# Patient Record
Sex: MALE | Race: WHITE | Employment: OTHER | URBAN - METROPOLITAN AREA
[De-identification: names, ages, dates, MRNs, and addresses within clinical notes are randomized per-mention and may not be internally consistent; named-entity substitution may affect disease eponyms.]

---

## 2017-04-03 DIAGNOSIS — E11.9 TYPE 2 DIABETES MELLITUS WITHOUT COMPLICATION, WITHOUT LONG-TERM CURRENT USE OF INSULIN (HCC): Chronic | ICD-10-CM

## 2017-04-03 DIAGNOSIS — I10 ESSENTIAL HYPERTENSION, BENIGN: Chronic | ICD-10-CM

## 2017-04-03 RX ORDER — LISINOPRIL 2.5 MG/1
TABLET ORAL
Qty: 90 TABLET | Refills: 0 | Status: SHIPPED | OUTPATIENT
Start: 2017-04-03 | End: 2017-07-05 | Stop reason: SDUPTHER

## 2017-04-03 RX ORDER — BLOOD-GLUCOSE METER
KIT MISCELLANEOUS
Qty: 300 STRIP | Refills: 0 | Status: SHIPPED | OUTPATIENT
Start: 2017-04-03 | End: 2017-07-05 | Stop reason: SDUPTHER

## 2017-04-06 ENCOUNTER — OFFICE VISIT (OUTPATIENT)
Dept: FAMILY MEDICINE CLINIC | Age: 77
End: 2017-04-06

## 2017-04-06 VITALS
DIASTOLIC BLOOD PRESSURE: 80 MMHG | RESPIRATION RATE: 16 BRPM | SYSTOLIC BLOOD PRESSURE: 124 MMHG | BODY MASS INDEX: 36.47 KG/M2 | WEIGHT: 232.4 LBS | HEIGHT: 67 IN

## 2017-04-06 DIAGNOSIS — E11.8 TYPE 2 DIABETES MELLITUS WITH COMPLICATION, WITHOUT LONG-TERM CURRENT USE OF INSULIN (HCC): Primary | Chronic | ICD-10-CM

## 2017-04-06 DIAGNOSIS — I25.10 CORONARY ARTERY DISEASE DUE TO LIPID RICH PLAQUE: Chronic | ICD-10-CM

## 2017-04-06 DIAGNOSIS — I10 ESSENTIAL HYPERTENSION, BENIGN: Chronic | ICD-10-CM

## 2017-04-06 DIAGNOSIS — I25.83 CORONARY ARTERY DISEASE DUE TO LIPID RICH PLAQUE: Chronic | ICD-10-CM

## 2017-04-06 DIAGNOSIS — E78.00 PURE HYPERCHOLESTEROLEMIA: Chronic | ICD-10-CM

## 2017-04-06 LAB — HBA1C MFR BLD: 7.6 %

## 2017-04-06 PROCEDURE — 3017F COLORECTAL CA SCREEN DOC REV: CPT | Performed by: FAMILY MEDICINE

## 2017-04-06 PROCEDURE — 83036 HEMOGLOBIN GLYCOSYLATED A1C: CPT | Performed by: FAMILY MEDICINE

## 2017-04-06 PROCEDURE — 4040F PNEUMOC VAC/ADMIN/RCVD: CPT | Performed by: FAMILY MEDICINE

## 2017-04-06 PROCEDURE — 3045F PR MOST RECENT HEMOGLOBIN A1C LEVEL 7.0-9.0%: CPT | Performed by: FAMILY MEDICINE

## 2017-04-06 PROCEDURE — G8598 ASA/ANTIPLAT THER USED: HCPCS | Performed by: FAMILY MEDICINE

## 2017-04-06 PROCEDURE — G8417 CALC BMI ABV UP PARAM F/U: HCPCS | Performed by: FAMILY MEDICINE

## 2017-04-06 PROCEDURE — 1123F ACP DISCUSS/DSCN MKR DOCD: CPT | Performed by: FAMILY MEDICINE

## 2017-04-06 PROCEDURE — 4004F PT TOBACCO SCREEN RCVD TLK: CPT | Performed by: FAMILY MEDICINE

## 2017-04-06 PROCEDURE — G8427 DOCREV CUR MEDS BY ELIG CLIN: HCPCS | Performed by: FAMILY MEDICINE

## 2017-04-06 PROCEDURE — 99214 OFFICE O/P EST MOD 30 MIN: CPT | Performed by: FAMILY MEDICINE

## 2017-04-06 RX ORDER — ATORVASTATIN CALCIUM 40 MG/1
TABLET, FILM COATED ORAL
Qty: 90 TABLET | Refills: 0 | Status: SHIPPED | OUTPATIENT
Start: 2017-04-06 | End: 2017-08-02 | Stop reason: SDUPTHER

## 2017-04-06 ASSESSMENT — ENCOUNTER SYMPTOMS
WHEEZING: 0
SHORTNESS OF BREATH: 1
COUGH: 1
CHOKING: 0
CHEST TIGHTNESS: 0

## 2017-04-06 ASSESSMENT — PATIENT HEALTH QUESTIONNAIRE - PHQ9
1. LITTLE INTEREST OR PLEASURE IN DOING THINGS: 0
2. FEELING DOWN, DEPRESSED OR HOPELESS: 0
SUM OF ALL RESPONSES TO PHQ QUESTIONS 1-9: 0
SUM OF ALL RESPONSES TO PHQ9 QUESTIONS 1 & 2: 0

## 2017-04-07 LAB
CREATININE URINE: 106.8 MG/DL (ref 39–259)
MICROALBUMIN UR-MCNC: 8 MG/DL
MICROALBUMIN/CREAT UR-RTO: 74.9 MG/G (ref 0–30)

## 2017-05-02 RX ORDER — GLIPIZIDE 5 MG/1
TABLET ORAL
Qty: 270 TABLET | Refills: 0 | Status: SHIPPED | OUTPATIENT
Start: 2017-05-02 | End: 2017-08-08 | Stop reason: SDUPTHER

## 2017-06-05 ENCOUNTER — OFFICE VISIT (OUTPATIENT)
Dept: FAMILY MEDICINE CLINIC | Age: 77
End: 2017-06-05

## 2017-06-05 VITALS
HEIGHT: 67 IN | OXYGEN SATURATION: 93 % | HEART RATE: 72 BPM | WEIGHT: 236 LBS | SYSTOLIC BLOOD PRESSURE: 136 MMHG | TEMPERATURE: 97.6 F | RESPIRATION RATE: 18 BRPM | DIASTOLIC BLOOD PRESSURE: 70 MMHG | BODY MASS INDEX: 37.04 KG/M2

## 2017-06-05 DIAGNOSIS — J44.1 COPD EXACERBATION (HCC): Primary | ICD-10-CM

## 2017-06-05 PROCEDURE — 94640 AIRWAY INHALATION TREATMENT: CPT | Performed by: NURSE PRACTITIONER

## 2017-06-05 PROCEDURE — G8427 DOCREV CUR MEDS BY ELIG CLIN: HCPCS | Performed by: NURSE PRACTITIONER

## 2017-06-05 PROCEDURE — 3023F SPIROM DOC REV: CPT | Performed by: NURSE PRACTITIONER

## 2017-06-05 PROCEDURE — 4040F PNEUMOC VAC/ADMIN/RCVD: CPT | Performed by: NURSE PRACTITIONER

## 2017-06-05 PROCEDURE — 3017F COLORECTAL CA SCREEN DOC REV: CPT | Performed by: NURSE PRACTITIONER

## 2017-06-05 PROCEDURE — G8417 CALC BMI ABV UP PARAM F/U: HCPCS | Performed by: NURSE PRACTITIONER

## 2017-06-05 PROCEDURE — 99214 OFFICE O/P EST MOD 30 MIN: CPT | Performed by: NURSE PRACTITIONER

## 2017-06-05 PROCEDURE — G8598 ASA/ANTIPLAT THER USED: HCPCS | Performed by: NURSE PRACTITIONER

## 2017-06-05 PROCEDURE — 1123F ACP DISCUSS/DSCN MKR DOCD: CPT | Performed by: NURSE PRACTITIONER

## 2017-06-05 PROCEDURE — 4004F PT TOBACCO SCREEN RCVD TLK: CPT | Performed by: NURSE PRACTITIONER

## 2017-06-05 PROCEDURE — G8926 SPIRO NO PERF OR DOC: HCPCS | Performed by: NURSE PRACTITIONER

## 2017-06-05 RX ORDER — ALBUTEROL SULFATE 2.5 MG/3ML
2.5 SOLUTION RESPIRATORY (INHALATION) ONCE
Status: COMPLETED | OUTPATIENT
Start: 2017-06-05 | End: 2017-06-05

## 2017-06-05 RX ORDER — ALBUTEROL SULFATE 90 UG/1
2 AEROSOL, METERED RESPIRATORY (INHALATION) EVERY 4 HOURS PRN
Qty: 1 INHALER | Refills: 3 | Status: SHIPPED | OUTPATIENT
Start: 2017-06-05 | End: 2018-05-15 | Stop reason: ALTCHOICE

## 2017-06-05 RX ORDER — SULFAMETHOXAZOLE AND TRIMETHOPRIM 800; 160 MG/1; MG/1
1 TABLET ORAL 2 TIMES DAILY
Qty: 20 TABLET | Refills: 0 | Status: SHIPPED | OUTPATIENT
Start: 2017-06-05 | End: 2017-06-15

## 2017-06-05 RX ORDER — PREDNISONE 10 MG/1
TABLET ORAL
Qty: 10 TABLET | Refills: 0 | Status: SHIPPED | OUTPATIENT
Start: 2017-06-05 | End: 2017-06-19 | Stop reason: ALTCHOICE

## 2017-06-05 RX ADMIN — ALBUTEROL SULFATE 2.5 MG: 2.5 SOLUTION RESPIRATORY (INHALATION) at 08:51

## 2017-06-19 ENCOUNTER — OFFICE VISIT (OUTPATIENT)
Dept: FAMILY MEDICINE CLINIC | Age: 77
End: 2017-06-19

## 2017-06-19 VITALS
BODY MASS INDEX: 36.76 KG/M2 | OXYGEN SATURATION: 96 % | WEIGHT: 234.2 LBS | SYSTOLIC BLOOD PRESSURE: 124 MMHG | DIASTOLIC BLOOD PRESSURE: 72 MMHG | HEIGHT: 67 IN | RESPIRATION RATE: 16 BRPM | HEART RATE: 79 BPM

## 2017-06-19 DIAGNOSIS — E11.59 TYPE 2 DIABETES MELLITUS WITH OTHER CIRCULATORY COMPLICATION, WITHOUT LONG-TERM CURRENT USE OF INSULIN (HCC): Chronic | ICD-10-CM

## 2017-06-19 DIAGNOSIS — J44.1 COPD WITH EXACERBATION (HCC): Primary | ICD-10-CM

## 2017-06-19 PROCEDURE — G8926 SPIRO NO PERF OR DOC: HCPCS | Performed by: FAMILY MEDICINE

## 2017-06-19 PROCEDURE — G8598 ASA/ANTIPLAT THER USED: HCPCS | Performed by: FAMILY MEDICINE

## 2017-06-19 PROCEDURE — 1123F ACP DISCUSS/DSCN MKR DOCD: CPT | Performed by: FAMILY MEDICINE

## 2017-06-19 PROCEDURE — 99214 OFFICE O/P EST MOD 30 MIN: CPT | Performed by: FAMILY MEDICINE

## 2017-06-19 PROCEDURE — 3017F COLORECTAL CA SCREEN DOC REV: CPT | Performed by: FAMILY MEDICINE

## 2017-06-19 PROCEDURE — 3046F HEMOGLOBIN A1C LEVEL >9.0%: CPT | Performed by: FAMILY MEDICINE

## 2017-06-19 PROCEDURE — G8427 DOCREV CUR MEDS BY ELIG CLIN: HCPCS | Performed by: FAMILY MEDICINE

## 2017-06-19 PROCEDURE — 3023F SPIROM DOC REV: CPT | Performed by: FAMILY MEDICINE

## 2017-06-19 PROCEDURE — 4004F PT TOBACCO SCREEN RCVD TLK: CPT | Performed by: FAMILY MEDICINE

## 2017-06-19 PROCEDURE — 4040F PNEUMOC VAC/ADMIN/RCVD: CPT | Performed by: FAMILY MEDICINE

## 2017-06-19 PROCEDURE — G8417 CALC BMI ABV UP PARAM F/U: HCPCS | Performed by: FAMILY MEDICINE

## 2017-06-19 RX ORDER — BUDESONIDE AND FORMOTEROL FUMARATE DIHYDRATE 160; 4.5 UG/1; UG/1
2 AEROSOL RESPIRATORY (INHALATION) 2 TIMES DAILY
Qty: 1 INHALER | Refills: 5 | Status: SHIPPED | OUTPATIENT
Start: 2017-06-19 | End: 2018-04-16

## 2017-07-05 ENCOUNTER — TELEPHONE (OUTPATIENT)
Dept: FAMILY MEDICINE CLINIC | Age: 77
End: 2017-07-05

## 2017-07-05 DIAGNOSIS — D50.8 ANEMIA, IRON DEFICIENCY, INADEQUATE DIETARY INTAKE: ICD-10-CM

## 2017-07-05 DIAGNOSIS — E78.00 PURE HYPERCHOLESTEROLEMIA: Primary | Chronic | ICD-10-CM

## 2017-07-05 DIAGNOSIS — I10 ESSENTIAL HYPERTENSION, BENIGN: Chronic | ICD-10-CM

## 2017-07-05 DIAGNOSIS — E55.9 VITAMIN D DEFICIENCY: ICD-10-CM

## 2017-07-05 DIAGNOSIS — I25.10 CORONARY ARTERY DISEASE DUE TO LIPID RICH PLAQUE: Chronic | ICD-10-CM

## 2017-07-05 DIAGNOSIS — E11.9 TYPE 2 DIABETES MELLITUS WITHOUT COMPLICATION, WITHOUT LONG-TERM CURRENT USE OF INSULIN (HCC): Chronic | ICD-10-CM

## 2017-07-05 DIAGNOSIS — Z51.81 MEDICATION MONITORING ENCOUNTER: ICD-10-CM

## 2017-07-05 DIAGNOSIS — I25.83 CORONARY ARTERY DISEASE DUE TO LIPID RICH PLAQUE: Chronic | ICD-10-CM

## 2017-07-05 DIAGNOSIS — K59.03 DRUG-INDUCED CONSTIPATION: Chronic | ICD-10-CM

## 2017-07-05 RX ORDER — BLOOD-GLUCOSE METER
KIT MISCELLANEOUS
Qty: 300 STRIP | Refills: 0 | Status: SHIPPED | OUTPATIENT
Start: 2017-07-05 | End: 2017-10-01 | Stop reason: SDUPTHER

## 2017-07-05 RX ORDER — LISINOPRIL 5 MG/1
5 TABLET ORAL DAILY
Qty: 90 TABLET | Refills: 0 | Status: SHIPPED | OUTPATIENT
Start: 2017-07-05 | End: 2017-10-16 | Stop reason: SDUPTHER

## 2017-08-02 DIAGNOSIS — I25.10 CORONARY ARTERY DISEASE DUE TO LIPID RICH PLAQUE: Chronic | ICD-10-CM

## 2017-08-02 DIAGNOSIS — I25.83 CORONARY ARTERY DISEASE DUE TO LIPID RICH PLAQUE: Chronic | ICD-10-CM

## 2017-08-02 DIAGNOSIS — E78.00 PURE HYPERCHOLESTEROLEMIA: Chronic | ICD-10-CM

## 2017-08-02 RX ORDER — ATORVASTATIN CALCIUM 40 MG/1
TABLET, FILM COATED ORAL
Qty: 90 TABLET | Refills: 0 | Status: SHIPPED | OUTPATIENT
Start: 2017-08-02 | End: 2017-08-08 | Stop reason: SDUPTHER

## 2017-08-08 DIAGNOSIS — I25.10 CORONARY ARTERY DISEASE DUE TO LIPID RICH PLAQUE: Chronic | ICD-10-CM

## 2017-08-08 DIAGNOSIS — E78.00 PURE HYPERCHOLESTEROLEMIA: Chronic | ICD-10-CM

## 2017-08-08 DIAGNOSIS — I25.83 CORONARY ARTERY DISEASE DUE TO LIPID RICH PLAQUE: Chronic | ICD-10-CM

## 2017-08-08 DIAGNOSIS — E11.8 TYPE 2 DIABETES MELLITUS WITH COMPLICATION, WITHOUT LONG-TERM CURRENT USE OF INSULIN (HCC): Chronic | ICD-10-CM

## 2017-08-08 DIAGNOSIS — I10 ESSENTIAL HYPERTENSION, BENIGN: Chronic | ICD-10-CM

## 2017-08-08 RX ORDER — GLIPIZIDE 5 MG/1
TABLET ORAL
Qty: 270 TABLET | Refills: 0 | Status: SHIPPED | OUTPATIENT
Start: 2017-08-08 | End: 2017-10-31 | Stop reason: SDUPTHER

## 2017-08-08 RX ORDER — ATORVASTATIN CALCIUM 40 MG/1
TABLET, FILM COATED ORAL
Qty: 90 TABLET | Refills: 0 | Status: SHIPPED | OUTPATIENT
Start: 2017-08-08 | End: 2017-12-29 | Stop reason: SDUPTHER

## 2017-08-14 ENCOUNTER — OFFICE VISIT (OUTPATIENT)
Dept: FAMILY MEDICINE CLINIC | Age: 77
End: 2017-08-14

## 2017-08-14 VITALS
RESPIRATION RATE: 16 BRPM | WEIGHT: 235.8 LBS | HEIGHT: 67 IN | OXYGEN SATURATION: 95 % | SYSTOLIC BLOOD PRESSURE: 136 MMHG | BODY MASS INDEX: 37.01 KG/M2 | HEART RATE: 68 BPM | DIASTOLIC BLOOD PRESSURE: 68 MMHG

## 2017-08-14 DIAGNOSIS — M79.89 LEG SWELLING: ICD-10-CM

## 2017-08-14 DIAGNOSIS — E55.9 VITAMIN D DEFICIENCY: ICD-10-CM

## 2017-08-14 DIAGNOSIS — G47.33 OBSTRUCTIVE SLEEP APNEA: Chronic | ICD-10-CM

## 2017-08-14 DIAGNOSIS — I25.10 CORONARY ARTERY DISEASE DUE TO LIPID RICH PLAQUE: Chronic | ICD-10-CM

## 2017-08-14 DIAGNOSIS — E78.00 PURE HYPERCHOLESTEROLEMIA: Chronic | ICD-10-CM

## 2017-08-14 DIAGNOSIS — Z23 NEED FOR PROPHYLACTIC VACCINATION AGAINST STREPTOCOCCUS PNEUMONIAE (PNEUMOCOCCUS): ICD-10-CM

## 2017-08-14 DIAGNOSIS — I25.83 CORONARY ARTERY DISEASE DUE TO LIPID RICH PLAQUE: Chronic | ICD-10-CM

## 2017-08-14 DIAGNOSIS — C44.91 RECURRENT BCC (BASAL CELL CARCINOMA): Chronic | ICD-10-CM

## 2017-08-14 DIAGNOSIS — K59.03 DRUG-INDUCED CONSTIPATION: Chronic | ICD-10-CM

## 2017-08-14 DIAGNOSIS — F17.200 TOBACCO USE DISORDER: Chronic | ICD-10-CM

## 2017-08-14 DIAGNOSIS — E11.8 TYPE 2 DIABETES MELLITUS WITH COMPLICATION, WITHOUT LONG-TERM CURRENT USE OF INSULIN (HCC): Primary | Chronic | ICD-10-CM

## 2017-08-14 DIAGNOSIS — Z51.81 MEDICATION MONITORING ENCOUNTER: ICD-10-CM

## 2017-08-14 DIAGNOSIS — I10 ESSENTIAL HYPERTENSION, BENIGN: Chronic | ICD-10-CM

## 2017-08-14 LAB
A/G RATIO: 2 (ref 1.1–2.2)
ALBUMIN SERPL-MCNC: 4.7 G/DL (ref 3.4–5)
ALP BLD-CCNC: 55 U/L (ref 40–129)
ALT SERPL-CCNC: 18 U/L (ref 10–40)
ANION GAP SERPL CALCULATED.3IONS-SCNC: 16 MMOL/L (ref 3–16)
AST SERPL-CCNC: 13 U/L (ref 15–37)
BASOPHILS ABSOLUTE: 0.1 K/UL (ref 0–0.2)
BASOPHILS RELATIVE PERCENT: 0.8 %
BILIRUB SERPL-MCNC: 0.4 MG/DL (ref 0–1)
BUN BLDV-MCNC: 16 MG/DL (ref 7–20)
CALCIUM SERPL-MCNC: 9.5 MG/DL (ref 8.3–10.6)
CHLORIDE BLD-SCNC: 101 MMOL/L (ref 99–110)
CHOLESTEROL, TOTAL: 139 MG/DL (ref 0–199)
CO2: 26 MMOL/L (ref 21–32)
CREAT SERPL-MCNC: 0.8 MG/DL (ref 0.8–1.3)
EOSINOPHILS ABSOLUTE: 0.1 K/UL (ref 0–0.6)
EOSINOPHILS RELATIVE PERCENT: 1.2 %
GFR AFRICAN AMERICAN: >60
GFR NON-AFRICAN AMERICAN: >60
GLOBULIN: 2.4 G/DL
GLUCOSE BLD-MCNC: 122 MG/DL (ref 70–99)
HBA1C MFR BLD: 8.2 %
HCT VFR BLD CALC: 47.8 % (ref 40.5–52.5)
HDLC SERPL-MCNC: 37 MG/DL (ref 40–60)
HEMOGLOBIN: 15.8 G/DL (ref 13.5–17.5)
HOMOCYSTEINE: 12 UMOL/L (ref 0–10)
LDL CHOLESTEROL CALCULATED: 74 MG/DL
LYMPHOCYTES ABSOLUTE: 2.7 K/UL (ref 1–5.1)
LYMPHOCYTES RELATIVE PERCENT: 30.3 %
MAGNESIUM: 2.1 MG/DL (ref 1.8–2.4)
MCH RBC QN AUTO: 32.3 PG (ref 26–34)
MCHC RBC AUTO-ENTMCNC: 33.1 G/DL (ref 31–36)
MCV RBC AUTO: 97.6 FL (ref 80–100)
MONOCYTES ABSOLUTE: 0.5 K/UL (ref 0–1.3)
MONOCYTES RELATIVE PERCENT: 5.9 %
NEUTROPHILS ABSOLUTE: 5.5 K/UL (ref 1.7–7.7)
NEUTROPHILS RELATIVE PERCENT: 61.8 %
PDW BLD-RTO: 14.8 % (ref 12.4–15.4)
PLATELET # BLD: 273 K/UL (ref 135–450)
PMV BLD AUTO: 7.9 FL (ref 5–10.5)
POTASSIUM SERPL-SCNC: 5.4 MMOL/L (ref 3.5–5.1)
RBC # BLD: 4.9 M/UL (ref 4.2–5.9)
SODIUM BLD-SCNC: 143 MMOL/L (ref 136–145)
TOTAL PROTEIN: 7.1 G/DL (ref 6.4–8.2)
TRIGL SERPL-MCNC: 138 MG/DL (ref 0–150)
VITAMIN B-12: 314 PG/ML (ref 211–911)
VITAMIN D 25-HYDROXY: 31.5 NG/ML
VLDLC SERPL CALC-MCNC: 28 MG/DL
WBC # BLD: 9 K/UL (ref 4–11)

## 2017-08-14 PROCEDURE — 4040F PNEUMOC VAC/ADMIN/RCVD: CPT | Performed by: FAMILY MEDICINE

## 2017-08-14 PROCEDURE — 3017F COLORECTAL CA SCREEN DOC REV: CPT | Performed by: FAMILY MEDICINE

## 2017-08-14 PROCEDURE — 90670 PCV13 VACCINE IM: CPT | Performed by: FAMILY MEDICINE

## 2017-08-14 PROCEDURE — G0009 ADMIN PNEUMOCOCCAL VACCINE: HCPCS | Performed by: FAMILY MEDICINE

## 2017-08-14 PROCEDURE — 1123F ACP DISCUSS/DSCN MKR DOCD: CPT | Performed by: FAMILY MEDICINE

## 2017-08-14 PROCEDURE — G8427 DOCREV CUR MEDS BY ELIG CLIN: HCPCS | Performed by: FAMILY MEDICINE

## 2017-08-14 PROCEDURE — 4004F PT TOBACCO SCREEN RCVD TLK: CPT | Performed by: FAMILY MEDICINE

## 2017-08-14 PROCEDURE — 99215 OFFICE O/P EST HI 40 MIN: CPT | Performed by: FAMILY MEDICINE

## 2017-08-14 PROCEDURE — G8598 ASA/ANTIPLAT THER USED: HCPCS | Performed by: FAMILY MEDICINE

## 2017-08-14 PROCEDURE — 83036 HEMOGLOBIN GLYCOSYLATED A1C: CPT | Performed by: FAMILY MEDICINE

## 2017-08-14 PROCEDURE — 3046F HEMOGLOBIN A1C LEVEL >9.0%: CPT | Performed by: FAMILY MEDICINE

## 2017-08-14 PROCEDURE — 36415 COLL VENOUS BLD VENIPUNCTURE: CPT | Performed by: FAMILY MEDICINE

## 2017-08-14 PROCEDURE — G8417 CALC BMI ABV UP PARAM F/U: HCPCS | Performed by: FAMILY MEDICINE

## 2017-08-14 RX ORDER — CEPHALEXIN 500 MG/1
CAPSULE ORAL
Refills: 0 | COMMUNITY
Start: 2017-08-10 | End: 2017-09-27 | Stop reason: ALTCHOICE

## 2017-08-14 ASSESSMENT — ENCOUNTER SYMPTOMS
COUGH: 0
CHEST TIGHTNESS: 0
CHOKING: 0
WHEEZING: 1
SHORTNESS OF BREATH: 1
APNEA: 1

## 2017-09-12 ENCOUNTER — CARE COORDINATION (OUTPATIENT)
Dept: CARE COORDINATION | Age: 77
End: 2017-09-12

## 2017-09-22 ENCOUNTER — OFFICE VISIT (OUTPATIENT)
Dept: FAMILY MEDICINE CLINIC | Age: 77
End: 2017-09-22

## 2017-09-22 VITALS
HEART RATE: 62 BPM | RESPIRATION RATE: 24 BRPM | OXYGEN SATURATION: 95 % | HEIGHT: 67 IN | WEIGHT: 233 LBS | BODY MASS INDEX: 36.57 KG/M2 | DIASTOLIC BLOOD PRESSURE: 60 MMHG | SYSTOLIC BLOOD PRESSURE: 120 MMHG

## 2017-09-22 DIAGNOSIS — R59.9 LYMPH NODE ENLARGEMENT: Primary | ICD-10-CM

## 2017-09-22 PROCEDURE — G8427 DOCREV CUR MEDS BY ELIG CLIN: HCPCS | Performed by: NURSE PRACTITIONER

## 2017-09-22 PROCEDURE — G8598 ASA/ANTIPLAT THER USED: HCPCS | Performed by: NURSE PRACTITIONER

## 2017-09-22 PROCEDURE — 4040F PNEUMOC VAC/ADMIN/RCVD: CPT | Performed by: NURSE PRACTITIONER

## 2017-09-22 PROCEDURE — 3017F COLORECTAL CA SCREEN DOC REV: CPT | Performed by: NURSE PRACTITIONER

## 2017-09-22 PROCEDURE — G8417 CALC BMI ABV UP PARAM F/U: HCPCS | Performed by: NURSE PRACTITIONER

## 2017-09-22 PROCEDURE — 99212 OFFICE O/P EST SF 10 MIN: CPT | Performed by: NURSE PRACTITIONER

## 2017-09-22 PROCEDURE — 1123F ACP DISCUSS/DSCN MKR DOCD: CPT | Performed by: NURSE PRACTITIONER

## 2017-09-22 PROCEDURE — 4004F PT TOBACCO SCREEN RCVD TLK: CPT | Performed by: NURSE PRACTITIONER

## 2017-09-22 RX ORDER — SULFAMETHOXAZOLE AND TRIMETHOPRIM 800; 160 MG/1; MG/1
1 TABLET ORAL 2 TIMES DAILY
Qty: 20 TABLET | Refills: 0 | Status: SHIPPED | OUTPATIENT
Start: 2017-09-22 | End: 2017-10-02

## 2017-09-24 ASSESSMENT — ENCOUNTER SYMPTOMS: FACIAL SWELLING: 1

## 2017-09-26 ENCOUNTER — NURSE ONLY (OUTPATIENT)
Dept: FAMILY MEDICINE CLINIC | Age: 77
End: 2017-09-26

## 2017-09-26 DIAGNOSIS — Z23 NEED FOR INFLUENZA VACCINATION: Primary | ICD-10-CM

## 2017-09-26 PROCEDURE — G0008 ADMIN INFLUENZA VIRUS VAC: HCPCS | Performed by: FAMILY MEDICINE

## 2017-09-26 PROCEDURE — 90662 IIV NO PRSV INCREASED AG IM: CPT | Performed by: FAMILY MEDICINE

## 2017-09-27 ENCOUNTER — OFFICE VISIT (OUTPATIENT)
Dept: FAMILY MEDICINE CLINIC | Age: 77
End: 2017-09-27

## 2017-09-27 VITALS
DIASTOLIC BLOOD PRESSURE: 82 MMHG | RESPIRATION RATE: 18 BRPM | HEART RATE: 75 BPM | SYSTOLIC BLOOD PRESSURE: 126 MMHG | HEIGHT: 67 IN | OXYGEN SATURATION: 93 % | BODY MASS INDEX: 36.32 KG/M2 | WEIGHT: 231.4 LBS

## 2017-09-27 DIAGNOSIS — K11.5 SIALOLITHIASIS OF SUBMANDIBULAR GLAND: Primary | ICD-10-CM

## 2017-09-27 DIAGNOSIS — N64.4 BREAST PAIN, RIGHT: ICD-10-CM

## 2017-09-27 PROCEDURE — 4040F PNEUMOC VAC/ADMIN/RCVD: CPT | Performed by: FAMILY MEDICINE

## 2017-09-27 PROCEDURE — 4004F PT TOBACCO SCREEN RCVD TLK: CPT | Performed by: FAMILY MEDICINE

## 2017-09-27 PROCEDURE — G8427 DOCREV CUR MEDS BY ELIG CLIN: HCPCS | Performed by: FAMILY MEDICINE

## 2017-09-27 PROCEDURE — G8417 CALC BMI ABV UP PARAM F/U: HCPCS | Performed by: FAMILY MEDICINE

## 2017-09-27 PROCEDURE — 3017F COLORECTAL CA SCREEN DOC REV: CPT | Performed by: FAMILY MEDICINE

## 2017-09-27 PROCEDURE — 99213 OFFICE O/P EST LOW 20 MIN: CPT | Performed by: FAMILY MEDICINE

## 2017-09-27 PROCEDURE — G8598 ASA/ANTIPLAT THER USED: HCPCS | Performed by: FAMILY MEDICINE

## 2017-09-27 PROCEDURE — 1123F ACP DISCUSS/DSCN MKR DOCD: CPT | Performed by: FAMILY MEDICINE

## 2017-09-27 NOTE — MR AVS SNAPSHOT
metFORMIN (GLUCOPHAGE) 1000 MG tablet Indications: Type 2 Diabetes TAKE 1 TABLET BY MOUTH TWICE DAILY WITH A MEAL    metoprolol tartrate (LOPRESSOR) 25 MG tablet TAKE 1/2 TABLET BY MOUTH TWICE DAILY    lisinopril (PRINIVIL;ZESTRIL) 5 MG tablet Take 1 tablet by mouth daily    FREESTYLE LITE strip TEST THREE TIMES DAILY    budesonide-formoterol (SYMBICORT) 160-4.5 MCG/ACT AERO Inhale 2 puffs into the lungs 2 times daily    Spacer/Aero-Holding Chambers (E-Z SPACER) JANET 1 Device by Does not apply route daily as needed (12)    albuterol sulfate HFA (VENTOLIN HFA) 108 (90 BASE) MCG/ACT inhaler Inhale 2 puffs into the lungs every 4 hours as needed for Wheezing or Shortness of Breath    FREESTYLE LANCETS MISC 1 each by In Vitro route 3 times daily    JANUVIA 100 MG tablet TAKE 1 TABLET BY MOUTH DAILY    docusate sodium (COLACE) 100 MG capsule Take 200 mg by mouth daily Indications: Constipation    aspirin 81 MG tablet Take 81 mg by mouth daily.       Allergies           No Known Allergies         Additional Information        Basic Information     Date Of Birth Sex Race Ethnicity Preferred Language    1940 Male White Non-/Non  English      Problem List as of 9/27/2017  Date Reviewed: 9/27/2017                Osteoarthritis of cervical spine with myelopathy    Cervical spondylosis without myelopathy    Narcotic dependency, continuous (HCC)    Cervical radiculitis    Lumbar stenosis    Drug-induced constipation (Chronic)    Disc displacement, lumbar (Chronic)    Facet arthropathy, cervical (HCC)    Cervical spine degeneration (Chronic)    Cervical stenosis of spinal canal    Spinal stenosis, lumbar region, without neurogenic claudication (Chronic)    Cervical radiculopathy, chronic (Chronic)    Low back pain (Chronic)    COPD (chronic obstructive pulmonary disease) (HCC) (Chronic)    Obesity (Chronic)    Degenerative disc disease, cervical    Postoperative anemia    CAD (coronary artery disease) (Chronic)

## 2017-09-27 NOTE — PROGRESS NOTES
(SYMBICORT) 160-4.5 MCG/ACT AERO Inhale 2 puffs into the lungs 2 times daily 1 Inhaler 5    Spacer/Aero-Holding Chambers (E-Z SPACER) JANET 1 Device by Does not apply route daily as needed (12) 1 Device 0    albuterol sulfate HFA (VENTOLIN HFA) 108 (90 BASE) MCG/ACT inhaler Inhale 2 puffs into the lungs every 4 hours as needed for Wheezing or Shortness of Breath 1 Inhaler 3    FREESTYLE LANCETS MISC 1 each by In Vitro route 3 times daily 300 each 1    JANUVIA 100 MG tablet TAKE 1 TABLET BY MOUTH DAILY 90 tablet 0    docusate sodium (COLACE) 100 MG capsule Take 200 mg by mouth daily Indications: Constipation      aspirin 81 MG tablet Take 81 mg by mouth daily. No current facility-administered medications for this visit. Review of Systems   Constitutional: Negative for activity change, chills, diaphoresis, fatigue, fever and unexpected weight change. Objective:   Physical Exam   Constitutional: He appears well-developed. No distress. Neck: Trachea normal and phonation normal. Neck supple. No thyroid mass and no thyromegaly present. Pulmonary/Chest:       Lymphadenopathy:        Head (right side): No submental, no tonsillar, no preauricular and no occipital adenopathy present. Head (left side): No submental, no tonsillar, no preauricular, no posterior auricular and no occipital adenopathy present. He has no cervical adenopathy. Right cervical: No superficial cervical, no deep cervical and no posterior cervical adenopathy present. Left cervical: No superficial cervical, no deep cervical and no posterior cervical adenopathy present. He has no axillary adenopathy. Right axillary: No pectoral and no lateral adenopathy present. Skin: He is not diaphoretic. Nursing note and vitals reviewed.     Vitals:    09/27/17 1638   BP: 126/82   Site: Left Arm   Position: Sitting   Cuff Size: Medium Adult   Pulse: 75   Resp: 18   SpO2: 93%   Weight: 231 lb 6.4 oz (105 kg) Height: 5' 7\" (1.702 m)     BP Readings from Last 3 Encounters:   09/27/17 126/82   09/22/17 120/60   08/14/17 136/68     Pulse Readings from Last 3 Encounters:   09/27/17 75   09/22/17 62   08/14/17 68     Wt Readings from Last 3 Encounters:   09/27/17 231 lb 6.4 oz (105 kg)   09/22/17 233 lb (105.7 kg)   08/14/17 235 lb 12.8 oz (107 kg)     Body mass index is 36.24 kg/(m^2). Assessment:      1. Sialolithiasis of submandibular gland - right    2. Breast pain, right          Plan:      Aries Arroyo was seen today for other and other. Diagnoses and all orders for this visit:    Sialolithiasis of submandibular gland - right  Suck on lemon drops to help constantly. Zinc Lozenges (Basic or Cold Artem brands are common.)   Keep blood sugars down ot avoid an infection. Call for ENT referral if not getting better. Breast pain, right  -     diclofenac sodium (VOLTAREN) 1 % GEL; Apply 4 g topically 4 times daily To joint - can use this any where. Try heat to area once cream above dry or 4 hours after over the counter sports cream put on.   If not getting better may need a mammogram.      Salivary Gland Stone: Care Instructions  Your Care Instructions

## 2017-10-02 RX ORDER — BLOOD-GLUCOSE METER
KIT MISCELLANEOUS
Qty: 300 STRIP | Refills: 0 | Status: SHIPPED | OUTPATIENT
Start: 2017-10-02 | End: 2017-12-30 | Stop reason: SDUPTHER

## 2017-10-02 RX ORDER — LANCETS 28 GAUGE
EACH MISCELLANEOUS
Qty: 300 EACH | Refills: 0 | Status: SHIPPED | OUTPATIENT
Start: 2017-10-02 | End: 2017-12-30 | Stop reason: SDUPTHER

## 2017-10-14 DIAGNOSIS — I10 ESSENTIAL HYPERTENSION, BENIGN: Chronic | ICD-10-CM

## 2017-10-14 DIAGNOSIS — E11.9 TYPE 2 DIABETES MELLITUS WITHOUT COMPLICATION, WITHOUT LONG-TERM CURRENT USE OF INSULIN (HCC): Chronic | ICD-10-CM

## 2017-10-16 ENCOUNTER — TELEPHONE (OUTPATIENT)
Dept: FAMILY MEDICINE CLINIC | Age: 77
End: 2017-10-16

## 2017-10-16 DIAGNOSIS — I10 ESSENTIAL HYPERTENSION, BENIGN: Chronic | ICD-10-CM

## 2017-10-16 DIAGNOSIS — I10 ESSENTIAL HYPERTENSION, BENIGN: Primary | Chronic | ICD-10-CM

## 2017-10-16 DIAGNOSIS — E11.9 TYPE 2 DIABETES MELLITUS WITHOUT COMPLICATION, WITHOUT LONG-TERM CURRENT USE OF INSULIN (HCC): Chronic | ICD-10-CM

## 2017-10-16 RX ORDER — LISINOPRIL 5 MG/1
5 TABLET ORAL DAILY
Qty: 30 TABLET | Refills: 0 | Status: SHIPPED | OUTPATIENT
Start: 2017-10-16 | End: 2017-10-16 | Stop reason: SDUPTHER

## 2017-10-16 RX ORDER — LISINOPRIL 5 MG/1
5 TABLET ORAL DAILY
Qty: 90 TABLET | Refills: 1 | OUTPATIENT
Start: 2017-10-16

## 2017-10-17 RX ORDER — LISINOPRIL 5 MG/1
5 TABLET ORAL DAILY
Qty: 90 TABLET | Refills: 0 | Status: SHIPPED | OUTPATIENT
Start: 2017-10-17 | End: 2018-01-12 | Stop reason: SDUPTHER

## 2017-10-31 DIAGNOSIS — I25.83 CORONARY ARTERY DISEASE DUE TO LIPID RICH PLAQUE: Chronic | ICD-10-CM

## 2017-10-31 DIAGNOSIS — E11.8 TYPE 2 DIABETES MELLITUS WITH COMPLICATION, WITHOUT LONG-TERM CURRENT USE OF INSULIN (HCC): Chronic | ICD-10-CM

## 2017-10-31 DIAGNOSIS — I10 ESSENTIAL HYPERTENSION, BENIGN: Chronic | ICD-10-CM

## 2017-10-31 DIAGNOSIS — I25.10 CORONARY ARTERY DISEASE DUE TO LIPID RICH PLAQUE: Chronic | ICD-10-CM

## 2017-10-31 RX ORDER — GLIPIZIDE 5 MG/1
TABLET ORAL
Qty: 270 TABLET | Refills: 0 | Status: SHIPPED | OUTPATIENT
Start: 2017-10-31 | End: 2017-11-09 | Stop reason: SDUPTHER

## 2017-11-09 ENCOUNTER — TELEPHONE (OUTPATIENT)
Dept: FAMILY MEDICINE CLINIC | Age: 77
End: 2017-11-09

## 2017-11-09 DIAGNOSIS — I25.83 CORONARY ARTERY DISEASE DUE TO LIPID RICH PLAQUE: Chronic | ICD-10-CM

## 2017-11-09 DIAGNOSIS — E11.8 TYPE 2 DIABETES MELLITUS WITH COMPLICATION, WITHOUT LONG-TERM CURRENT USE OF INSULIN (HCC): Chronic | ICD-10-CM

## 2017-11-09 DIAGNOSIS — I25.10 CORONARY ARTERY DISEASE DUE TO LIPID RICH PLAQUE: Chronic | ICD-10-CM

## 2017-11-09 DIAGNOSIS — I10 ESSENTIAL HYPERTENSION, BENIGN: Chronic | ICD-10-CM

## 2017-11-09 RX ORDER — GLIPIZIDE 5 MG/1
TABLET ORAL
Qty: 270 TABLET | Refills: 0 | Status: SHIPPED | OUTPATIENT
Start: 2017-11-09 | End: 2018-04-16 | Stop reason: SDUPTHER

## 2017-12-04 ENCOUNTER — OFFICE VISIT (OUTPATIENT)
Dept: FAMILY MEDICINE CLINIC | Age: 77
End: 2017-12-04

## 2017-12-04 VITALS
DIASTOLIC BLOOD PRESSURE: 68 MMHG | RESPIRATION RATE: 20 BRPM | SYSTOLIC BLOOD PRESSURE: 118 MMHG | WEIGHT: 234.4 LBS | HEIGHT: 67 IN | OXYGEN SATURATION: 94 % | BODY MASS INDEX: 36.79 KG/M2 | HEART RATE: 70 BPM

## 2017-12-04 DIAGNOSIS — F11.20 OPIOID TYPE DEPENDENCE, CONTINUOUS (HCC): ICD-10-CM

## 2017-12-04 DIAGNOSIS — J42 BRONCHITIS, CHRONIC WITH ACUTE EXACERBATION (HCC): ICD-10-CM

## 2017-12-04 DIAGNOSIS — J20.9 BRONCHITIS, CHRONIC WITH ACUTE EXACERBATION (HCC): ICD-10-CM

## 2017-12-04 DIAGNOSIS — M47.818 SI JOINT ARTHRITIS: ICD-10-CM

## 2017-12-04 DIAGNOSIS — E11.8 TYPE 2 DIABETES MELLITUS WITH COMPLICATION, WITHOUT LONG-TERM CURRENT USE OF INSULIN (HCC): Primary | Chronic | ICD-10-CM

## 2017-12-04 LAB — HBA1C MFR BLD: 7.6 %

## 2017-12-04 PROCEDURE — 3023F SPIROM DOC REV: CPT | Performed by: FAMILY MEDICINE

## 2017-12-04 PROCEDURE — G8926 SPIRO NO PERF OR DOC: HCPCS | Performed by: FAMILY MEDICINE

## 2017-12-04 PROCEDURE — 1123F ACP DISCUSS/DSCN MKR DOCD: CPT | Performed by: FAMILY MEDICINE

## 2017-12-04 PROCEDURE — 4004F PT TOBACCO SCREEN RCVD TLK: CPT | Performed by: FAMILY MEDICINE

## 2017-12-04 PROCEDURE — G8417 CALC BMI ABV UP PARAM F/U: HCPCS | Performed by: FAMILY MEDICINE

## 2017-12-04 PROCEDURE — G8598 ASA/ANTIPLAT THER USED: HCPCS | Performed by: FAMILY MEDICINE

## 2017-12-04 PROCEDURE — 99214 OFFICE O/P EST MOD 30 MIN: CPT | Performed by: FAMILY MEDICINE

## 2017-12-04 PROCEDURE — 83036 HEMOGLOBIN GLYCOSYLATED A1C: CPT | Performed by: FAMILY MEDICINE

## 2017-12-04 PROCEDURE — 3017F COLORECTAL CA SCREEN DOC REV: CPT | Performed by: FAMILY MEDICINE

## 2017-12-04 PROCEDURE — 3045F PR MOST RECENT HEMOGLOBIN A1C LEVEL 7.0-9.0%: CPT | Performed by: FAMILY MEDICINE

## 2017-12-04 PROCEDURE — G8427 DOCREV CUR MEDS BY ELIG CLIN: HCPCS | Performed by: FAMILY MEDICINE

## 2017-12-04 PROCEDURE — G8484 FLU IMMUNIZE NO ADMIN: HCPCS | Performed by: FAMILY MEDICINE

## 2017-12-04 PROCEDURE — 4040F PNEUMOC VAC/ADMIN/RCVD: CPT | Performed by: FAMILY MEDICINE

## 2017-12-04 RX ORDER — AMOXICILLIN AND CLAVULANATE POTASSIUM 875; 125 MG/1; MG/1
1 TABLET, FILM COATED ORAL 2 TIMES DAILY
Qty: 28 TABLET | Refills: 0 | Status: SHIPPED | OUTPATIENT
Start: 2017-12-04 | End: 2017-12-18

## 2017-12-04 ASSESSMENT — ENCOUNTER SYMPTOMS
SINUS PAIN: 0
SORE THROAT: 0
VOICE CHANGE: 0
TROUBLE SWALLOWING: 0
SINUS PRESSURE: 0
RHINORRHEA: 0
FACIAL SWELLING: 0

## 2017-12-04 NOTE — PATIENT INSTRUCTIONS
to loosen chest or head congestion) and Dextromethorphan ( to decrease excess cough). Robitussin D.M. Syrup every 4-6 hrs or Mucinex D. M. pills twice a day. Use the pediatric formulations for children over 6 months making sure they are alcohol & sugar free for children, pregnant women, and diabetics. 5. Pain And Fevers: Take Acetaminophen ( Tylenol) for fevers, aches, and headaches. 2-500 mg every 8 hours for adults. Appropriate doses at bedtime for children may help them sleep better. If pregnant take 1 -500 mg (Tylenol) every 8 hours as needed. Ibuprofen may be used if not pregnant, but should be given with food to avoid nausea. Avoid Ibuprofen if you have high blood pressure, CHF, or kidney problems. 6.Gargle: (DAY ONE OF SYMPTOMS) Gargle in the back of the throat with the head tilted back and to the sides with a strong mouthwash  ( Listerine or Scope) after meals and at bedtime at least 4 -5 times a day. This helps kill bacteria and viruses in the back of the throat and will shorten the duration and decrease the severity of your symptoms: sore throat, cough, ear popping,/ear pain, and possibly dizziness. 7. Smoking: Avoid smoking or exposure to second hand smoke. 8. Zinc: (DAY ONE OF SYMPTOMS)  Zinc lozenges such as Cold Artem (available most stores), or Basic ([employer] brand) will help shorten the duration and lessen symptoms such as sore throat, cough, nasal congestion, runny nose, and post nasal drip. Use 1 lozenge every 2-4 hours ( after meals if stomach is sensitive). Children can use 10-15 mg or less 3-4 times a day or Zinc lollypops. In pregnancy limit to 50-60 mg a day for 7 days as prenatals have Zinc also. With diarrhea use zinc pills 50 mg 1/2 to 1 pill 2x/day. 9. Vitamins: Vitamin C 500 mg with breakfast and dinner. Children and pregnant women should drink citrus juices. This speeds healing and strengthens immune system. 10. Chest Symptoms: Vicks Vapor rub to the chest at bedtime.   11.

## 2017-12-04 NOTE — PROGRESS NOTES
Subjective:      Patient ID: Virgen Walker is a 78 y.o. male. Chief Complaint   Patient presents with    COPD     EDUCATION    Cough     WANTS TO KNOW WHAT CAN BE DONE    Diabetes     3 MONTH ROUTINE FOLLOW UP     HPI    Type 2 diabetes mellitus with complication, without long-term current use of insulin ([facility]  -     POCT glycosylated hemoglobin (Hb A1C)  Is 140-160 in am on average. PM glucose . Unable to average PM glucose without a calculator due to the high degree a variability. Opioid type dependence, continuous (HCC)  Seeing pain Dr. Autsin Saunders joint arthritis ([facility] [address]  Listed [facility] diagnosis. Is being followed by pain management. Head congestion  Been there for 50 years. No fevers. Has no humidifier. Washes out CPAP weekly. He is using old equipment as they did not send new. Has not contacted them. He is taking a Nasal spray that says not to use more than 3 days (Afrin?).      Current Outpatient Prescriptions   Medication Sig Dispense Refill    metFORMIN (GLUCOPHAGE) 1000 MG tablet TAKE 1 TABLET BY MOUTH TWICE DAILY WITH A MEAL 180 tablet 0    metoprolol tartrate (LOPRESSOR) 25 MG tablet TAKE 1/2 TABLET BY MOUTH TWICE DAILY 90 tablet 0    glipiZIDE (GLUCOTROL) 5 MG tablet TAKE 1/2 TO 1 TABLET BY MOUTH THREE TIMES DAILY BEFORE MEALS 270 tablet 0    HYDROcodone-acetaminophen (NORCO)  MG per tablet Take 1 tablet by mouth 4 times daily  ICD M48.02. Earliest Fill Date: 11/30/17 120 tablet 0    lisinopril (PRINIVIL;ZESTRIL) 5 MG tablet TAKE 1 TABLET BY MOUTH DAILY 90 tablet 0    FREESTYLE LITE strip TEST THREE TIMES DAILY 300 strip 0    FREESTYLE LANCETS MISC TEST BLOOD SUGAR THREE TIMES DAILY 300 each 0    atorvastatin (LIPITOR) 40 MG tablet TAKE 1 TABLET BY MOUTH EVERY DAY 90 tablet 0    Spacer/Aero-Holding Chambers (E-Z SPACER) JANET 1 Device by Does not apply route daily as needed (12) 1 Device 0    JANUVIA 100 MG tablet TAKE 1 TABLET BY MOUTH DAILY 90 tablet 0    docusate sodium 16   GFR Non-African American >60   Magnesium 2.10   Glucose 122 (H)   Calcium 9.5   Total Protein 7.1   Cholesterol, Total 139   HDL Cholesterol 37 (L)   LDL Calculated 74   Triglycerides 138   VLDL CHOLESTEROL CALCULATED 28   Homocysteine 12 (H)   Albumin 4.7   Globulin 2.4   Albumin/Globulin Ratio 2.0   Alk Phos 55   ALT 18   AST 13 (L)   Bilirubin 0.4   Vit D, 25-Hydroxy 31.5   WBC 9.0   RBC 4.90   Hemoglobin Quant 15.8   Hematocrit 47.8   MCV 97.6   MCH 32.3   MCHC 33.1   MPV 7.9   RDW 14.8   Platelet Count 636   Neutrophils % 61.8   Lymphocyte % 30.3   Monocytes % 5.9   Eosinophils % 1.2   Basophils % 0.8   Neutrophils # 5.5   Lymphocytes # 2.7   Monocytes # 0.5   Eosinophils # 0.1   Basophils # 0.1   Vitamin B-12 314     Results for POC orders placed in visit on 12/04/17   POCT glycosylated hemoglobin (Hb A1C)   Result Value Ref Range    Hemoglobin A1C 7.6 %     Assessment:      1. Type 2 diabetes mellitus with complication, without long-term current use of insulin (HCC)    2. Opioid type dependence, continuous (HCC)    3. SI joint arthritis ([address]    4. Bronchitis, chronic with acute exacerbation ([address]          Plan:      - Counseling More Than 50% of the 25 min Appointment Time     Jose Cannon was seen today for copd, cough and diabetes. Diagnoses and all orders for this visit:    Type 2 diabetes mellitus with complication, without long-term current use of insulin (HCC)  -     POCT glycosylated hemoglobin (Hb A1C)  -     Moo Hernandez MD  Poor control for  4 years. Explained to the patient that poor diabetes control will result in increased risk of heart attack infections and amputations. Since I have not been able to gain control over a long period of time we'll have him see the specialist who can concentrate on this problem. Opioid type dependence, continuous ([address]  Per pain Doctor. SI joint arthritis ([address]  Per pain Doctor.     Bronchitis, chronic with acute exacerbation ([address] / Sinusitis  -     amoxicillin-clavulanate (AUGMENTIN) 875-125 MG per tablet; Take 1 tablet by mouth 2 times daily for 14 days  Discussed the risk of aspiration from reflux causing bronchitis and pneumonias and also is poorly treated COPD. Consistent use of his inhalers could help treat the cough. He need to send him to a pulmonologist to treat his COPD. Instructions for Respiratory Infections (SAVE THIS SHEET)  For the first 7-14 days of symptoms follow instructions below, even before being seen in the office or even during treatment with antibiotics, until symptom free. 1. Water: Drink 1 ounce of water for every 2 pounds of body weight for adults, 100 Ounces of water per day. This will loosen mucus in the head and chest & improve the weak feeling of dehydration, allow the body to get germ fighting resources to the infection. Half can be juice or sugar free Crystal Light. Don't count drinks with caffeine or carbonation. Infants can have Pedialyte liquid or freezer pops. Avoid salt if you have high Blood Pressure, swelling in the feet or ankles or have heart problems. 2. Humidity: Humidify the air to 35-50% ( or until the windows fog over slightly). Can use a humidifier, vaporizer, boil water on the stove or put a coffee can full of water on the heater vents. This will loosen mucus from infections and allergies. 3. Sleep: Get 8-10 hours a night and rest during the evening after work or school. If you have trouble sleeping, adults can take Melatonin 5mg up to 2 tabs at bedtime ( not for children or pregnant women). If Mono is suspected then sleep during 9PM to 9AM time span (if possible.)  4. Cough: Take cough medicines with Guaifenesin ( to loosen chest or head congestion) and Dextromethorphan ( to decrease excess cough). Robitussin D.M. Syrup every 4-6 hrs or Mucinex D. M. pills twice a day.  Use the pediatric formulations for children over 6 months making sure they are alcohol & sugar free for children, pregnant women, and diabetics. 5. Pain And Fevers: Take Acetaminophen ( Tylenol) for fevers, aches, and headaches. 2-500 mg every 8 hours for adults. Appropriate doses at bedtime for children may help them sleep better. If pregnant take 1 -500 mg (Tylenol) every 8 hours as needed. Ibuprofen may be used if not pregnant, but should be given with food to avoid nausea. Avoid Ibuprofen if you have high blood pressure, CHF, or kidney problems. 6.Gargle: (DAY ONE OF SYMPTOMS) Gargle in the back of the throat with the head tilted back and to the sides with a strong mouthwash  ( Listerine or Scope) after meals and at bedtime at least 4 -5 times a day. This helps kill bacteria and viruses in the back of the throat and will shorten the duration and decrease the severity of your symptoms: sore throat, cough, ear popping,/ear pain, and possibly dizziness. 7. Smoking: Avoid smoking or exposure to second hand smoke. 8. Zinc: (DAY ONE OF SYMPTOMS)  Zinc lozenges such as Cold Artem (available most stores), or Basic ([employer] brand) will help shorten the duration and lessen symptoms such as sore throat, cough, nasal congestion, runny nose, and post nasal drip. Use 1 lozenge every 2-4 hours ( after meals if stomach is sensitive). Children can use 10-15 mg or less 3-4 times a day or Zinc lollypops. In pregnancy limit to 50-60 mg a day for 7 days as prenatals have Zinc also. With diarrhea use zinc pills 50 mg 1/2 to 1 pill 2x/day. 9. Vitamins: Vitamin C 500 mg with breakfast and dinner. Children and pregnant women should drink citrus juices. This speeds healing and strengthens immune system. 10. Chest Symptoms: Vicks Vapor rub to the chest at bedtime. 11. Decongestants: Avoid all decongestants and antihistamine cold preparations in children. Try all of the above starting with day 1 of symptoms.  If Strep throat symptoms appear call to be seen in the office as soon as possible and don't gargle on

## 2017-12-29 DIAGNOSIS — I25.10 CORONARY ARTERY DISEASE DUE TO LIPID RICH PLAQUE: Chronic | ICD-10-CM

## 2017-12-29 DIAGNOSIS — E78.00 PURE HYPERCHOLESTEROLEMIA: Chronic | ICD-10-CM

## 2017-12-29 DIAGNOSIS — I25.83 CORONARY ARTERY DISEASE DUE TO LIPID RICH PLAQUE: Chronic | ICD-10-CM

## 2017-12-29 RX ORDER — ATORVASTATIN CALCIUM 40 MG/1
TABLET, FILM COATED ORAL
Qty: 90 TABLET | Refills: 0 | Status: SHIPPED | OUTPATIENT
Start: 2017-12-29 | End: 2018-03-29 | Stop reason: SDUPTHER

## 2018-01-02 RX ORDER — BLOOD-GLUCOSE METER
KIT MISCELLANEOUS
Qty: 300 STRIP | Refills: 0 | Status: SHIPPED | OUTPATIENT
Start: 2018-01-02 | End: 2018-07-26 | Stop reason: SDUPTHER

## 2018-01-02 RX ORDER — LANCETS 28 GAUGE
EACH MISCELLANEOUS
Qty: 300 EACH | Refills: 0 | Status: SHIPPED | OUTPATIENT
Start: 2018-01-02 | End: 2018-12-27 | Stop reason: SDUPTHER

## 2018-01-12 DIAGNOSIS — I10 ESSENTIAL HYPERTENSION, BENIGN: Chronic | ICD-10-CM

## 2018-01-12 DIAGNOSIS — E11.9 TYPE 2 DIABETES MELLITUS WITHOUT COMPLICATION, WITHOUT LONG-TERM CURRENT USE OF INSULIN (HCC): Chronic | ICD-10-CM

## 2018-01-12 RX ORDER — LISINOPRIL 5 MG/1
5 TABLET ORAL DAILY
Qty: 90 TABLET | Refills: 0 | Status: SHIPPED | OUTPATIENT
Start: 2018-01-12 | End: 2018-01-30 | Stop reason: SDUPTHER

## 2018-01-29 ENCOUNTER — TELEPHONE (OUTPATIENT)
Dept: FAMILY MEDICINE CLINIC | Age: 78
End: 2018-01-29

## 2018-01-29 DIAGNOSIS — J32.9 RECURRENT SINUSITIS: Primary | ICD-10-CM

## 2018-01-30 DIAGNOSIS — E11.8 TYPE 2 DIABETES MELLITUS WITH COMPLICATION, WITHOUT LONG-TERM CURRENT USE OF INSULIN (HCC): Chronic | ICD-10-CM

## 2018-01-30 DIAGNOSIS — I25.10 CORONARY ARTERY DISEASE DUE TO LIPID RICH PLAQUE: Chronic | ICD-10-CM

## 2018-01-30 DIAGNOSIS — E11.9 TYPE 2 DIABETES MELLITUS WITHOUT COMPLICATION, WITHOUT LONG-TERM CURRENT USE OF INSULIN (HCC): Chronic | ICD-10-CM

## 2018-01-30 DIAGNOSIS — I25.83 CORONARY ARTERY DISEASE DUE TO LIPID RICH PLAQUE: Chronic | ICD-10-CM

## 2018-01-30 DIAGNOSIS — I10 ESSENTIAL HYPERTENSION, BENIGN: Chronic | ICD-10-CM

## 2018-01-30 RX ORDER — LISINOPRIL 5 MG/1
5 TABLET ORAL DAILY
Qty: 90 TABLET | Refills: 0 | Status: SHIPPED | OUTPATIENT
Start: 2018-01-30 | End: 2018-04-16 | Stop reason: SDUPTHER

## 2018-01-30 NOTE — TELEPHONE ENCOUNTER
NOTE TO :  Please use the referral form. There is no ENT listed in the chart until care everywhere is searched. NOTE TO MA:  FAX REFERRAL.

## 2018-03-28 ENCOUNTER — TELEPHONE (OUTPATIENT)
Dept: FAMILY MEDICINE CLINIC | Age: 78
End: 2018-03-28

## 2018-03-28 NOTE — TELEPHONE ENCOUNTER
This is the 4th day pt has had a nose bleed. It gushes out , not just a little. It fills a hand towel with blood. Pt just takes a baby aspirin, he is on blood pressure medication lisinopril--pt did take it today. His BP has been normal.    What can they do? Go to The ER?   Please call

## 2018-03-29 DIAGNOSIS — I25.10 CORONARY ARTERY DISEASE DUE TO LIPID RICH PLAQUE: Chronic | ICD-10-CM

## 2018-03-29 DIAGNOSIS — I25.83 CORONARY ARTERY DISEASE DUE TO LIPID RICH PLAQUE: Chronic | ICD-10-CM

## 2018-03-29 DIAGNOSIS — E78.00 PURE HYPERCHOLESTEROLEMIA: Chronic | ICD-10-CM

## 2018-03-29 RX ORDER — ATORVASTATIN CALCIUM 40 MG/1
TABLET, FILM COATED ORAL
Qty: 30 TABLET | Refills: 0 | Status: SHIPPED | OUTPATIENT
Start: 2018-03-29 | End: 2018-04-09 | Stop reason: SDUPTHER

## 2018-04-09 ENCOUNTER — TELEPHONE (OUTPATIENT)
Dept: FAMILY MEDICINE CLINIC | Age: 78
End: 2018-04-09

## 2018-04-09 DIAGNOSIS — I25.10 CORONARY ARTERY DISEASE DUE TO LIPID RICH PLAQUE: Chronic | ICD-10-CM

## 2018-04-09 DIAGNOSIS — E78.00 PURE HYPERCHOLESTEROLEMIA: Chronic | ICD-10-CM

## 2018-04-09 DIAGNOSIS — I25.83 CORONARY ARTERY DISEASE DUE TO LIPID RICH PLAQUE: Chronic | ICD-10-CM

## 2018-04-11 RX ORDER — ATORVASTATIN CALCIUM 40 MG/1
TABLET, FILM COATED ORAL
Qty: 90 TABLET | Refills: 0 | Status: SHIPPED | OUTPATIENT
Start: 2018-04-11 | End: 2018-07-24 | Stop reason: SDUPTHER

## 2018-04-16 ENCOUNTER — TELEPHONE (OUTPATIENT)
Dept: FAMILY MEDICINE CLINIC | Age: 78
End: 2018-04-16

## 2018-04-16 ENCOUNTER — OFFICE VISIT (OUTPATIENT)
Dept: FAMILY MEDICINE CLINIC | Age: 78
End: 2018-04-16

## 2018-04-16 VITALS
BODY MASS INDEX: 36.41 KG/M2 | RESPIRATION RATE: 24 BRPM | WEIGHT: 232 LBS | HEIGHT: 67 IN | OXYGEN SATURATION: 98 % | HEART RATE: 64 BPM | SYSTOLIC BLOOD PRESSURE: 136 MMHG | DIASTOLIC BLOOD PRESSURE: 70 MMHG

## 2018-04-16 DIAGNOSIS — I10 ESSENTIAL HYPERTENSION, BENIGN: Chronic | ICD-10-CM

## 2018-04-16 DIAGNOSIS — E11.8 TYPE 2 DIABETES MELLITUS WITH COMPLICATION, WITHOUT LONG-TERM CURRENT USE OF INSULIN (HCC): Primary | Chronic | ICD-10-CM

## 2018-04-16 DIAGNOSIS — I25.83 CORONARY ARTERY DISEASE DUE TO LIPID RICH PLAQUE: Chronic | ICD-10-CM

## 2018-04-16 DIAGNOSIS — I25.10 CORONARY ARTERY DISEASE DUE TO LIPID RICH PLAQUE: Chronic | ICD-10-CM

## 2018-04-16 DIAGNOSIS — E78.00 PURE HYPERCHOLESTEROLEMIA: Chronic | ICD-10-CM

## 2018-04-16 DIAGNOSIS — R04.0 BLEEDING FROM THE NOSE: ICD-10-CM

## 2018-04-16 LAB — HBA1C MFR BLD: 7.7 %

## 2018-04-16 PROCEDURE — G8417 CALC BMI ABV UP PARAM F/U: HCPCS | Performed by: FAMILY MEDICINE

## 2018-04-16 PROCEDURE — G8598 ASA/ANTIPLAT THER USED: HCPCS | Performed by: FAMILY MEDICINE

## 2018-04-16 PROCEDURE — 2022F DILAT RTA XM EVC RTNOPTHY: CPT | Performed by: FAMILY MEDICINE

## 2018-04-16 PROCEDURE — 99214 OFFICE O/P EST MOD 30 MIN: CPT | Performed by: FAMILY MEDICINE

## 2018-04-16 PROCEDURE — 3045F PR MOST RECENT HEMOGLOBIN A1C LEVEL 7.0-9.0%: CPT | Performed by: FAMILY MEDICINE

## 2018-04-16 PROCEDURE — 3017F COLORECTAL CA SCREEN DOC REV: CPT | Performed by: FAMILY MEDICINE

## 2018-04-16 PROCEDURE — G8427 DOCREV CUR MEDS BY ELIG CLIN: HCPCS | Performed by: FAMILY MEDICINE

## 2018-04-16 PROCEDURE — 83036 HEMOGLOBIN GLYCOSYLATED A1C: CPT | Performed by: FAMILY MEDICINE

## 2018-04-16 PROCEDURE — 4004F PT TOBACCO SCREEN RCVD TLK: CPT | Performed by: FAMILY MEDICINE

## 2018-04-16 PROCEDURE — 1123F ACP DISCUSS/DSCN MKR DOCD: CPT | Performed by: FAMILY MEDICINE

## 2018-04-16 PROCEDURE — 4040F PNEUMOC VAC/ADMIN/RCVD: CPT | Performed by: FAMILY MEDICINE

## 2018-04-16 RX ORDER — LISINOPRIL 5 MG/1
5 TABLET ORAL DAILY
Qty: 90 TABLET | Refills: 0 | Status: SHIPPED | OUTPATIENT
Start: 2018-04-16 | End: 2018-10-08 | Stop reason: SDUPTHER

## 2018-04-16 RX ORDER — GLIPIZIDE 5 MG/1
TABLET ORAL
Qty: 270 TABLET | Refills: 0 | Status: SHIPPED | OUTPATIENT
Start: 2018-04-16 | End: 2018-12-03 | Stop reason: SDUPTHER

## 2018-04-16 ASSESSMENT — PATIENT HEALTH QUESTIONNAIRE - PHQ9
SUM OF ALL RESPONSES TO PHQ9 QUESTIONS 1 & 2: 0
1. LITTLE INTEREST OR PLEASURE IN DOING THINGS: 0
2. FEELING DOWN, DEPRESSED OR HOPELESS: 0
SUM OF ALL RESPONSES TO PHQ QUESTIONS 1-9: 0

## 2018-04-16 ASSESSMENT — ENCOUNTER SYMPTOMS
CHEST TIGHTNESS: 0
CHOKING: 0
APNEA: 1
SHORTNESS OF BREATH: 1
COUGH: 1
WHEEZING: 0

## 2018-04-29 ENCOUNTER — TELEPHONE (OUTPATIENT)
Dept: FAMILY MEDICINE CLINIC | Age: 78
End: 2018-04-29

## 2018-05-15 ENCOUNTER — OFFICE VISIT (OUTPATIENT)
Dept: FAMILY MEDICINE CLINIC | Age: 78
End: 2018-05-15

## 2018-05-15 VITALS
HEIGHT: 67 IN | RESPIRATION RATE: 16 BRPM | HEART RATE: 75 BPM | TEMPERATURE: 98.3 F | SYSTOLIC BLOOD PRESSURE: 130 MMHG | DIASTOLIC BLOOD PRESSURE: 80 MMHG | WEIGHT: 225 LBS | OXYGEN SATURATION: 93 % | BODY MASS INDEX: 35.31 KG/M2

## 2018-05-15 DIAGNOSIS — J43.1 PANLOBULAR EMPHYSEMA (HCC): ICD-10-CM

## 2018-05-15 DIAGNOSIS — J44.1 CHRONIC OBSTRUCTIVE PULMONARY DISEASE WITH ACUTE EXACERBATION (HCC): ICD-10-CM

## 2018-05-15 DIAGNOSIS — F11.20 NARCOTIC DEPENDENCY, CONTINUOUS (HCC): ICD-10-CM

## 2018-05-15 DIAGNOSIS — R07.89 CHEST TIGHTNESS: Primary | ICD-10-CM

## 2018-05-15 PROCEDURE — G8926 SPIRO NO PERF OR DOC: HCPCS | Performed by: REGISTERED NURSE

## 2018-05-15 PROCEDURE — 93000 ELECTROCARDIOGRAM COMPLETE: CPT | Performed by: REGISTERED NURSE

## 2018-05-15 PROCEDURE — 1123F ACP DISCUSS/DSCN MKR DOCD: CPT | Performed by: REGISTERED NURSE

## 2018-05-15 PROCEDURE — 99214 OFFICE O/P EST MOD 30 MIN: CPT | Performed by: REGISTERED NURSE

## 2018-05-15 PROCEDURE — 4040F PNEUMOC VAC/ADMIN/RCVD: CPT | Performed by: REGISTERED NURSE

## 2018-05-15 PROCEDURE — G8417 CALC BMI ABV UP PARAM F/U: HCPCS | Performed by: REGISTERED NURSE

## 2018-05-15 PROCEDURE — G8598 ASA/ANTIPLAT THER USED: HCPCS | Performed by: REGISTERED NURSE

## 2018-05-15 PROCEDURE — G8427 DOCREV CUR MEDS BY ELIG CLIN: HCPCS | Performed by: REGISTERED NURSE

## 2018-05-15 PROCEDURE — 4004F PT TOBACCO SCREEN RCVD TLK: CPT | Performed by: REGISTERED NURSE

## 2018-05-15 PROCEDURE — 3023F SPIROM DOC REV: CPT | Performed by: REGISTERED NURSE

## 2018-05-15 RX ORDER — ALBUTEROL SULFATE 90 UG/1
2 AEROSOL, METERED RESPIRATORY (INHALATION) EVERY 4 HOURS PRN
Qty: 1 INHALER | Refills: 3 | Status: SHIPPED | OUTPATIENT
Start: 2018-05-15 | End: 2019-01-17

## 2018-05-15 RX ORDER — METHYLPREDNISOLONE 4 MG/1
4 TABLET ORAL SEE ADMIN INSTRUCTIONS
Qty: 1 KIT | Refills: 0 | Status: SHIPPED | OUTPATIENT
Start: 2018-05-15 | End: 2018-05-21

## 2018-05-15 RX ORDER — AZITHROMYCIN 250 MG/1
TABLET, FILM COATED ORAL
Qty: 6 TABLET | Refills: 0 | Status: SHIPPED | OUTPATIENT
Start: 2018-05-15 | End: 2019-05-10 | Stop reason: SDUPTHER

## 2018-05-15 ASSESSMENT — ENCOUNTER SYMPTOMS
RHINORRHEA: 1
COUGH: 0
SORE THROAT: 0
SINUS PAIN: 0
SHORTNESS OF BREATH: 1
WHEEZING: 0
SINUS PRESSURE: 0
TROUBLE SWALLOWING: 0
CHEST TIGHTNESS: 1

## 2018-06-29 ENCOUNTER — OFFICE VISIT (OUTPATIENT)
Dept: FAMILY MEDICINE CLINIC | Age: 78
End: 2018-06-29

## 2018-06-29 VITALS
BODY MASS INDEX: 36.03 KG/M2 | HEIGHT: 67 IN | RESPIRATION RATE: 18 BRPM | OXYGEN SATURATION: 93 % | HEART RATE: 70 BPM | WEIGHT: 229.6 LBS | SYSTOLIC BLOOD PRESSURE: 132 MMHG | TEMPERATURE: 98.4 F | DIASTOLIC BLOOD PRESSURE: 82 MMHG

## 2018-06-29 DIAGNOSIS — B96.89 ACUTE BACTERIAL SINUSITIS: ICD-10-CM

## 2018-06-29 DIAGNOSIS — H65.22 LEFT CHRONIC SEROUS OTITIS MEDIA: Primary | ICD-10-CM

## 2018-06-29 DIAGNOSIS — J01.90 ACUTE BACTERIAL SINUSITIS: ICD-10-CM

## 2018-06-29 PROCEDURE — 4004F PT TOBACCO SCREEN RCVD TLK: CPT | Performed by: NURSE PRACTITIONER

## 2018-06-29 PROCEDURE — 99213 OFFICE O/P EST LOW 20 MIN: CPT | Performed by: NURSE PRACTITIONER

## 2018-06-29 PROCEDURE — G8417 CALC BMI ABV UP PARAM F/U: HCPCS | Performed by: NURSE PRACTITIONER

## 2018-06-29 PROCEDURE — 1123F ACP DISCUSS/DSCN MKR DOCD: CPT | Performed by: NURSE PRACTITIONER

## 2018-06-29 PROCEDURE — G8598 ASA/ANTIPLAT THER USED: HCPCS | Performed by: NURSE PRACTITIONER

## 2018-06-29 PROCEDURE — G8427 DOCREV CUR MEDS BY ELIG CLIN: HCPCS | Performed by: NURSE PRACTITIONER

## 2018-06-29 PROCEDURE — 4040F PNEUMOC VAC/ADMIN/RCVD: CPT | Performed by: NURSE PRACTITIONER

## 2018-06-29 RX ORDER — PREDNISONE 10 MG/1
TABLET ORAL
Qty: 36 TABLET | Refills: 0 | Status: SHIPPED | OUTPATIENT
Start: 2018-06-29 | End: 2018-08-27 | Stop reason: ALTCHOICE

## 2018-06-29 RX ORDER — FLUTICASONE PROPIONATE 50 MCG
1 SPRAY, SUSPENSION (ML) NASAL DAILY
Qty: 1 BOTTLE | Refills: 3 | Status: SHIPPED | OUTPATIENT
Start: 2018-06-29 | End: 2020-02-18

## 2018-06-29 RX ORDER — AMOXICILLIN AND CLAVULANATE POTASSIUM 875; 125 MG/1; MG/1
1 TABLET, FILM COATED ORAL 2 TIMES DAILY
Qty: 20 TABLET | Refills: 0 | Status: SHIPPED | OUTPATIENT
Start: 2018-06-29 | End: 2018-10-12 | Stop reason: SDUPTHER

## 2018-06-29 NOTE — PROGRESS NOTES
if the fluid does not clear up after 3 months. · If your doctor prescribed antibiotics, take them as directed. Do not stop taking them just because you feel better. You need to take the full course of antibiotics. When should you call for help? Call your doctor now or seek immediate medical care if:    · You have symptoms of infection, such as:  ¨ Increased pain, swelling, warmth, or redness. ¨ Pus draining from the area. ¨ A fever.    Watch closely for changes in your health, and be sure to contact your doctor if:    · You notice changes in hearing.     · You do not get better as expected. Where can you learn more? Go to [email or web address].[email or web address] [email or web address] and sign in to your [facility] account. Enter M871 in the [employer] box to learn more about \"Middle Ear Fluid: Care Instructions. \"     If you do not have an account, please click on the \"Sign Up Now\" link. Current as of: May 12, 2017  Content Version: 11.6  © 0344-0335 [employer]. Care instructions adapted under license by [facility] ([facility]). If you have questions about a medical condition or this instruction, always ask your healthcare professional. [place] [address] any warranty or liability for your use of this information.

## 2018-07-24 DIAGNOSIS — I25.83 CORONARY ARTERY DISEASE DUE TO LIPID RICH PLAQUE: Chronic | ICD-10-CM

## 2018-07-24 DIAGNOSIS — E78.00 PURE HYPERCHOLESTEROLEMIA: Chronic | ICD-10-CM

## 2018-07-24 DIAGNOSIS — I25.10 CORONARY ARTERY DISEASE DUE TO LIPID RICH PLAQUE: Chronic | ICD-10-CM

## 2018-07-25 DIAGNOSIS — I10 ESSENTIAL HYPERTENSION, BENIGN: Chronic | ICD-10-CM

## 2018-07-25 DIAGNOSIS — E11.8 TYPE 2 DIABETES MELLITUS WITH COMPLICATION, WITHOUT LONG-TERM CURRENT USE OF INSULIN (HCC): Chronic | ICD-10-CM

## 2018-07-25 DIAGNOSIS — I25.10 CORONARY ARTERY DISEASE DUE TO LIPID RICH PLAQUE: Chronic | ICD-10-CM

## 2018-07-25 DIAGNOSIS — I25.83 CORONARY ARTERY DISEASE DUE TO LIPID RICH PLAQUE: Chronic | ICD-10-CM

## 2018-07-25 RX ORDER — ATORVASTATIN CALCIUM 40 MG/1
TABLET, FILM COATED ORAL
Qty: 90 TABLET | Refills: 0 | Status: SHIPPED | OUTPATIENT
Start: 2018-07-25 | End: 2018-10-22 | Stop reason: SDUPTHER

## 2018-07-26 RX ORDER — BLOOD-GLUCOSE METER
KIT MISCELLANEOUS
Qty: 300 STRIP | Refills: 0 | Status: SHIPPED | OUTPATIENT
Start: 2018-07-26 | End: 2019-02-05 | Stop reason: SDUPTHER

## 2018-08-27 ENCOUNTER — OFFICE VISIT (OUTPATIENT)
Dept: FAMILY MEDICINE CLINIC | Age: 78
End: 2018-08-27

## 2018-08-27 VITALS
RESPIRATION RATE: 17 BRPM | HEART RATE: 70 BPM | HEIGHT: 67 IN | BODY MASS INDEX: 36.41 KG/M2 | DIASTOLIC BLOOD PRESSURE: 70 MMHG | WEIGHT: 232 LBS | SYSTOLIC BLOOD PRESSURE: 124 MMHG | OXYGEN SATURATION: 95 %

## 2018-08-27 DIAGNOSIS — R35.1 NOCTURIA: ICD-10-CM

## 2018-08-27 DIAGNOSIS — E11.8 TYPE 2 DIABETES MELLITUS WITH COMPLICATION, WITHOUT LONG-TERM CURRENT USE OF INSULIN (HCC): Primary | Chronic | ICD-10-CM

## 2018-08-27 DIAGNOSIS — I10 ESSENTIAL HYPERTENSION, BENIGN: Chronic | ICD-10-CM

## 2018-08-27 DIAGNOSIS — E66.01 CLASS 2 SEVERE OBESITY DUE TO EXCESS CALORIES WITH SERIOUS COMORBIDITY AND BODY MASS INDEX (BMI) OF 36.0 TO 36.9 IN ADULT (HCC): Chronic | ICD-10-CM

## 2018-08-27 DIAGNOSIS — J43.1 PANLOBULAR EMPHYSEMA (HCC): Chronic | ICD-10-CM

## 2018-08-27 DIAGNOSIS — M79.89 LEG SWELLING: ICD-10-CM

## 2018-08-27 LAB
BILIRUBIN, POC: NORMAL
BLOOD URINE, POC: NORMAL
CLARITY, POC: NORMAL
COLOR, POC: NORMAL
CREATININE URINE: 87.6 MG/DL (ref 39–259)
GLUCOSE URINE, POC: NORMAL
HBA1C MFR BLD: 7.4 %
KETONES, POC: NORMAL
LEUKOCYTE EST, POC: NORMAL
MICROALBUMIN UR-MCNC: 4.7 MG/DL
MICROALBUMIN/CREAT UR-RTO: 53.7 MG/G (ref 0–30)
NITRITE, POC: NORMAL
PH, POC: 5.5
PROTEIN, POC: NORMAL
SPECIFIC GRAVITY, POC: 1.02
UROBILINOGEN, POC: 0.2

## 2018-08-27 PROCEDURE — G8598 ASA/ANTIPLAT THER USED: HCPCS | Performed by: FAMILY MEDICINE

## 2018-08-27 PROCEDURE — G8417 CALC BMI ABV UP PARAM F/U: HCPCS | Performed by: FAMILY MEDICINE

## 2018-08-27 PROCEDURE — 3023F SPIROM DOC REV: CPT | Performed by: FAMILY MEDICINE

## 2018-08-27 PROCEDURE — 4004F PT TOBACCO SCREEN RCVD TLK: CPT | Performed by: FAMILY MEDICINE

## 2018-08-27 PROCEDURE — 4040F PNEUMOC VAC/ADMIN/RCVD: CPT | Performed by: FAMILY MEDICINE

## 2018-08-27 PROCEDURE — 83036 HEMOGLOBIN GLYCOSYLATED A1C: CPT | Performed by: FAMILY MEDICINE

## 2018-08-27 PROCEDURE — G8427 DOCREV CUR MEDS BY ELIG CLIN: HCPCS | Performed by: FAMILY MEDICINE

## 2018-08-27 PROCEDURE — 81002 URINALYSIS NONAUTO W/O SCOPE: CPT | Performed by: FAMILY MEDICINE

## 2018-08-27 PROCEDURE — 1101F PT FALLS ASSESS-DOCD LE1/YR: CPT | Performed by: FAMILY MEDICINE

## 2018-08-27 PROCEDURE — 99215 OFFICE O/P EST HI 40 MIN: CPT | Performed by: FAMILY MEDICINE

## 2018-08-27 PROCEDURE — G8926 SPIRO NO PERF OR DOC: HCPCS | Performed by: FAMILY MEDICINE

## 2018-08-27 PROCEDURE — 1123F ACP DISCUSS/DSCN MKR DOCD: CPT | Performed by: FAMILY MEDICINE

## 2018-08-27 ASSESSMENT — ENCOUNTER SYMPTOMS
CHEST TIGHTNESS: 0
WHEEZING: 1
CHOKING: 0
SHORTNESS OF BREATH: 0
COUGH: 1

## 2018-08-27 NOTE — PROGRESS NOTES
Subjective:      Patient ID: Susana Lehman is a 70 y.o. male. Chief Complaint   Patient presents with    Diabetes     4 MONTH ROUTINE FOLLOW UP     HPI    Type 2 diabetes mellitus with complication, without long-term current use of insulin ([facility] [address] [address]  Other orders  -     POCT glycosylated hemoglobin (Hb A1C) - 7.4  -     MICROALBUMIN / CREATININE URINE RATIO; Future  He is checking his BS but forgot to bring. Highest 200's. In am 150 and pm is . He eats 2x/day. He eats sweets once in awhile. No exercise due to back pain. Essential hypertension, benign  -     MICROALBUMIN / CREATININE URINE RATIO; Future  Checks BP and is 140/80 or less. 150/80 high occasionally. Is not watching salt. Panlobular emphysema (HCC)  No cold nor COPD problems since seen. Is not using MDI any more. Can't tell it did any good. Leg swelling  Not on water pills. No sores on feet. Class 2 severe obesity due to excess calories with serious comorbidity and body mass index (BMI) of 36.0 to 36.9 in [facility])  He was sick and not eating and lost to 217 at home but now came back to 230 on its own per pt. He doesn't want to eat when he gets back pain. Nocturia  -     POCT Urinalysis no Micro  Every 1-2 hours. Getting up 4-5 x. No prior BPH dx.     Current Outpatient Prescriptions   Medication Sig Dispense Refill    FREESTYLE LITE strip TEST THREE TIMES DAILY 300 strip 0    atorvastatin (LIPITOR) 40 MG tablet TAKE 1 TABLET DAILY (NEED  APPOINTMENT FOR FURTHER    REFILLS) 90 tablet 0    metFORMIN (GLUCOPHAGE) 1000 MG tablet TAKE 1 TABLET BY MOUTH TWICE DAILY WITH A MEAL 180 tablet 0    metoprolol tartrate (LOPRESSOR) 25 MG tablet TAKE 1/2 TABLET BY MOUTH TWICE DAILY 90 tablet 0    fluticasone (FLONASE) 50 MCG/ACT nasal spray 1 spray by Nasal route daily 1 Bottle 3    albuterol sulfate HFA (VENTOLIN HFA) 108 (90 Base) MCG/ACT inhaler Inhale 2 puffs into the lungs every 4 hours as needed for Wheezing 1 Inhaler 3   

## 2018-10-08 DIAGNOSIS — E11.8 TYPE 2 DIABETES MELLITUS WITH COMPLICATION, WITHOUT LONG-TERM CURRENT USE OF INSULIN (HCC): Chronic | ICD-10-CM

## 2018-10-08 DIAGNOSIS — I10 ESSENTIAL HYPERTENSION, BENIGN: Chronic | ICD-10-CM

## 2018-10-09 RX ORDER — LISINOPRIL 5 MG/1
5 TABLET ORAL DAILY
Qty: 90 TABLET | Refills: 0 | Status: SHIPPED | OUTPATIENT
Start: 2018-10-09 | End: 2018-10-15 | Stop reason: SDUPTHER

## 2018-10-12 ENCOUNTER — OFFICE VISIT (OUTPATIENT)
Dept: FAMILY MEDICINE CLINIC | Age: 78
End: 2018-10-12
Payer: MEDICARE

## 2018-10-12 VITALS
SYSTOLIC BLOOD PRESSURE: 138 MMHG | RESPIRATION RATE: 20 BRPM | WEIGHT: 234 LBS | HEART RATE: 72 BPM | DIASTOLIC BLOOD PRESSURE: 80 MMHG | TEMPERATURE: 98.3 F | BODY MASS INDEX: 36.73 KG/M2 | HEIGHT: 67 IN

## 2018-10-12 DIAGNOSIS — H61.21 IMPACTED CERUMEN OF RIGHT EAR: ICD-10-CM

## 2018-10-12 DIAGNOSIS — I27.20 PULMONARY HYPERTENSION (HCC): ICD-10-CM

## 2018-10-12 DIAGNOSIS — H65.22 LEFT CHRONIC SEROUS OTITIS MEDIA: ICD-10-CM

## 2018-10-12 DIAGNOSIS — Z23 NEED FOR SHINGLES VACCINE: ICD-10-CM

## 2018-10-12 DIAGNOSIS — J01.90 ACUTE BACTERIAL SINUSITIS: Primary | ICD-10-CM

## 2018-10-12 DIAGNOSIS — B96.89 ACUTE BACTERIAL SINUSITIS: Primary | ICD-10-CM

## 2018-10-12 PROCEDURE — G8484 FLU IMMUNIZE NO ADMIN: HCPCS | Performed by: REGISTERED NURSE

## 2018-10-12 PROCEDURE — G8417 CALC BMI ABV UP PARAM F/U: HCPCS | Performed by: REGISTERED NURSE

## 2018-10-12 PROCEDURE — 69209 REMOVE IMPACTED EAR WAX UNI: CPT | Performed by: REGISTERED NURSE

## 2018-10-12 PROCEDURE — 99213 OFFICE O/P EST LOW 20 MIN: CPT | Performed by: REGISTERED NURSE

## 2018-10-12 PROCEDURE — 1101F PT FALLS ASSESS-DOCD LE1/YR: CPT | Performed by: REGISTERED NURSE

## 2018-10-12 PROCEDURE — 4004F PT TOBACCO SCREEN RCVD TLK: CPT | Performed by: REGISTERED NURSE

## 2018-10-12 PROCEDURE — G8427 DOCREV CUR MEDS BY ELIG CLIN: HCPCS | Performed by: REGISTERED NURSE

## 2018-10-12 PROCEDURE — 4040F PNEUMOC VAC/ADMIN/RCVD: CPT | Performed by: REGISTERED NURSE

## 2018-10-12 PROCEDURE — G8598 ASA/ANTIPLAT THER USED: HCPCS | Performed by: REGISTERED NURSE

## 2018-10-12 PROCEDURE — 1123F ACP DISCUSS/DSCN MKR DOCD: CPT | Performed by: REGISTERED NURSE

## 2018-10-12 RX ORDER — AMOXICILLIN AND CLAVULANATE POTASSIUM 875; 125 MG/1; MG/1
1 TABLET, FILM COATED ORAL 2 TIMES DAILY
Qty: 20 TABLET | Refills: 0 | Status: SHIPPED | OUTPATIENT
Start: 2018-10-12 | End: 2018-10-22 | Stop reason: ALTCHOICE

## 2018-10-12 ASSESSMENT — ENCOUNTER SYMPTOMS
SINUS PRESSURE: 1
SORE THROAT: 1
SHORTNESS OF BREATH: 1
RHINORRHEA: 1
CHEST TIGHTNESS: 0
TROUBLE SWALLOWING: 0
SINUS PAIN: 1
COUGH: 1
WHEEZING: 0

## 2018-10-12 ASSESSMENT — PATIENT HEALTH QUESTIONNAIRE - PHQ9
1. LITTLE INTEREST OR PLEASURE IN DOING THINGS: 0
2. FEELING DOWN, DEPRESSED OR HOPELESS: 0
SUM OF ALL RESPONSES TO PHQ9 QUESTIONS 1 & 2: 0
SUM OF ALL RESPONSES TO PHQ QUESTIONS 1-9: 0
SUM OF ALL RESPONSES TO PHQ QUESTIONS 1-9: 0

## 2018-10-12 NOTE — PROGRESS NOTES
REPLACEMENT  11/12/2013    BLEPHAROPLASTY  07/2007    UPPER LID    CARDIAC CATHETERIZATION  11/8/2013    dx    CARDIAC SURGERY  11/12/2013    reincised for bleeding    CERVICAL DISC SURGERY Right 2015    epidural steroids.  CORONARY ARTERY BYPASS GRAFT  11/12/2013    4 V    FINGER TRIGGER RELEASE Right [id number]    Release of trigger thumb (stenosing tenosynovitis)    HEMORRHOID SURGERY  [id number]    LAPAROSCOPIC APPENDECTOMY  11/14/2014    LUMBAR LAMINECTOMY  1998 & 2003    LUMBAR LAMINECTOMY  05/07/2014    L2-L3 & Repair dural tear x2.  LUMBAR SPINE SURGERY  8/4/2009    SPINaL CORD STIMULATOR FOR PAIN    LUMBAR SPINE SURGERY  08/2014    epidural steroids. 3/2017 NEAL    NERVE SURGERY  ~12/2012    nerve block     ROTATOR CUFF REPAIR  2001    SKIN CANCER EXCISION Right 07/2017    forehead THEN TOP OF THE HEAD.     SOFT TISSUE BIOPSY      needle bx right neck mass -benign    TEAR DUCT SURGERY Bilateral 01/01/2015    for dry    TESTICLE REMOVAL  1947    R    TONSILLECTOMY      CHILD    UMBILICAL HERNIA REPAIR  11/14/2014    Laparoscopic       Office Visit on 08/27/2018   Component Date Value Ref Range Status    Hemoglobin A1C 08/27/2018 7.4  % Final    Glucose, UA POC 08/27/2018 NEG   Final    Bilirubin, UA 08/27/2018 NEG   Final    Ketones, UA 08/27/2018 NEG   Final    Spec Grav, UA 08/27/2018 1.020   Final    Blood, UA POC 08/27/2018 NEG   Final    pH, UA 08/27/2018 5.5   Final    Protein, UA POC 08/27/2018 NEG   Final    Urobilinogen, UA 08/27/2018 0.2   Final    Leukocytes, UA 08/27/2018 NEG   Final    Nitrite, UA 08/27/2018 NEG   Final    Microalbumin, Random Urine 08/27/2018 4.70* <2.0 mg/dL Final    Creatinine, Ur 08/27/2018 87.6  39.0 - 259.0 mg/dL Final    Microalbumin Creatinine Ratio 08/27/2018 53.7* 0.0 - 30.0 mg/g Final       Family History   Problem Relation Age of Onset    Other Mother         CABG    Heart Surgery Mother 66        CABG    Sudden Death Father    Phoebe Ashley drip, rhinorrhea, sinus pain, sinus pressure and sore throat. Negative for ear discharge, sneezing and trouble swallowing. Respiratory: Positive for cough and shortness of breath. Negative for chest tightness and wheezing. Cardiovascular: Negative for chest pain and palpitations. Neurological: Negative for dizziness, weakness, light-headedness, numbness and headaches. All other systems reviewed and are negative. Vitals:  /80 (Site: Right Upper Arm, Position: Sitting, Cuff Size: Large Adult)   Pulse 72   Temp 98.3 °F (36.8 °C) (Oral)   Resp 20   Ht 5' 7\" (1.702 m)   Wt 234 lb (106.1 kg)   BMI 36.65 kg/m²     Physical Exam   Constitutional: He is oriented to person, place, and time. He appears well-developed and well-nourished. HENT:   Head: Normocephalic and atraumatic. Right Ear: External ear and ear canal normal.   Left Ear: External ear and ear canal normal. A middle ear effusion is present. Nose: Rhinorrhea present. Right sinus exhibits maxillary sinus tenderness and frontal sinus tenderness. Left sinus exhibits maxillary sinus tenderness and frontal sinus tenderness. Mouth/Throat: Uvula is midline, oropharynx is clear and moist and mucous membranes are normal. No oropharyngeal exudate, posterior oropharyngeal edema, posterior oropharyngeal erythema or tonsillar abscesses. No tonsillar exudate. Unable to visualize right TM due to cerumen impaction   Eyes: Pupils are equal, round, and reactive to light. Conjunctivae and EOM are normal.   Neck: Normal range of motion. Neck supple. No thyromegaly present. Cardiovascular: Normal rate, regular rhythm and intact distal pulses. Murmur heard. Pulmonary/Chest: Effort normal and breath sounds normal. No respiratory distress. He has no wheezes. He has no rales. He exhibits no tenderness. Lymphadenopathy:     He has cervical adenopathy. Neurological: He is alert and oriented to person, place, and time.    Skin: Skin is warm and

## 2018-10-12 NOTE — PATIENT INSTRUCTIONS
fluid clears up within a few months without treatment. You may need more tests if the fluid does not clear up after 3 months. · If your doctor prescribed antibiotics, take them as directed. Do not stop taking them just because you feel better. You need to take the full course of antibiotics. When should you call for help? Call your doctor now or seek immediate medical care if:    · You have symptoms of infection, such as:  ¨ Increased pain, swelling, warmth, or redness. ¨ Pus draining from the area. ¨ A fever.    Watch closely for changes in your health, and be sure to contact your doctor if:    · You notice changes in hearing.     · You do not get better as expected. Where can you learn more? Go to [email or web address] and sign in to your [employer] account. Enter F706 in the TaKaDu box to learn more about \"Middle Ear Fluid: Care Instructions. \"     If you do not have an account, please click on the \"Sign Up Now\" link. Current as of: May 12, 2017  Content Version: 11.7  © 3453-2556 [email or web address]. Care instructions adapted under license by [facility] ([facility]). If you have questions about a medical condition or this instruction, always ask your healthcare professional. [place] [address] any warranty or liability for your use of this information. Patient Education        Earwax Blockage: Care Instructions  Your Care Instructions    Earwax is a natural substance that protects the ear canal. Normally, earwax drains from the ears and does not cause problems. Sometimes earwax builds up and hardens. Earwax blockage (also called cerumen impaction) can cause some loss of hearing and pain. When wax is tightly packed, you will need to have your doctor remove it. Follow-up care is a key part of your treatment and safety. Be sure to make and go to all appointments, and call your doctor if you are having problems.  It's also a good idea to know your test [employer]. Care instructions adapted under license by [facility] ([facility]). If you have questions about a medical condition or this instruction, always ask your healthcare professional. [address] any warranty or liability for your use of this information.

## 2018-10-15 ENCOUNTER — TELEPHONE (OUTPATIENT)
Dept: FAMILY MEDICINE CLINIC | Age: 78
End: 2018-10-15

## 2018-10-15 DIAGNOSIS — I10 ESSENTIAL HYPERTENSION, BENIGN: Chronic | ICD-10-CM

## 2018-10-15 DIAGNOSIS — E11.8 TYPE 2 DIABETES MELLITUS WITH COMPLICATION, WITHOUT LONG-TERM CURRENT USE OF INSULIN (HCC): Chronic | ICD-10-CM

## 2018-10-15 RX ORDER — LISINOPRIL 5 MG/1
5 TABLET ORAL DAILY
Qty: 90 TABLET | Refills: 0 | Status: SHIPPED | OUTPATIENT
Start: 2018-10-15 | End: 2019-03-05 | Stop reason: SDUPTHER

## 2018-10-22 ENCOUNTER — OFFICE VISIT (OUTPATIENT)
Dept: FAMILY MEDICINE CLINIC | Age: 78
End: 2018-10-22
Payer: MEDICARE

## 2018-10-22 VITALS
HEIGHT: 67 IN | SYSTOLIC BLOOD PRESSURE: 130 MMHG | HEART RATE: 98 BPM | RESPIRATION RATE: 17 BRPM | DIASTOLIC BLOOD PRESSURE: 76 MMHG | OXYGEN SATURATION: 96 % | BODY MASS INDEX: 36.44 KG/M2 | WEIGHT: 232.2 LBS

## 2018-10-22 DIAGNOSIS — I25.83 CORONARY ARTERY DISEASE DUE TO LIPID RICH PLAQUE: Chronic | ICD-10-CM

## 2018-10-22 DIAGNOSIS — J43.1 PANLOBULAR EMPHYSEMA (HCC): Chronic | ICD-10-CM

## 2018-10-22 DIAGNOSIS — E78.00 PURE HYPERCHOLESTEROLEMIA: Chronic | ICD-10-CM

## 2018-10-22 DIAGNOSIS — E55.9 VITAMIN D DEFICIENCY: Chronic | ICD-10-CM

## 2018-10-22 DIAGNOSIS — Z23 NEEDS FLU SHOT: ICD-10-CM

## 2018-10-22 DIAGNOSIS — J32.9 RECURRENT SINUSITIS: Primary | ICD-10-CM

## 2018-10-22 DIAGNOSIS — I25.10 CORONARY ARTERY DISEASE DUE TO LIPID RICH PLAQUE: Chronic | ICD-10-CM

## 2018-10-22 PROCEDURE — G8926 SPIRO NO PERF OR DOC: HCPCS | Performed by: FAMILY MEDICINE

## 2018-10-22 PROCEDURE — 1123F ACP DISCUSS/DSCN MKR DOCD: CPT | Performed by: FAMILY MEDICINE

## 2018-10-22 PROCEDURE — 4004F PT TOBACCO SCREEN RCVD TLK: CPT | Performed by: FAMILY MEDICINE

## 2018-10-22 PROCEDURE — G8427 DOCREV CUR MEDS BY ELIG CLIN: HCPCS | Performed by: FAMILY MEDICINE

## 2018-10-22 PROCEDURE — 90662 IIV NO PRSV INCREASED AG IM: CPT | Performed by: FAMILY MEDICINE

## 2018-10-22 PROCEDURE — G8598 ASA/ANTIPLAT THER USED: HCPCS | Performed by: FAMILY MEDICINE

## 2018-10-22 PROCEDURE — 1101F PT FALLS ASSESS-DOCD LE1/YR: CPT | Performed by: FAMILY MEDICINE

## 2018-10-22 PROCEDURE — 4040F PNEUMOC VAC/ADMIN/RCVD: CPT | Performed by: FAMILY MEDICINE

## 2018-10-22 PROCEDURE — G8417 CALC BMI ABV UP PARAM F/U: HCPCS | Performed by: FAMILY MEDICINE

## 2018-10-22 PROCEDURE — G0008 ADMIN INFLUENZA VIRUS VAC: HCPCS | Performed by: FAMILY MEDICINE

## 2018-10-22 PROCEDURE — G8482 FLU IMMUNIZE ORDER/ADMIN: HCPCS | Performed by: FAMILY MEDICINE

## 2018-10-22 PROCEDURE — 99214 OFFICE O/P EST MOD 30 MIN: CPT | Performed by: FAMILY MEDICINE

## 2018-10-22 PROCEDURE — 3023F SPIROM DOC REV: CPT | Performed by: FAMILY MEDICINE

## 2018-10-22 RX ORDER — ATORVASTATIN CALCIUM 40 MG/1
TABLET, FILM COATED ORAL
Qty: 90 TABLET | Refills: 0 | Status: SHIPPED | OUTPATIENT
Start: 2018-10-22 | End: 2019-02-28 | Stop reason: SDUPTHER

## 2018-10-22 ASSESSMENT — ENCOUNTER SYMPTOMS
SORE THROAT: 0
TROUBLE SWALLOWING: 0
SINUS PAIN: 0
SINUS PRESSURE: 0
SHORTNESS OF BREATH: 1
WHEEZING: 0
VOICE CHANGE: 0
RHINORRHEA: 1

## 2018-10-22 NOTE — PROGRESS NOTES
Subjective:      Patient ID: Aym Li is a 70 y.o. male. Chief Complaint   Patient presents with    Head Congestion     LONGER THAN A MONTH, HE STATES \"YEARS\"    Shortness of Breath     LONG TIME/ REFERRAL TO ENT? HPI  SOB  He is having more sx in his chest. Cough is severe when gets up. CPAP keeps him opened up. Has CPAP is 15-20 years. Has a humidifier on CPAP. Uses distilled water. Cleans weekly. Cleans mask. Nasal congestion  Using nasal saline occasionally. No sinus netti pot. Just finished Augmentin x 10 days. Still smoking 1 PPD. Has not been using Flonase. Current Outpatient Prescriptions   Medication Sig Dispense Refill    lisinopril (PRINIVIL;ZESTRIL) 5 MG tablet Take 1 tablet by mouth daily 90 tablet 0    FREESTYLE LITE strip TEST THREE TIMES DAILY 300 strip 0    atorvastatin (LIPITOR) 40 MG tablet TAKE 1 TABLET DAILY (NEED  APPOINTMENT FOR FURTHER    REFILLS) 90 tablet 0    metFORMIN (GLUCOPHAGE) 1000 MG tablet TAKE 1 TABLET BY MOUTH TWICE DAILY WITH A MEAL 180 tablet 0    metoprolol tartrate (LOPRESSOR) 25 MG tablet TAKE 1/2 TABLET BY MOUTH TWICE DAILY 90 tablet 0    glipiZIDE (GLUCOTROL) 5 MG tablet TAKE 1/2 TO 1 TABLET BY MOUTH THREE TIMES DAILY BEFORE MEALS 270 tablet 0    FREESTYLE LANCETS MISC TEST BLOOD SUGAR THREE TIMES DAILY 300 each 0    HYDROcodone-acetaminophen (NORCO)  MG per tablet Take 1 tablet by mouth 4 times daily for 5 days  ICD M51.26. 20 tablet 0    docusate sodium (COLACE) 100 MG capsule Take 200 mg by mouth daily Indications: Constipation      aspirin 81 MG tablet Take 81 mg by mouth daily.       fluticasone (FLONASE) 50 MCG/ACT nasal spray 1 spray by Nasal route daily 1 Bottle 3    albuterol sulfate HFA (VENTOLIN HFA) 108 (90 Base) MCG/ACT inhaler Inhale 2 puffs into the lungs every 4 hours as needed for Wheezing 1 Inhaler 3    Spacer/Aero-Holding Chambers (E-Z SPACER) JANET 1 Device by Does not apply route daily as needed (12) 1 Device 0 No current facility-administered medications for this visit. Review of Systems   Constitutional: Positive for fatigue (chronic). Negative for chills, diaphoresis and fever. HENT: Positive for congestion, postnasal drip and rhinorrhea. Negative for ear discharge, ear pain, nosebleeds, sinus pain, sinus pressure, sneezing, sore throat, tinnitus, trouble swallowing and voice change. Respiratory: Positive for shortness of breath. Negative for wheezing. Objective:   Physical Exam   Constitutional: He appears well-developed. No distress. HENT:   Right Ear: Tympanic membrane, external ear and ear canal normal. Tympanic membrane is not injected, not erythematous, not retracted and not bulging. No middle ear effusion. Left Ear: External ear and ear canal normal. Tympanic membrane is not injected, not erythematous, not retracted and not bulging. No middle ear effusion. Nose: Mucosal edema present. No rhinorrhea. Right sinus exhibits no maxillary sinus tenderness and no frontal sinus tenderness. Left sinus exhibits no maxillary sinus tenderness and no frontal sinus tenderness. Mouth/Throat: Uvula is midline, oropharynx is clear and moist and mucous membranes are normal. Mucous membranes are not pale, not dry and not cyanotic. No uvula swelling. No oropharyngeal exudate, posterior oropharyngeal edema, posterior oropharyngeal erythema or tonsillar abscesses. Eyes: Conjunctivae are normal. Right eye exhibits no discharge and no exudate. Left eye exhibits no discharge and no exudate. Right conjunctiva is not injected. Left conjunctiva is not injected. No scleral icterus. Neck: Neck supple. No thyroid mass and no thyromegaly present. Cardiovascular: Normal rate, regular rhythm and normal heart sounds. Exam reveals no gallop and no friction rub. No murmur heard. Pulmonary/Chest: Effort normal. No respiratory distress.  He has decreased breath sounds in the right lower field and the left lower evaluation of COPD and risk of lung cancer with a low-dose CT scan. Because of his back problem he doesn't move enough to become short of breath due to his lungs. He still is smoking and declines help with cessation. Vitamin D deficiency  Vitamin D 5000 IU daily. Needs flu shot  Influenza high-dose 65 years plus.       Dora Mcrae,

## 2018-10-22 NOTE — PATIENT INSTRUCTIONS
Kenny Byrne was seen today for head congestion and shortness of breath. Diagnoses and all orders for this visit:    Recurrent sinusitis  Restart Flonase nightly. Claritin 10 mg once nightly. Needs sinus endoscopy (NP scope)  Vitamin C 500 mg 2x/day. Panlobular emphysema ([address]  Not the cause of head symptoms. Vitamin D deficiency  Vitamin D 5000 IU daily.

## 2018-10-25 DIAGNOSIS — E11.8 TYPE 2 DIABETES MELLITUS WITH COMPLICATION, WITHOUT LONG-TERM CURRENT USE OF INSULIN (HCC): Chronic | ICD-10-CM

## 2018-10-26 DIAGNOSIS — I10 ESSENTIAL HYPERTENSION, BENIGN: Chronic | ICD-10-CM

## 2018-10-26 DIAGNOSIS — I25.10 CORONARY ARTERY DISEASE DUE TO LIPID RICH PLAQUE: Chronic | ICD-10-CM

## 2018-10-26 DIAGNOSIS — I25.83 CORONARY ARTERY DISEASE DUE TO LIPID RICH PLAQUE: Chronic | ICD-10-CM

## 2018-10-29 ENCOUNTER — NURSE ONLY (OUTPATIENT)
Dept: FAMILY MEDICINE CLINIC | Age: 78
End: 2018-10-29
Payer: MEDICARE

## 2018-10-29 DIAGNOSIS — I27.20 PULMONARY HYPERTENSION (HCC): ICD-10-CM

## 2018-10-29 DIAGNOSIS — I10 ESSENTIAL HYPERTENSION, BENIGN: Chronic | ICD-10-CM

## 2018-10-29 DIAGNOSIS — E78.00 PURE HYPERCHOLESTEROLEMIA: Chronic | ICD-10-CM

## 2018-10-29 DIAGNOSIS — E11.9 TYPE 2 DIABETES MELLITUS WITHOUT COMPLICATION, WITHOUT LONG-TERM CURRENT USE OF INSULIN (HCC): Chronic | ICD-10-CM

## 2018-10-29 LAB
A/G RATIO: 2.1 (ref 1.1–2.2)
ALBUMIN SERPL-MCNC: 4.7 G/DL (ref 3.4–5)
ALP BLD-CCNC: 57 U/L (ref 40–129)
ALT SERPL-CCNC: 19 U/L (ref 10–40)
ANION GAP SERPL CALCULATED.3IONS-SCNC: 17 MMOL/L (ref 3–16)
AST SERPL-CCNC: 15 U/L (ref 15–37)
BASOPHILS ABSOLUTE: 0.1 K/UL (ref 0–0.2)
BASOPHILS RELATIVE PERCENT: 0.6 %
BILIRUB SERPL-MCNC: 0.4 MG/DL (ref 0–1)
BUN BLDV-MCNC: 11 MG/DL (ref 7–20)
CALCIUM SERPL-MCNC: 9.7 MG/DL (ref 8.3–10.6)
CHLORIDE BLD-SCNC: 106 MMOL/L (ref 99–110)
CHOLESTEROL, TOTAL: 116 MG/DL (ref 0–199)
CO2: 26 MMOL/L (ref 21–32)
CREAT SERPL-MCNC: 0.8 MG/DL (ref 0.8–1.3)
EOSINOPHILS ABSOLUTE: 0.1 K/UL (ref 0–0.6)
EOSINOPHILS RELATIVE PERCENT: 1.3 %
GFR AFRICAN AMERICAN: >60
GFR NON-AFRICAN AMERICAN: >60
GLOBULIN: 2.2 G/DL
GLUCOSE BLD-MCNC: 115 MG/DL (ref 70–99)
HCT VFR BLD CALC: 48.6 % (ref 40.5–52.5)
HDLC SERPL-MCNC: 34 MG/DL (ref 40–60)
HEMOGLOBIN: 15.6 G/DL (ref 13.5–17.5)
LDL CHOLESTEROL CALCULATED: 65 MG/DL
LYMPHOCYTES ABSOLUTE: 2.4 K/UL (ref 1–5.1)
LYMPHOCYTES RELATIVE PERCENT: 28.9 %
MCH RBC QN AUTO: 31.8 PG (ref 26–34)
MCHC RBC AUTO-ENTMCNC: 32.1 G/DL (ref 31–36)
MCV RBC AUTO: 98.9 FL (ref 80–100)
MONOCYTES ABSOLUTE: 0.5 K/UL (ref 0–1.3)
MONOCYTES RELATIVE PERCENT: 5.7 %
NEUTROPHILS ABSOLUTE: 5.4 K/UL (ref 1.7–7.7)
NEUTROPHILS RELATIVE PERCENT: 63.5 %
PDW BLD-RTO: 15.1 % (ref 12.4–15.4)
PLATELET # BLD: 254 K/UL (ref 135–450)
PMV BLD AUTO: 8.4 FL (ref 5–10.5)
POTASSIUM SERPL-SCNC: 5.8 MMOL/L (ref 3.5–5.1)
RBC # BLD: 4.92 M/UL (ref 4.2–5.9)
SODIUM BLD-SCNC: 149 MMOL/L (ref 136–145)
TOTAL PROTEIN: 6.9 G/DL (ref 6.4–8.2)
TRIGL SERPL-MCNC: 86 MG/DL (ref 0–150)
VLDLC SERPL CALC-MCNC: 17 MG/DL
WBC # BLD: 8.5 K/UL (ref 4–11)

## 2018-10-29 PROCEDURE — 36415 COLL VENOUS BLD VENIPUNCTURE: CPT | Performed by: FAMILY MEDICINE

## 2018-11-02 ENCOUNTER — OFFICE VISIT (OUTPATIENT)
Dept: ENT CLINIC | Age: 78
End: 2018-11-02
Payer: MEDICARE

## 2018-11-02 VITALS — OXYGEN SATURATION: 95 % | SYSTOLIC BLOOD PRESSURE: 152 MMHG | DIASTOLIC BLOOD PRESSURE: 74 MMHG | HEART RATE: 95 BPM

## 2018-11-02 DIAGNOSIS — J32.9 RECURRENT SINUSITIS: Primary | ICD-10-CM

## 2018-11-02 DIAGNOSIS — J30.9 ALLERGIC SINUSITIS: ICD-10-CM

## 2018-11-02 PROCEDURE — 4040F PNEUMOC VAC/ADMIN/RCVD: CPT | Performed by: OTOLARYNGOLOGY

## 2018-11-02 PROCEDURE — 4004F PT TOBACCO SCREEN RCVD TLK: CPT | Performed by: OTOLARYNGOLOGY

## 2018-11-02 PROCEDURE — G8417 CALC BMI ABV UP PARAM F/U: HCPCS | Performed by: OTOLARYNGOLOGY

## 2018-11-02 PROCEDURE — G8482 FLU IMMUNIZE ORDER/ADMIN: HCPCS | Performed by: OTOLARYNGOLOGY

## 2018-11-02 PROCEDURE — 1123F ACP DISCUSS/DSCN MKR DOCD: CPT | Performed by: OTOLARYNGOLOGY

## 2018-11-02 PROCEDURE — G8598 ASA/ANTIPLAT THER USED: HCPCS | Performed by: OTOLARYNGOLOGY

## 2018-11-02 PROCEDURE — 99203 OFFICE O/P NEW LOW 30 MIN: CPT | Performed by: OTOLARYNGOLOGY

## 2018-11-02 PROCEDURE — 1101F PT FALLS ASSESS-DOCD LE1/YR: CPT | Performed by: OTOLARYNGOLOGY

## 2018-11-02 PROCEDURE — G8427 DOCREV CUR MEDS BY ELIG CLIN: HCPCS | Performed by: OTOLARYNGOLOGY

## 2018-11-02 RX ORDER — DOXYCYCLINE 100 MG/1
100 CAPSULE ORAL 2 TIMES DAILY
Qty: 20 CAPSULE | Refills: 0 | Status: SHIPPED | OUTPATIENT
Start: 2018-11-02 | End: 2019-01-17

## 2018-11-02 RX ORDER — MONTELUKAST SODIUM 10 MG/1
10 TABLET ORAL NIGHTLY
Qty: 30 TABLET | Refills: 1 | Status: SHIPPED | OUTPATIENT
Start: 2018-11-02 | End: 2019-01-07 | Stop reason: SDUPTHER

## 2018-11-02 ASSESSMENT — ENCOUNTER SYMPTOMS
RESPIRATORY NEGATIVE: 1
SINUS PAIN: 1
VOICE CHANGE: 0
FACIAL SWELLING: 0
RHINORRHEA: 1
EYES NEGATIVE: 1
TROUBLE SWALLOWING: 0
ALLERGIC/IMMUNOLOGIC NEGATIVE: 1
SINUS PRESSURE: 1
SORE THROAT: 0

## 2018-11-02 NOTE — PROGRESS NOTES
not polyps present. .There were not other masses present. The turbinates were not enlarged beyond normal.     Eyes: Pupils are equal, round, and reactive to light. Conjunctivae are normal.   Neck: Normal range of motion. Neck supple. No tracheal deviation present. No thyromegaly present. Cardiovascular: Normal rate and regular rhythm. Pulmonary/Chest: Effort normal.   Lymphadenopathy:     He has no cervical adenopathy. Neurological: He is alert and oriented to person, place, and time. Skin: Skin is warm and dry. Psychiatric: He has a normal mood and affect. His behavior is normal. Judgment and thought content normal.       Assessment:      Findings are suggestive of chronic sinus infection associated with some allergy. Plan:      Sinus CT scan is ordered to evaluate the more chronic problem. At present we'll start him on doxycycline monohydrate as well as Singulair. He is instructed to continue use of his Flonase nasal spray on a daily basis.         Janice Watts MD

## 2018-11-07 ENCOUNTER — HOSPITAL ENCOUNTER (OUTPATIENT)
Dept: CT IMAGING | Age: 78
Discharge: HOME | End: 2018-11-07
Payer: MEDICARE

## 2018-11-07 DIAGNOSIS — J32.9 RECURRENT SINUSITIS: ICD-10-CM

## 2018-11-07 PROCEDURE — 70486 CT MAXILLOFACIAL W/O DYE: CPT

## 2018-11-21 DIAGNOSIS — E11.8 TYPE 2 DIABETES MELLITUS WITH COMPLICATION, WITHOUT LONG-TERM CURRENT USE OF INSULIN (HCC): Chronic | ICD-10-CM

## 2018-11-23 DIAGNOSIS — E11.8 TYPE 2 DIABETES MELLITUS WITH COMPLICATION, WITHOUT LONG-TERM CURRENT USE OF INSULIN (HCC): Chronic | ICD-10-CM

## 2018-12-03 ENCOUNTER — OFFICE VISIT (OUTPATIENT)
Dept: FAMILY MEDICINE CLINIC | Age: 78
End: 2018-12-03
Payer: MEDICARE

## 2018-12-03 VITALS
OXYGEN SATURATION: 96 % | RESPIRATION RATE: 22 BRPM | DIASTOLIC BLOOD PRESSURE: 64 MMHG | HEART RATE: 68 BPM | SYSTOLIC BLOOD PRESSURE: 122 MMHG | BODY MASS INDEX: 36.44 KG/M2 | WEIGHT: 232.2 LBS | HEIGHT: 67 IN

## 2018-12-03 DIAGNOSIS — F17.200 TOBACCO USE DISORDER: Chronic | ICD-10-CM

## 2018-12-03 DIAGNOSIS — I10 ESSENTIAL HYPERTENSION, BENIGN: Chronic | ICD-10-CM

## 2018-12-03 DIAGNOSIS — J43.1 PANLOBULAR EMPHYSEMA (HCC): Chronic | ICD-10-CM

## 2018-12-03 DIAGNOSIS — G47.33 OBSTRUCTIVE SLEEP APNEA: Chronic | ICD-10-CM

## 2018-12-03 DIAGNOSIS — E11.8 TYPE 2 DIABETES MELLITUS WITH COMPLICATION, WITHOUT LONG-TERM CURRENT USE OF INSULIN (HCC): Primary | Chronic | ICD-10-CM

## 2018-12-03 DIAGNOSIS — E87.5 HYPERKALEMIA: Chronic | ICD-10-CM

## 2018-12-03 LAB
ANION GAP SERPL CALCULATED.3IONS-SCNC: 14 MMOL/L (ref 3–16)
BUN BLDV-MCNC: 19 MG/DL (ref 7–20)
CALCIUM SERPL-MCNC: 9.5 MG/DL (ref 8.3–10.6)
CHLORIDE BLD-SCNC: 101 MMOL/L (ref 99–110)
CO2: 26 MMOL/L (ref 21–32)
CREAT SERPL-MCNC: 0.9 MG/DL (ref 0.8–1.3)
GFR AFRICAN AMERICAN: >60
GFR NON-AFRICAN AMERICAN: >60
GLUCOSE BLD-MCNC: 128 MG/DL (ref 70–99)
HBA1C MFR BLD: 7.5 %
POTASSIUM SERPL-SCNC: 5.5 MMOL/L (ref 3.5–5.1)
SODIUM BLD-SCNC: 141 MMOL/L (ref 136–145)

## 2018-12-03 PROCEDURE — G8926 SPIRO NO PERF OR DOC: HCPCS | Performed by: FAMILY MEDICINE

## 2018-12-03 PROCEDURE — G8482 FLU IMMUNIZE ORDER/ADMIN: HCPCS | Performed by: FAMILY MEDICINE

## 2018-12-03 PROCEDURE — 1123F ACP DISCUSS/DSCN MKR DOCD: CPT | Performed by: FAMILY MEDICINE

## 2018-12-03 PROCEDURE — G8427 DOCREV CUR MEDS BY ELIG CLIN: HCPCS | Performed by: FAMILY MEDICINE

## 2018-12-03 PROCEDURE — 4040F PNEUMOC VAC/ADMIN/RCVD: CPT | Performed by: FAMILY MEDICINE

## 2018-12-03 PROCEDURE — 36415 COLL VENOUS BLD VENIPUNCTURE: CPT | Performed by: FAMILY MEDICINE

## 2018-12-03 PROCEDURE — 83036 HEMOGLOBIN GLYCOSYLATED A1C: CPT | Performed by: FAMILY MEDICINE

## 2018-12-03 PROCEDURE — G8598 ASA/ANTIPLAT THER USED: HCPCS | Performed by: FAMILY MEDICINE

## 2018-12-03 PROCEDURE — 1101F PT FALLS ASSESS-DOCD LE1/YR: CPT | Performed by: FAMILY MEDICINE

## 2018-12-03 PROCEDURE — 3023F SPIROM DOC REV: CPT | Performed by: FAMILY MEDICINE

## 2018-12-03 PROCEDURE — 99214 OFFICE O/P EST MOD 30 MIN: CPT | Performed by: FAMILY MEDICINE

## 2018-12-03 PROCEDURE — 4004F PT TOBACCO SCREEN RCVD TLK: CPT | Performed by: FAMILY MEDICINE

## 2018-12-03 PROCEDURE — G8417 CALC BMI ABV UP PARAM F/U: HCPCS | Performed by: FAMILY MEDICINE

## 2018-12-03 RX ORDER — GLIPIZIDE 5 MG/1
TABLET ORAL
Qty: 180 TABLET | Refills: 0 | Status: SHIPPED | OUTPATIENT
Start: 2018-12-03 | End: 2019-02-20 | Stop reason: SDUPTHER

## 2018-12-03 RX ORDER — HYDROCODONE BITARTRATE AND ACETAMINOPHEN 10; 325 MG/1; MG/1
TABLET ORAL
Refills: 0 | COMMUNITY
Start: 2018-11-26

## 2018-12-03 ASSESSMENT — PATIENT HEALTH QUESTIONNAIRE - PHQ9
SUM OF ALL RESPONSES TO PHQ QUESTIONS 1-9: 0
1. LITTLE INTEREST OR PLEASURE IN DOING THINGS: 0
2. FEELING DOWN, DEPRESSED OR HOPELESS: 0
SUM OF ALL RESPONSES TO PHQ9 QUESTIONS 1 & 2: 0
SUM OF ALL RESPONSES TO PHQ QUESTIONS 1-9: 0

## 2018-12-03 ASSESSMENT — ENCOUNTER SYMPTOMS
CHEST TIGHTNESS: 0
SHORTNESS OF BREATH: 1
COUGH: 1
WHEEZING: 1
CHOKING: 0

## 2018-12-03 NOTE — PROGRESS NOTES
Subjective:      Patient ID: Aurea Grover is a 70 y.o. male. Chief Complaint   Patient presents with    Diabetes     3 MONTH ROUTINE FOLLOW UP   1  HPI    Type 2 diabetes mellitus with complication, without long-term current use of insulin ([facility])  -     POCT glycosylated hemoglobin (Hb A1C) 7.5  He is often only taking the Glipizide once daily. No SE's with med. BMs regular. No diarrhea. Am 130-150 on average. Range 104-181 Pm 110-120's on average. Range . Essential hypertension, benign  -     BASIC METABOLIC PANEL; Future  BP ~ 140/75-80. High at the specialist. Is not watching salt. Obstructive sleep apnea  Is using CPAP every night. Needs to be checked. Seen by [address] in the past.  Not sure if they are still in business. Panlobular emphysema ([address]  Not using MDI. Allergies better. No colds since last visit. Hyperkalemia  Eats 1-2 bananas /day at times. Water - not a lot. No salt substitute. Tobacco use disorder  1 PPD. Allergic sinusitis   He is now on the Singulair 10 mg nightly. Still on flonase daily. He feels better. Cataracts  Will have surgery in 1/2019. Current Outpatient Prescriptions   Medication Sig Dispense Refill    HYDROcodone-acetaminophen (NORCO)  MG per tablet TK 1 T PO  QID PRN P  0    metFORMIN (GLUCOPHAGE) 1000 MG tablet TAKE 1 TABLET BY MOUTH TWICE DAILY WITH A MEAL 180 tablet 0    doxycycline monohydrate (MONODOX) 100 MG capsule Take 1 capsule by mouth 2 times daily 20 capsule 0    montelukast (SINGULAIR) 10 MG tablet Take 1 tablet by mouth nightly 30 tablet 1    metoprolol tartrate (LOPRESSOR) 25 MG tablet TAKE 1/2 TABLET BY MOUTH TWICE DAILY 90 tablet 0    atorvastatin (LIPITOR) 40 MG tablet TAKE 1 TABLET DAILY.   MAKE AN APPOINTMENT FOR FURTHER REFILLS 90 tablet 0    lisinopril (PRINIVIL;ZESTRIL) 5 MG tablet Take 1 tablet by mouth daily 90 tablet 0    FREESTYLE LITE strip TEST THREE TIMES DAILY 300 strip 0    fluticasone 2.1   Alk Phos 57   ALT 19   AST 15   Bilirubin 0.4   WBC 8.5   RBC 4.92   Hemoglobin Quant 15.6   Hematocrit 48.6   MCV 98.9   MCH 31.8   MCHC 32.1   MPV 8.4   RDW 15.1   Platelet Count 928   Neutrophils % 63.5   Lymphocyte % 28.9   Monocytes % 5.7   Eosinophils % 1.3   Basophils % 0.6   Neutrophils # 5.4   Lymphocytes # 2.4   Monocytes # 0.5   Eosinophils # 0.1   Basophils # 0.1     Results for POC orders placed in visit on 12/03/18   POCT glycosylated hemoglobin (Hb A1C)   Result Value Ref Range    Hemoglobin A1C 7.5 %     CT OF THE SINUS WITHOUT CONTRAST  11/7/2018 5:08 pm  COMPARISON:  None  HISTORY:  ORDERING SYSTEM PROVIDED HISTORY: Recurrent sinusitis  TECHNOLOGIST PROVIDED HISTORY:  Ordering Physician Provided Reason for Exam: recurrent sinusitis  Acuity: Unknown  Type of Exam: Unknown  FINDINGS:  SINUSES/MASTOIDS:  The paranasal sinuses are well aerated.  There is mild to moderate right maxillary sinus mucosal thickening. There are no air-fluid levels.  The ostiomeatal units are patent bilaterally.  There is fluid in the inferior portion of both mastoid air cells. SOFT TISSUES:  Visualized soft tissues demonstrate no acute abnormality.  The visualized portion of the intracranial contents demonstrate no gross acute  abnormality. Impression   Moderate right maxillary sinus mucosal thickening  Bilateral mastoid effusions     Assessment:      1. Type 2 diabetes mellitus with complication, without long-term current use of insulin ([address]    2. Essential hypertension, benign    3. Obstructive sleep apnea    4. Panlobular emphysema ([address]    5. Hyperkalemia    6. Tobacco use disorder          Plan:      Ethan Warren was seen today for diabetes.     Diagnoses and all orders for this visit:    Type 2 diabetes mellitus with complication, without long-term current use of insulin ([facility])  -     Cancel: POCT microalbumin  -     POCT glycosylated hemoglobin (Hb A1C)  -     glipiZIDE (GLUCOTROL) 5 MG tablet; TAKE 1/2 TO 1 TABLET BY MOUTH TWICE DAILY BEFORE MEALS  Poor control still. The Hemoglobin A1c goal for good control to avoid diabetic complications like stroke, heart attacks, amputations, kidney dialysis is an A1c of 6.4 or less. Up to 6.9 is considered fair control. Essential hypertension, benign  -     BASIC METABOLIC PANEL; Future  -     Good control here. -     Continue meds and lifestyle control. Obstructive sleep apnea  Call if you need a referral.    Panlobular emphysema ([address]  Stable. Hyperkalemia  -     BASIC METABOLIC PANEL; Future  Your potassium is high. It may be due to taking potassium supplements. Another cause is using too much potassium containing salt substitute like \"Chanel's Lite\", sea salt, or \"No Salt\"  (check the labels if you are using one). It will not be caused by substitutes like \"Mrs Dash\" which are salt free and low potassium spices. Drinking a lot of fruit/vegetable juice or eating a lot of fruit can also cause a slightly high potassium. Drinking plenty of water can help if you do not have other medical reasons to restrict your water. Tobacco use disorder  Cut back prior to cataract surgery. Plain that smoking decreases the size of blood vessels and therefore causes poor healing.       Catarino Cruz, DO

## 2018-12-03 NOTE — PATIENT INSTRUCTIONS
Jackson Del Real was seen today for diabetes. Diagnoses and all orders for this visit:    Type 2 diabetes mellitus with complication, without long-term current use of insulin (Former[facility])  -     Cancel: POCT microalbumin  -     POCT glycosylated hemoglobin (Hb A1C)  -     glipiZIDE (GLUCOTROL) 5 MG tablet; TAKE 1/2 TO 1 TABLET BY MOUTH TWICE DAILY BEFORE MEALS  Poor control still. The Hemoglobin A1c goal for good control to avoid diabetic complications like stroke, heart attacks, amputations, kidney dialysis is an A1c of 6.4 or less. Up to 6.9 is considered fair control. Essential hypertension, benign  -     BASIC METABOLIC PANEL; Future  -     Good control here. -     Continue meds and lifestyle control. Obstructive sleep apnea  Call if you need a referral.    Panlobular emphysema ([address]  Stable. Hyperkalemia  -     BASIC METABOLIC PANEL; Future  Your potassium is high. It may be due to taking potassium supplements. Another cause is using too much potassium containing salt substitute like \"Chanel's Lite\", sea salt, or \"No Salt\"  (check the labels if you are using one). It will not be caused by substitutes like \"Mrs Dash\" which are salt free and low potassium spices. Drinking a lot of fruit/vegetable juice or eating a lot of fruit can also cause a slightly high potassium. Drinking plenty of water can help if you do not have other medical reasons to restrict your water. Tobacco use disorder  Cut back prior to cataracts.

## 2018-12-27 DIAGNOSIS — E11.8 TYPE 2 DIABETES MELLITUS WITH COMPLICATION, WITHOUT LONG-TERM CURRENT USE OF INSULIN (HCC): Primary | Chronic | ICD-10-CM

## 2018-12-27 RX ORDER — LANCETS 28 GAUGE
EACH MISCELLANEOUS
Qty: 200 EACH | Refills: 1 | Status: SHIPPED | OUTPATIENT
Start: 2018-12-27 | End: 2019-08-08 | Stop reason: SDUPTHER

## 2019-01-07 DIAGNOSIS — J30.9 ALLERGIC SINUSITIS: ICD-10-CM

## 2019-01-07 RX ORDER — MONTELUKAST SODIUM 10 MG/1
TABLET ORAL
Qty: 30 TABLET | Refills: 0 | Status: SHIPPED | OUTPATIENT
Start: 2019-01-07 | End: 2019-02-15 | Stop reason: SDUPTHER

## 2019-01-17 ENCOUNTER — OFFICE VISIT (OUTPATIENT)
Dept: FAMILY MEDICINE CLINIC | Age: 79
End: 2019-01-17
Payer: MEDICARE

## 2019-01-17 VITALS
WEIGHT: 232 LBS | RESPIRATION RATE: 26 BRPM | BODY MASS INDEX: 36.41 KG/M2 | OXYGEN SATURATION: 95 % | TEMPERATURE: 97.8 F | HEIGHT: 67 IN | DIASTOLIC BLOOD PRESSURE: 78 MMHG | SYSTOLIC BLOOD PRESSURE: 134 MMHG | HEART RATE: 64 BPM

## 2019-01-17 DIAGNOSIS — Z01.818 PRE-OP EVALUATION: Primary | ICD-10-CM

## 2019-01-17 DIAGNOSIS — H26.9 CATARACT OF BOTH EYES, UNSPECIFIED CATARACT TYPE: ICD-10-CM

## 2019-01-17 PROCEDURE — G8427 DOCREV CUR MEDS BY ELIG CLIN: HCPCS | Performed by: REGISTERED NURSE

## 2019-01-17 PROCEDURE — 99214 OFFICE O/P EST MOD 30 MIN: CPT | Performed by: REGISTERED NURSE

## 2019-01-17 PROCEDURE — G8482 FLU IMMUNIZE ORDER/ADMIN: HCPCS | Performed by: REGISTERED NURSE

## 2019-01-17 PROCEDURE — 1101F PT FALLS ASSESS-DOCD LE1/YR: CPT | Performed by: REGISTERED NURSE

## 2019-01-17 PROCEDURE — G8417 CALC BMI ABV UP PARAM F/U: HCPCS | Performed by: REGISTERED NURSE

## 2019-01-17 RX ORDER — CEPHALEXIN 500 MG/1
CAPSULE ORAL
Refills: 0 | COMMUNITY
Start: 2018-12-27 | End: 2019-04-24 | Stop reason: ALTCHOICE

## 2019-01-17 RX ORDER — BROMFENAC SODIUM 0.7 MG/ML
SOLUTION/ DROPS OPHTHALMIC
Refills: 1 | COMMUNITY
Start: 2019-01-14 | End: 2019-07-30 | Stop reason: ALTCHOICE

## 2019-01-17 ASSESSMENT — ENCOUNTER SYMPTOMS
NAUSEA: 0
PHOTOPHOBIA: 0
VOMITING: 0
EYE REDNESS: 0
EYE DISCHARGE: 0
DIARRHEA: 0
EYE ITCHING: 0
CONSTIPATION: 0
EYE PAIN: 0
WHEEZING: 0
SHORTNESS OF BREATH: 0
CHEST TIGHTNESS: 0
COUGH: 0
ABDOMINAL PAIN: 0

## 2019-01-23 ENCOUNTER — CARE COORDINATION (OUTPATIENT)
Dept: CARE COORDINATION | Age: 79
End: 2019-01-23

## 2019-02-05 RX ORDER — BLOOD-GLUCOSE METER
KIT MISCELLANEOUS
Qty: 300 STRIP | Refills: 0 | Status: SHIPPED | OUTPATIENT
Start: 2019-02-05 | End: 2019-11-14 | Stop reason: SDUPTHER

## 2019-02-11 ENCOUNTER — CARE COORDINATION (OUTPATIENT)
Dept: CARE COORDINATION | Age: 79
End: 2019-02-11

## 2019-02-15 DIAGNOSIS — J30.9 ALLERGIC SINUSITIS: ICD-10-CM

## 2019-02-15 RX ORDER — MONTELUKAST SODIUM 10 MG/1
TABLET ORAL
Qty: 30 TABLET | Refills: 0 | Status: SHIPPED | OUTPATIENT
Start: 2019-02-15 | End: 2019-04-24 | Stop reason: SDUPTHER

## 2019-02-20 DIAGNOSIS — E11.8 TYPE 2 DIABETES MELLITUS WITH COMPLICATION, WITHOUT LONG-TERM CURRENT USE OF INSULIN (HCC): Chronic | ICD-10-CM

## 2019-02-20 RX ORDER — GLIPIZIDE 5 MG/1
TABLET ORAL
Qty: 180 TABLET | Refills: 0 | Status: SHIPPED | OUTPATIENT
Start: 2019-02-20 | End: 2019-04-24 | Stop reason: SDUPTHER

## 2019-02-26 ENCOUNTER — TELEPHONE (OUTPATIENT)
Dept: INTERNAL MEDICINE CLINIC | Age: 79
End: 2019-02-26

## 2019-02-27 ENCOUNTER — OFFICE VISIT (OUTPATIENT)
Dept: ENT CLINIC | Age: 79
End: 2019-02-27
Payer: MEDICARE

## 2019-02-27 VITALS — OXYGEN SATURATION: 93 % | SYSTOLIC BLOOD PRESSURE: 130 MMHG | HEART RATE: 85 BPM | DIASTOLIC BLOOD PRESSURE: 68 MMHG

## 2019-02-27 DIAGNOSIS — J32.9 RECURRENT SINUSITIS: ICD-10-CM

## 2019-02-27 DIAGNOSIS — J30.9 ALLERGIC SINUSITIS: Primary | ICD-10-CM

## 2019-02-27 PROCEDURE — 96372 THER/PROPH/DIAG INJ SC/IM: CPT | Performed by: OTOLARYNGOLOGY

## 2019-02-27 PROCEDURE — 1123F ACP DISCUSS/DSCN MKR DOCD: CPT | Performed by: OTOLARYNGOLOGY

## 2019-02-27 PROCEDURE — G8427 DOCREV CUR MEDS BY ELIG CLIN: HCPCS | Performed by: OTOLARYNGOLOGY

## 2019-02-27 PROCEDURE — 1101F PT FALLS ASSESS-DOCD LE1/YR: CPT | Performed by: OTOLARYNGOLOGY

## 2019-02-27 PROCEDURE — G8482 FLU IMMUNIZE ORDER/ADMIN: HCPCS | Performed by: OTOLARYNGOLOGY

## 2019-02-27 PROCEDURE — 99213 OFFICE O/P EST LOW 20 MIN: CPT | Performed by: OTOLARYNGOLOGY

## 2019-02-27 PROCEDURE — G8417 CALC BMI ABV UP PARAM F/U: HCPCS | Performed by: OTOLARYNGOLOGY

## 2019-02-27 PROCEDURE — G8598 ASA/ANTIPLAT THER USED: HCPCS | Performed by: OTOLARYNGOLOGY

## 2019-02-27 PROCEDURE — 4040F PNEUMOC VAC/ADMIN/RCVD: CPT | Performed by: OTOLARYNGOLOGY

## 2019-02-27 PROCEDURE — 4004F PT TOBACCO SCREEN RCVD TLK: CPT | Performed by: OTOLARYNGOLOGY

## 2019-02-27 RX ORDER — METHYLPREDNISOLONE ACETATE 40 MG/ML
40 INJECTION, SUSPENSION INTRA-ARTICULAR; INTRALESIONAL; INTRAMUSCULAR; SOFT TISSUE ONCE
Status: COMPLETED | OUTPATIENT
Start: 2019-02-27 | End: 2019-02-27

## 2019-02-27 RX ORDER — CEFDINIR 300 MG/1
300 CAPSULE ORAL 2 TIMES DAILY
Qty: 20 CAPSULE | Refills: 0 | Status: SHIPPED | OUTPATIENT
Start: 2019-02-27 | End: 2019-04-24 | Stop reason: ALTCHOICE

## 2019-02-27 RX ORDER — AZELASTINE 1 MG/ML
1 SPRAY, METERED NASAL 2 TIMES DAILY
Qty: 1 BOTTLE | Refills: 1 | Status: SHIPPED | OUTPATIENT
Start: 2019-02-27 | End: 2020-02-18

## 2019-02-27 RX ADMIN — METHYLPREDNISOLONE ACETATE 40 MG: 40 INJECTION, SUSPENSION INTRA-ARTICULAR; INTRALESIONAL; INTRAMUSCULAR; SOFT TISSUE at 15:10

## 2019-02-28 DIAGNOSIS — I25.10 CORONARY ARTERY DISEASE DUE TO LIPID RICH PLAQUE: Chronic | ICD-10-CM

## 2019-02-28 DIAGNOSIS — E78.00 PURE HYPERCHOLESTEROLEMIA: Chronic | ICD-10-CM

## 2019-02-28 DIAGNOSIS — I25.83 CORONARY ARTERY DISEASE DUE TO LIPID RICH PLAQUE: Chronic | ICD-10-CM

## 2019-02-28 RX ORDER — ATORVASTATIN CALCIUM 40 MG/1
TABLET, FILM COATED ORAL
Qty: 90 TABLET | Refills: 0 | Status: SHIPPED | OUTPATIENT
Start: 2019-02-28 | End: 2019-06-05 | Stop reason: SDUPTHER

## 2019-03-05 ENCOUNTER — CARE COORDINATION (OUTPATIENT)
Dept: CARE COORDINATION | Age: 79
End: 2019-03-05

## 2019-03-05 DIAGNOSIS — E11.8 TYPE 2 DIABETES MELLITUS WITH COMPLICATION, WITHOUT LONG-TERM CURRENT USE OF INSULIN (HCC): Chronic | ICD-10-CM

## 2019-03-05 DIAGNOSIS — I10 ESSENTIAL HYPERTENSION, BENIGN: Chronic | ICD-10-CM

## 2019-03-06 RX ORDER — LISINOPRIL 5 MG/1
TABLET ORAL
Qty: 90 TABLET | Refills: 0 | Status: SHIPPED | OUTPATIENT
Start: 2019-03-06 | End: 2019-05-21 | Stop reason: SDUPTHER

## 2019-03-25 ENCOUNTER — CARE COORDINATION (OUTPATIENT)
Dept: CARE COORDINATION | Age: 79
End: 2019-03-25

## 2019-04-02 ENCOUNTER — CARE COORDINATION (OUTPATIENT)
Dept: CARE COORDINATION | Age: 79
End: 2019-04-02

## 2019-04-02 NOTE — CARE COORDINATION
Ambulatory Care Coordination Note  4/2/2019  CM Risk Score: 7  Lily Mortality Risk Score: 15.18    ACC: Rose Gregory, RN    Summary Note: RNCC called and spoke with patient today. Pt states that he is still battling with cough and feeling \"stuffed up\" every morning. Pt states that he was seen by the ENT and put on antibiotics. Pt states that he has a follow up with the ENT on 4/8/19 as does not feel better. Pt states he is going to have is defib. Replaced on the 18th and has a follow up schedule with cardiology on the 24th. Pt also requested to schedule an appointment with Dr. Arn Boas on the same day which writer was able to schedule. Pt denies any progress with stopping smoking. Pt states that he did have his eyes worked on also this last month. Writer reviewed his blood sugar status which pt states is fine, he was able to fill the test strips. Writer and patient discussed hypo and hyperglycemia and how to correct if it is too low. Pt verbalized understanding. Writer will meet with patient for more education when he comes in to see Dr. Arn Boas. Care Coordination Interventions    Program Enrollment:  Complex Care  Referral from Primary Care Provider:  No  Suggested Interventions and Community Resources  Diabetes Education:  Not Started  Fall Risk Prevention: In Process  Disease Association:  Completed (Comment: ENT; Cardiology)  Medication Assistance Program:  Not Started  Medi Set or Pill Pack:  Completed  Pharmacist:   (Comment: Faxed medical necessity form for test strips and lancets to [facility] )  Registered Dietician:  Not Started  Smoking Cessation:  Not Started  Transportation Support:  Not Started  Zone Management Tools:  Not Started  Other Services or Interventions:  Able to get test strips now.  Will bring readings to appointment         Goals Addressed                 This Visit's Progress       Patient Stated     Patient Stated (pt-stated)   On track     Will attempt to decrease number of cigarettes per day. Barriers: lack of motivation  Plan for overcoming my barriers: I will work on limiting the number of cigarettes per day. Confidence: 5/10  Anticipated Goal Completion Date: 6/1/19         Other     Conditions and Symptoms        I will schedule office visits, as directed by my provider. I will notify my provider of any barriers to my plan of care. I will follow my Zone Management tool to seek urgent or emergent care. I will notify my provider of any symptoms that indicate a worsening of my condition. Barriers: overwhelmed by complexity of regimen  Plan for overcoming my barriers: I will follow up with my CC or health provider on symptoms or conditions that change with my health with COPD and DM  Confidence: 6/10  Anticipated Goal Completion Date: 6/1/19                Prior to Admission medications    Medication Sig Start Date End Date Taking? Authorizing Provider   lisinopril (PRINIVIL;ZESTRIL) 5 MG tablet TAKE 1 TABLET DAILY 3/6/19   Vernestine Slohéctor, DO   atorvastatin (LIPITOR) 40 MG tablet TAKE 1 TABLET DAILY.   MAKE AN APPOINTMENT FOR FURTHER REFILLS 2/28/19   Verfelicitastine Colin, DO   cefdinir (OMNICEF) 300 MG capsule Take 1 capsule by mouth 2 times daily 2/27/19   Karina Lyle MD   azelastine (ASTELIN) 0.1 % nasal spray 1 spray by Nasal route 2 times daily 1 Spray in each nostril 2/27/19   Karina Lyle MD   glipiZIDE (GLUCOTROL) 5 MG tablet TAKE 1/2 TO 1 TABLET TWICE DAILY BEFORE MEALS 2/20/19   MERCED Francis - CNP   montelukast (SINGULAIR) 10 MG tablet TAKE 1 TABLET BY MOUTH EVERY NIGHT 2/15/19   MD SAM Lopez LITE strip TEST THREE TIMES DAILY 2/5/19   Verkenneth Shaw, DO   cephALEXin (KEFLEX) 500 MG capsule TK 2 CS PO 1 HOUR PRIOR TO SURGERY AND 1 C PO 6 H AFTER PROCEDURE 12/27/18   Historical Provider, MD   PROLENSA 0.07 % SOLN USE 1 DROP IN RIGHT EYE D AFTER SURGERY 1/14/19   Historical Provider, MD   FREESTYLE LANCETS MISC Use to test blood two

## 2019-04-08 ENCOUNTER — OFFICE VISIT (OUTPATIENT)
Dept: ENT CLINIC | Age: 79
End: 2019-04-08
Payer: MEDICARE

## 2019-04-08 VITALS — DIASTOLIC BLOOD PRESSURE: 68 MMHG | SYSTOLIC BLOOD PRESSURE: 134 MMHG

## 2019-04-08 DIAGNOSIS — J30.9 ALLERGIC SINUSITIS: Primary | ICD-10-CM

## 2019-04-08 DIAGNOSIS — J30.9 ALLERGIC SINUSITIS: ICD-10-CM

## 2019-04-08 DIAGNOSIS — J32.9 RECURRENT SINUSITIS: ICD-10-CM

## 2019-04-08 DIAGNOSIS — I10 ESSENTIAL HYPERTENSION, BENIGN: Chronic | ICD-10-CM

## 2019-04-08 DIAGNOSIS — I25.10 CORONARY ARTERY DISEASE DUE TO LIPID RICH PLAQUE: Chronic | ICD-10-CM

## 2019-04-08 DIAGNOSIS — I25.83 CORONARY ARTERY DISEASE DUE TO LIPID RICH PLAQUE: Chronic | ICD-10-CM

## 2019-04-08 PROCEDURE — 4040F PNEUMOC VAC/ADMIN/RCVD: CPT | Performed by: OTOLARYNGOLOGY

## 2019-04-08 PROCEDURE — 99213 OFFICE O/P EST LOW 20 MIN: CPT | Performed by: OTOLARYNGOLOGY

## 2019-04-08 PROCEDURE — G8417 CALC BMI ABV UP PARAM F/U: HCPCS | Performed by: OTOLARYNGOLOGY

## 2019-04-08 PROCEDURE — 1123F ACP DISCUSS/DSCN MKR DOCD: CPT | Performed by: OTOLARYNGOLOGY

## 2019-04-08 PROCEDURE — G8598 ASA/ANTIPLAT THER USED: HCPCS | Performed by: OTOLARYNGOLOGY

## 2019-04-08 PROCEDURE — 4004F PT TOBACCO SCREEN RCVD TLK: CPT | Performed by: OTOLARYNGOLOGY

## 2019-04-08 PROCEDURE — G8427 DOCREV CUR MEDS BY ELIG CLIN: HCPCS | Performed by: OTOLARYNGOLOGY

## 2019-04-08 RX ORDER — MONTELUKAST SODIUM 10 MG/1
TABLET ORAL
Qty: 90 TABLET | Refills: 1 | Status: SHIPPED | OUTPATIENT
Start: 2019-04-08 | End: 2019-08-29

## 2019-04-08 RX ORDER — MONTELUKAST SODIUM 10 MG/1
10 TABLET ORAL NIGHTLY
Qty: 30 TABLET | Refills: 1 | Status: SHIPPED | OUTPATIENT
Start: 2019-04-08 | End: 2019-04-08 | Stop reason: SDUPTHER

## 2019-04-08 RX ORDER — DOXYCYCLINE 100 MG/1
100 TABLET ORAL 2 TIMES DAILY
Qty: 28 TABLET | Refills: 0 | Status: SHIPPED | OUTPATIENT
Start: 2019-04-08 | End: 2019-04-22

## 2019-04-08 NOTE — TELEPHONE ENCOUNTER
LAST VISIT 01/17/2019 EDDIE, NEXT VISIT 04/24/2019. LAST BMP 12/03/2018. [address]     Component Value Ref Range & Units Status Collected Lab   Sodium 141  136 - 145 mmol/L Final 12/03/2018 10:27 AM [facility]   Potassium 5.5High   3.5 - 5.1 mmol/L Final 12/03/2018 10:27 AM [facility]   Chloride 101  99 - 110 mmol/L Final 12/03/2018 10:27 AM [facility]   CO2 26  21 - 32 mmol/L Final 12/03/2018 10:27 AM [facility]   Anion Gap 14  3 - 16 Final 12/03/2018 10:27 AM [facility]   Glucose 128High   70 - 99 mg/dL Final 12/03/2018 10:27 AM [facility]   BUN 19  7 - 20 mg/dL Final 12/03/2018 10:27 AM [facility]   CREATININE 0.9  0.8 - 1.3 mg/dL Final 12/03/2018 10:27 AM [facility]   GFR Non-African American >60  >60 Final 12/03/2018 10:27 AM [facility]   >60 mL/min/1.73m2 EGFR, calc. for ages 25 and older using the   MDRD formula (not corrected for weight), is valid for stable   renal function. GFR  >60  >60 Final 12/03/2018 10:27 AM [facility]   Chronic Kidney Disease: less than 60 ml/min/1.73 sq. m.         Kidney Failure: less than 15 ml/min/1.73 sq.m. Results valid for patients 18 years and older.     Calcium 9.5  8.3 - 10.6 mg/dL Final 12/03/2018 10:27 AM [facility] [employer]

## 2019-04-08 NOTE — PROGRESS NOTES
Patient has not noted any particular change with his nasal congestion and drainage. He is diabetic on medication under fairly good control. No no temperature elevations been noted. He does not smoke. Currently, he appears in no obvious acute distress. Ear examination reveals normal-appearing tympanic membranes and ear canals after removal of some cerumen with a curet from his right ear canal.  Oral examination is unremarkable. The neck is free of adenopathy, mass, thyroid enlargement. Nasal mucosa treated with cotton pledgets  impregnated with Afrin and lidocaine placed in middle meatuses against turbinates. Pledgets left in place for five minutes and removed enabling enhanced visualization. The exam revealed positive edema of mucosa. it was positive for erythema indicative of infection. There was not evidence of deviation of the septum which was not significant. There were not polyps present. .There were not other masses present. The turbinates were not enlarged beyond normal.  Finding remain those of sinus infection with associated allergy. Unfortunately, it is difficult to treat his allergies due to his diabetes. We will try a course of doxycycline monohydrate for 2 weeks along with Singulair. He will continue the use of his nasal spray. It may well be necessary that he undergo allergy desensitization. Sinus CT scan previously done did not reveal any serious pathology that would require any surgical intervention.

## 2019-04-15 ENCOUNTER — TELEPHONE (OUTPATIENT)
Dept: FAMILY MEDICINE CLINIC | Age: 79
End: 2019-04-15

## 2019-04-15 NOTE — TELEPHONE ENCOUNTER
[facility] -- they need him to have labs done and he wants to come here    They need  A Pt Inr and CBC -  Please place orders.       He is having surgery the 18th --  Battery replaced in a stimulator in his back    Narciso Glass - will fax this over to you

## 2019-04-16 ENCOUNTER — TELEPHONE (OUTPATIENT)
Dept: FAMILY MEDICINE CLINIC | Age: 79
End: 2019-04-16

## 2019-04-16 ENCOUNTER — NURSE ONLY (OUTPATIENT)
Dept: FAMILY MEDICINE CLINIC | Age: 79
End: 2019-04-16
Payer: MEDICARE

## 2019-04-16 DIAGNOSIS — M96.1 POSTLAMINECTOMY SYNDROME, CERVICAL REGION: Primary | ICD-10-CM

## 2019-04-16 LAB
BASOPHILS ABSOLUTE: 0.1 K/UL (ref 0–0.2)
BASOPHILS RELATIVE PERCENT: 0.5 %
EOSINOPHILS ABSOLUTE: 0.1 K/UL (ref 0–0.6)
EOSINOPHILS RELATIVE PERCENT: 0.9 %
HCT VFR BLD CALC: 47.7 % (ref 40.5–52.5)
HEMOGLOBIN: 15.8 G/DL (ref 13.5–17.5)
INR BLD: 0.99 (ref 0.86–1.14)
LYMPHOCYTES ABSOLUTE: 3.3 K/UL (ref 1–5.1)
LYMPHOCYTES RELATIVE PERCENT: 25.4 %
MCH RBC QN AUTO: 32.4 PG (ref 26–34)
MCHC RBC AUTO-ENTMCNC: 33.2 G/DL (ref 31–36)
MCV RBC AUTO: 97.4 FL (ref 80–100)
MONOCYTES ABSOLUTE: 1 K/UL (ref 0–1.3)
MONOCYTES RELATIVE PERCENT: 7.9 %
NEUTROPHILS ABSOLUTE: 8.4 K/UL (ref 1.7–7.7)
NEUTROPHILS RELATIVE PERCENT: 65.3 %
PDW BLD-RTO: 14.7 % (ref 12.4–15.4)
PLATELET # BLD: 273 K/UL (ref 135–450)
PMV BLD AUTO: 8 FL (ref 5–10.5)
PROTHROMBIN TIME: 11.3 SEC (ref 9.8–13)
RBC # BLD: 4.9 M/UL (ref 4.2–5.9)
WBC # BLD: 12.9 K/UL (ref 4–11)

## 2019-04-16 PROCEDURE — 36415 COLL VENOUS BLD VENIPUNCTURE: CPT | Performed by: FAMILY MEDICINE

## 2019-04-16 NOTE — TELEPHONE ENCOUNTER
We can't do labs when the only diagnosis is preop otherwise it will not be covered by medicare and he will have to pay for it. Labs costs are part of the cost of surgery if there are no diagnosis so the labs must be done at the facility where the surgery will be done. Inform patient.

## 2019-04-16 NOTE — TELEPHONE ENCOUNTER
RECEIVED FAX FROM SURGEON OFFICE. PATIENT SCHEDULED FOR LABS TODAY AT 1:30 PM PLEASE PUT ORDERS IN FOR PATIENT.  SC

## 2019-04-16 NOTE — TELEPHONE ENCOUNTER
Mr Wyatt Lees has still not received his metoprolol . Was ordered on 4/8 but normally they would have contacted him by now.  Can we please look into this for him

## 2019-04-17 ENCOUNTER — TELEPHONE (OUTPATIENT)
Dept: FAMILY MEDICINE CLINIC | Age: 79
End: 2019-04-17

## 2019-04-17 DIAGNOSIS — E11.8 TYPE 2 DIABETES MELLITUS WITH COMPLICATION, WITHOUT LONG-TERM CURRENT USE OF INSULIN (HCC): Chronic | ICD-10-CM

## 2019-04-17 NOTE — TELEPHONE ENCOUNTER
LAST VISIT 01/17/2019 WITH EDDIE FOR A PRE-OP. LAST OV 12/03/2018 WITH LUCY. NEXT VISIT 04/24/2019 WITH LUCY. LAST A1C 12/03/2018.  HS     12/3/2018 10:57 AM - Vitaly Beltran MA     Component Value Ref Range & Units Status Collected Lab   Hemoglobin A1C 7.5  % Final 12/03/2018 10:57 AM Unknown

## 2019-04-17 NOTE — TELEPHONE ENCOUNTER
WBC 4.0 - 11.0 K/uL 12.9High     RBC 4.20 - 5.90 M/uL 4.90    Hemoglobin 13.5 - 17.5 g/dL 15.8    Hematocrit 40.5 - 52.5 % 47.7    MCV 80.0 - 100.0 fL 97.4    MCH 26.0 - 34.0 pg 32.4    MCHC 31.0 - 36.0 g/dL 33.2    RDW 12.4 - 15.4 % 14.7    Platelets 545 - 386 K/uL 273    MPV 5.0 - 10.5 fL 8.0    Neutrophils % % 65.3    Lymphocytes % % 25.4    Monocytes % % 7.9    Eosinophils % % 0.9    Basophils % % 0.5    Neutrophils # 1.7 - 7.7 K/uL 8. 4High     Lymphocytes # 1.0 - 5.1 K/uL 3.3    Monocytes # 0.0 - 1.3 K/uL 1.0    Eosinophils # 0.0 - 0.6 K/uL 0.1    Basophils # 0.0 - 0.2 K/uL 0.1       Narrative     Performed at:  [facility]  [address] [place] [address]   Phone [phone]            Protime-INR   Order: [id number]   Collected:  4/16/2019 13:55   View Full Report   Ref Range & Units 1d ago   Protime 9.8 - 13.0 sec 11.3    Comment: Effective 5-31-18 09:00am EST   Please note reference ranges have   changed for PT and INR Testing. INR 0.86 - 1.14 0.99    Comment: Effective 05/31/18 at 09:00am EST     Normal: 0.86 - 1.14   Therapeutic: 2.0 - 3.0   Pros.  Valve: 2.5 - 3.5   AMI: 2.0 - 3.0       Narrative     Performed at:  [facility]  [address] [place], [place] [address]   Phone [phone]

## 2019-04-17 NOTE — TELEPHONE ENCOUNTER
CALLED [facility] DELISA AND SPOKE TO BRANDIE SHE VERIFIED THAT THEY SHIPPED THE MEDICATION TO THE PATIENT ON Monday AND THAT PATIENT SHOULD EXPECT THIS BY END OF THE WEEK OR EARLY NEXT WEEK. CALLED PATIENT TO LET HIM KNOW THIS AND THAT HE IS TO CALL ME IF HE DOES NOT RECEIVE HIS MEDICATION.  SC

## 2019-04-24 ENCOUNTER — OFFICE VISIT (OUTPATIENT)
Dept: FAMILY MEDICINE CLINIC | Age: 79
End: 2019-04-24
Payer: MEDICARE

## 2019-04-24 VITALS
OXYGEN SATURATION: 95 % | RESPIRATION RATE: 18 BRPM | HEIGHT: 67 IN | WEIGHT: 233.2 LBS | SYSTOLIC BLOOD PRESSURE: 124 MMHG | BODY MASS INDEX: 36.6 KG/M2 | HEART RATE: 56 BPM | DIASTOLIC BLOOD PRESSURE: 82 MMHG

## 2019-04-24 DIAGNOSIS — E66.01 CLASS 2 SEVERE OBESITY DUE TO EXCESS CALORIES WITH SERIOUS COMORBIDITY AND BODY MASS INDEX (BMI) OF 36.0 TO 36.9 IN ADULT (HCC): ICD-10-CM

## 2019-04-24 DIAGNOSIS — H92.01 RIGHT EAR PAIN: ICD-10-CM

## 2019-04-24 DIAGNOSIS — J43.1 PANLOBULAR EMPHYSEMA (HCC): ICD-10-CM

## 2019-04-24 DIAGNOSIS — F17.200 TOBACCO USE DISORDER: ICD-10-CM

## 2019-04-24 DIAGNOSIS — M51.37 DEGENERATION OF LUMBAR OR LUMBOSACRAL INTERVERTEBRAL DISC: ICD-10-CM

## 2019-04-24 DIAGNOSIS — E11.8 TYPE 2 DIABETES MELLITUS WITH COMPLICATION, WITHOUT LONG-TERM CURRENT USE OF INSULIN (HCC): Primary | Chronic | ICD-10-CM

## 2019-04-24 LAB — HBA1C MFR BLD: 8.4 %

## 2019-04-24 PROCEDURE — 1123F ACP DISCUSS/DSCN MKR DOCD: CPT | Performed by: FAMILY MEDICINE

## 2019-04-24 PROCEDURE — 3023F SPIROM DOC REV: CPT | Performed by: FAMILY MEDICINE

## 2019-04-24 PROCEDURE — G8598 ASA/ANTIPLAT THER USED: HCPCS | Performed by: FAMILY MEDICINE

## 2019-04-24 PROCEDURE — 83036 HEMOGLOBIN GLYCOSYLATED A1C: CPT | Performed by: FAMILY MEDICINE

## 2019-04-24 PROCEDURE — G8417 CALC BMI ABV UP PARAM F/U: HCPCS | Performed by: FAMILY MEDICINE

## 2019-04-24 PROCEDURE — 4004F PT TOBACCO SCREEN RCVD TLK: CPT | Performed by: FAMILY MEDICINE

## 2019-04-24 PROCEDURE — 99215 OFFICE O/P EST HI 40 MIN: CPT | Performed by: FAMILY MEDICINE

## 2019-04-24 PROCEDURE — 4040F PNEUMOC VAC/ADMIN/RCVD: CPT | Performed by: FAMILY MEDICINE

## 2019-04-24 PROCEDURE — G8926 SPIRO NO PERF OR DOC: HCPCS | Performed by: FAMILY MEDICINE

## 2019-04-24 PROCEDURE — G8427 DOCREV CUR MEDS BY ELIG CLIN: HCPCS | Performed by: FAMILY MEDICINE

## 2019-04-24 RX ORDER — GLIPIZIDE 5 MG/1
TABLET ORAL
Qty: 225 TABLET | Refills: 0 | Status: SHIPPED | OUTPATIENT
Start: 2019-04-24 | End: 2019-05-21 | Stop reason: SDUPTHER

## 2019-04-24 SDOH — ECONOMIC STABILITY: TRANSPORTATION INSECURITY
IN THE PAST 12 MONTHS, HAS THE LACK OF TRANSPORTATION KEPT YOU FROM MEDICAL APPOINTMENTS OR FROM GETTING MEDICATIONS?: NO

## 2019-04-24 SDOH — ECONOMIC STABILITY: TRANSPORTATION INSECURITY
IN THE PAST 12 MONTHS, HAS LACK OF TRANSPORTATION KEPT YOU FROM MEETINGS, WORK, OR FROM GETTING THINGS NEEDED FOR DAILY LIVING?: NO

## 2019-04-24 ASSESSMENT — PATIENT HEALTH QUESTIONNAIRE - PHQ9
2. FEELING DOWN, DEPRESSED OR HOPELESS: 0
SUM OF ALL RESPONSES TO PHQ9 QUESTIONS 1 & 2: 0
SUM OF ALL RESPONSES TO PHQ QUESTIONS 1-9: 0
1. LITTLE INTEREST OR PLEASURE IN DOING THINGS: 0
SUM OF ALL RESPONSES TO PHQ QUESTIONS 1-9: 0

## 2019-04-24 ASSESSMENT — ENCOUNTER SYMPTOMS
WHEEZING: 0
COUGH: 1
SHORTNESS OF BREATH: 1
CHOKING: 0
CHEST TIGHTNESS: 0

## 2019-04-24 NOTE — PATIENT INSTRUCTIONS
Ct Miller was seen today for diabetes. Diagnoses and all orders for this visit:    Type 2 diabetes mellitus with complication, without long-term current use of insulin ([facility])  -     POCT glycosylated hemoglobin (Hb A1C) 8.4  -     glipiZIDE (GLUCOTROL) 5 MG tablet; Take 1 tab at breakfast and 1.5 tabs at dinner by mouth  Poor control. Increase medication as above. The Hemoglobin A1c goal for good control to avoid diabetic complications like stroke, heart attacks, amputations, kidney dialysis is an A1c of 6.4 or less. Up to 6.9 is considered fair control. Panlobular emphysema ([facility] [address]  Still have wheezing. Class 2 severe obesity due to excess calories with serious comorbidity and body mass index (BMI) of 36.0 to 36.9 in adult ([facility])  Weight loss gives. Right ear pain  Can soak with 1/2 vinegar 1/2 warm water for min nightly till resolved.

## 2019-05-08 ENCOUNTER — CARE COORDINATION (OUTPATIENT)
Dept: CARE COORDINATION | Age: 79
End: 2019-05-08

## 2019-05-10 ENCOUNTER — OFFICE VISIT (OUTPATIENT)
Dept: FAMILY MEDICINE CLINIC | Age: 79
End: 2019-05-10
Payer: MEDICARE

## 2019-05-10 VITALS
SYSTOLIC BLOOD PRESSURE: 130 MMHG | DIASTOLIC BLOOD PRESSURE: 82 MMHG | TEMPERATURE: 97.6 F | HEIGHT: 67 IN | HEART RATE: 64 BPM | OXYGEN SATURATION: 95 % | BODY MASS INDEX: 36.73 KG/M2 | WEIGHT: 234 LBS | RESPIRATION RATE: 26 BRPM

## 2019-05-10 DIAGNOSIS — Z87.891 PERSONAL HISTORY OF NICOTINE DEPENDENCE: ICD-10-CM

## 2019-05-10 DIAGNOSIS — J44.1 CHRONIC OBSTRUCTIVE PULMONARY DISEASE WITH ACUTE EXACERBATION (HCC): Primary | ICD-10-CM

## 2019-05-10 PROCEDURE — 99213 OFFICE O/P EST LOW 20 MIN: CPT | Performed by: REGISTERED NURSE

## 2019-05-10 PROCEDURE — G8598 ASA/ANTIPLAT THER USED: HCPCS | Performed by: REGISTERED NURSE

## 2019-05-10 PROCEDURE — 4004F PT TOBACCO SCREEN RCVD TLK: CPT | Performed by: REGISTERED NURSE

## 2019-05-10 PROCEDURE — 4040F PNEUMOC VAC/ADMIN/RCVD: CPT | Performed by: REGISTERED NURSE

## 2019-05-10 PROCEDURE — G8417 CALC BMI ABV UP PARAM F/U: HCPCS | Performed by: REGISTERED NURSE

## 2019-05-10 PROCEDURE — G8926 SPIRO NO PERF OR DOC: HCPCS | Performed by: REGISTERED NURSE

## 2019-05-10 PROCEDURE — 1123F ACP DISCUSS/DSCN MKR DOCD: CPT | Performed by: REGISTERED NURSE

## 2019-05-10 PROCEDURE — 3023F SPIROM DOC REV: CPT | Performed by: REGISTERED NURSE

## 2019-05-10 PROCEDURE — G8427 DOCREV CUR MEDS BY ELIG CLIN: HCPCS | Performed by: REGISTERED NURSE

## 2019-05-10 PROCEDURE — 99406 BEHAV CHNG SMOKING 3-10 MIN: CPT | Performed by: REGISTERED NURSE

## 2019-05-10 RX ORDER — PREDNISONE 1 MG/1
5 TABLET ORAL DAILY
Qty: 21 TABLET | Refills: 0 | Status: SHIPPED | OUTPATIENT
Start: 2019-05-10 | End: 2019-05-16

## 2019-05-10 RX ORDER — AZITHROMYCIN 250 MG/1
TABLET, FILM COATED ORAL
Qty: 6 TABLET | Refills: 0 | Status: SHIPPED | OUTPATIENT
Start: 2019-05-10 | End: 2019-05-20

## 2019-05-10 ASSESSMENT — ENCOUNTER SYMPTOMS
TROUBLE SWALLOWING: 0
SORE THROAT: 0
RHINORRHEA: 1
SHORTNESS OF BREATH: 1
CHEST TIGHTNESS: 0
SINUS PAIN: 0
WHEEZING: 1
FACIAL SWELLING: 0
COUGH: 1
SINUS PRESSURE: 0

## 2019-05-10 NOTE — PROGRESS NOTES
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    Obstructive sleep apnea 05/09/2011    Tobacco use disorder 05/09/2011    Essential hypertension, benign 05/09/2011    Type 2 diabetes mellitus with complication ([address] 74/35/1403    Degeneration of lumbar or lumbosacral intervertebral disc 05/09/2011       Past Medical History:   Diagnosis Date    Alcohol use     excess in past prior to CABG    Benign essential hypertension     Mod concentric LVH    Blind right eye 01/01/2015    ~ date    CAD (coronary artery disease) 11/8/2013    s/p 4V CABG 11/12/13    Colon polyps 10/30/2012    COPD (chronic obstructive pulmonary disease) ([address]     DDD (degenerative disc disease), lumbar 1962    Degenerative disc disease, cervical 2002    fell w/ radicular sx into his R thumb. Ruptured disc    Diverticulosis of colon 10/30/2012    Hip problem 1956    NUMBNESS W/ TRAUMA    Hypercholesterolemia     Impotence     Metabolic syndrome     Mild diastolic dysfunction 20/61/0773    LVEF 55-60%    Obstructive sleep apnea     Obstructive sleep apnea     C-PAP    Osteoarthritis of shoulder     left  @ ac jt/impingement.     Pulmonary hypertension ([address] 10/21/2013    37    Recurrent BCC (basal cell carcinoma)     S/P aortic valve replacement 11/12/2013    Tobacco use disorder 1957    Type II or unspecified type diabetes mellitus without mention of complication, not stated as uncontrolled 2003    Vitamin D deficiency        Past Surgical History: Current Outpatient Medications   Medication Sig Dispense Refill    glipiZIDE (GLUCOTROL) 5 MG tablet Take 1 tab at breakfast and 1.5 tabs at dinner by mouth 225 tablet 0    metFORMIN (GLUCOPHAGE) 1000 MG tablet TAKE 1 TABLET TWICE DAILY  WITH MEALS 180 tablet 0    metoprolol tartrate (LOPRESSOR) 25 MG tablet TAKE 1/2 TABLET BY MOUTH TWICE DAILY 90 tablet 0    montelukast (SINGULAIR) 10 MG tablet TAKE 1 TABLET BY MOUTH EVERY NIGHT 90 tablet 1    lisinopril (PRINIVIL;ZESTRIL) 5 MG tablet TAKE 1 TABLET DAILY 90 tablet 0    atorvastatin (LIPITOR) 40 MG tablet TAKE 1 TABLET DAILY. MAKE AN APPOINTMENT FOR FURTHER REFILLS 90 tablet 0    azelastine (ASTELIN) 0.1 % nasal spray 1 spray by Nasal route 2 times daily 1 Spray in each nostril 1 Bottle 1    FREESTYLE LITE strip TEST THREE TIMES DAILY 300 strip 0    PROLENSA 0.07 % SOLN USE 1 DROP IN RIGHT EYE D AFTER SURGERY  1    FREESTYLE LANCETS MISC Use to test blood two times per day 200 each 1    HYDROcodone-acetaminophen (NORCO)  MG per tablet TK 1 T PO  QID PRN P  0    fluticasone (FLONASE) 50 MCG/ACT nasal spray 1 spray by Nasal route daily 1 Bottle 3    docusate sodium (COLACE) 100 MG capsule Take 200 mg by mouth daily Indications: Constipation      aspirin 81 MG tablet Take 81 mg by mouth daily. No current facility-administered medications for this visit. No Known Allergies    Review of Systems   Constitutional: Negative for chills, diaphoresis, fatigue and fever. HENT: Positive for congestion, ear pain, postnasal drip and rhinorrhea. Negative for ear discharge, facial swelling, hearing loss, sinus pressure, sinus pain, sneezing, sore throat and trouble swallowing. Respiratory: Positive for cough, shortness of breath and wheezing. Negative for chest tightness. Cardiovascular: Negative for chest pain and palpitations. Neurological: Negative for dizziness, weakness, light-headedness, numbness and headaches.    All other systems reviewed and are negative. Vitals:  /82 (Site: Right Upper Arm, Position: Sitting, Cuff Size: Medium Adult)   Pulse 64   Temp 97.6 °F (36.4 °C) (Oral)   Resp 26   Ht 5' 7\" (1.702 m)   Wt 234 lb (106.1 kg)   SpO2 95%   BMI 36.65 kg/m²     Physical Exam   Constitutional: He is oriented to person, place, and time. He appears well-developed and well-nourished. HENT:   Head: Normocephalic and atraumatic. Right Ear: Tympanic membrane, external ear and ear canal normal.   Left Ear: Tympanic membrane, external ear and ear canal normal.   Nose: Rhinorrhea present. Right sinus exhibits no maxillary sinus tenderness and no frontal sinus tenderness. Left sinus exhibits no maxillary sinus tenderness and no frontal sinus tenderness. Mouth/Throat: Uvula is midline, oropharynx is clear and moist and mucous membranes are normal. No tonsillar exudate. Eyes: Pupils are equal, round, and reactive to light. Conjunctivae and EOM are normal.   Neck: Normal range of motion. Neck supple. No thyromegaly present. Cardiovascular: Normal rate, regular rhythm, normal heart sounds and intact distal pulses. Pulmonary/Chest: Effort normal. No stridor. No respiratory distress. He has wheezes. He has no rales. He exhibits no tenderness. Lymphadenopathy:     He has no cervical adenopathy. Neurological: He is alert and oriented to person, place, and time. Skin: Skin is warm and dry. Capillary refill takes less than 2 seconds. Psychiatric: He has a normal mood and affect. His behavior is normal. Judgment and thought content normal.   Nursing note and vitals reviewed. Assessment/Plan     1. Chronic obstructive pulmonary disease with acute exacerbation (HCC)  Treat with Zpack and steroids. Given OTC management education- Mucinex, Flonase, Delsym, push fluids, throat lozenges, and Zyrtec.  - azithromycin (ZITHROMAX Z-NALLELY) 250 MG tablet; 2 tabs day 1. 1 tab day 2-5. Dispense: 6 tablet;  Refill: 0  - predniSONE (DELTASONE) 5 MG tablet; Take 1 tablet by mouth daily for 6 days Take 6 tablets by mouth on day 1, 5 on day 2, 4 on day 3, 3 on day 4, 2 on day 5, 1 on day 6. Dispense: 21 tablet; Refill: 0    2. Personal history of nicotine dependence   Ready to quit: No  Counseling given: Yes  Comment: Started 17 y/o. Quit 11/8/13. He smoked this year but not regular 6/23/15. Is smoking < 1/2 ppd 12/28/16. 1 ppd 3/28/16 & 6/28 & 10/5/16, 12/21/16, 4/6/17, 6/19/17, 8/14/17. 9/27/17. 12/4/17. 1.5 PPD. 1 PPD. 8/27/18.10/22/18. 12/3/18. 4/24/19  - [place] TOBACCO USE CESSATION INTERMEDIATE 3-10 MINUTES      Orders Placed This Encounter   Procedures    [place] TOBACCO USE CESSATION INTERMEDIATE 3-10 MINUTES       Return if symptoms worsen or fail to improve.     June Guerrero NP    5/10/2019  8:23 AM

## 2019-05-10 NOTE — PATIENT INSTRUCTIONS
Mucinex, Flonase, Delsym and Zyrtec  For the first 7-14 days of symptoms follow instructions below, even before being seen in the office or even during treatment with antibiotics, until symptom free. 1. Water: Drink 1 ounce of water for every 2 pounds of body weight for adults, 90 Ounces of water per day. This will loosen mucus in the head and chest & improve the weak feeling of dehydration, allow the body to get germ fighting resources to the infection. Half can be juice or sugar free Crystal Light. Don't count drinks with caffeine or carbonation. Infants can have Pedialyte liquid or freezer pops. Avoid salt if you have high Blood Pressure, swelling in the feet or ankles or have heart problems. 2. Humidity: Humidify the air to 35-50% ( or until the windows fog over slightly).  Summer use of an air conditioner turned down too far and can result in dry air. Can use a humidifier, vaporizer, boil water on the stove or put a coffee can full of water on the heater vents. This will loosen mucus from infections and allergies. 3. Sleep: Get 8-10 hours a night and rest during the evening after work or school. If you have trouble sleeping, adults can take Melatonin 5mg up to 2 tabs at bedtime ( not for children or pregnant women). If Mono is suspected then sleep during 9PM to 9AM time span (if possible.)   4. Cough: Take cough medicines with Guaifenesin ( to loosen chest or head congestion) and Dextromethorphan ( to decrease excess cough). Robitussin D.M. Syrup every 4-6 hrs or Mucinex D. M. pills twice a day. Use the pediatric formulations for children over 6 months making sure they are alcohol & sugar free for children, pregnant women, and diabetics. 5. Pain And Fevers: Take Acetaminophen ( Tylenol) for fevers, aches, and headaches. 2-500 mg every 8 hours for adults. Appropriate doses at bedtime for children may help them sleep better. If pregnant take 1 -500 mg (Tylenol) every 8 hours as needed.  Ibuprofen may be Instructions    If you have chronic obstructive pulmonary disease (COPD), your usual shortness of breath could suddenly get worse. You may start coughing more and have more mucus. This flare-up is called a COPD exacerbation (say \"rw-WEQ-yo-BAY-shun\"). A lung infection or air pollution could set off an exacerbation. Sometimes it can happen after a quick change in temperature or being around chemicals. Work with your doctor to make a plan for dealing with an exacerbation. You can better manage it if you plan ahead. Follow-up care is a key part of your treatment and safety. Be sure to make and go to all appointments, and call your doctor if you are having problems. It's also a good idea to know your test results and keep a list of the medicines you take. How can you care for yourself at home? During an exacerbation  · Do not panic if you start to have one. Quick treatment at home may help you prevent serious breathing problems. If you have a COPD exacerbation plan that you developed with your doctor, follow it. · Take your medicines exactly as your doctor tells you.  ? Use your inhaler as directed by your doctor. If your symptoms do not get better after you use your medicine, have someone take you to the emergency room. Call an ambulance if necessary. ? With inhaled medicines, a spacer or a nebulizer may help you get more medicine to your lungs. Ask your doctor or pharmacist how to use them properly. Practice using the spacer in front of a mirror before you have an exacerbation. This may help you get the medicine into your lungs quickly. ? If your doctor has given you steroid pills, take them as directed. ? Your doctor may have given you a prescription for antibiotics, which you can fill if you need it. ? Talk to your doctor if you have any problems with your medicine. And call your doctor if you have to use your antibiotic or steroid pills. Preventing an exacerbation  · Do not smoke.  This is the most important step you can take to prevent more damage to your lungs and prevent problems. If you already smoke, it is never too late to stop. If you need help quitting, talk to your doctor about stop-smoking programs and medicines. These can increase your chances of quitting for good. · Take your daily medicines as prescribed. · Avoid colds and flu. ? Get a pneumococcal vaccine. ? Get a flu vaccine each year, as soon as it is available. Ask those you live or work with to do the same, so they will not get the flu and infect you. ? Try to stay away from people with colds or the flu. ? Wash your hands often. · Avoid secondhand smoke; air pollution; cold, dry air; hot, humid air; and high altitudes. Stay at home with your windows closed when air pollution is bad. · Learn breathing techniques for COPD, such as breathing through pursed lips. These techniques can help you breathe easier during an exacerbation. When should you call for help? Call 911 anytime you think you may need emergency care. For example, call if:    · You have severe trouble breathing.     · You have severe chest pain.    Call your doctor now or seek immediate medical care if:    · You have new or worse shortness of breath.     · You develop new chest pain.     · You are coughing more deeply or more often, especially if you notice more mucus or a change in the color of your mucus.     · You cough up blood.     · You have new or increased swelling in your legs or belly.     · You have a fever.    Watch closely for changes in your health, and be sure to contact your doctor if:    · You need to use your antibiotic or steroid pills.     · Your symptoms are getting worse. Where can you learn more? Go to https://Minterawilliam.Avolent. org and sign in to your CITTIO account. Enter B099 in the Stirplate.io box to learn more about \"COPD Exacerbation Plan: Care Instructions. \"     If you do not have an account, please click on the \"Sign Up Now\" link. Current as of: September 5, 2018  Content Version: 12.0  © 3748-9528 [employer]. Care instructions adapted under license by [facility] ([facility]). If you have questions about a medical condition or this instruction, always ask your healthcare professional. [address] any warranty or liability for your use of this information. Patient Education        Learning About Chronic Bronchitis  What is chronic bronchitis? Chronic bronchitis is long-term swelling and the buildup of mucus in the airways of your lungs. The airways (bronchial tubes) get inflamed and make a lot of mucus. This can narrow or block the airways, making it hard for you to breathe. It is a form of COPD (chronic obstructive pulmonary disease). Chronic bronchitis is usually caused by smoking. But chemical fumes, dust, or air pollution also can cause it over time. What can you expect when you have chronic bronchitis? Chronic bronchitis gets worse over time. You cannot undo the damage to your lungs. Over time, you may find that:  · You get short of breath even when you do simple things like get dressed or fix a meal.  · It is hard to eat or exercise. · You lose weight and feel weaker. Over many years, the swelling and mucus from chronic bronchitis make it more likely that you will get lung infections. But there are things you can do to prevent more damage and feel better. What are the symptoms? The main symptoms of chronic bronchitis are:  · A cough that will not go away. · Mucus that comes up when you cough. · Shortness of breath that gets worse when you exercise. At times, your symptoms may suddenly flare up and get much worse. This is a called an exacerbation (say \"egg-ZASS-er-BAY-shun\"). When this happens, your usual symptoms quickly get worse and stay bad. This can be dangerous. You may have to go to the hospital.  How can you keep chronic bronchitis from getting worse?   Don't smoke. That is the best way to keep chronic bronchitis from getting worse. If you already smoke, it is never too late to stop. If you need help quitting, talk to your doctor about stop-smoking programs and medicines. These can increase your chances of quitting for good. You can do other things to keep chronic bronchitis from getting worse:  · Avoid bad air. Air pollution, chemical fumes, and dust also can make chronic bronchitis worse. · Get a flu shot every year. A shot may keep the flu from turning into something more serious, like pneumonia. A flu shot also may lower your chances of having a flare-up. · Get a pneumococcal shot. A shot can prevent some of the serious complications of pneumonia. Ask your doctor how often you should get this shot. How is chronic bronchitis treated? Chronic bronchitis is treated with medicines and oxygen. You also can take steps at home to stay healthy and keep your condition from getting worse. Medicines and oxygen therapy  · You may be taking medicines such as:  ? Bronchodilators. These help open your airways and make breathing easier. Bronchodilators are either short-acting (work for 6 to 9 hours) or long-acting (work for 24 hours). You inhale most bronchodilators, so they start to act quickly. Always carry your quick-relief inhaler with you in case you need it while you are away from home. ? Corticosteroids. These reduce airway inflammation. They come in pill or inhaled form. You must take these medicines every day for them to work well. ? Antibiotics. These medicines are used when you have a bacterial lung infection. · Take your medicines exactly as prescribed. Call your doctor if you think you are having a problem with your medicine. · Oxygen therapy boosts the amount of oxygen in your blood and helps you breathe easier.  Use the flow rate your doctor has recommended, and do not change it without talking to your doctor first.  Other care at home  · If your doctor recommends it, get more exercise. Walking is a good choice. Bit by bit, increase the amount you walk every day. Try for at least 30 minutes on most days of the week. · Learn breathing methods--such as breathing through pursed lips--to help you become less short of breath. · If your doctor has not set you up with a pulmonary rehabilitation program, talk to him or her about whether rehab is right for you. Rehab includes exercise programs, education about your disease and how to manage it, help with diet and other changes, and emotional support. · Eat regular, healthy meals. Use bronchodilators about 1 hour before you eat to make it easier to eat. Eat several small meals instead of three large ones. Drink beverages at the end of the meal. Avoid foods that are hard to chew. Follow-up care is a key part of your treatment and safety. Be sure to make and go to all appointments, and call your doctor if you are having problems. It's also a good idea to know your test results and keep a list of the medicines you take. Where can you learn more? Go to https://Healthonomy.Card Capture Services. org and sign in to your Bill Me Later account. Enter B918 in the [facility] box to learn more about \"Learning About Chronic Bronchitis. \"     If you do not have an account, please click on the \"Sign Up Now\" link. Current as of: September 5, 2018  Content Version: 12.0  © 9202-8587 [employer]. Care instructions adapted under license by [facility] ([facility]). If you have questions about a medical condition or this instruction, always ask your healthcare professional. [address] any warranty or liability for your use of this information.

## 2019-05-21 DIAGNOSIS — E11.8 TYPE 2 DIABETES MELLITUS WITH COMPLICATION, WITHOUT LONG-TERM CURRENT USE OF INSULIN (HCC): Chronic | ICD-10-CM

## 2019-05-21 DIAGNOSIS — I10 ESSENTIAL HYPERTENSION, BENIGN: Chronic | ICD-10-CM

## 2019-05-21 RX ORDER — GLIPIZIDE 5 MG/1
TABLET ORAL
Qty: 180 TABLET | Refills: 0 | Status: SHIPPED | OUTPATIENT
Start: 2019-05-21 | End: 2019-09-18 | Stop reason: SDUPTHER

## 2019-05-21 RX ORDER — LISINOPRIL 5 MG/1
TABLET ORAL
Qty: 90 TABLET | Refills: 0 | Status: SHIPPED | OUTPATIENT
Start: 2019-05-21 | End: 2019-09-01 | Stop reason: SDUPTHER

## 2019-05-29 PROBLEM — K11.8 SUBMANDIBULAR DUCT OBSTRUCTION: Chronic | Status: ACTIVE | Noted: 2017-10-02

## 2019-06-05 DIAGNOSIS — E78.00 PURE HYPERCHOLESTEROLEMIA: Chronic | ICD-10-CM

## 2019-06-05 DIAGNOSIS — I25.83 CORONARY ARTERY DISEASE DUE TO LIPID RICH PLAQUE: Chronic | ICD-10-CM

## 2019-06-05 DIAGNOSIS — I25.10 CORONARY ARTERY DISEASE DUE TO LIPID RICH PLAQUE: Chronic | ICD-10-CM

## 2019-06-05 RX ORDER — ATORVASTATIN CALCIUM 40 MG/1
TABLET, FILM COATED ORAL
Qty: 90 TABLET | Refills: 0 | Status: SHIPPED | OUTPATIENT
Start: 2019-06-05 | End: 2019-09-10 | Stop reason: SDUPTHER

## 2019-06-10 ENCOUNTER — CARE COORDINATION (OUTPATIENT)
Dept: INTERNAL MEDICINE CLINIC | Age: 79
End: 2019-06-10

## 2019-06-10 NOTE — CARE COORDINATION
cessation resources)  Transportation Support:  Not Started  Zone Management Tools: In Process (Comment: DM and COPD)  Other Services or Interventions:  Able to get test strips now. Will bring readings to appointment         Goals Addressed                 This Visit's Progress     Conditions and Symptoms   Improving     I will schedule office visits, as directed by my provider. I will notify my provider of any barriers to my plan of care. I will follow my Zone Management tool to seek urgent or emergent care. I will notify my provider of any symptoms that indicate a worsening of my condition. Barriers: overwhelmed by complexity of regimen  Plan for overcoming my barriers: I will follow up with my CC or health provider on symptoms or conditions that change with my health with COPD and DM  Confidence: 6/10  Anticipated Goal Completion Date: 6/1/19                Prior to Admission medications    Medication Sig Start Date End Date Taking? Authorizing Provider   atorvastatin (LIPITOR) 40 MG tablet TAKE 1 TABLET DAILY.  MAKE  AN APPOINTMENT FOR FURTHER REFILLS 6/5/19   Neelima Begin, DO   glipiZIDE (GLUCOTROL) 5 MG tablet TAKE 1/2 TO 1 TABLET TWO   TIMES A DAY BEFORE MEALS 5/21/19   Neelima Begin, DO   lisinopril (PRINIVIL;ZESTRIL) 5 MG tablet TAKE 1 TABLET DAILY 5/21/19   Neelima Begin, DO   metFORMIN (GLUCOPHAGE) 1000 MG tablet TAKE 1 TABLET TWICE DAILY  WITH MEALS 4/17/19   Neelima Begin, DO   metoprolol tartrate (LOPRESSOR) 25 MG tablet TAKE 1/2 TABLET BY MOUTH TWICE DAILY 4/8/19   Neelima Begin, DO   montelukast (SINGULAIR) 10 MG tablet TAKE 1 TABLET BY MOUTH EVERY NIGHT 4/8/19   Vikash Lee MD   azelastine (ASTELIN) 0.1 % nasal spray 1 spray by Nasal route 2 times daily 1 Spray in each nostril 2/27/19   MD SAM Rivera LITE strip TEST THREE TIMES DAILY 2/5/19   Neelima Begin, DO   PROLENSA 0.07 % SOLN USE 1 DROP IN RIGHT EYE D AFTER SURGERY 1/14/19   Historical Provider, MD   FREESTYLE LANCETS MISC Use to test blood two times per day 12/27/18   Heather Taveras,    HYDROcodone-acetaminophen [facility])  MG per tablet TK 1 T PO  QID PRN P 11/26/18   Historical Provider, MD   fluticasone [facility]) 50 MCG/ACT nasal spray 1 spray by Nasal route daily 6/29/18   Porsha , APRN - CNP   docusate sodium (COLACE) 100 MG capsule Take 200 mg by mouth daily Indications: Constipation    Historical Provider, MD   aspirin 81 MG tablet Take 81 mg by mouth daily. Historical Provider, MD       No future appointments. ,   Diabetes Assessment    Medic Alert ID:  No  Meal Planning:  None   How often do you test your blood sugar?:  Daily (Comment: Fasting and in afternoon)   Do you have barriers with adherence to non-pharmacologic self-management interventions?  (Nutrition/Exercise/Self-Monitoring):  No   Have you ever had to go to the ED for symptoms of low blood sugar?:  No       No patient-reported symptoms      ,   COPD Assessment    Does the patient understand envrionmental exposure?:  No  Is the patient able to verbalize Rescue vs. Long Acting medications?:  No  Does the patient have a nebulizer?:  No     No patient-reported symptoms         Symptoms:      Have you had a recent diagnosis of pneumonia either by PCP or at a hospital?:  No      and   General Assessment    Do you have any symptoms that are causing concern?:  Yes  Progression since Onset:  Gradually Worsening  Reported Symptoms:  Pain (Comment: back)

## 2019-07-10 DIAGNOSIS — E11.8 TYPE 2 DIABETES MELLITUS WITH COMPLICATION, WITHOUT LONG-TERM CURRENT USE OF INSULIN (HCC): Chronic | ICD-10-CM

## 2019-07-11 ENCOUNTER — CARE COORDINATION (OUTPATIENT)
Dept: CARE COORDINATION | Age: 79
End: 2019-07-11

## 2019-07-11 DIAGNOSIS — I10 ESSENTIAL HYPERTENSION, BENIGN: Chronic | ICD-10-CM

## 2019-07-11 DIAGNOSIS — I25.83 CORONARY ARTERY DISEASE DUE TO LIPID RICH PLAQUE: Chronic | ICD-10-CM

## 2019-07-11 DIAGNOSIS — I25.10 CORONARY ARTERY DISEASE DUE TO LIPID RICH PLAQUE: Chronic | ICD-10-CM

## 2019-07-30 ENCOUNTER — OFFICE VISIT (OUTPATIENT)
Dept: FAMILY MEDICINE CLINIC | Age: 79
End: 2019-07-30
Payer: MEDICARE

## 2019-07-30 VITALS
HEART RATE: 70 BPM | DIASTOLIC BLOOD PRESSURE: 70 MMHG | HEIGHT: 67 IN | RESPIRATION RATE: 18 BRPM | WEIGHT: 231 LBS | OXYGEN SATURATION: 98 % | SYSTOLIC BLOOD PRESSURE: 138 MMHG | BODY MASS INDEX: 36.26 KG/M2

## 2019-07-30 DIAGNOSIS — M48.061 SPINAL STENOSIS, LUMBAR REGION, WITHOUT NEUROGENIC CLAUDICATION: ICD-10-CM

## 2019-07-30 DIAGNOSIS — J43.1 PANLOBULAR EMPHYSEMA (HCC): ICD-10-CM

## 2019-07-30 DIAGNOSIS — E11.8 TYPE 2 DIABETES MELLITUS WITH COMPLICATION, WITHOUT LONG-TERM CURRENT USE OF INSULIN (HCC): Primary | Chronic | ICD-10-CM

## 2019-07-30 DIAGNOSIS — I10 ESSENTIAL HYPERTENSION, BENIGN: ICD-10-CM

## 2019-07-30 DIAGNOSIS — F17.200 TOBACCO USE DISORDER: ICD-10-CM

## 2019-07-30 LAB — HBA1C MFR BLD: 7.2 %

## 2019-07-30 PROCEDURE — G8598 ASA/ANTIPLAT THER USED: HCPCS | Performed by: FAMILY MEDICINE

## 2019-07-30 PROCEDURE — G8427 DOCREV CUR MEDS BY ELIG CLIN: HCPCS | Performed by: FAMILY MEDICINE

## 2019-07-30 PROCEDURE — 3023F SPIROM DOC REV: CPT | Performed by: FAMILY MEDICINE

## 2019-07-30 PROCEDURE — G8417 CALC BMI ABV UP PARAM F/U: HCPCS | Performed by: FAMILY MEDICINE

## 2019-07-30 PROCEDURE — 4040F PNEUMOC VAC/ADMIN/RCVD: CPT | Performed by: FAMILY MEDICINE

## 2019-07-30 PROCEDURE — 4004F PT TOBACCO SCREEN RCVD TLK: CPT | Performed by: FAMILY MEDICINE

## 2019-07-30 PROCEDURE — 99215 OFFICE O/P EST HI 40 MIN: CPT | Performed by: FAMILY MEDICINE

## 2019-07-30 PROCEDURE — 83036 HEMOGLOBIN GLYCOSYLATED A1C: CPT | Performed by: FAMILY MEDICINE

## 2019-07-30 PROCEDURE — 1123F ACP DISCUSS/DSCN MKR DOCD: CPT | Performed by: FAMILY MEDICINE

## 2019-07-30 PROCEDURE — G8926 SPIRO NO PERF OR DOC: HCPCS | Performed by: FAMILY MEDICINE

## 2019-07-30 SDOH — ECONOMIC STABILITY: INCOME INSECURITY: HOW HARD IS IT FOR YOU TO PAY FOR THE VERY BASICS LIKE FOOD, HOUSING, MEDICAL CARE, AND HEATING?: NOT HARD AT ALL

## 2019-07-30 SDOH — ECONOMIC STABILITY: FOOD INSECURITY: WITHIN THE PAST 12 MONTHS, THE FOOD YOU BOUGHT JUST DIDN'T LAST AND YOU DIDN'T HAVE MONEY TO GET MORE.: NEVER TRUE

## 2019-07-30 SDOH — ECONOMIC STABILITY: FOOD INSECURITY: WITHIN THE PAST 12 MONTHS, YOU WORRIED THAT YOUR FOOD WOULD RUN OUT BEFORE YOU GOT MONEY TO BUY MORE.: NEVER TRUE

## 2019-07-30 ASSESSMENT — ENCOUNTER SYMPTOMS
WHEEZING: 1
SHORTNESS OF BREATH: 1
CHOKING: 0
CHEST TIGHTNESS: 0
COUGH: 1

## 2019-07-30 NOTE — PROGRESS NOTES
respiratory distress. He has decreased breath sounds in the right lower field and the left lower field. He has wheezes (mild) in the right lower field and the left lower field. He has rhonchi. He has no rales. Musculoskeletal: He exhibits edema (1+ pretibial). Foot sensory:  Right  7/10  (absent 3 heel sites), left 8/10 (absent mid heel, lateral heel ). DP +1(bilaterally)  PT +1 (bilaterally). Callus: Bilateral heels possibly decreasing sensation. Sores None. Onychomycosis: All toes R foot distal in 1 and 4; distal all toes on L foot . Tinea pedis None. Deformity: Bilateral bunion     Lymphadenopathy:        Head (right side): No submandibular and no tonsillar adenopathy present. Head (left side): No submandibular and no tonsillar adenopathy present. He has no cervical adenopathy. Right cervical: No superficial cervical, no deep cervical and no posterior cervical adenopathy present. Left cervical: No superficial cervical, no deep cervical and no posterior cervical adenopathy present. Neurological: He is alert. Reflex Scores:       Bicep reflexes are 2+ on the right side and 2+ on the left side. Skin: Skin is warm and dry. He is not diaphoretic. No pallor. Psychiatric: He has a normal mood and affect. His speech is normal and behavior is normal. Judgment normal.   Nursing note and vitals reviewed. Vitals:    07/30/19 0832   BP: 138/70   Site: Right Upper Arm   Position: Sitting   Cuff Size: Large Adult   Pulse: 70   Resp: 18   SpO2: 98%   Weight: 231 lb (104.8 kg)  Comment: WITH SHOES   Height: 5' 7\" (1.702 m)     BP Readings from Last 3 Encounters:   07/30/19 138/70   05/10/19 130/82   04/24/19 124/82     Pulse Readings from Last 3 Encounters:   07/30/19 70   05/10/19 64   04/24/19 56     Wt Readings from Last 3 Encounters:   07/30/19 231 lb (104.8 kg)   05/10/19 234 lb (106.1 kg)   04/24/19 233 lb 3.2 oz (105.8 kg)     Body mass index is 36.18 kg/m².     Results for POC

## 2019-07-31 ENCOUNTER — OFFICE VISIT (OUTPATIENT)
Dept: FAMILY MEDICINE CLINIC | Age: 79
End: 2019-07-31
Payer: MEDICARE

## 2019-07-31 VITALS
HEIGHT: 67 IN | TEMPERATURE: 98.1 F | BODY MASS INDEX: 36.47 KG/M2 | DIASTOLIC BLOOD PRESSURE: 62 MMHG | WEIGHT: 232.4 LBS | HEART RATE: 68 BPM | OXYGEN SATURATION: 94 % | SYSTOLIC BLOOD PRESSURE: 120 MMHG | RESPIRATION RATE: 16 BRPM

## 2019-07-31 DIAGNOSIS — H66.002 ACUTE SUPPURATIVE OTITIS MEDIA OF LEFT EAR WITHOUT SPONTANEOUS RUPTURE OF TYMPANIC MEMBRANE, RECURRENCE NOT SPECIFIED: Primary | ICD-10-CM

## 2019-07-31 PROCEDURE — 99213 OFFICE O/P EST LOW 20 MIN: CPT | Performed by: NURSE PRACTITIONER

## 2019-07-31 PROCEDURE — 4040F PNEUMOC VAC/ADMIN/RCVD: CPT | Performed by: NURSE PRACTITIONER

## 2019-07-31 PROCEDURE — 4004F PT TOBACCO SCREEN RCVD TLK: CPT | Performed by: NURSE PRACTITIONER

## 2019-07-31 PROCEDURE — G8427 DOCREV CUR MEDS BY ELIG CLIN: HCPCS | Performed by: NURSE PRACTITIONER

## 2019-07-31 PROCEDURE — G8417 CALC BMI ABV UP PARAM F/U: HCPCS | Performed by: NURSE PRACTITIONER

## 2019-07-31 PROCEDURE — G8598 ASA/ANTIPLAT THER USED: HCPCS | Performed by: NURSE PRACTITIONER

## 2019-07-31 PROCEDURE — 1123F ACP DISCUSS/DSCN MKR DOCD: CPT | Performed by: NURSE PRACTITIONER

## 2019-07-31 RX ORDER — DOXYCYCLINE HYCLATE 100 MG
100 TABLET ORAL 2 TIMES DAILY
Qty: 14 TABLET | Refills: 0 | Status: SHIPPED | OUTPATIENT
Start: 2019-07-31 | End: 2019-08-07 | Stop reason: ALTCHOICE

## 2019-07-31 NOTE — PATIENT INSTRUCTIONS
closely for changes in your health, and be sure to contact your doctor if:    · You have new or worse symptoms.     · You are not getting better after taking an antibiotic for 2 days. Where can you learn more? Go to https://Visual.lypejose leb.mySchoolNotebook. org and sign in to your [email or web address] account. Enter T289 in the Body Central box to learn more about \"Ear Infection (Otitis Media): Care Instructions. \"     If you do not have an account, please click on the \"Sign Up Now\" link. Current as of: October 21, 2018  Content Version: 12.0  © 9729-2896 Healthwise, Incorporated. Care instructions adapted under license by [facility] ([facility]). If you have questions about a medical condition or this instruction, always ask your healthcare professional. [address] any warranty or liability for your use of this information.

## 2019-08-07 ENCOUNTER — OFFICE VISIT (OUTPATIENT)
Dept: FAMILY MEDICINE CLINIC | Age: 79
End: 2019-08-07
Payer: MEDICARE

## 2019-08-07 VITALS
BODY MASS INDEX: 36.1 KG/M2 | SYSTOLIC BLOOD PRESSURE: 114 MMHG | DIASTOLIC BLOOD PRESSURE: 70 MMHG | WEIGHT: 230 LBS | RESPIRATION RATE: 16 BRPM | HEART RATE: 80 BPM | HEIGHT: 67 IN

## 2019-08-07 DIAGNOSIS — I27.20 PULMONARY HYPERTENSION (HCC): ICD-10-CM

## 2019-08-07 DIAGNOSIS — H66.004 RECURRENT ACUTE SUPPURATIVE OTITIS MEDIA OF RIGHT EAR WITHOUT SPONTANEOUS RUPTURE OF TYMPANIC MEMBRANE: Primary | ICD-10-CM

## 2019-08-07 DIAGNOSIS — F11.20 NARCOTIC DEPENDENCY, CONTINUOUS (HCC): ICD-10-CM

## 2019-08-07 PROCEDURE — G8598 ASA/ANTIPLAT THER USED: HCPCS | Performed by: FAMILY MEDICINE

## 2019-08-07 PROCEDURE — 4004F PT TOBACCO SCREEN RCVD TLK: CPT | Performed by: FAMILY MEDICINE

## 2019-08-07 PROCEDURE — 4040F PNEUMOC VAC/ADMIN/RCVD: CPT | Performed by: FAMILY MEDICINE

## 2019-08-07 PROCEDURE — 1123F ACP DISCUSS/DSCN MKR DOCD: CPT | Performed by: FAMILY MEDICINE

## 2019-08-07 PROCEDURE — 99213 OFFICE O/P EST LOW 20 MIN: CPT | Performed by: FAMILY MEDICINE

## 2019-08-07 PROCEDURE — G8417 CALC BMI ABV UP PARAM F/U: HCPCS | Performed by: FAMILY MEDICINE

## 2019-08-07 PROCEDURE — G8427 DOCREV CUR MEDS BY ELIG CLIN: HCPCS | Performed by: FAMILY MEDICINE

## 2019-08-07 RX ORDER — AMOXICILLIN 875 MG/1
875 TABLET, COATED ORAL 2 TIMES DAILY
Qty: 14 TABLET | Refills: 0 | Status: SHIPPED | OUTPATIENT
Start: 2019-08-07 | End: 2019-08-14

## 2019-08-07 RX ORDER — PREDNISONE 10 MG/1
TABLET ORAL
Qty: 21 TABLET | Refills: 0 | Status: SHIPPED | OUTPATIENT
Start: 2019-08-07 | End: 2019-08-29

## 2019-08-08 DIAGNOSIS — E11.8 TYPE 2 DIABETES MELLITUS WITH COMPLICATION, WITHOUT LONG-TERM CURRENT USE OF INSULIN (HCC): Chronic | ICD-10-CM

## 2019-08-08 RX ORDER — LANCETS 28 GAUGE
EACH MISCELLANEOUS
Qty: 200 EACH | Refills: 0 | Status: SHIPPED | OUTPATIENT
Start: 2019-08-08 | End: 2019-11-12 | Stop reason: SDUPTHER

## 2019-08-13 ENCOUNTER — CARE COORDINATION (OUTPATIENT)
Dept: CARE COORDINATION | Age: 79
End: 2019-08-13

## 2019-08-13 NOTE — CARE COORDINATION
Ambulatory Care Coordination Note  8/13/2019  CM Risk Score: 3  Charlson 10 Year Mortality Risk Score: 79%     ACC: Davy Geller, RN    Summary Note: Nurse Care Coordinator called and spoke to pt on the phone today. He says his RT ear still feels \"clogged up\" and he cannot hear out of it. He is still taking antibiotic and has few more days of prednisone. Says ear is not painful. He has an ENT appointment on Friday 8/19/19. He says his blood glucose has been higher since taking prednisone; this AM was 220. He states adherence with all medications. Patient denies need for additional information or resources. Encouraged patient to call PCP office for any questions, concerns or worsening symptoms. PLAN: Patient was instructed to continue with current medication and symptom management. RNCC reminded patient when to contact clinic/RNCC in terms of symptoms and concerns. RNCC will follow-up next month to check on symptoms and provide support as needed in the interim.                 Care Coordination Interventions    Program Enrollment:  Complex Care  Referral from Primary Care Provider:  No  Suggested Interventions and Community Resources  Diabetes Education:  Completed (Comment: Mailed Zone tool for DM and diet education. )  Fall Risk Prevention:  Completed  Disease Association:  Completed (Comment: ENT; Cardiology)  Medi Set or Pill Pack:  Completed  Pharmacist:   (Comment: Faxed medical necessity form for test strips and lancets to [facility] )  Registered Dietician:  Declined  Smoking Cessation:  Declined (Comment: 6/10/19: declines smoking cessation resources)  Zone Management Tools: In Process (Comment: DM and COPD)  Other Services or Interventions:  Able to get test strips now. Will bring readings to appointment         Goals Addressed                 This Visit's Progress     Conditions and Symptoms   On track     I will schedule office visits, as directed by my provider.   I will notify my provider of any

## 2019-08-16 ENCOUNTER — PROCEDURE VISIT (OUTPATIENT)
Dept: AUDIOLOGY | Age: 79
End: 2019-08-16
Payer: MEDICARE

## 2019-08-16 ENCOUNTER — OFFICE VISIT (OUTPATIENT)
Dept: ENT CLINIC | Age: 79
End: 2019-08-16
Payer: MEDICARE

## 2019-08-16 VITALS — DIASTOLIC BLOOD PRESSURE: 72 MMHG | OXYGEN SATURATION: 92 % | HEART RATE: 71 BPM | SYSTOLIC BLOOD PRESSURE: 142 MMHG

## 2019-08-16 DIAGNOSIS — H90.6 MIXED HEARING LOSS, BILATERAL: Primary | ICD-10-CM

## 2019-08-16 DIAGNOSIS — H65.02 ACUTE SEROUS OTITIS MEDIA OF LEFT EAR, RECURRENCE NOT SPECIFIED: Primary | ICD-10-CM

## 2019-08-16 PROCEDURE — 92567 TYMPANOMETRY: CPT | Performed by: AUDIOLOGIST

## 2019-08-16 PROCEDURE — 4040F PNEUMOC VAC/ADMIN/RCVD: CPT | Performed by: OTOLARYNGOLOGY

## 2019-08-16 PROCEDURE — 99213 OFFICE O/P EST LOW 20 MIN: CPT | Performed by: OTOLARYNGOLOGY

## 2019-08-16 PROCEDURE — 4004F PT TOBACCO SCREEN RCVD TLK: CPT | Performed by: OTOLARYNGOLOGY

## 2019-08-16 PROCEDURE — 92557 COMPREHENSIVE HEARING TEST: CPT | Performed by: AUDIOLOGIST

## 2019-08-16 PROCEDURE — G8417 CALC BMI ABV UP PARAM F/U: HCPCS | Performed by: OTOLARYNGOLOGY

## 2019-08-16 PROCEDURE — G8598 ASA/ANTIPLAT THER USED: HCPCS | Performed by: OTOLARYNGOLOGY

## 2019-08-16 PROCEDURE — 1123F ACP DISCUSS/DSCN MKR DOCD: CPT | Performed by: OTOLARYNGOLOGY

## 2019-08-16 PROCEDURE — G8427 DOCREV CUR MEDS BY ELIG CLIN: HCPCS | Performed by: OTOLARYNGOLOGY

## 2019-08-16 RX ORDER — PREDNISONE 10 MG/1
TABLET ORAL
Qty: 25 TABLET | Refills: 0 | Status: SHIPPED | OUTPATIENT
Start: 2019-08-16 | End: 2019-08-29 | Stop reason: ALTCHOICE

## 2019-08-19 ENCOUNTER — TELEPHONE (OUTPATIENT)
Dept: ENT CLINIC | Age: 79
End: 2019-08-19

## 2019-08-29 ENCOUNTER — OFFICE VISIT (OUTPATIENT)
Dept: ENT CLINIC | Age: 79
End: 2019-08-29
Payer: MEDICARE

## 2019-08-29 VITALS — SYSTOLIC BLOOD PRESSURE: 168 MMHG | DIASTOLIC BLOOD PRESSURE: 88 MMHG | HEART RATE: 75 BPM | OXYGEN SATURATION: 92 %

## 2019-08-29 DIAGNOSIS — H65.02 ACUTE SEROUS OTITIS MEDIA OF LEFT EAR, RECURRENCE NOT SPECIFIED: Primary | ICD-10-CM

## 2019-08-29 PROCEDURE — G8427 DOCREV CUR MEDS BY ELIG CLIN: HCPCS | Performed by: OTOLARYNGOLOGY

## 2019-08-29 PROCEDURE — 4004F PT TOBACCO SCREEN RCVD TLK: CPT | Performed by: OTOLARYNGOLOGY

## 2019-08-29 PROCEDURE — 4040F PNEUMOC VAC/ADMIN/RCVD: CPT | Performed by: OTOLARYNGOLOGY

## 2019-08-29 PROCEDURE — G8417 CALC BMI ABV UP PARAM F/U: HCPCS | Performed by: OTOLARYNGOLOGY

## 2019-08-29 PROCEDURE — G8598 ASA/ANTIPLAT THER USED: HCPCS | Performed by: OTOLARYNGOLOGY

## 2019-08-29 PROCEDURE — 99213 OFFICE O/P EST LOW 20 MIN: CPT | Performed by: OTOLARYNGOLOGY

## 2019-08-29 PROCEDURE — 1123F ACP DISCUSS/DSCN MKR DOCD: CPT | Performed by: OTOLARYNGOLOGY

## 2019-08-29 RX ORDER — SULFAMETHOXAZOLE AND TRIMETHOPRIM 800; 160 MG/1; MG/1
1 TABLET ORAL 2 TIMES DAILY
Qty: 28 TABLET | Refills: 0 | Status: SHIPPED | OUTPATIENT
Start: 2019-08-29 | End: 2019-09-12

## 2019-08-29 RX ORDER — MONTELUKAST SODIUM 10 MG/1
10 TABLET ORAL NIGHTLY
Qty: 15 TABLET | Refills: 1 | Status: SHIPPED | OUTPATIENT
Start: 2019-08-29 | End: 2019-09-12

## 2019-09-01 DIAGNOSIS — I10 ESSENTIAL HYPERTENSION, BENIGN: Chronic | ICD-10-CM

## 2019-09-01 DIAGNOSIS — E11.8 TYPE 2 DIABETES MELLITUS WITH COMPLICATION, WITHOUT LONG-TERM CURRENT USE OF INSULIN (HCC): Chronic | ICD-10-CM

## 2019-09-03 RX ORDER — LISINOPRIL 5 MG/1
TABLET ORAL
Qty: 90 TABLET | Refills: 0 | Status: SHIPPED | OUTPATIENT
Start: 2019-09-03 | End: 2019-12-02 | Stop reason: SDUPTHER

## 2019-09-10 DIAGNOSIS — I25.83 CORONARY ARTERY DISEASE DUE TO LIPID RICH PLAQUE: Chronic | ICD-10-CM

## 2019-09-10 DIAGNOSIS — E78.00 PURE HYPERCHOLESTEROLEMIA: Chronic | ICD-10-CM

## 2019-09-10 DIAGNOSIS — I25.10 CORONARY ARTERY DISEASE DUE TO LIPID RICH PLAQUE: Chronic | ICD-10-CM

## 2019-09-10 RX ORDER — ATORVASTATIN CALCIUM 40 MG/1
40 TABLET, FILM COATED ORAL DAILY
Qty: 90 TABLET | Refills: 3 | Status: SHIPPED | OUTPATIENT
Start: 2019-09-10 | End: 2019-12-05 | Stop reason: SDUPTHER

## 2019-09-11 ENCOUNTER — CARE COORDINATION (OUTPATIENT)
Dept: CARE COORDINATION | Age: 79
End: 2019-09-11

## 2019-09-12 ENCOUNTER — OFFICE VISIT (OUTPATIENT)
Dept: ENT CLINIC | Age: 79
End: 2019-09-12
Payer: MEDICARE

## 2019-09-12 VITALS — HEART RATE: 97 BPM | DIASTOLIC BLOOD PRESSURE: 68 MMHG | SYSTOLIC BLOOD PRESSURE: 142 MMHG | OXYGEN SATURATION: 90 %

## 2019-09-12 DIAGNOSIS — H65.02 ACUTE SEROUS OTITIS MEDIA OF LEFT EAR, RECURRENCE NOT SPECIFIED: Primary | ICD-10-CM

## 2019-09-12 PROCEDURE — G8427 DOCREV CUR MEDS BY ELIG CLIN: HCPCS | Performed by: OTOLARYNGOLOGY

## 2019-09-12 PROCEDURE — G8417 CALC BMI ABV UP PARAM F/U: HCPCS | Performed by: OTOLARYNGOLOGY

## 2019-09-12 PROCEDURE — 99213 OFFICE O/P EST LOW 20 MIN: CPT | Performed by: OTOLARYNGOLOGY

## 2019-09-12 PROCEDURE — G8598 ASA/ANTIPLAT THER USED: HCPCS | Performed by: OTOLARYNGOLOGY

## 2019-09-12 PROCEDURE — 4040F PNEUMOC VAC/ADMIN/RCVD: CPT | Performed by: OTOLARYNGOLOGY

## 2019-09-12 PROCEDURE — 1123F ACP DISCUSS/DSCN MKR DOCD: CPT | Performed by: OTOLARYNGOLOGY

## 2019-09-12 PROCEDURE — 4004F PT TOBACCO SCREEN RCVD TLK: CPT | Performed by: OTOLARYNGOLOGY

## 2019-09-12 RX ORDER — DOXYCYCLINE 100 MG/1
100 TABLET ORAL 2 TIMES DAILY
Qty: 20 TABLET | Refills: 0 | Status: SHIPPED | OUTPATIENT
Start: 2019-09-12 | End: 2019-09-22

## 2019-09-18 DIAGNOSIS — I25.10 CORONARY ARTERY DISEASE DUE TO LIPID RICH PLAQUE: Chronic | ICD-10-CM

## 2019-09-18 DIAGNOSIS — E11.8 TYPE 2 DIABETES MELLITUS WITH COMPLICATION, WITHOUT LONG-TERM CURRENT USE OF INSULIN (HCC): Chronic | ICD-10-CM

## 2019-09-18 DIAGNOSIS — I25.83 CORONARY ARTERY DISEASE DUE TO LIPID RICH PLAQUE: Chronic | ICD-10-CM

## 2019-09-18 DIAGNOSIS — I10 ESSENTIAL HYPERTENSION, BENIGN: Chronic | ICD-10-CM

## 2019-09-18 RX ORDER — GLIPIZIDE 5 MG/1
TABLET ORAL
Qty: 180 TABLET | Refills: 0 | Status: SHIPPED | OUTPATIENT
Start: 2019-09-18 | End: 2019-11-30 | Stop reason: SDUPTHER

## 2019-09-26 ENCOUNTER — OFFICE VISIT (OUTPATIENT)
Dept: ENT CLINIC | Age: 79
End: 2019-09-26
Payer: MEDICARE

## 2019-09-26 VITALS — SYSTOLIC BLOOD PRESSURE: 160 MMHG | DIASTOLIC BLOOD PRESSURE: 80 MMHG | OXYGEN SATURATION: 95 % | HEART RATE: 70 BPM

## 2019-09-26 DIAGNOSIS — H69.92 DISORDER OF LEFT EUSTACHIAN TUBE: ICD-10-CM

## 2019-09-26 DIAGNOSIS — E11.8 TYPE 2 DIABETES MELLITUS WITH COMPLICATION, WITHOUT LONG-TERM CURRENT USE OF INSULIN (HCC): Primary | Chronic | ICD-10-CM

## 2019-09-26 DIAGNOSIS — J30.9 ALLERGIC SINUSITIS: Primary | ICD-10-CM

## 2019-09-26 DIAGNOSIS — E55.9 VITAMIN D DEFICIENCY: Chronic | ICD-10-CM

## 2019-09-26 PROCEDURE — 1123F ACP DISCUSS/DSCN MKR DOCD: CPT | Performed by: OTOLARYNGOLOGY

## 2019-09-26 PROCEDURE — 4040F PNEUMOC VAC/ADMIN/RCVD: CPT | Performed by: OTOLARYNGOLOGY

## 2019-09-26 PROCEDURE — 4004F PT TOBACCO SCREEN RCVD TLK: CPT | Performed by: OTOLARYNGOLOGY

## 2019-09-26 PROCEDURE — G8417 CALC BMI ABV UP PARAM F/U: HCPCS | Performed by: OTOLARYNGOLOGY

## 2019-09-26 PROCEDURE — G8427 DOCREV CUR MEDS BY ELIG CLIN: HCPCS | Performed by: OTOLARYNGOLOGY

## 2019-09-26 PROCEDURE — 99213 OFFICE O/P EST LOW 20 MIN: CPT | Performed by: OTOLARYNGOLOGY

## 2019-09-26 PROCEDURE — G8598 ASA/ANTIPLAT THER USED: HCPCS | Performed by: OTOLARYNGOLOGY

## 2019-09-26 RX ORDER — MONTELUKAST SODIUM 10 MG/1
10 TABLET ORAL NIGHTLY
Qty: 15 TABLET | Refills: 0 | Status: SHIPPED | OUTPATIENT
Start: 2019-09-26 | End: 2020-02-18

## 2019-09-27 ENCOUNTER — NURSE ONLY (OUTPATIENT)
Dept: FAMILY MEDICINE CLINIC | Age: 79
End: 2019-09-27
Payer: MEDICARE

## 2019-09-27 DIAGNOSIS — E78.00 PURE HYPERCHOLESTEROLEMIA: Chronic | ICD-10-CM

## 2019-09-27 DIAGNOSIS — E87.5 HYPERKALEMIA: Primary | Chronic | ICD-10-CM

## 2019-09-27 DIAGNOSIS — Z23 NEED FOR IMMUNIZATION AGAINST INFLUENZA: Primary | ICD-10-CM

## 2019-09-27 DIAGNOSIS — E11.9 TYPE 2 DIABETES MELLITUS WITHOUT COMPLICATION, WITHOUT LONG-TERM CURRENT USE OF INSULIN (HCC): Chronic | ICD-10-CM

## 2019-09-27 DIAGNOSIS — I10 ESSENTIAL HYPERTENSION, BENIGN: Chronic | ICD-10-CM

## 2019-09-27 DIAGNOSIS — E55.9 VITAMIN D DEFICIENCY: Chronic | ICD-10-CM

## 2019-09-27 LAB
A/G RATIO: 2.3 (ref 1.1–2.2)
ALBUMIN SERPL-MCNC: 4.5 G/DL (ref 3.4–5)
ALP BLD-CCNC: 54 U/L (ref 40–129)
ALT SERPL-CCNC: 36 U/L (ref 10–40)
ANION GAP SERPL CALCULATED.3IONS-SCNC: 10 MMOL/L (ref 3–16)
AST SERPL-CCNC: 20 U/L (ref 15–37)
BASOPHILS ABSOLUTE: 0 K/UL (ref 0–0.2)
BASOPHILS RELATIVE PERCENT: 0.6 %
BILIRUB SERPL-MCNC: 0.4 MG/DL (ref 0–1)
BUN BLDV-MCNC: 10 MG/DL (ref 7–20)
CALCIUM SERPL-MCNC: 9.5 MG/DL (ref 8.3–10.6)
CHLORIDE BLD-SCNC: 104 MMOL/L (ref 99–110)
CHOLESTEROL, TOTAL: 122 MG/DL (ref 0–199)
CO2: 27 MMOL/L (ref 21–32)
CREAT SERPL-MCNC: 0.8 MG/DL (ref 0.8–1.3)
EOSINOPHILS ABSOLUTE: 0.1 K/UL (ref 0–0.6)
EOSINOPHILS RELATIVE PERCENT: 1.4 %
GFR AFRICAN AMERICAN: >60
GFR NON-AFRICAN AMERICAN: >60
GLOBULIN: 2 G/DL
GLUCOSE BLD-MCNC: 128 MG/DL (ref 70–99)
HCT VFR BLD CALC: 42.3 % (ref 40.5–52.5)
HDLC SERPL-MCNC: 45 MG/DL (ref 40–60)
HEMOGLOBIN: 14.1 G/DL (ref 13.5–17.5)
LDL CHOLESTEROL CALCULATED: 53 MG/DL
LYMPHOCYTES ABSOLUTE: 2.4 K/UL (ref 1–5.1)
LYMPHOCYTES RELATIVE PERCENT: 30.9 %
MCH RBC QN AUTO: 32.4 PG (ref 26–34)
MCHC RBC AUTO-ENTMCNC: 33.2 G/DL (ref 31–36)
MCV RBC AUTO: 97.6 FL (ref 80–100)
MONOCYTES ABSOLUTE: 0.4 K/UL (ref 0–1.3)
MONOCYTES RELATIVE PERCENT: 5.5 %
NEUTROPHILS ABSOLUTE: 4.7 K/UL (ref 1.7–7.7)
NEUTROPHILS RELATIVE PERCENT: 61.6 %
PDW BLD-RTO: 14.3 % (ref 12.4–15.4)
PLATELET # BLD: 237 K/UL (ref 135–450)
PMV BLD AUTO: 8 FL (ref 5–10.5)
POTASSIUM SERPL-SCNC: 6.6 MMOL/L (ref 3.5–5.1)
RBC # BLD: 4.34 M/UL (ref 4.2–5.9)
SODIUM BLD-SCNC: 141 MMOL/L (ref 136–145)
TOTAL PROTEIN: 6.5 G/DL (ref 6.4–8.2)
TRIGL SERPL-MCNC: 121 MG/DL (ref 0–150)
VITAMIN D 25-HYDROXY: 18.4 NG/ML
VLDLC SERPL CALC-MCNC: 24 MG/DL
WBC # BLD: 7.7 K/UL (ref 4–11)

## 2019-09-27 PROCEDURE — 36415 COLL VENOUS BLD VENIPUNCTURE: CPT | Performed by: FAMILY MEDICINE

## 2019-09-27 PROCEDURE — 90653 IIV ADJUVANT VACCINE IM: CPT | Performed by: FAMILY MEDICINE

## 2019-09-27 PROCEDURE — G0008 ADMIN INFLUENZA VIRUS VAC: HCPCS | Performed by: FAMILY MEDICINE

## 2019-10-25 ENCOUNTER — OFFICE VISIT (OUTPATIENT)
Dept: FAMILY MEDICINE CLINIC | Age: 79
End: 2019-10-25
Payer: MEDICARE

## 2019-10-25 VITALS
BODY MASS INDEX: 35.94 KG/M2 | HEIGHT: 67 IN | OXYGEN SATURATION: 94 % | HEART RATE: 70 BPM | SYSTOLIC BLOOD PRESSURE: 130 MMHG | DIASTOLIC BLOOD PRESSURE: 82 MMHG | WEIGHT: 229 LBS | RESPIRATION RATE: 20 BRPM

## 2019-10-25 DIAGNOSIS — Z99.89 OSA ON CPAP: Primary | ICD-10-CM

## 2019-10-25 DIAGNOSIS — N40.1 BPH ASSOCIATED WITH NOCTURIA: ICD-10-CM

## 2019-10-25 DIAGNOSIS — E87.5 HYPERKALEMIA: Chronic | ICD-10-CM

## 2019-10-25 DIAGNOSIS — R35.1 BPH ASSOCIATED WITH NOCTURIA: ICD-10-CM

## 2019-10-25 DIAGNOSIS — G47.33 OSA ON CPAP: Primary | ICD-10-CM

## 2019-10-25 DIAGNOSIS — H69.82 ETD (EUSTACHIAN TUBE DYSFUNCTION), LEFT: ICD-10-CM

## 2019-10-25 DIAGNOSIS — M50.30 DEGENERATIVE DISC DISEASE, CERVICAL: ICD-10-CM

## 2019-10-25 LAB
ANION GAP SERPL CALCULATED.3IONS-SCNC: 13 MMOL/L (ref 3–16)
BUN BLDV-MCNC: 19 MG/DL (ref 7–20)
CALCIUM SERPL-MCNC: 9.5 MG/DL (ref 8.3–10.6)
CHLORIDE BLD-SCNC: 98 MMOL/L (ref 99–110)
CO2: 28 MMOL/L (ref 21–32)
CREAT SERPL-MCNC: 0.9 MG/DL (ref 0.8–1.3)
GFR AFRICAN AMERICAN: >60
GFR NON-AFRICAN AMERICAN: >60
GLUCOSE BLD-MCNC: 191 MG/DL (ref 70–99)
POTASSIUM SERPL-SCNC: 5.2 MMOL/L (ref 3.5–5.1)
SODIUM BLD-SCNC: 139 MMOL/L (ref 136–145)

## 2019-10-25 PROCEDURE — 1123F ACP DISCUSS/DSCN MKR DOCD: CPT | Performed by: FAMILY MEDICINE

## 2019-10-25 PROCEDURE — 36415 COLL VENOUS BLD VENIPUNCTURE: CPT | Performed by: FAMILY MEDICINE

## 2019-10-25 PROCEDURE — 4040F PNEUMOC VAC/ADMIN/RCVD: CPT | Performed by: FAMILY MEDICINE

## 2019-10-25 PROCEDURE — G8482 FLU IMMUNIZE ORDER/ADMIN: HCPCS | Performed by: FAMILY MEDICINE

## 2019-10-25 PROCEDURE — 99214 OFFICE O/P EST MOD 30 MIN: CPT | Performed by: FAMILY MEDICINE

## 2019-10-25 PROCEDURE — G8417 CALC BMI ABV UP PARAM F/U: HCPCS | Performed by: FAMILY MEDICINE

## 2019-10-25 PROCEDURE — G8598 ASA/ANTIPLAT THER USED: HCPCS | Performed by: FAMILY MEDICINE

## 2019-10-25 PROCEDURE — 4004F PT TOBACCO SCREEN RCVD TLK: CPT | Performed by: FAMILY MEDICINE

## 2019-10-25 PROCEDURE — G8427 DOCREV CUR MEDS BY ELIG CLIN: HCPCS | Performed by: FAMILY MEDICINE

## 2019-10-25 RX ORDER — TAMSULOSIN HYDROCHLORIDE 0.4 MG/1
0.4 CAPSULE ORAL DAILY
Qty: 30 CAPSULE | Refills: 1 | Status: SHIPPED | OUTPATIENT
Start: 2019-10-25 | End: 2020-05-19

## 2019-10-25 RX ORDER — CHOLECALCIFEROL (VITAMIN D3) 1250 MCG
1 CAPSULE ORAL DAILY
COMMUNITY
End: 2020-05-18

## 2019-11-05 DIAGNOSIS — E11.8 TYPE 2 DIABETES MELLITUS WITH COMPLICATION, WITHOUT LONG-TERM CURRENT USE OF INSULIN (HCC): Chronic | ICD-10-CM

## 2019-11-12 DIAGNOSIS — E11.8 TYPE 2 DIABETES MELLITUS WITH COMPLICATION, WITHOUT LONG-TERM CURRENT USE OF INSULIN (HCC): Chronic | ICD-10-CM

## 2019-11-12 RX ORDER — LANCETS 28 GAUGE
EACH MISCELLANEOUS
Qty: 200 EACH | Refills: 3 | Status: SHIPPED | OUTPATIENT
Start: 2019-11-12 | End: 2019-11-21 | Stop reason: SDUPTHER

## 2019-11-14 DIAGNOSIS — E11.8 TYPE 2 DIABETES MELLITUS WITH COMPLICATION (HCC): Primary | Chronic | ICD-10-CM

## 2019-11-21 ENCOUNTER — TELEPHONE (OUTPATIENT)
Dept: FAMILY MEDICINE CLINIC | Age: 79
End: 2019-11-21

## 2019-11-21 DIAGNOSIS — E11.9 TYPE 2 DIABETES MELLITUS WITHOUT COMPLICATION, WITHOUT LONG-TERM CURRENT USE OF INSULIN (HCC): Primary | ICD-10-CM

## 2019-11-21 DIAGNOSIS — E11.8 TYPE 2 DIABETES MELLITUS WITH COMPLICATION (HCC): Chronic | ICD-10-CM

## 2019-11-21 DIAGNOSIS — E11.8 TYPE 2 DIABETES MELLITUS WITH COMPLICATION, WITHOUT LONG-TERM CURRENT USE OF INSULIN (HCC): Chronic | ICD-10-CM

## 2019-11-21 RX ORDER — LANCETS 28 GAUGE
EACH MISCELLANEOUS
Qty: 200 EACH | Refills: 3 | Status: SHIPPED | OUTPATIENT
Start: 2019-11-21 | End: 2021-01-18 | Stop reason: SDUPTHER

## 2019-11-21 RX ORDER — BLOOD-GLUCOSE METER
1 KIT MISCELLANEOUS DAILY
Qty: 1 KIT | Refills: 0 | Status: SHIPPED | OUTPATIENT
Start: 2019-11-21 | End: 2021-01-06

## 2019-11-22 NOTE — PROGRESS NOTES
Subjective:      Patient ID: Ren Velasquez is a 70 y.o. male. Chief Complaint   Patient presents with    Diabetes     PT HERE FOR ROUTINE FOLLOW UP ON DIABETES. A1C DUE TODAY   109  HPI    CERUMEN OCCLUSION  Had removal of ear wax with ENT. Done with tweazer like tool (used a curette per the note). Had some pain with removal.  Had bleeding the same night. He woke up 5 days later he got pain in the ear. Had a sinus CT that was normal.  Spinal cord stimulator / failed laminectomy  Operative progress note - Zac Nieto MD - 04/23/2019 12:33 PM EDT  Formatting of this note might be different from the original.  Operative Progress NotePatient: Ren Velasquez MRN: [id number]   YOB: 1940 Age: 70year old Sex: male   [facility]: [facility] Room[facility]Bed: [facility] Location: [address] [place] [address]   Date of Procedure: 4/23/2019   Preoperative Diagnosis: Postlaminectomy syndrome [M96.1]  Postoperative Diagnosis: Same  Procedure: Procedure(s) with comments:  SPINAL CORD STIMULATOR BATTERY REPLACEMENT WITH POCKET REVISION - SPINAL CORD STIMULATOR BATTERY REPLACEMENT WITH POCKET REVISION  Post-Op Diagnosis Codes:  * Postlaminectomy syndrome [M96.1]  Pre-Op Diagnosis Codes:  * Postlaminectomy syndrome [M96.1]  Surgeon(s) and Role:  Darlene Sawyer MD - Primary  Anesthesia: MAC   Estimated Blood Loss: less than 50 mL  * No specimens in log *  Findings: None   Complications: None   Implants: [employer]  Signed By: Yaneli Cheng MD 4/23/2019 12:33 PM  NOW  Pt not sure how to use the remote for his spinal stimulator. Says he may have to read the book. It seems to go off when he sits down. Needs disability parking letter. TOB  Started 17 y/o. Quit 11/8/13. He smoked this year but not regular 6/23/15. Is smoking < 1/2 ppd 12/28/16. 1 ppd 3/28/16 & 6/28 & 10/5/16, 12/21/16, 4/6/17, 6/19/17, 8/14/17. 9/27/17. 12/4/17. 1.5 PPD.   1 PPD. 8/27/18.10/22/18. 12/3/18. 4/24/19  Type 2 diabetes mellitus visit. Review of Systems   HENT: Positive for ear pain and postnasal drip. Respiratory: Positive for cough (with PN drip) and shortness of breath (about the same). Negative for choking, chest tightness and wheezing. Cardiovascular: Negative for chest pain, palpitations and leg swelling. Neurological: Negative for dizziness and light-headedness. Objective:   Physical Exam   Constitutional: He appears well-developed. No distress. HENT:   Right Ear: Tympanic membrane normal. No lacerations (none seen in canal). There is swelling and tenderness (slight at tragus). No drainage. No foreign bodies. No mastoid tenderness. Tympanic membrane is not injected, not scarred, not perforated, not erythematous, not retracted and not bulging. No middle ear effusion (TM white on right and clear on left). No hemotympanum. Left Ear: Tympanic membrane, external ear and ear canal normal. No lacerations. No drainage, swelling or tenderness. No foreign bodies. No mastoid tenderness. Tympanic membrane is not injected, not scarred, not perforated, not erythematous, not retracted and not bulging. No middle ear effusion. No hemotympanum. Eyes: Conjunctivae are normal. Right eye exhibits no discharge. Left eye exhibits no discharge. No scleral icterus. No palpebral conjunctival pallor. Neck: Trachea normal and phonation normal. Neck supple. No JVD present. No tracheal tenderness present. Carotid bruit is not present. No tracheal deviation present. No thyroid mass and no thyromegaly present. Cardiovascular: Normal rate, regular rhythm, S1 normal and S2 normal. Exam reveals no gallop, no S3, no S4, no distant heart sounds and no friction rub. Murmur heard. Systolic murmur is present with a grade of 1/6. Pulmonary/Chest: Effort normal. No stridor. No respiratory distress. He has decreased breath sounds in the right lower field and the left lower field.  He has wheezes (mild) in the right lower field and the left lower field. He has rhonchi. He has no rales. Musculoskeletal: He exhibits edema (trace pretibial ). Lymphadenopathy:        Head (right side): No submandibular, no tonsillar, no preauricular and no posterior auricular adenopathy present. Head (left side): No submandibular, no tonsillar, no preauricular and no posterior auricular adenopathy present. He has no cervical adenopathy. Right cervical: No superficial cervical, no deep cervical and no posterior cervical adenopathy present. Left cervical: No superficial cervical, no deep cervical and no posterior cervical adenopathy present. Right: No supraclavicular adenopathy present. Left: No supraclavicular adenopathy present. Neurological: He is alert. Reflex Scores:       Patellar reflexes are 2+ on the right side and 2+ on the left side. Skin: Skin is warm and dry. He is not diaphoretic. No pallor. Psychiatric: He has a normal mood and affect. His speech is normal and behavior is normal. Judgment normal.   Nursing note and vitals reviewed. Vitals:    04/24/19 1255   BP: 124/82   Site: Left Upper Arm   Position: Sitting   Cuff Size: Medium Adult   Pulse: 56   Resp: 18   SpO2: 95%   Weight: 233 lb 3.2 oz (105.8 kg)  Comment: shoes on   Height: 5' 7\" (1.702 m)     BP Readings from Last 3 Encounters:   04/24/19 124/82   04/08/19 134/68   02/27/19 130/68     Pulse Readings from Last 3 Encounters:   04/24/19 56   02/27/19 85   01/17/19 64     Wt Readings from Last 3 Encounters:   04/24/19 233 lb 3.2 oz (105.8 kg)   01/17/19 232 lb (105.2 kg)   12/03/18 232 lb 3.2 oz (105.3 kg)     Body mass index is 36.52 kg/m². Results for POC orders placed in visit on 04/24/19   POCT glycosylated hemoglobin (Hb A1C)   Result Value Ref Range    Hemoglobin A1C 8.4 %     Assessment:      1. Type 2 diabetes mellitus with complication, without long-term current use of insulin ([address]    2.  Panlobular emphysema (HCC)    3. Class 2 severe obesity due to excess calories with serious comorbidity and body mass index (BMI) of 36.0 to 36.9 in adult ([facility]    4. Right ear pain          Plan:       - Counseling More Than 50% of the 40 min Appointment Time     Emmy Farrell was seen today for diabetes. Diagnoses and all orders for this visit:    Type 2 diabetes mellitus with complication, without long-term current use of insulin (HCC)  -     POCT glycosylated hemoglobin (Hb A1C) 8.4  -     glipiZIDE (GLUCOTROL) 5 MG tablet; Take 1 tab at breakfast and 1.5 tabs at dinner by mouth  Poor control. Increase medication as above. The Hemoglobin A1c goal for good control to avoid diabetic complications like stroke, heart attacks, amputations, kidney dialysis is an A1c of 6.4 or less. Up to 6.9 is considered fair control. Explained how his high sugar in am is from what he ate over night. Since dinner is bigger and he is skipping lunch he should take 1 Glipizide in am and 1.5 at night. Weight loss gives better diabetes control. Discussed easy upper body exercises he could do. Panlobular emphysema (HCC)/tobacco use  Still have wheezing. Discussed likelihood of future oxygen even if quits smoking but sooner if does not. He has decided in the past not to quit till he dies. Class 2 severe obesity due to excess calories with serious comorbidity and body mass index (BMI) of 36.0 to 36.9 in adult [facility])  Weight loss gives better diabetes control. Discussed easy upper body exercises he could do. Explained it is a personal choice. Right ear pain  Can soak with 1/2 vinegar 1/2 warm water for min nightly till resolved. DDD L spine /Spinal cord stimulator / failed laminectomy   Needs disability parking letter. Given same.       Familia Tyson, DO none

## 2019-11-30 DIAGNOSIS — E11.8 TYPE 2 DIABETES MELLITUS WITH COMPLICATION, WITHOUT LONG-TERM CURRENT USE OF INSULIN (HCC): Chronic | ICD-10-CM

## 2019-12-02 DIAGNOSIS — I10 ESSENTIAL HYPERTENSION, BENIGN: Chronic | ICD-10-CM

## 2019-12-02 DIAGNOSIS — E11.8 TYPE 2 DIABETES MELLITUS WITH COMPLICATION, WITHOUT LONG-TERM CURRENT USE OF INSULIN (HCC): Chronic | ICD-10-CM

## 2019-12-02 RX ORDER — LISINOPRIL 5 MG/1
TABLET ORAL
Qty: 90 TABLET | Refills: 0 | Status: SHIPPED | OUTPATIENT
Start: 2019-12-02 | End: 2020-02-21

## 2019-12-02 RX ORDER — GLIPIZIDE 5 MG/1
TABLET ORAL
Qty: 180 TABLET | Refills: 0 | Status: SHIPPED | OUTPATIENT
Start: 2019-12-02 | End: 2020-02-21

## 2019-12-05 DIAGNOSIS — I25.83 CORONARY ARTERY DISEASE DUE TO LIPID RICH PLAQUE: Chronic | ICD-10-CM

## 2019-12-05 DIAGNOSIS — E78.00 PURE HYPERCHOLESTEROLEMIA: Chronic | ICD-10-CM

## 2019-12-05 DIAGNOSIS — I10 ESSENTIAL HYPERTENSION, BENIGN: Chronic | ICD-10-CM

## 2019-12-05 DIAGNOSIS — I25.10 CORONARY ARTERY DISEASE DUE TO LIPID RICH PLAQUE: Chronic | ICD-10-CM

## 2019-12-05 RX ORDER — ATORVASTATIN CALCIUM 40 MG/1
40 TABLET, FILM COATED ORAL DAILY
Qty: 90 TABLET | Refills: 3 | Status: SHIPPED | OUTPATIENT
Start: 2019-12-05 | End: 2020-12-10 | Stop reason: SDUPTHER

## 2019-12-16 ENCOUNTER — TELEPHONE (OUTPATIENT)
Dept: FAMILY MEDICINE CLINIC | Age: 79
End: 2019-12-16

## 2019-12-16 DIAGNOSIS — M51.26 DISC DISPLACEMENT, LUMBAR: ICD-10-CM

## 2019-12-16 DIAGNOSIS — M54.32 BILATERAL SCIATICA: Primary | ICD-10-CM

## 2019-12-16 DIAGNOSIS — M48.061 SPINAL STENOSIS OF LUMBAR REGION, UNSPECIFIED WHETHER NEUROGENIC CLAUDICATION PRESENT: Chronic | ICD-10-CM

## 2019-12-16 DIAGNOSIS — M54.31 BILATERAL SCIATICA: Primary | ICD-10-CM

## 2020-01-29 ENCOUNTER — OFFICE VISIT (OUTPATIENT)
Dept: FAMILY MEDICINE CLINIC | Age: 80
End: 2020-01-29
Payer: MEDICARE

## 2020-01-29 VITALS
BODY MASS INDEX: 35.82 KG/M2 | TEMPERATURE: 97.5 F | WEIGHT: 228.2 LBS | RESPIRATION RATE: 20 BRPM | HEIGHT: 67 IN | SYSTOLIC BLOOD PRESSURE: 134 MMHG | DIASTOLIC BLOOD PRESSURE: 82 MMHG | HEART RATE: 92 BPM

## 2020-01-29 PROCEDURE — G8427 DOCREV CUR MEDS BY ELIG CLIN: HCPCS | Performed by: NURSE PRACTITIONER

## 2020-01-29 PROCEDURE — 4004F PT TOBACCO SCREEN RCVD TLK: CPT | Performed by: NURSE PRACTITIONER

## 2020-01-29 PROCEDURE — G8417 CALC BMI ABV UP PARAM F/U: HCPCS | Performed by: NURSE PRACTITIONER

## 2020-01-29 PROCEDURE — 99213 OFFICE O/P EST LOW 20 MIN: CPT | Performed by: NURSE PRACTITIONER

## 2020-01-29 PROCEDURE — G8482 FLU IMMUNIZE ORDER/ADMIN: HCPCS | Performed by: NURSE PRACTITIONER

## 2020-01-29 PROCEDURE — 3023F SPIROM DOC REV: CPT | Performed by: NURSE PRACTITIONER

## 2020-01-29 PROCEDURE — 4040F PNEUMOC VAC/ADMIN/RCVD: CPT | Performed by: NURSE PRACTITIONER

## 2020-01-29 PROCEDURE — G8926 SPIRO NO PERF OR DOC: HCPCS | Performed by: NURSE PRACTITIONER

## 2020-01-29 PROCEDURE — 1123F ACP DISCUSS/DSCN MKR DOCD: CPT | Performed by: NURSE PRACTITIONER

## 2020-01-29 PROCEDURE — G8510 SCR DEP NEG, NO PLAN REQD: HCPCS | Performed by: NURSE PRACTITIONER

## 2020-01-29 RX ORDER — IPRATROPIUM BROMIDE AND ALBUTEROL SULFATE 2.5; .5 MG/3ML; MG/3ML
1 SOLUTION RESPIRATORY (INHALATION) ONCE
Status: COMPLETED | OUTPATIENT
Start: 2020-01-29 | End: 2020-01-29

## 2020-01-29 RX ORDER — BUDESONIDE AND FORMOTEROL FUMARATE DIHYDRATE 160; 4.5 UG/1; UG/1
2 AEROSOL RESPIRATORY (INHALATION) 2 TIMES DAILY
Qty: 1 INHALER | Refills: 0 | Status: SHIPPED | OUTPATIENT
Start: 2020-01-29 | End: 2020-05-18

## 2020-01-29 RX ORDER — DOXYCYCLINE HYCLATE 100 MG
100 TABLET ORAL 2 TIMES DAILY
Qty: 20 TABLET | Refills: 0 | Status: SHIPPED | OUTPATIENT
Start: 2020-01-29 | End: 2020-02-08

## 2020-01-29 RX ADMIN — IPRATROPIUM BROMIDE AND ALBUTEROL SULFATE 1 AMPULE: 2.5; .5 SOLUTION RESPIRATORY (INHALATION) at 12:23

## 2020-01-29 ASSESSMENT — ENCOUNTER SYMPTOMS
SHORTNESS OF BREATH: 1
WHEEZING: 1
CHEST TIGHTNESS: 0
COUGH: 1

## 2020-01-29 ASSESSMENT — PATIENT HEALTH QUESTIONNAIRE - PHQ9
1. LITTLE INTEREST OR PLEASURE IN DOING THINGS: 0
SUM OF ALL RESPONSES TO PHQ9 QUESTIONS 1 & 2: 0
SUM OF ALL RESPONSES TO PHQ QUESTIONS 1-9: 0
SUM OF ALL RESPONSES TO PHQ QUESTIONS 1-9: 0
2. FEELING DOWN, DEPRESSED OR HOPELESS: 0

## 2020-01-29 NOTE — PROGRESS NOTES
SUBJECTIVE:    Patient ID: Sandra Vo is a 70 y.o. y.o. male. HPI   Chief Complaint   Patient presents with    URI     PT IS BEING SEEN TODAY FOR URI . PT HAS STUFFINESS , LEFT EAR PAIN , NASAL CONGESTION WITH CLEAR PHLEGM      PT C/O HEAD CONGESTION  LEFT EAR FEELS FULL PRODUCTIVE COUGH OF CLEAR CLEAR STUFF- WHEEZING NOTED BUT SOB IS ABOUT THE SAME NO SORE THROAT - HAD HAD AN ISSUE FOR THE LAST 4 YEARS BUT NOT SO BAD LAST YEAR  HE HAS TRIED INHALERS ALBUTEROL 1-2 WEEKLY CANT TELL IF IT HELPS -     Review of Systems   Respiratory: Positive for cough, shortness of breath and wheezing. Negative for chest tightness. All other systems reviewed and are negative. OBJECTIVE:    Vitals:    01/29/20 1155   BP: 134/82   Pulse: 92   Resp: 20   Temp: 97.5 °F (36.4 °C)       Physical Exam  Constitutional:       General: He is awake. He is not in acute distress. Appearance: Normal appearance. He is well-developed, well-groomed and normal weight. He is not ill-appearing, toxic-appearing or diaphoretic. Pulmonary:      Effort: Pulmonary effort is normal.      Breath sounds: Examination of the right-upper field reveals rhonchi. Examination of the left-upper field reveals rhonchi. Examination of the right-lower field reveals rhonchi. Examination of the left-lower field reveals rhonchi. Wheezing and rhonchi present. Neurological:      Mental Status: He is alert. Psychiatric:         Attention and Perception: Attention and perception normal.         Mood and Affect: Mood normal.         Speech: Speech normal.         Behavior: Behavior is cooperative. Thought Content: Thought content normal.         Cognition and Memory: Cognition and memory normal.         Micheline Alexander was seen today for uri.     Diagnoses and all orders for this visit:    Chronic obstructive pulmonary disease with acute exacerbation (HCC)  Wheezing  -     ipratropium-albuterol (DUONEB) nebulizer solution 1 ampule  -     doxycycline

## 2020-01-29 NOTE — PATIENT INSTRUCTIONS
hours). You inhale most bronchodilators, so they start to act quickly. Always carry your quick-relief inhaler with you in case you need it while you are away from home. ? Corticosteroids (prednisone, budesonide). These reduce airway inflammation. They come in pill or inhaled form. You must take these medicines every day for them to work well.     · A spacer may help you get more inhaled medicine to your lungs. Ask your doctor or pharmacist if a spacer is right for you. If it is, ask how to use it properly.     · Do not take any vitamins, over-the-counter medicine, or herbal products without talking to your doctor first.     · If your doctor prescribed antibiotics, take them as directed. Do not stop taking them just because you feel better. You need to take the full course of antibiotics.     · Oxygen therapy boosts the amount of oxygen in your blood and helps you breathe easier. Use the flow rate your doctor has recommended, and do not change it without talking to your doctor first.   Activity    · Get regular exercise. Walking is an easy way to get exercise. Start out slowly, and walk a little more each day.     · Pay attention to your breathing. You are exercising too hard if you cannot talk while you are exercising.     · Take short rest breaks when doing household chores and other activities.     · Learn breathing methods--such as breathing through pursed lips--to help you become less short of breath.     · If your doctor has not set you up with a pulmonary rehabilitation program, talk to him or her about whether rehab is right for you. Rehab includes exercise programs, education about your disease and how to manage it, help with diet and other changes, and emotional support. Diet    · Eat regular, healthy meals. Use bronchodilators about 1 hour before you eat to make it easier to eat. Eat several small meals instead of three large ones.  Drink beverages at the end of the meal. Avoid foods that are hard to chew.     · Eat foods that contain protein so that you do not lose muscle mass.     · Talk with your doctor if you gain too much weight or if you lose weight without trying.    Mental health    · Talk to your family, friends, or a therapist about your feelings. It is normal to feel frightened, angry, hopeless, helpless, and even guilty. Talking openly about bad feelings can help you cope. If these feelings last, talk to your doctor. When should you call for help? Call 911 anytime you think you may need emergency care. For example, call if:    · You have severe trouble breathing.    Call your doctor now or seek immediate medical care if:    · You have new or worse trouble breathing.     · You cough up blood.     · You have a fever.    Watch closely for changes in your health, and be sure to contact your doctor if:    · You cough more deeply or more often, especially if you notice more mucus or a change in the color of your mucus.     · You have new or worse swelling in your legs or belly.     · You are not getting better as expected. Where can you learn more? Go to https://BONDS.COMpeJamclouds.Bnooki. org and sign in to your RecruitLoop account. Enter S392 in the Melon #usemelon box to learn more about \"Chronic Obstructive Pulmonary Disease (COPD): Care Instructions. \"     If you do not have an account, please click on the \"Sign Up Now\" link. Current as of: June 9, 2019  Content Version: 12.3  © 2327-2224 Healthwise, Incorporated. Care instructions adapted under license by [facility] ([facility]). If you have questions about a medical condition or this instruction, always ask your healthcare professional. [address] any warranty or liability for your use of this information.

## 2020-01-31 ENCOUNTER — TELEPHONE (OUTPATIENT)
Dept: INTERNAL MEDICINE CLINIC | Age: 80
End: 2020-01-31

## 2020-01-31 NOTE — TELEPHONE ENCOUNTER
Submitted PA for Budesonide-Formoterol Fumarate 160-4. 5MCG/ACT IN AERO    Via CMM STATUS: APPROVED effective 11/02/2019- 01/30/2021 ,letter attached

## 2020-02-03 ENCOUNTER — OFFICE VISIT (OUTPATIENT)
Dept: ORTHOPEDIC SURGERY | Age: 80
End: 2020-02-03
Payer: MEDICARE

## 2020-02-03 VITALS
WEIGHT: 230 LBS | HEIGHT: 67 IN | BODY MASS INDEX: 36.1 KG/M2 | DIASTOLIC BLOOD PRESSURE: 84 MMHG | HEART RATE: 79 BPM | SYSTOLIC BLOOD PRESSURE: 129 MMHG

## 2020-02-03 PROCEDURE — 99203 OFFICE O/P NEW LOW 30 MIN: CPT | Performed by: PHYSICIAN ASSISTANT

## 2020-02-03 PROCEDURE — G8427 DOCREV CUR MEDS BY ELIG CLIN: HCPCS | Performed by: PHYSICIAN ASSISTANT

## 2020-02-03 PROCEDURE — G8482 FLU IMMUNIZE ORDER/ADMIN: HCPCS | Performed by: PHYSICIAN ASSISTANT

## 2020-02-03 PROCEDURE — G8417 CALC BMI ABV UP PARAM F/U: HCPCS | Performed by: PHYSICIAN ASSISTANT

## 2020-02-03 NOTE — PROGRESS NOTES
also suspected. Tibialis anterior (L4, L5) abnormalities are less pronounced today on both sides. -Very mild left quadriceps membrane irritability today was not reported 3/2018, and may represent some interval left mid lumbar nerve root irritation. This mild finding is difficult to relate to his diffuse bilateral lower limb dysesthesias.  -In this age group spinal stenosis is the main consideration with the above findings. His 3 prior back surgeries are noted. \"    Assessment:  S/p lumbar fusion L3-S1   Chronic back pain    Plan:  We discussed treatment options including observation, physical therapy, epidural injection and surgical intervention. He is not interested in physical therapy or additional surgery as it would likely require extension of his fusion. He will return to pain management to possibly try additional injection.     Lawrence Ahumada, PA-C

## 2020-02-18 ENCOUNTER — OFFICE VISIT (OUTPATIENT)
Dept: FAMILY MEDICINE CLINIC | Age: 80
End: 2020-02-18
Payer: MEDICARE

## 2020-02-18 VITALS
SYSTOLIC BLOOD PRESSURE: 116 MMHG | HEART RATE: 54 BPM | OXYGEN SATURATION: 97 % | RESPIRATION RATE: 16 BRPM | DIASTOLIC BLOOD PRESSURE: 70 MMHG | WEIGHT: 226 LBS | HEIGHT: 67 IN | BODY MASS INDEX: 35.47 KG/M2

## 2020-02-18 LAB — HBA1C MFR BLD: 8.3 %

## 2020-02-18 PROCEDURE — 4004F PT TOBACCO SCREEN RCVD TLK: CPT | Performed by: FAMILY MEDICINE

## 2020-02-18 PROCEDURE — G8417 CALC BMI ABV UP PARAM F/U: HCPCS | Performed by: FAMILY MEDICINE

## 2020-02-18 PROCEDURE — G8427 DOCREV CUR MEDS BY ELIG CLIN: HCPCS | Performed by: FAMILY MEDICINE

## 2020-02-18 PROCEDURE — 83036 HEMOGLOBIN GLYCOSYLATED A1C: CPT | Performed by: FAMILY MEDICINE

## 2020-02-18 PROCEDURE — 1123F ACP DISCUSS/DSCN MKR DOCD: CPT | Performed by: FAMILY MEDICINE

## 2020-02-18 PROCEDURE — 4040F PNEUMOC VAC/ADMIN/RCVD: CPT | Performed by: FAMILY MEDICINE

## 2020-02-18 PROCEDURE — 3052F HG A1C>EQUAL 8.0%<EQUAL 9.0%: CPT | Performed by: FAMILY MEDICINE

## 2020-02-18 PROCEDURE — G8482 FLU IMMUNIZE ORDER/ADMIN: HCPCS | Performed by: FAMILY MEDICINE

## 2020-02-18 PROCEDURE — G8926 SPIRO NO PERF OR DOC: HCPCS | Performed by: FAMILY MEDICINE

## 2020-02-18 PROCEDURE — 99215 OFFICE O/P EST HI 40 MIN: CPT | Performed by: FAMILY MEDICINE

## 2020-02-18 PROCEDURE — 3023F SPIROM DOC REV: CPT | Performed by: FAMILY MEDICINE

## 2020-02-18 ASSESSMENT — ENCOUNTER SYMPTOMS
COUGH: 1
CHOKING: 1
SHORTNESS OF BREATH: 1
WHEEZING: 1
CHEST TIGHTNESS: 0

## 2020-02-18 NOTE — PROGRESS NOTES
Subjective:      Patient ID: Radha Wagner is a 70 y.o. male. Chief Complaint   Patient presents with    Diabetes     PT HERE FOR ROUTINE FOLLOW UP ON DIABETES, A1C DUE TODAY    Sleep Apnea     PT HERE TO DISCUSS SLEEP APNEA   839  HPI     Type 2 diabetes mellitus with microalbuminuria, without long-term current use of insulin ([address]  Usually doesn't eat breakfast.  Am glucose 150-180. Pm range  mean about 100-140. No lows. He alters how much of the glipizide he takes by what his sugars are. He does not take into account how much he is going to eat. Panlobular emphysema ([address]  He is not using any inhalers. Still has congestion. Inhalers are expensive. ANNMARIE on CPAP  Is using CPAP. [facility]rs [facility] That office is now closed down. His old machine broke. It was 25years old. Is not waking feeling refreshed in am. Dozes off if sits too long. Used to snore prior to CPAP. No morning headaches. Pulmonary hypertension ([address]  Last echo no mention. Seeing heart dr yearly. Class 2 severe obesity due to excess calories with serious comorbidity and body mass index (BMI) of 36.0 to 36.9 in adult [facility])  Lost 2 pounds since his last office visit. He only eats once or twice a day. He still needs sweets such as pie cake donuts when he wants. Essential hypertension, benign  With history of hyperkalemia. He stopped using the salt substitute with potassium. His wife reported that he add salt both for he tasted. He is also on lisinopril. Does not take consistently at home and does not write down. Has been good at his doctor's office visits per patient if he is not in pain. Prior to Visit Medications    Medication Sig Taking?  Authorizing Provider   blood glucose test strips (FREESTYLE LITE) strip 1 each by Does not apply route 2 times daily Yes Hammad Park, DO   metoprolol tartrate (LOPRESSOR) 25 MG tablet TAKE 1/2 TABLET TWICE A DAY Yes MERCED Carvajal - distress. Appearance: He is well-developed. He is not ill-appearing or diaphoretic. Comments: Obese   HENT:      Right Ear: Tympanic membrane, ear canal and external ear normal. No drainage, swelling or tenderness. No middle ear effusion. No mastoid tenderness. No hemotympanum. Tympanic membrane is not injected, perforated, erythematous, retracted or bulging. Left Ear: Tympanic membrane, ear canal and external ear normal. No drainage, swelling or tenderness. No middle ear effusion. No mastoid tenderness. No hemotympanum. Tympanic membrane is not injected, perforated, erythematous, retracted or bulging. Eyes:      General: No scleral icterus. Right eye: No discharge. Left eye: No discharge. Neck:      Musculoskeletal: Neck supple. Trachea: Phonation normal.      Comments: 47 cm neck circumference. Pulmonary:      Effort: No accessory muscle usage, prolonged expiration, respiratory distress or retractions. Breath sounds: Decreased air movement present. Examination of the right-upper field reveals decreased breath sounds. Examination of the left-upper field reveals decreased breath sounds. Examination of the right-middle field reveals decreased breath sounds, wheezing and rhonchi. Examination of the left-middle field reveals decreased breath sounds, wheezing and rhonchi. Examination of the right-lower field reveals decreased breath sounds, wheezing and rhonchi. Examination of the left-lower field reveals decreased breath sounds, wheezing and rhonchi. Decreased breath sounds, wheezing and rhonchi present. Lymphadenopathy:      Head:      Right side of head: No submandibular, tonsillar, preauricular or posterior auricular adenopathy. Left side of head: No submandibular, tonsillar, preauricular or posterior auricular adenopathy. Cervical: No cervical adenopathy. Right cervical: No superficial, deep or posterior cervical adenopathy.      Left cervical: No superficial, deep or posterior cervical adenopathy. Neurological:      Mental Status: He is alert. Coordination: Coordination normal.      Comments: Antalgic gait due to pain   Psychiatric:         Attention and Perception: He is attentive. Speech: Speech normal.         Behavior: Behavior normal. Behavior is cooperative. Judgment: Judgment normal.       Vitals:    20 0816   BP: 116/70   Site: Right Upper Arm   Position: Sitting   Cuff Size: Large Adult   Pulse: 54   Resp: 16   SpO2: 97%   Weight: 226 lb (102.5 kg)  Comment: shoes on   Height: 5' 7\" (1.702 m)     BP Readings from Last 3 Encounters:   20 116/70   20 129/84   20 134/82     Pulse Readings from Last 3 Encounters:   20 54   20 79   20 92     Wt Readings from Last 3 Encounters:   20 226 lb (102.5 kg)   20 230 lb (104.3 kg)   20 228 lb 3.2 oz (103.5 kg)     Body mass index is 35.4 kg/m². 10/25/2019 14:21   Sodium 139   Potassium 5.2 (H)   Chloride 98 (L)   CO2 28   BUN 19   Creatinine 0.9   Anion Gap 13   GFR Non-African American >60   Glucose 191 (H)   Calcium 9.5     Results for POC orders placed in visit on 20   POCT glycosylated hemoglobin (Hb A1C)   Result Value Ref Range    Hemoglobin A1C 8.3 %     ECHO 2D DOPP AND COLOR FLOW2019  Result Narrative   [facility] - [place]  Echocardiography                            Adult Echocardiogram Report  Name: Rome Jimenes  : 1940                   Age: 70 yrs  EPI: [id number]              Gender: Male  Height: 79 in                     Weight: 230 lb  Study Date: 2019 10:01 AM  Summary:  Aortic valve prosthesis is a bioprosthetic pericardial bovine type valve. Ao Valve prosthesis measures 23 mm. The left ventricular wall motion is normal.  The left atrium is dilated. There is mild concentric left ventricular hypertrophy.   The mitral inflow pattern is consistent with Diastolic Dysfunction. There is mild mitral regurgitation. Left ventricular systolic function is normal. (LVEF>/=55%)  Overall left ventricular ejection fraction is estimated to be 55-60%. A contrast agent was used to demonstrate all left ventricular wall segments. MMode/2D Measurements & Calculations  IVSd: 1.5 cm (0.6-1.1)                 LVPWd: 1.8 cm (0.6-1.1)  LA dimension: 3.5 cm (2.1-3.7)         Ao root diam: 3.2 cm (2.1-3.7)    LVIDd: 3.5 cm (3.6-6.0)                RVDd: 3.5 cm  LVIDs: 2.2 cm                          ACS: 1.4 cm  LVOT diam: 2.3 cm                      FS (2D-Teich): 35.5 %  LVOT area: 4.2 cm2                                         RWT: 1.0 cm  LV Mass Index: 104.7 grams/m2    Doppler Measurements & Calculations  MV E/A: 1.0                              MV V2 max: 119.4 cm/sec                                           MV max P.7 mmHg                                           MV mean PG: 3.0 mmHg                                           MVA(VTI): 2.3 cm2  MV P1/2t: 54.7 msec                      Aortic max: 291.8 cm/sec                                           Ao max P.1 mmHg  MVA(P1/2t): 4.0 cm2                      Ao mean P.5 mmHg  MV dec slope: 683.7 cm/sec2  MV dec time: 0.19 sec                    DENYS(I,D): 1.5 cm2                                           DENYS(V,D): 1.4 cm2  LVOT max PG: 3.9 mmHg                    SV(LVOT): 83.0 ml  MV P1/2t-pr_phl: 54.7 msec               Lateral E/E': 10.4  Medial E/E': 11.9  Reason for Study: 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 Procedure: 2D Echo with contrast, doppler and color flow (88805). Strain  Assessment (0167H). Left Ventricle: Left ventricular systolic function is normal. (LVEF>/=55%). Overall left ventricular ejection fraction is estimated to be 55-60%. The left  ventricle is normal in size.  There is mild concentric left

## 2020-02-19 ENCOUNTER — TELEPHONE (OUTPATIENT)
Dept: FAMILY MEDICINE CLINIC | Age: 80
End: 2020-02-19

## 2020-02-19 NOTE — TELEPHONE ENCOUNTER
I HAVE NOT SEEN ANYTHING COME THROUGH AND JARED IS OUT- WILL ROUTE TO DR Iliana Patel TO SEE IF HE KNOWS ANYTHING ABOUT THIS.

## 2020-02-21 RX ORDER — GLIPIZIDE 5 MG/1
TABLET ORAL
Qty: 180 TABLET | Refills: 0 | Status: SHIPPED | OUTPATIENT
Start: 2020-02-21 | End: 2020-06-15

## 2020-02-21 RX ORDER — LISINOPRIL 5 MG/1
TABLET ORAL
Qty: 90 TABLET | Refills: 0 | Status: SHIPPED | OUTPATIENT
Start: 2020-02-21 | End: 2020-05-15

## 2020-02-24 NOTE — TELEPHONE ENCOUNTER
CALLED AND LEFT A MESSAGE FOR [place] TO CALL DR. Severo Machado CELL PHONE BACK. HE HAD QUESTIONS FOR THEM IN REGARDS TO PATIENT PAPERWORK.  [place]

## 2020-03-04 NOTE — TELEPHONE ENCOUNTER
No answer from [address]. Sent paperwork in as best we could. We have no sleep study - it was years ago.

## 2020-05-15 RX ORDER — LISINOPRIL 5 MG/1
TABLET ORAL
Qty: 90 TABLET | Refills: 0 | Status: SHIPPED | OUTPATIENT
Start: 2020-05-15 | End: 2020-08-17

## 2020-05-18 ENCOUNTER — VIRTUAL VISIT (OUTPATIENT)
Dept: FAMILY MEDICINE CLINIC | Age: 80
End: 2020-05-18
Payer: MEDICARE

## 2020-05-18 VITALS
BODY MASS INDEX: 35.94 KG/M2 | DIASTOLIC BLOOD PRESSURE: 91 MMHG | HEART RATE: 79 BPM | HEIGHT: 67 IN | SYSTOLIC BLOOD PRESSURE: 157 MMHG | WEIGHT: 229 LBS

## 2020-05-18 PROCEDURE — G8417 CALC BMI ABV UP PARAM F/U: HCPCS | Performed by: FAMILY MEDICINE

## 2020-05-18 PROCEDURE — G8926 SPIRO NO PERF OR DOC: HCPCS | Performed by: FAMILY MEDICINE

## 2020-05-18 PROCEDURE — 4040F PNEUMOC VAC/ADMIN/RCVD: CPT | Performed by: FAMILY MEDICINE

## 2020-05-18 PROCEDURE — G8427 DOCREV CUR MEDS BY ELIG CLIN: HCPCS | Performed by: FAMILY MEDICINE

## 2020-05-18 PROCEDURE — 99443 PR PHYS/QHP TELEPHONE EVALUATION 21-30 MIN: CPT | Performed by: FAMILY MEDICINE

## 2020-05-18 PROCEDURE — 3023F SPIROM DOC REV: CPT | Performed by: FAMILY MEDICINE

## 2020-05-18 PROCEDURE — 3052F HG A1C>EQUAL 8.0%<EQUAL 9.0%: CPT | Performed by: FAMILY MEDICINE

## 2020-05-18 PROCEDURE — 1123F ACP DISCUSS/DSCN MKR DOCD: CPT | Performed by: FAMILY MEDICINE

## 2020-05-18 PROCEDURE — 4004F PT TOBACCO SCREEN RCVD TLK: CPT | Performed by: FAMILY MEDICINE

## 2020-05-18 RX ORDER — DOXYCYCLINE HYCLATE 100 MG/1
100 CAPSULE ORAL 2 TIMES DAILY
Qty: 14 CAPSULE | Refills: 0 | Status: SHIPPED | OUTPATIENT
Start: 2020-05-18 | End: 2020-05-25

## 2020-05-18 ASSESSMENT — ENCOUNTER SYMPTOMS
WHEEZING: 1
SORE THROAT: 0
RHINORRHEA: 1
TROUBLE SWALLOWING: 0
SHORTNESS OF BREATH: 1
CHOKING: 0
VOICE CHANGE: 1
SINUS PAIN: 0
CHEST TIGHTNESS: 1
SINUS PRESSURE: 1
COUGH: 1

## 2020-05-18 NOTE — PROGRESS NOTES
Minutes per session: Not on file    Stress: Not on file   Relationships    Social connections     Talks on phone: Not on file     Gets together: Not on file     Attends [employer] service: Not on file     Active member of club or organization: Not on file     Attends meetings of clubs or organizations: Not on file     Relationship status: Not on file    Intimate partner violence     Fear of current or ex partner: Not on file     Emotionally abused: Not on file     Physically abused: Not on file     Forced sexual activity: Not on file   Other Topics Concern    Not on file   Social History Narrative    No exercise. 12/21/16.  4/6/17, 6/19/17, 8/14/17. 9/27/27. 12/4/17. 8/27/18. 10/22/18. 4/24/19. 7/30/19. 1025/19.2/18/20.        Family History   Problem Relation Age of Onset    Other Mother         CABG    Heart Surgery Mother 66        CABG    Sudden Death Father     Coronary Art Dis Brother     Diabetes Brother     Stroke Sister 77        blinded pt in 1 eye    Cancer Sister         pancreatic cancer    No Known Problems Sister     Other Daughter         DDD L spine    Other Son         DDD L spine    Heart Attack Maternal Aunt     Heart Attack Maternal Uncle        No Known Allergies    Current Outpatient Medications   Medication Sig Dispense Refill    lisinopril (PRINIVIL;ZESTRIL) 5 MG tablet TAKE 1 TABLET DAILY 90 tablet 0    metFORMIN (GLUCOPHAGE) 1000 MG tablet TAKE 1 TABLET TWICE DAILY  WITH MEALS (SCHEDULE VISIT FOR FUTURE FULL REFILLS) 180 tablet 0    glipiZIDE (GLUCOTROL) 5 MG tablet TAKE 1/2 TO 1 TABLET TWO   TIMES A DAY BEFORE MEALS 180 tablet 0    blood glucose test strips (FREESTYLE LITE) strip 1 each by Does not apply route 2 times daily 200 strip 3    metoprolol tartrate (LOPRESSOR) 25 MG tablet TAKE 1/2 TABLET TWICE A DAY 90 tablet 3    atorvastatin (LIPITOR) 40 MG tablet Take 1 tablet by mouth daily 90 tablet 3    glucose monitoring kit (FREESTYLE) monitoring kit 1 kit by Does not apply route daily 1 kit 0    FREESTYLE LANCETS MISC USE TO TEST TWICE DAILY 200 each 3    Cholecalciferol (VITAMIN D3) 1.25 MG (12743 UT) CAPS Take 1 capsule by mouth daily Indications: Vitamin D Deficiency      Respiratory Therapy Supplies JANET CPAP WITH VARIABLE PRESSURE CAPABILITY  PRESSURE 12. 1 Device 0    HYDROcodone-acetaminophen (NORCO)  MG per tablet TK 1 T PO  QID PRN P  0    docusate sodium (COLACE) 100 MG capsule Take 200 mg by mouth daily Indications: Constipation      aspirin 81 MG tablet Take 81 mg by mouth daily.  tamsulosin (FLOMAX) 0.4 MG capsule Take 1 capsule by mouth daily Indications: Enlarged Prostate with Urination Problems (Patient not taking: Reported on 5/18/2020) 30 capsule 1     No current facility-administered medications for this visit. Review of Systems   Constitutional: Positive for fatigue. Negative for chills, diaphoresis and fever. HENT: Positive for congestion, hearing loss, postnasal drip, rhinorrhea, sinus pressure and voice change (hoarseness). Negative for ear pain (ears feel full), sinus pain, sneezing, sore throat, tinnitus and trouble swallowing. Respiratory: Positive for cough, chest tightness, shortness of breath and wheezing. Negative for choking. Cardiovascular: Positive for leg swelling (not bad). Negative for chest pain and palpitations. Genitourinary:        Nocturia from 1-6x. Neurological: Positive for weakness. Negative for dizziness, light-headedness and headaches. Objective:   Physical Exam  Vitals:    05/18/20 0927   BP: (!) 157/91  Comment: PT STATED - pts cuff reads higher.    Pulse: 79  Comment: PT STATED   Weight: 229 lb (103.9 kg)  Comment: PT STATED   Height: 5' 7\" (1.702 m)     BP Readings from Last 3 Encounters:   05/18/20 (!) 157/91   02/18/20 116/70   02/03/20 129/84     Pulse Readings from Last 3 Encounters:   05/18/20 79   02/18/20 54   02/03/20 79     Wt Readings from Last 3 Encounters:   05/18/20 229 lb heart attacks. Mixing that with smoking makes it even worse. Would you like to see a diabetic specialist to see if they can get your diabetes under better control? Essential hypertension, benign  Your last blood pressure in our office was 116/70 which is good control. Your blood pressure done at home suggests your cuff may be reading higher than ours. Would ask that you bring your blood pressure cuff into place so we can compare it to ours at the next in person visit in our office. Hopefully that will be at your next visit. ANNMARIE on CPAP  Continue your CPAP routinely. Contact Dr. Cathy Garcia who will also manage your sleep apnea if you have problems with your CPAP. Status post lumbar spinal fusion L4-s1 then L3-s1, [facility] neuro  Continue with pain management. If pain management does not seem to be working then consider a visit back to the orthopedic surgeon's office. Educated about COVID-19 virus infection  The risk is likely to increase for COVID-19 infection as the reopening occurs. This is because while some people are being careful with social distancing masks and social isolation other people are totally ignoring it. Preventing the Spread of Coronavirus Disease 2019 in Homes and Residential Communities   For the most recent information go to [employer]    Prevention steps for People with confirmed or suspected COVID-19 (including persons under investigation) who do not need to be hospitalized  and   People with confirmed COVID-19 who were hospitalized and determined to be medically stable to go home    Your healthcare provider and public health staff will evaluate whether you can be cared for at home. If it is determined that you do not need to be hospitalized and can be isolated at home, you will be monitored by staff from your local or state health department.  You should follow the prevention steps below until a healthcare in the same room with you, or they should wear a facemask if they enter your room. Cover your coughs and sneezes  Cover your mouth and nose with a tissue when you cough or sneeze. Throw used tissues in a lined trash can. Immediately wash your hands with soap and water for at least 20 seconds or, if soap and water are not available, clean your hands with an alcohol-based hand  that contains at least 60% alcohol. Clean your hands often  Wash your hands often with soap and water for at least 20 seconds, especially after blowing your nose, coughing, or sneezing; going to the bathroom; and before eating or preparing food. If soap and water are not readily available, use an alcohol-based hand  with at least 60% alcohol, covering all surfaces of your hands and rubbing them together until they feel dry. Soap and water are the best option if hands are visibly dirty. Avoid touching your eyes, nose, and mouth with unwashed hands. Avoid sharing personal household items  You should not share dishes, drinking glasses, cups, eating utensils, towels, or bedding with other people or pets in your home. After using these items, they should be washed thoroughly with soap and water. Clean all high-touch surfaces everyday  High touch surfaces include counters, tabletops, doorknobs, bathroom fixtures, toilets, phones, keyboards, tablets, and bedside tables. Also, clean any surfaces that may have blood, stool, or body fluids on them. Use a household cleaning spray or wipe, according to the label instructions. Labels contain instructions for safe and effective use of the cleaning product including precautions you should take when applying the product, such as wearing gloves and making sure you have good ventilation during use of the product. Monitor your symptoms  Seek prompt medical attention if your illness is worsening (e.g., difficulty breathing).  Before seeking care, call your healthcare provider and tell them that you have, or are being evaluated for, COVID-19. Put on a facemask before you enter the facility. These steps will help the healthcare providers office to keep other people in the office or waiting room from getting infected or exposed. Ask your healthcare provider to call the local or state health department. Persons who are placed under active monitoring or facilitated self-monitoring should follow instructions provided by their local health department or occupational health professionals, as appropriate. When working with your local health department check their available hours. If you have a medical emergency and need to call 911, notify the dispatch personnel that you have, or are being evaluated for COVID-19. If possible, put on a facemask before emergency medical services arrive. Discontinuing home isolation  Patients with confirmed COVID-19 should remain under home isolation precautions until the risk of secondary transmission to others is thought to be low. The decision to discontinue home isolation precautions should be made on a case-by-case basis, in consultation with healthcare providers and state and [facility] health departments. Use Tylenol NOT Ibuprofen/Aleve/aspirin for pain or fevers. There is a theoretical decrease in the body's ability to fight the virus when you are infected if you are on NSAIDs. The FDA has said that this information is not yet proven. The newest evidence suggest that wearing a mask in public may be beneficial if you remember that the outside of it is contaminated and treat it accordingly. It also helps prevent spread if someone has an asymptomatic or presymptomatic case and does not know it yet. Even cloth masks can be beneficial.    Vitamin D by mouth and vitamin C intravenously are being used as part of the treatment regimen for COVID-19 in some hospitalized patients. An ideal vitamin D level is 50-80 for people with COPD who may be exposed to COVID-19.   Your level has never been in the ideal level and at last check it was in the vitamin D deficient range. Vitamin D helps you fight infections and helps keep infections milder. Ref. Range 1/14/2014 10:07 3/6/2015 08:42 8/14/2017 10:57 9/27/2019 08:25   Vit D, 25-Hydroxy 30-80 ng/mL 18 (L) 44.6 31.5 18.4 (L)   Continue to be consistent with vitamin D 5000 IU daily. Also start vitamin C 500 mg once daily. Both of these should be continued for the duration of the moderate risk portion of the COVID-19 pandemic expected to be 1 year. After 1 year we can check vitamin D level and see if the dosage can be decreased. Continue wearing a mask when around people except those living in the same house who are not infected with or heavily exposed to COVID-19, handwashing/hand gel use, social distancing, and social isolation for nonessential activities. IF you develop ANY respiratory infection symptoms (not just allergy symptoms) suggestive of COVID-19 start the recommendations below: Instructions for Respiratory Infections (SAVE THIS SHEET)    For the first 7-14 days of symptoms follow instructions below, even before being seen in the office or even during treatment with antibiotics, until symptom free. 1. Water: Drink 1 ounce of water for every 2 pounds of body weight for adults, need 100 ounces of water/fluids per day. This will loosen mucus in the head and chest & improve the weak feeling of dehydration, allow the body to get germ fighting resources to the infection. Half of fluids can be juice or sugar free Crystal Light. Don't count drinks with caffeine, alcohol or carbonation. Infants can have Pedialyte liquid or freezer pops. Avoid salt and sports drinks if you have high Blood Pressure, swelling in the feet or ankles or have heart problems. 2. Humidity: Humidify the air to 35-50% ( or until the windows fog over slightly).  Can use a humidifier, vaporizer, boil water on the

## 2020-05-19 ENCOUNTER — OFFICE VISIT (OUTPATIENT)
Dept: PULMONOLOGY | Age: 80
End: 2020-05-19
Payer: MEDICARE

## 2020-05-19 VITALS
DIASTOLIC BLOOD PRESSURE: 82 MMHG | HEART RATE: 80 BPM | RESPIRATION RATE: 14 BRPM | HEIGHT: 67 IN | SYSTOLIC BLOOD PRESSURE: 132 MMHG | WEIGHT: 233 LBS | TEMPERATURE: 98.1 F | OXYGEN SATURATION: 97 % | BODY MASS INDEX: 36.57 KG/M2

## 2020-05-19 PROCEDURE — G8417 CALC BMI ABV UP PARAM F/U: HCPCS | Performed by: INTERNAL MEDICINE

## 2020-05-19 PROCEDURE — 4040F PNEUMOC VAC/ADMIN/RCVD: CPT | Performed by: INTERNAL MEDICINE

## 2020-05-19 PROCEDURE — G8926 SPIRO NO PERF OR DOC: HCPCS | Performed by: INTERNAL MEDICINE

## 2020-05-19 PROCEDURE — 4004F PT TOBACCO SCREEN RCVD TLK: CPT | Performed by: INTERNAL MEDICINE

## 2020-05-19 PROCEDURE — 99204 OFFICE O/P NEW MOD 45 MIN: CPT | Performed by: INTERNAL MEDICINE

## 2020-05-19 PROCEDURE — G8427 DOCREV CUR MEDS BY ELIG CLIN: HCPCS | Performed by: INTERNAL MEDICINE

## 2020-05-19 PROCEDURE — 1123F ACP DISCUSS/DSCN MKR DOCD: CPT | Performed by: INTERNAL MEDICINE

## 2020-05-19 PROCEDURE — 3023F SPIROM DOC REV: CPT | Performed by: INTERNAL MEDICINE

## 2020-05-19 RX ORDER — IPRATROPIUM BROMIDE 21 UG/1
2 SPRAY, METERED NASAL 2 TIMES DAILY
Qty: 1 BOTTLE | Refills: 5 | Status: SHIPPED | OUTPATIENT
Start: 2020-05-19 | End: 2021-01-06

## 2020-05-25 PROBLEM — J31.0 CHRONIC RHINITIS: Status: ACTIVE | Noted: 2020-05-25

## 2020-05-25 PROBLEM — Z77.090 ASBESTOS EXPOSURE: Status: ACTIVE | Noted: 2020-05-25

## 2020-05-26 NOTE — ASSESSMENT & PLAN NOTE
He uses nightly. No download available. While he endorses cleaning his device, I doubt this is clean. Proper cleaning was discussed and advised infection in the cpap can lead to increased sinus issues.

## 2020-05-26 NOTE — ASSESSMENT & PLAN NOTE
He has responded to multiple rounds of antibiotics but continues to have symptoms. I suspect he has repeated infection from dirty cpap but not clear since he did not bring in cpap. Continue Astelin and Flonase  With Singulair with sinus rinse.

## 2020-05-27 ENCOUNTER — HOSPITAL ENCOUNTER (OUTPATIENT)
Age: 80
Discharge: HOME | End: 2020-05-27
Payer: MEDICARE

## 2020-05-27 ENCOUNTER — HOSPITAL ENCOUNTER (OUTPATIENT)
Dept: GENERAL RADIOLOGY | Age: 80
Discharge: HOME | End: 2020-05-27
Payer: MEDICARE

## 2020-05-27 PROCEDURE — 71046 X-RAY EXAM CHEST 2 VIEWS: CPT

## 2020-06-06 NOTE — PATIENT INSTRUCTIONS
emergency and need to call 911, notify the dispatch personnel that you have, or are being evaluated for COVID-19. If possible, put on a facemask before emergency medical services arrive. Discontinuing home isolation  Patients with confirmed COVID-19 should remain under home isolation precautions until the risk of secondary transmission to others is thought to be low. The decision to discontinue home isolation precautions should be made on a case-by-case basis, in consultation with healthcare providers and state and local health departments. Use Tylenol NOT Ibuprofen/Aleve/aspirin for pain or fevers. There is a theoretical decrease in the body's ability to fight the virus when you are infected if you are on NSAIDs. The FDA has said that this information is not yet proven. The newest evidence suggest that wearing a mask in public may be beneficial if you remember that the outside of it is contaminated and treat it accordingly. It also helps prevent spread if someone has an asymptomatic or presymptomatic case and does not know it yet. Even cloth masks can be beneficial.    Vitamin D by mouth and vitamin C intravenously are being used as part of the treatment regimen for COVID-19 in some hospitalized patients. An ideal vitamin D level is 50-80 for people with COPD who may be exposed to COVID-19. Your level has never been in the ideal level and at last check it was in the vitamin D deficient range. Vitamin D helps you fight infections and helps keep infections milder. Ref. Range 1/14/2014 10:07 3/6/2015 08:42 8/14/2017 10:57 9/27/2019 08:25   Vit D, 25-Hydroxy 30-80 ng/mL 18 (L) 44.6 31.5 18.4 (L)   Continue to be consistent with vitamin D 5000 IU daily. Also start vitamin C 500 mg once daily. Both of these should be continued for the duration of the moderate risk portion of the COVID-19 pandemic expected to be 1 year. After 1 year we can check vitamin D level and see if the dosage can be decreased. Continue wearing a mask when around people except those living in the same house who are not infected with or heavily exposed to COVID-19, handwashing/hand gel use, social distancing, and social isolation for nonessential activities. IF you develop ANY respiratory infection symptoms (not just allergy symptoms) suggestive of COVID-19 start the recommendations below: Instructions for Respiratory Infections (SAVE THIS SHEET)    For the first 7-14 days of symptoms follow instructions below, even before being seen in the office or even during treatment with antibiotics, until symptom free. 1. Water: Drink 1 ounce of water for every 2 pounds of body weight for adults, need 100 ounces of water/fluids per day. This will loosen mucus in the head and chest & improve the weak feeling of dehydration, allow the body to get germ fighting resources to the infection. Half of fluids can be juice or sugar free Crystal Light. Don't count drinks with caffeine, alcohol or carbonation. Infants can have Pedialyte liquid or freezer pops. Avoid salt and sports drinks if you have high Blood Pressure, swelling in the feet or ankles or have heart problems. 2. Humidity: Humidify the air to 35-50% ( or until the windows fog over slightly). Can use a humidifier, vaporizer, boil water on the stove or put a coffee can full of water on the heater vents. This will loosen mucus from infections and allergies. 3. Sleep: Get 8-10 hours a night and rest during the evening after work or school. If you have trouble sleeping, adults can take Melatonin 5mg up to 2 tabs at bedtime ( not for children or pregnant women). If Mono is suspected then sleep during 9PM to 9AM time span (if possible.)  4. Cough: Take cough medicines with Guaifenesin ( to loosen chest or head congestion) and Dextromethorphan ( to decrease excess cough). Robitussin D.M. Syrup every 4-6 hrs OR Mucinex D. M. pills OR Delsym DM syrup twice a decongestants and antihistamine cold preparations in children. Decongestants should always be avoided in people with high blood pressure, palpitations, heart disease and those on stimulant medications. Antihistamines may impede the body's ability to fight COVID-19.  12. Frequent hand washing and/or hand gel especially after coughing or sneezing into the hands or blowing the nose to help prevent spreading to others. Use kleenex and NOT handkerchiefs. Try all of the above starting with day 1 of symptoms. If Strep throat symptoms appear call to be seen in the office as soon as possible and don't gargle on that day. Newborns, infants, or anyone with earaches or influenza may need to be seen quickly. Adults with fevers over 103 degrees or shortness of breath should call the office immediately.

## 2020-06-15 RX ORDER — GLIPIZIDE 5 MG/1
TABLET ORAL
Qty: 180 TABLET | Refills: 0 | Status: SHIPPED | OUTPATIENT
Start: 2020-06-15 | End: 2020-09-14

## 2020-07-24 ENCOUNTER — TELEPHONE (OUTPATIENT)
Dept: FAMILY MEDICINE CLINIC | Age: 80
End: 2020-07-24

## 2020-07-24 NOTE — TELEPHONE ENCOUNTER
Has been sore in ear. Thinks it may be in his jaw joint. He thought it was his teeth went to see the dentist. He used a wax removal agent. Nothing came out of the ear. Ear swelled up in the past but a year ago and he could not hear. He has had problems like this and treated with antibiotics and got better. ETD  Gargle with mouthwash after meals and bedtime with head tilted back and to each side. Hold onto the sink/countertop while gargling due to possible worsening of the vertigo when tilting head. Go to am and bedtime when symptoms resolve for prevention. Vitamin C 500 mg twice daily till resolves then taper down to 250 mg daily. TMJ  Trial of Aleve bid x 3. Pain anterior to the ear. Explained that we can treat if we do not know what it is. I have given him some things that will not cause major problems to do to see if he can treat it at home. If he is not getting better he can [facility] care and have them look in his ear and treat him.

## 2020-08-12 ENCOUNTER — OFFICE VISIT (OUTPATIENT)
Dept: PULMONOLOGY | Age: 80
End: 2020-08-12
Payer: MEDICARE

## 2020-08-12 VITALS
SYSTOLIC BLOOD PRESSURE: 118 MMHG | RESPIRATION RATE: 16 BRPM | TEMPERATURE: 98.6 F | DIASTOLIC BLOOD PRESSURE: 68 MMHG | BODY MASS INDEX: 35.47 KG/M2 | HEIGHT: 67 IN | OXYGEN SATURATION: 95 % | HEART RATE: 76 BPM | WEIGHT: 226 LBS

## 2020-08-12 PROCEDURE — 4004F PT TOBACCO SCREEN RCVD TLK: CPT | Performed by: INTERNAL MEDICINE

## 2020-08-12 PROCEDURE — 99214 OFFICE O/P EST MOD 30 MIN: CPT | Performed by: INTERNAL MEDICINE

## 2020-08-12 PROCEDURE — G8427 DOCREV CUR MEDS BY ELIG CLIN: HCPCS | Performed by: INTERNAL MEDICINE

## 2020-08-12 PROCEDURE — G8417 CALC BMI ABV UP PARAM F/U: HCPCS | Performed by: INTERNAL MEDICINE

## 2020-08-12 PROCEDURE — G8926 SPIRO NO PERF OR DOC: HCPCS | Performed by: INTERNAL MEDICINE

## 2020-08-12 PROCEDURE — 1123F ACP DISCUSS/DSCN MKR DOCD: CPT | Performed by: INTERNAL MEDICINE

## 2020-08-12 PROCEDURE — 4040F PNEUMOC VAC/ADMIN/RCVD: CPT | Performed by: INTERNAL MEDICINE

## 2020-08-12 PROCEDURE — 3023F SPIROM DOC REV: CPT | Performed by: INTERNAL MEDICINE

## 2020-08-12 RX ORDER — DOXYCYCLINE HYCLATE 100 MG
100 TABLET ORAL 2 TIMES DAILY
COMMUNITY
End: 2020-11-10 | Stop reason: ALTCHOICE

## 2020-08-12 ASSESSMENT — SLEEP AND FATIGUE QUESTIONNAIRES
ESS TOTAL SCORE: 15
HOW LIKELY ARE YOU TO NOD OFF OR FALL ASLEEP WHILE SITTING INACTIVE IN A PUBLIC PLACE: 2
HOW LIKELY ARE YOU TO NOD OFF OR FALL ASLEEP WHEN YOU ARE A PASSENGER IN A CAR FOR AN HOUR WITHOUT A BREAK: 1
HOW LIKELY ARE YOU TO NOD OFF OR FALL ASLEEP WHILE SITTING AND TALKING TO SOMEONE: 2
HOW LIKELY ARE YOU TO NOD OFF OR FALL ASLEEP WHILE LYING DOWN TO REST IN THE AFTERNOON WHEN CIRCUMSTANCES PERMIT: 1
HOW LIKELY ARE YOU TO NOD OFF OR FALL ASLEEP WHILE SITTING AND READING: 2
HOW LIKELY ARE YOU TO NOD OFF OR FALL ASLEEP IN A CAR, WHILE STOPPED FOR A FEW MINUTES IN TRAFFIC: 1
HOW LIKELY ARE YOU TO NOD OFF OR FALL ASLEEP WHILE SITTING QUIETLY AFTER LUNCH WITHOUT ALCOHOL: 3
HOW LIKELY ARE YOU TO NOD OFF OR FALL ASLEEP WHILE WATCHING TV: 3

## 2020-08-12 NOTE — PROGRESS NOTES
REASON FOR CONSULTATION/CC:    Chief Complaint   Patient presents with    COPD     f/u emphysema    Sleep Apnea     f/u cpap        Consult at request of   Montrell Braden DO for  emphysema    PCP: Montrell Braden DO    HISTORY OF PRESENT ILLNESS: Daniel Foster is a 68y.o. year old male with a history of tobacco abuse who presents :           ANNMARIE  Using ever night. Wife complains of noise from the device but he is not clear what. No issues with device      Tobacco abuse  Still smoking. 1 ppd. ENT   Seeing for sinus congestion. Using sinus rinse, nasal spray. PAST MEDICAL HISTORY:  Past Medical History:   Diagnosis Date    Alcohol use     excess in past prior to CABG    AMD (age related macular degeneration) bilateral     Drusen and subretinal fluid on right, drusen on left, with poorly controlled diabetes and patient smoking    Benign essential hypertension     Mod concentric LVH    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 Ruptured disc    Diverticulosis of colon 10/30/2012    Dry eye syndrome of bilateral lacrimal glands     Hip problem 1956    NUMBNESS W/ TRAUMA    Hypercholesterolemia     Impotence     Metabolic syndrome     Mild diastolic dysfunction 89/24/8044    LVEF 55-60%    Obstructive sleep apnea     Obstructive sleep apnea     C-PAP    Osteoarthritis of shoulder     left  @ ac jt/impingement.     Pulmonary hypertension ([address] 10/21/2013    37    Recurrent BCC (basal cell carcinoma)     S/P aortic valve replacement 11/12/2013    Submandibular duct obstruction - right 10/2/2017    12 x 14 x 18 mm right submandibular gland stone causing submandibular duct dilatation and gland prominence    Tobacco use disorder 1957    Type II or unspecified type diabetes mellitus without mention of complication, not stated as uncontrolled 2003    Vitamin D deficiency        PAST SURGICAL HISTORY:  Past Surgical History:   Procedure Laterality Date    AORTIC VALVE REPLACEMENT  11/12/2013    BLEPHAROPLASTY  07/2007    UPPER LID    CARDIAC CATHETERIZATION  11/8/2013    dx    CARDIAC SURGERY  11/12/2013    reincised for bleeding    CATARACT REMOVAL WITH IMPLANT Right 01/2019    CATARACT REMOVAL WITH IMPLANT Left 02/2019    CERVICAL DISC SURGERY Right 2015    epidural steroids.  CORONARY ARTERY BYPASS GRAFT  11/12/2013    4 V    EYE SURGERY Bilateral     Laser peripheral iridotomy     FINGER TRIGGER RELEASE Right [id number]    Release of trigger thumb (stenosing tenosynovitis)    HEMORRHOID SURGERY  [id number]    LAPAROSCOPIC APPENDECTOMY  11/14/2014    LUMBAR LAMINECTOMY  1998 & 2003    LUMBAR LAMINECTOMY  05/07/2014    L2-L3 & Repair dural tear x2.  LUMBAR SPINE SURGERY  8/4/2009    SPINaL CORD STIMULATOR FOR PAIN    LUMBAR SPINE SURGERY  08/2014    epidural steroids. 3/2017 NEAL, 10/21/19    LUMBAR SPINE SURGERY Right 04/23/2019    SPINAL CORD STIMULATOR BATTERY REPLACEMENT WITH POCKET REVISION     MOHS SURGERY Left 02/11/2020    Dorsum of hand: type?  MOHS SURGERY Right 01/01/2020    right upper inner pinna with skin graft    NERVE SURGERY  ~12/2012    nerve block     ROTATOR CUFF REPAIR  2001    SKIN CANCER EXCISION Right 07/2017    forehead THEN TOP OF THE HEAD.     SKIN CANCER EXCISION  01/2019    nose    SOFT TISSUE BIOPSY      needle bx right neck mass -benign    TEAR DUCT SURGERY Bilateral 01/01/2015    for dry    TESTICLE REMOVAL  1947    R    TONSILLECTOMY      CHILD    UMBILICAL HERNIA REPAIR  11/14/2014    Laparoscopic       FAMILY HISTORY:  family history includes Cancer in his sister; Coronary Art Dis tablet 0    ipratropium (ATROVENT) 0.03 % nasal spray 2 sprays by Nasal route 2 times daily 1 Bottle 5    Cholecalciferol (VITAMIN D3) 125 MCG (5000 UT) CAPS Take 1 capsule by mouth      lisinopril (PRINIVIL;ZESTRIL) 5 MG tablet TAKE 1 TABLET DAILY 90 tablet 0    metFORMIN (GLUCOPHAGE) 1000 MG tablet TAKE 1 TABLET TWICE DAILY  WITH MEALS (SCHEDULE VISIT FOR FUTURE FULL REFILLS) 180 tablet 0    blood glucose test strips (FREESTYLE LITE) strip 1 each by Does not apply route 2 times daily 200 strip 3    metoprolol tartrate (LOPRESSOR) 25 MG tablet TAKE 1/2 TABLET TWICE A DAY 90 tablet 3    atorvastatin (LIPITOR) 40 MG tablet Take 1 tablet by mouth daily 90 tablet 3    glucose monitoring kit (FREESTYLE) monitoring kit 1 kit by Does not apply route daily 1 kit 0    FREESTYLE LANCETS MISC USE TO TEST TWICE DAILY 200 each 3    Respiratory Therapy Supplies JANET CPAP WITH VARIABLE PRESSURE CAPABILITY  PRESSURE 12. 1 Device 0    HYDROcodone-acetaminophen (NORCO)  MG per tablet TK 1 T PO  QID PRN P  0    docusate sodium (COLACE) 100 MG capsule Take 200 mg by mouth daily Indications: Constipation      aspirin 81 MG tablet Take 81 mg by mouth daily. No current facility-administered medications for this visit. Data Reviewed:   CBC and Renal reviewed  Last CBC  Lab Results   Component Value Date    WBC 7.7 09/27/2019    RBC 4.34 09/27/2019    HGB 14.1 09/27/2019    MCV 97.6 09/27/2019     09/27/2019     Last Renal  Lab Results   Component Value Date     10/25/2019    K 5.2 10/25/2019    CL 98 10/25/2019    CO2 28 10/25/2019    CO2 27 09/27/2019    CO2 26 12/03/2018    BUN 19 10/25/2019    CREATININE 0.9 10/25/2019    GLUCOSE 191 10/25/2019    CALCIUM 9.5 10/25/2019       Last ABG  POC Blood Gas: No results found for: POCPH, POCPCO2, POCPO2, POCHCO3, NBEA, [id number]  No results for input(s): PH, PCO2, PO2, HCO3, BE, O2SAT in the last 72 hours.       Radiology Review:  Pertinent images / reports were reviewed as a part of this visit. CT Chest w/ contrast: No results found for this or any previous visit. CT Chest w/o contrast: No results found for this or any previous visit. CTPA: No results found for this or any previous visit. CXR PA/LAT:   Results for orders placed in visit on 11/21/13   XR Chest Standard TWO VW    [facility] [phone]  Rad #: [id number]  Unit #: [id number]  Location: [facility]  Account #: [de-identified]  Req #: [id number]  Order #: [id number]  Procedure: XR CHEST PA AND LATERAL  Exam Date/Time: 11/21/2013 08:00 AM  Admitting Diagnosis: s/p cabg, mild hypoxia  Reason for Exam: s/p cabg, mild hypoxia  Dictated by: Saúl Carty  ED    D: 11/21/2013 08:25 AM  T:  This document is confidential medical information. Unauthorized disclosure or   use of this information is prohibited by law. If you are not the intended recipient of this document, please advise us by   calling immediately [phone]. Impression/Conclusion below    FRONTAL AND LATERAL CHEST X-RAY:    CLINICAL INDICATION: s/p cabg, mild hypoxia    COMPARISON: November 13, 2013. FINDINGS:    HEART: here is evidence of prior cardiothoracic surgery, with sternotomy wires. The cardiac   silhouette is normal.    MEDIASTINUM: Mediastinum is stable. HILUM: The hilar structures are stable bilaterally. LUNGS: Persistent bilateral pleural effusions right greater than left. Lungs otherwise clear. LIFE SUPPORT AND MONITORING DEVICES: None. ADDITIONAL FINDINGS: No pertinent additional findings. IMPRESSION:    Persistent mild bilateral pleural effusions right greater than left. Lungs otherwise clear.     SIGNED BY: Saúl Carty MD on 11/21/2013  8:22 AM       CXR portable:   Results for orders placed in visit on 11/10/13   XR Chest Portable    [facility]ative [facility] [phone]  Rad #: [id number]  Unit #: [id number]  Location: [facility]  Account #: [id number]  [facility] #: [id number]  Order #: [id number]  Procedure: XR CHEST AP PORTABLE  Exam Date/Time: 11/13/2013 04:15 AM  Admitting Diagnosis: s/p open heart  Reason for Exam: s/p open heart  Dictated by: Joao Wright  ED    D: 11/13/2013 08:35 AM  T:  This document is confidential medical information. Unauthorized disclosure or   use of this information is prohibited by law. If you are not the intended recipient of this document, please advise us by   calling immediately [phone]. Impression/Conclusion below    HISTORY:  s/p open heart    COMPARISON: November 12, 2013 current exam November 13, 2013. FINDINGS:    Without problems the cardiopericardial silhouette is unchanged. Mediastinal contours are   grossly normal.  The kurtis is mildly limited in evaluation but grossly normal.  The thoracostomy   tube in place. No residual pneumothorax. Blunting of the  costophrenic angles not significantly different consistent with small bilateral pleural   effusions. Hazy changes at both lung bases, differential of atelectasis versus less likely   airspace disease, also unchanged. No acute osseous process seen. IMPRESSION:      No significant change since comparison is  SIGNED BY: Joao Wright MD on 11/13/2013  8:32 AM        Assessment:     ·  Tobacco abuse  · ANNMARIE  · COPD  · Chronic rhinitis    · Asbestos exposures      Plan:      Problem List Items Addressed This Visit     Tobacco use disorder (Chronic)     Continues to smoke 1 pack/day. Not interested in quitting smoking. CT chest to assess for lung cancer. Low back pain (Chronic)     Chronic. Will be assessed further with CT chest         Relevant Orders    Low Dose Chest CT--pulmonologist/radiologist use only    COPD (chronic obstructive pulmonary disease) (HCC) (Chronic)     Rhonchi on exam.  He is Spiriva and states he did not notice a difference. At this point is not interested in trying any other inhalers.          Chronic rhinitis     He is currently seeing ENT. He has used Flonase, Astelin, Singulair, and Atrovent without improvement. Currently on antibiotics. Not interested in trialing any other medications this time. Of note, he has used Keflex, Omnicef, ceftriaxone, Augmentin, Bactrim, Levaquin and doxycycline in the past without success. Asbestos exposure - Primary     Chest x-ray is stable. Order CT chest to assess further and screen for lung cancer. Relevant Orders    Low Dose Chest CT--pulmonologist/radiologist use only                This note was transcribed using [id number] [employer]. Please disregard any translational errors.     [facility] Gallegos Pul[facility], [facility] and [facility] [phone]

## 2020-08-12 NOTE — ASSESSMENT & PLAN NOTE
Rhonchi on exam.  He is Spiriva and states he did not notice a difference. At this point is not interested in trying any other inhalers.

## 2020-08-12 NOTE — PATIENT INSTRUCTIONS
Cleaning tube and tank with 1/2 and 1/2 vinegar and water then with soap and water then rinse completely.

## 2020-08-12 NOTE — LETTER
[facility] Pulmonology   [address]   Phone: [phone]  Fax: [phone]     8/12/2020    Dr. Meenakshi Spring, DO    I have seen your patient, Lisa Nicolas on follow up. Here is the assessment and plan for your patient. Any question, please do not hesitate to call. Problem List Items Addressed This Visit     Tobacco use disorder (Chronic)     Continues to smoke 1 pack/day. Not interested in quitting smoking. CT chest to assess for lung cancer. Low back pain (Chronic)     Chronic. Will be assessed further with CT chest         Relevant Orders    Low Dose Chest CT--pulmonologist/radiologist use only    COPD (chronic obstructive pulmonary disease) (HCC) (Chronic)     Rhonchi on exam.  He is Spiriva and states he did not notice a difference. At this point is not interested in trying any other inhalers. Chronic rhinitis     He is currently seeing ENT. He has used Flonase, Astelin, Singulair, and Atrovent without improvement. Currently on antibiotics. Not interested in trialing any other medications this time. Of note, he has used Keflex, Omnicef, ceftriaxone, Augmentin, Bactrim, Levaquin and doxycycline in the past without success. Asbestos exposure - Primary     Chest x-ray is stable. Order CT chest to assess further and screen for lung cancer.          Relevant Orders    Low Dose Chest CT--pulmonologist/radiologist use only            Sincerely,    ELANA DIEHL [address] [facility] Pulmonary, Sleep and Critical Care  [phone]

## 2020-08-17 RX ORDER — LISINOPRIL 5 MG/1
TABLET ORAL
Qty: 90 TABLET | Refills: 0 | Status: SHIPPED | OUTPATIENT
Start: 2020-08-17 | End: 2020-11-16

## 2020-08-28 ENCOUNTER — TELEPHONE (OUTPATIENT)
Dept: FAMILY MEDICINE CLINIC | Age: 80
End: 2020-08-28

## 2020-08-28 NOTE — TELEPHONE ENCOUNTER
CALLED AND SPOKE TO PATIENT AND SPOKE TO HIM, HE STATES ENT DOCTOR PRESCRIBED HIM ANTIBIOTICS FOR HIS EAR. I ASKED HIM TO CALL ENT ABOUT THE ISSUE AND HE WAS NOT UNDERSTANDING ME. I CALLED DR. OLGUIN OFFICE AND SPOKE TO JOYCE AND SHE ADVISED ME THAT PATIENT DID NOT COMPLAIN OF ANY EAR ISSUE WHEN HE HAD HIS VISIT ON 8/7/2020. HE DID HOWEVER COMPLAIN OF SOME JAW ISSUE AND HEY SAID THAT THEY WOULD REFER HIM TO ORAL SURGEON FOR TMJ IF NEEDED AFTER ANTIBIOTICS COMPLETE. I CALLED AND SPOKE TO PATIENT AND ADVISED HIM TO KEEP AN EYE ON IT OVER THE WEEKEND AND IF BY Monday HE STILL HAVING TROUBLE CALL DR. Veronica Us OFFICE ON Monday AND THEY WILL REFER HIM OUT.  SC

## 2020-09-14 RX ORDER — GLIPIZIDE 5 MG/1
TABLET ORAL
Qty: 180 TABLET | Refills: 0 | Status: SHIPPED | OUTPATIENT
Start: 2020-09-14 | End: 2020-11-15

## 2020-09-15 NOTE — TELEPHONE ENCOUNTER
CALLED AND SPOKE TO PT WIFE HE JUST LEFT BUT SHE WILL HAVE HIM CALL THE OFFICE WHEN HE GETS HOME AND SCHEDULE AN APPOINTMENT. Sheila Lyle

## 2020-09-23 ENCOUNTER — IMMUNIZATION (OUTPATIENT)
Dept: FAMILY MEDICINE CLINIC | Age: 80
End: 2020-09-23
Payer: MEDICARE

## 2020-09-23 PROCEDURE — G0008 ADMIN INFLUENZA VIRUS VAC: HCPCS | Performed by: FAMILY MEDICINE

## 2020-09-23 PROCEDURE — 90653 IIV ADJUVANT VACCINE IM: CPT | Performed by: FAMILY MEDICINE

## 2020-11-10 ENCOUNTER — OFFICE VISIT (OUTPATIENT)
Dept: FAMILY MEDICINE CLINIC | Age: 80
End: 2020-11-10
Payer: MEDICARE

## 2020-11-10 VITALS
HEIGHT: 67 IN | DIASTOLIC BLOOD PRESSURE: 76 MMHG | TEMPERATURE: 98.6 F | HEART RATE: 68 BPM | BODY MASS INDEX: 36.1 KG/M2 | OXYGEN SATURATION: 99 % | WEIGHT: 230 LBS | SYSTOLIC BLOOD PRESSURE: 134 MMHG

## 2020-11-10 LAB
A/G RATIO: 1.9 (ref 1.1–2.2)
ALBUMIN SERPL-MCNC: 4.1 G/DL (ref 3.4–5)
ALP BLD-CCNC: 52 U/L (ref 40–129)
ALT SERPL-CCNC: 23 U/L (ref 10–40)
ANION GAP SERPL CALCULATED.3IONS-SCNC: 8 MMOL/L (ref 3–16)
AST SERPL-CCNC: 13 U/L (ref 15–37)
BASOPHILS ABSOLUTE: 0.1 K/UL (ref 0–0.2)
BASOPHILS RELATIVE PERCENT: 0.6 %
BILIRUB SERPL-MCNC: <0.2 MG/DL (ref 0–1)
BUN BLDV-MCNC: 17 MG/DL (ref 7–20)
CALCIUM SERPL-MCNC: 9.3 MG/DL (ref 8.3–10.6)
CHLORIDE BLD-SCNC: 102 MMOL/L (ref 99–110)
CHOLESTEROL, TOTAL: 116 MG/DL (ref 0–199)
CO2: 28 MMOL/L (ref 21–32)
CREAT SERPL-MCNC: 0.8 MG/DL (ref 0.8–1.3)
EOSINOPHILS ABSOLUTE: 0.1 K/UL (ref 0–0.6)
EOSINOPHILS RELATIVE PERCENT: 1.4 %
GFR AFRICAN AMERICAN: >60
GFR NON-AFRICAN AMERICAN: >60
GLOBULIN: 2.2 G/DL
GLUCOSE BLD-MCNC: 115 MG/DL (ref 70–99)
HCT VFR BLD CALC: 42.9 % (ref 40.5–52.5)
HDLC SERPL-MCNC: 41 MG/DL (ref 40–60)
HEMOGLOBIN: 14.1 G/DL (ref 13.5–17.5)
LDL CHOLESTEROL CALCULATED: 58 MG/DL
LYMPHOCYTES ABSOLUTE: 2.3 K/UL (ref 1–5.1)
LYMPHOCYTES RELATIVE PERCENT: 25.9 %
MCH RBC QN AUTO: 32.4 PG (ref 26–34)
MCHC RBC AUTO-ENTMCNC: 33 G/DL (ref 31–36)
MCV RBC AUTO: 98.3 FL (ref 80–100)
MONOCYTES ABSOLUTE: 0.5 K/UL (ref 0–1.3)
MONOCYTES RELATIVE PERCENT: 5.8 %
NEUTROPHILS ABSOLUTE: 6 K/UL (ref 1.7–7.7)
NEUTROPHILS RELATIVE PERCENT: 66.3 %
PDW BLD-RTO: 14.3 % (ref 12.4–15.4)
PLATELET # BLD: 262 K/UL (ref 135–450)
PMV BLD AUTO: 7.9 FL (ref 5–10.5)
POTASSIUM SERPL-SCNC: 4.7 MMOL/L (ref 3.5–5.1)
PRO-BNP: 401 PG/ML (ref 0–449)
PROSTATE SPECIFIC ANTIGEN: 1.37 NG/ML (ref 0–4)
RBC # BLD: 4.36 M/UL (ref 4.2–5.9)
SODIUM BLD-SCNC: 138 MMOL/L (ref 136–145)
TOTAL PROTEIN: 6.3 G/DL (ref 6.4–8.2)
TRIGL SERPL-MCNC: 87 MG/DL (ref 0–150)
VLDLC SERPL CALC-MCNC: 17 MG/DL
WBC # BLD: 9.1 K/UL (ref 4–11)

## 2020-11-10 PROCEDURE — 36415 COLL VENOUS BLD VENIPUNCTURE: CPT | Performed by: NURSE PRACTITIONER

## 2020-11-10 PROCEDURE — 3052F HG A1C>EQUAL 8.0%<EQUAL 9.0%: CPT | Performed by: NURSE PRACTITIONER

## 2020-11-10 PROCEDURE — 99214 OFFICE O/P EST MOD 30 MIN: CPT | Performed by: NURSE PRACTITIONER

## 2020-11-10 ASSESSMENT — ENCOUNTER SYMPTOMS
BACK PAIN: 0
NAUSEA: 0
EYE DISCHARGE: 0
COUGH: 1
COLOR CHANGE: 0
CHEST TIGHTNESS: 0
ABDOMINAL DISTENTION: 0
SHORTNESS OF BREATH: 1
CONSTIPATION: 0
SINUS PRESSURE: 0
DIARRHEA: 0
ABDOMINAL PAIN: 0

## 2020-11-10 NOTE — PATIENT INSTRUCTIONS
Call insurance company to discuss coverage for shingles vaccine (Shingrix) 2 dose series     Recommend over the counter allergy medication for congestion (zyrtec, claritin, or allergra). Take daily. Patient Education        Learning About Meal Planning for Diabetes  Why plan your meals? Meal planning can be a key part of managing diabetes. Planning meals and snacks with the right balance of carbohydrate, protein, and fat can help you keep your blood sugar at the target level you set with your doctor. You don't have to eat special foods. You can eat what your family eats, including sweets once in a while. But you do have to pay attention to how often you eat and how much you eat of certain foods. You may want to work with a dietitian or a certified diabetes educator. He or she can give you tips and meal ideas and can answer your questions about meal planning. This health professional can also help you reach a healthy weight if that is one of your goals. What plan is right for you? Your dietitian or diabetes educator may suggest that you start with the plate format or carbohydrate counting. The plate format  The plate format is a simple way to help you manage how you eat. You plan meals by learning how much space each food should take on a plate. Using the plate format helps you spread carbohydrate throughout the day. It can make it easier to keep your blood sugar level within your target range. It also helps you see if you're eating healthy portion sizes. To use the plate format, you put non-starchy vegetables on half your plate. Add meat or meat substitutes on one-quarter of the plate. Put a grain or starchy vegetable (such as brown rice or a potato) on the final quarter of the plate.  You can add a small piece of fruit and some low-fat or fat-free milk or yogurt, depending on your carbohydrate goal for each meal.  Here are some tips for using the plate format:  · Make sure that you are not using an oversized plate. A 9-inch plate is best. Many restaurants use larger plates. · Get used to using the plate format at home. Then you can use it when you eat out. · Write down your questions about using the plate format. Talk to your doctor, a dietitian, or a diabetes educator about your concerns. Carbohydrate counting  With carbohydrate counting, you plan meals based on the amount of carbohydrate in each food. Carbohydrate raises blood sugar higher and more quickly than any other nutrient. It is found in desserts, breads and cereals, and fruit. It's also found in starchy vegetables such as potatoes and corn, grains such as rice and pasta, and milk and yogurt. Spreading carbohydrate throughout the day helps keep your blood sugar levels within your target range. Your daily amount depends on several things, including your weight, how active you are, which diabetes medicines you take, and what your goals are for your blood sugar levels. A registered dietitian or diabetes educator can help you plan how much carbohydrate to include in each meal and snack. A guideline for your daily amount of carbohydrate is:  · 45 to 60 grams at each meal. That's about the same as 3 to 4 carbohydrate servings. · 15 to 20 grams at each snack. That's about the same as 1 carbohydrate serving. The Nutrition Facts label on packaged foods tells you how much carbohydrate is in a serving of the food. First, look at the serving size on the food label. Is that the amount you eat in a serving? All of the nutrition information on a food label is based on that serving size. So if you eat more or less than that, you'll need to adjust the other numbers. Total carbohydrate is the next thing you need to look for on the label. If you count carbohydrate servings, one serving of carbohydrate is 15 grams. For foods that don't come with labels, such as fresh fruits and vegetables, you'll need a guide that lists carbohydrate in these foods.  Ask your doctor, dietitian, or diabetes educator about books or other nutrition guides you can use. If you take insulin, you need to know how many grams of carbohydrate are in a meal. This lets you know how much rapid-acting insulin to take before you eat. If you use an insulin pump, you get a constant rate of insulin during the day. So the pump must be programmed at meals to give you extra insulin to cover the rise in blood sugar after meals. When you know how much carbohydrate you will eat, you can take the right amount of insulin. Or, if you always use the same amount of insulin, you need to make sure that you eat the same amount of carbohydrate at meals. If you need more help to understand carbohydrate counting and food labels, ask your doctor, dietitian, or diabetes educator. How do you get started with meal planning? Here are some tips to get started:  · Plan your meals a week at a time. Don't forget to include snacks too. · Use cookbooks or online recipes to plan several main meals. Plan some quick meals for busy nights. You also can double some recipes that freeze well. Then you can save half for other busy nights when you don't have time to cook. · Make sure you have the ingredients you need for your recipes. If you're running low on basic items, put these items on your shopping list too. · List foods that you use to make breakfasts, lunches, and snacks. List plenty of fruits and vegetables. · Post this list on the refrigerator. Add to it as you think of more things you need. · Take the list to the store to do your weekly shopping. Follow-up care is a key part of your treatment and safety. Be sure to make and go to all appointments, and call your doctor if you are having problems. It's also a good idea to know your test results and keep a list of the medicines you take. Where can you learn more? Go to https://alan.[employer]. org and sign in to your eTutor account.  Enter X674 in the Search Health Information box to learn more about \"Learning About Meal Planning for Diabetes. \"     If you do not have an account, please click on the \"Sign Up Now\" link. Current as of: December 20, 2019               Content Version: 12.6  © 1435-5941 [employer]. Care instructions adapted under license by [facility] [place] ([facility]). If you have questions about a medical condition or this instruction, always ask your healthcare professional. [address] any warranty or liability for your use of this information. Patient Education        Learning About Diabetes Food Guidelines  Your Care Instructions     Meal planning is important to manage diabetes. It helps keep your blood sugar at a target level (which you set with your doctor). You don't have to eat special foods. You can eat what your family eats, including sweets once in a while. But you do have to pay attention to how often you eat and how much you eat of certain foods. You may want to work with a dietitian or a certified diabetes educator (CDE) to help you plan meals and snacks. A dietitian or CDE can also help you lose weight if that is one of your goals. What should you know about eating carbs? Managing the amount of carbohydrate (carbs) you eat is an important part of healthy meals when you have diabetes. Carbohydrate is found in many foods. · Learn which foods have carbs. And learn the amounts of carbs in different foods. ? Bread, cereal, pasta, and rice have about 15 grams of carbs in a serving. A serving is 1 slice of bread (1 ounce), ½ cup of cooked cereal, or 1/3 cup of cooked pasta or rice. ? Fruits have 15 grams of carbs in a serving. A serving is 1 small fresh fruit, such as an apple or orange; ½ of a banana; ½ cup of cooked or canned fruit; ½ cup of fruit juice; 1 cup of melon or raspberries; or 2 tablespoons of dried fruit. ? Milk and no-sugar-added yogurt have 15 grams of carbs in a serving.  A serving is 1 cup of milk or 2/3 cup of no-sugar-added yogurt. ? Starchy vegetables have 15 grams of carbs in a serving. A serving is ½ cup of mashed potatoes or sweet potato; 1 cup winter squash; ½ of a small baked potato; ½ cup of cooked beans; or ½ cup cooked corn or green peas. · Learn how much carbs to eat each day and at each meal. A dietitian or CDE can teach you how to keep track of the amount of carbs you eat. This is called carbohydrate counting. · If you are not sure how to count carbohydrate grams, use the Plate Method to plan meals. It is a good, quick way to make sure that you have a balanced meal. It also helps you spread carbs throughout the day. ? Divide your plate by types of foods. Put non-starchy vegetables on half the plate, meat or other protein food on one-quarter of the plate, and a grain or starchy vegetable in the final quarter of the plate. To this you can add a small piece of fruit and 1 cup of milk or yogurt, depending on how many carbs you are supposed to eat at a meal.  · Try to eat about the same amount of carbs at each meal. Do not \"save up\" your daily allowance of carbs to eat at one meal.  · Proteins have very little or no carbs per serving. Examples of proteins are beef, chicken, turkey, fish, eggs, tofu, cheese, cottage cheese, and peanut butter. A serving size of meat is 3 ounces, which is about the size of a deck of cards. Examples of meat substitute serving sizes (equal to 1 ounce of meat) are 1/4 cup of cottage cheese, 1 egg, 1 tablespoon of peanut butter, and ½ cup of tofu. How can you eat out and still eat healthy? · Learn to estimate the serving sizes of foods that have carbohydrate. If you measure food at home, it will be easier to estimate the amount in a serving of restaurant food. · If the meal you order has too much carbohydrate (such as potatoes, corn, or baked beans), ask to have a low-carbohydrate food instead. Ask for a salad or green vegetables.   · If you use insulin, check

## 2020-11-10 NOTE — PROGRESS NOTES
Date of Service:  11/10/2020    Taco Duncan (:  1940) is a 68 y.o. male, here for evaluation of the following medical concerns:    Chief Complaint   Patient presents with    Diabetes     ROUTINE FOLLOW UP FOR DM- PT IS FASTING         HPI     Last A1C was 9 months ago and it was 8.3. No POCT A1C checks available today. Will draw A1C. Pt thinks it will be up to 10 now. Fasting blood sugars 140-170s. Treatment Adherence:   Medication compliance:  compliant all of the time  Diet compliance:  noncompliant: \"I don't full with no diet\"  Weight trend: stable  Current exercise: no regular exercise  Barriers: lack of motivation and pain    Diabetes Mellitus Type 2: Current symptoms/problems include poor circulation. Home blood sugar records: fasting range: 170s  Any episodes of hypoglycemia? no  Eye exam current (within one year): no  Tobacco history: He  reports that he has been smoking cigarettes. He started smoking about 66 years ago. He has a 90.00 pack-year smoking history. He has never used smokeless tobacco.   Daily Aspirin? Yes    Hypertension:  Home blood pressure monitoring: No.  He is not adherent to a low sodium diet. Patient denies chest pain, shortness of breath, headache, lightheadedness, blurred vision, peripheral edema, palpitations, dry cough and fatigue. Antihypertensive medication side effects: no medication side effects noted. Use of agents associated with hypertension: none. Hyperlipidemia:  No new myalgias or GI upset on atorvastatin (Lipitor).        Lab Results   Component Value Date    LABA1C 8.3 2020    LABA1C 7.2 2019    LABA1C 8.4 2019     Lab Results   Component Value Date    LABMICR 4.70 (H) 2018    CREATININE 0.9 10/25/2019     Lab Results   Component Value Date    ALT 36 2019    AST 20 2019     Lab Results   Component Value Date    CHOL 122 2019    TRIG 121 2019    HDL 45 2019    LDLCALC 53 2019 Review of Systems   Constitutional: Negative for activity change, appetite change and fatigue. HENT: Positive for congestion and ear pain (left). Negative for sinus pressure. Eyes: Negative for discharge and visual disturbance. Respiratory: Positive for cough and shortness of breath. Negative for chest tightness. Cardiovascular: Positive for leg swelling. Negative for chest pain and palpitations. Gastrointestinal: Negative for abdominal distention, abdominal pain, constipation, diarrhea and nausea. Endocrine: Negative for cold intolerance, heat intolerance, polydipsia, polyphagia and polyuria. Genitourinary: Negative for decreased urine volume, difficulty urinating, dysuria, flank pain, frequency and urgency. Nocturia 4 times nightly   Musculoskeletal: Negative for arthralgias, back pain, gait problem, joint swelling, myalgias and neck pain. Skin: Negative for color change and rash. Allergic/Immunologic: Negative for immunocompromised state. Neurological: Negative for dizziness, tremors, speech difficulty, weakness, light-headedness, numbness and headaches. Hematological: Negative for adenopathy. Does not bruise/bleed easily. Psychiatric/Behavioral: Negative for confusion, decreased concentration and sleep disturbance. The patient is not nervous/anxious. Prior to Visit Medications    Medication Sig Taking?  Authorizing Provider   glipiZIDE (GLUCOTROL) 5 MG tablet TAKE 1/2 TO 1 TABLET TWO   TIMES A DAY BEFORE MEALS Yes Elmo Kaufman, DO   lisinopril (PRINIVIL;ZESTRIL) 5 MG tablet TAKE 1 TABLET DAILY Yes Elmo Kaufman DO   metFORMIN (GLUCOPHAGE) 1000 MG tablet TAKE 1 TABLET TWICE DAILY  WITH MEALS (SCHEDULE VISIT FOR FUTURE FULL REFILLS) Yes Daniel Kaufman DO   ipratropium (ATROVENT) 0.03 % nasal spray 2 sprays by Nasal route 2 times daily Yes Quentin Biswas MD   Cholecalciferol (VITAMIN D3) 125 MCG (5000 UT) CAPS Take 1 capsule by mouth Yes Historical MD Roly blood glucose test strips (FREESTYLE LITE) strip 1 each by Does not apply route 2 times daily Yes Afsaneh Foster DO   metoprolol tartrate (LOPRESSOR) 25 MG tablet TAKE 1/2 TABLET TWICE A DAY Yes MERCED Riddle CNP   atorvastatin (LIPITOR) 40 MG tablet Take 1 tablet by mouth daily Yes MERCED Riddle CNP   glucose monitoring kit (FREESTYLE) monitoring kit 1 kit by Does not apply route daily Yes Afsaneh Foster DO   FREESTYLE LANCETS MISC USE TO TEST TWICE DAILY Yes Afsaneh Foster DO   Respiratory Therapy Supplies JANET CPAP WITH VARIABLE PRESSURE CAPABILITY  PRESSURE 12. Yes Afsaneh Foster DO   HYDROcodone-acetaminophen (NORCO)  MG per tablet TK 1 T PO  QID PRN P Yes Historical Provider, MD   docusate sodium (COLACE) 100 MG capsule Take 200 mg by mouth daily Indications: Constipation Yes Historical Provider, MD   aspirin 81 MG tablet Take 81 mg by mouth daily. Yes Historical Provider, MD        Social History     Tobacco Use    Smoking status: Current Every Day Smoker     Packs/day: 1.50     Years: 60.00     Pack years: 90.00     Types: Cigarettes     Start date: [birth year]/9/1954    Smokeless tobacco: Never Used    Tobacco comment: Started 17 y/o. Quit 11/8/13. 1 PPD. 1025/19. 2/18/20.5/18/20. Substance Use Topics    Alcohol use: No     Alcohol/week: 1.0 standard drinks     Types: 1 Standard drinks or equivalent per week     Comment: occ        Vitals:    11/10/20 0819   BP: 134/76   Site: Right Upper Arm   Position: Sitting   Cuff Size: Large Adult   Pulse: 68   Temp: 98.6 °F (37 °C)   TempSrc: Infrared   SpO2: 99%   Weight: 230 lb (104.3 kg)   Height: 5' 7\" (1.702 m)     Estimated body mass index is 36.02 kg/m² as calculated from the following:    Height as of this encounter: 5' 7\" (1.702 m). Weight as of this encounter: 230 lb (104.3 kg). Physical Exam  Vitals signs reviewed. Constitutional:       General: He is awake. Appearance: Normal appearance. He is well-developed and well-groomed. He is morbidly obese. He is not ill-appearing or toxic-appearing. HENT:      Head: Normocephalic and atraumatic. Right Ear: Tympanic membrane, ear canal and external ear normal. Decreased hearing noted. Left Ear: Tympanic membrane, ear canal and external ear normal. Decreased hearing noted. Ears:      Comments: L ear pain, internal and external- no visible abnormalities. States abx helped in past.      Nose: Congestion present. Mouth/Throat:      Lips: Pink. Mouth: Mucous membranes are moist.      Dentition: Normal dentition. Does not have dentures. No dental tenderness. Pharynx: Oropharyngeal exudate (clear) present. Eyes:      General: Lids are normal.      Conjunctiva/sclera: Conjunctivae normal.      Pupils: Pupils are equal, round, and reactive to light. Neck:      Musculoskeletal: Normal range of motion and neck supple. Thyroid: No thyromegaly. Vascular: No carotid bruit. Cardiovascular:      Rate and Rhythm: Normal rate. Pulses:           Carotid pulses are 2+ on the right side and 2+ on the left side. Radial pulses are 2+ on the right side and 2+ on the left side. Posterior tibial pulses are 1+ on the right side and 1+ on the left side. Heart sounds: Normal heart sounds, S1 normal and S2 normal. Heart sounds not distant. No murmur. Pulmonary:      Effort: Pulmonary effort is normal.      Breath sounds: Decreased air movement present. Examination of the right-upper field reveals wheezing. Examination of the left-upper field reveals wheezing. Examination of the right-middle field reveals wheezing. Examination of the left-middle field reveals wheezing. Examination of the right-lower field reveals wheezing. Examination of the left-lower field reveals wheezing. Wheezing present. Abdominal:      General: Bowel sounds are normal.      Palpations: Abdomen is soft. Tenderness: There is no abdominal tenderness.    Genitourinary: Comments: Deferred  Musculoskeletal: Normal range of motion. Right lower le+ Edema present. Left lower le+ Edema present. Lymphadenopathy:      Head:      Right side of head: No submental, submandibular, tonsillar, preauricular, posterior auricular or occipital adenopathy. Left side of head: No submental, submandibular, tonsillar, preauricular, posterior auricular or occipital adenopathy. Cervical: No cervical adenopathy. Right cervical: No superficial, deep or posterior cervical adenopathy. Left cervical: No superficial, deep or posterior cervical adenopathy. Upper Body:      Right upper body: No supraclavicular adenopathy. Left upper body: No supraclavicular adenopathy. Skin:     General: Skin is warm and dry. Capillary Refill: Capillary refill takes less than 2 seconds. Findings: Wound present. Comments: Small ulcer on each shin, cool extremities, redness around sores   Neurological:      General: No focal deficit present. Mental Status: He is alert and oriented to person, place, and time. Mental status is at baseline. Motor: Motor function is intact. No weakness. Coordination: Coordination is intact. Gait: Gait is intact. Psychiatric:         Attention and Perception: Attention and perception normal.         Mood and Affect: Mood and affect normal.         Speech: Speech normal.         Behavior: Behavior normal. Behavior is cooperative. Thought Content: Thought content normal.         Cognition and Memory: Cognition and memory normal.         Judgment: Judgment normal.         ASSESSMENT/PLAN:  1. Type 2 diabetes mellitus with microalbuminuria, without long-term current use of insulin (HCC)  Glipizide 5-7.5 mg every morning and 5 mg nightly  -Metformin 1000 mg BID  - Comprehensive Metabolic Panel; Future  - CBC Auto Differential; Future  - Hemoglobin A1C; Future    2.  Sacroiliitis, not elsewhere classified ([facility] [address]  Norco PRN    3. Narcotic dependency, continuous (HCC)  Norco PRN    4. Paroxysmal atrial fibrillation ([facility] [address]  Follows with cardiology, last visit this past summer    5. Essential hypertension, benign  -Continue metoprolol and lisinopril  - Comprehensive Metabolic Panel; Future  -BP stable, recommend checking regularly at home    6. Tobacco use disorder  -No interest in smoking cessation    7. Degeneration of lumbar or lumbosacral intervertebral disc  Norco PRN  Chronic back pain    8. ANNMARIE on CPAP  Wears CPAP nightly    9. Pure hypercholesterolemia  Atorvastatin nightly  -Work on limiting saturated fats in diet, and eating a healthy balance of fruits, vegetables, lean proteins, and multigrains. Physical activity 150 minutes weekly recommended   Discussed Weight loss, initial goal 10% of body weight  - Lipid Panel; Future    10. Nocturia associated with benign prostatic hyperplasia  -Up 4 times nightly to void  - Psa screening; Future    11. Encounter for screening for malignant neoplasm of prostate   Requested PSA check  - Psa screening; Future    12. Shortness of breath  -Chronic smoker, congestion- audible from across the room  Complains of congestion  Lungs wheezing throughout  Consider lasix or hctz or a diuretic for swelling in legs and congestion  - Brain Natriuretic Peptide; Future    Reviewed 2013 echo  Left Ventricle    Left ventricle size is normal.    Moderate concentric left ventricular hypertrophy is present.    Ejection fraction is visually estimated to be 60-65 %.    No regional wall motion abnormalities are noted.   Sa[place]l Nani is reversal of E/A inflow velocities across the mitral valve    suggesting impaired left ventricular relaxation.        Mitral Valve    The mitral valve is normal in structure and function.  No evidence of mitral    regurgitation.        Left Atrium    The left atrium is normal in size.        Aortic Valve    The aortic valve is thickened and calcified with a peak gradient of 63 mm    Hg, and mean gradient of 41 mmHg c/w moderate aortic stenosis.    No evidence of aortic valve regurgitation.        Aorta    The aortic root is normal in size.        Right Ventricle    Normal right ventricular size and function.    TAPSE measures 22mm.        Tricuspid Valve    There is mild tricuspid regurgitation with RVSP estimated at 37 mmHg.        Right Atrium    The right atrium is normal in size.        Pulmonic Valve    The pulmonic valve is not well visualized.    No evidence of pulmonic valve regurgitation. Call insurance company to discuss coverage for shingles vaccine (Shingrix) 2 dose series     Recommend annual wellness visit before the end of the year    I have reviewed patient's pertinent medical history, relevant laboratory and imaging studies, and past/future health maintenance. Discussed with the patient the importance of adhering to their current medication regimen as directed. Advised the patient that they should continue to work on eating a healthy balanced diet and staying active by exercising within their personal limits. Orders as listed above. Patient was advised to keep future appointments with their respective specialty care team(s). Patient had the opportunity to ask questions, all of which were answered to the best of my ability and with patient satisfaction. Patient understands and is agreeable with the care plan following today's visit. Patient is to schedule an appointment for any new or worsening symptoms. Go to ER for significant shortness of breath, chest pain, or uncontrolled pain or fever. I discussed with patient the risk and benefits of any medications that were prescribed today. I verified that the patient understands their medications, labs, and/or procedures. The patient is doing well with current medication regimen and does not have any barriers to adherence. The patient's self-management abilities are good.        Return in about 4 weeks (around

## 2020-11-11 LAB
ESTIMATED AVERAGE GLUCOSE: 180 MG/DL
HBA1C MFR BLD: 7.9 %

## 2020-11-15 RX ORDER — GLIPIZIDE 10 MG/1
TABLET ORAL
Qty: 180 TABLET | Refills: 1 | Status: SHIPPED | OUTPATIENT
Start: 2020-11-15 | End: 2021-04-14 | Stop reason: SDUPTHER

## 2020-11-16 RX ORDER — LISINOPRIL 5 MG/1
TABLET ORAL
Qty: 90 TABLET | Refills: 0 | Status: SHIPPED | OUTPATIENT
Start: 2020-11-16 | End: 2021-02-12

## 2020-11-20 ENCOUNTER — TELEPHONE (OUTPATIENT)
Dept: FAMILY MEDICINE CLINIC | Age: 80
End: 2020-11-20

## 2020-11-20 NOTE — TELEPHONE ENCOUNTER
The A1C, measure of diabetes, is 7.9, better than you expected but still higher than we would like you to be. I adjusted your glipizide to 10 mg tablets to be taken 1 full tab twice daily.       Your prostate level is stable, comparable to last check from 8 years ago.       Your BNP does not show fluid overload despite your shortness of breath and swelling in your legs.       Your cholesterol levels are stable.  Continue on atorvastatin.       Metabolic panel- kidneys and liver look good.       Blood counts look good

## 2020-11-30 RX ORDER — GLIPIZIDE 5 MG/1
TABLET ORAL
Qty: 180 TABLET | Refills: 0 | Status: SHIPPED | OUTPATIENT
Start: 2020-11-30 | End: 2021-02-12

## 2020-12-07 ENCOUNTER — TELEPHONE (OUTPATIENT)
Dept: FAMILY MEDICINE CLINIC | Age: 80
End: 2020-12-07

## 2020-12-10 RX ORDER — ATORVASTATIN CALCIUM 40 MG/1
40 TABLET, FILM COATED ORAL DAILY
Qty: 90 TABLET | Refills: 0 | Status: SHIPPED | OUTPATIENT
Start: 2020-12-10 | End: 2021-03-16

## 2020-12-18 NOTE — TELEPHONE ENCOUNTER
CALLED AND SPOKE WITH PT. HE IS SCHEDULED 12/22/220
FOREIGN DO YOU HAVE ANYWHERE TO PUT HIM?  [facility]
PT @  [phone]    HE HAS A PRE-OP 01/06/21 - HE STATES IF HE CAN GET A PRE-OP IN December THEY WILL MOVE HIS SURGERY UP, CAN ANYONE SEE HIM SOONER?
Yes

## 2021-01-05 DIAGNOSIS — E11.29 TYPE 2 DIABETES MELLITUS WITH MICROALBUMINURIA, WITHOUT LONG-TERM CURRENT USE OF INSULIN (HCC): ICD-10-CM

## 2021-01-05 DIAGNOSIS — R80.9 TYPE 2 DIABETES MELLITUS WITH MICROALBUMINURIA, WITHOUT LONG-TERM CURRENT USE OF INSULIN (HCC): ICD-10-CM

## 2021-01-05 RX ORDER — BLOOD-GLUCOSE METER
1 KIT MISCELLANEOUS 2 TIMES DAILY
Qty: 200 STRIP | Refills: 3 | Status: SHIPPED | OUTPATIENT
Start: 2021-01-05 | End: 2021-09-09

## 2021-01-06 ENCOUNTER — OFFICE VISIT (OUTPATIENT)
Dept: FAMILY MEDICINE CLINIC | Age: 81
End: 2021-01-06
Payer: MEDICARE

## 2021-01-06 ENCOUNTER — TELEPHONE (OUTPATIENT)
Dept: FAMILY MEDICINE CLINIC | Age: 81
End: 2021-01-06

## 2021-01-06 VITALS
HEART RATE: 68 BPM | BODY MASS INDEX: 36.73 KG/M2 | OXYGEN SATURATION: 98 % | RESPIRATION RATE: 18 BRPM | DIASTOLIC BLOOD PRESSURE: 76 MMHG | WEIGHT: 234 LBS | SYSTOLIC BLOOD PRESSURE: 126 MMHG | HEIGHT: 67 IN | TEMPERATURE: 96.6 F

## 2021-01-06 DIAGNOSIS — R73.9 HYPERGLYCEMIA: ICD-10-CM

## 2021-01-06 DIAGNOSIS — M25.59 PAIN IN OTHER SPECIFIED JOINT: ICD-10-CM

## 2021-01-06 DIAGNOSIS — M51.37 DEGENERATION OF LUMBAR OR LUMBOSACRAL INTERVERTEBRAL DISC: Primary | Chronic | ICD-10-CM

## 2021-01-06 DIAGNOSIS — M51.26 DISC DISPLACEMENT, LUMBAR: ICD-10-CM

## 2021-01-06 DIAGNOSIS — Z01.818 PRE-OP EXAM: ICD-10-CM

## 2021-01-06 DIAGNOSIS — M19.09 PRIMARY OSTEOARTHRITIS, OTHER SPECIFIED SITE: ICD-10-CM

## 2021-01-06 DIAGNOSIS — M47.818 SI JOINT ARTHRITIS: Chronic | ICD-10-CM

## 2021-01-06 DIAGNOSIS — M50.30 DEGENERATIVE DISC DISEASE, CERVICAL: ICD-10-CM

## 2021-01-06 DIAGNOSIS — M48.061 SPINAL STENOSIS OF LUMBAR REGION, UNSPECIFIED WHETHER NEUROGENIC CLAUDICATION PRESENT: Chronic | ICD-10-CM

## 2021-01-06 LAB
ANION GAP SERPL CALCULATED.3IONS-SCNC: 8 MMOL/L (ref 3–16)
APTT: 35.4 SEC (ref 24.2–36.2)
BASOPHILS ABSOLUTE: 0.1 K/UL (ref 0–0.2)
BASOPHILS RELATIVE PERCENT: 0.8 %
BUN BLDV-MCNC: 17 MG/DL (ref 7–20)
CALCIUM SERPL-MCNC: 9.8 MG/DL (ref 8.3–10.6)
CHLORIDE BLD-SCNC: 104 MMOL/L (ref 99–110)
CO2: 30 MMOL/L (ref 21–32)
CREAT SERPL-MCNC: 0.8 MG/DL (ref 0.8–1.3)
EOSINOPHILS ABSOLUTE: 0.1 K/UL (ref 0–0.6)
EOSINOPHILS RELATIVE PERCENT: 1.4 %
ESTIMATED AVERAGE GLUCOSE: 194.4 MG/DL
GFR AFRICAN AMERICAN: >60
GFR NON-AFRICAN AMERICAN: >60
GLUCOSE BLD-MCNC: 140 MG/DL (ref 70–99)
HBA1C MFR BLD: 8.4 %
HCT VFR BLD CALC: 43.6 % (ref 40.5–52.5)
HEMOGLOBIN: 14.1 G/DL (ref 13.5–17.5)
INR BLD: 0.95 (ref 0.86–1.14)
LYMPHOCYTES ABSOLUTE: 2.2 K/UL (ref 1–5.1)
LYMPHOCYTES RELATIVE PERCENT: 25.3 %
MCH RBC QN AUTO: 31.6 PG (ref 26–34)
MCHC RBC AUTO-ENTMCNC: 32.4 G/DL (ref 31–36)
MCV RBC AUTO: 97.5 FL (ref 80–100)
MONOCYTES ABSOLUTE: 0.5 K/UL (ref 0–1.3)
MONOCYTES RELATIVE PERCENT: 6.2 %
NEUTROPHILS ABSOLUTE: 5.7 K/UL (ref 1.7–7.7)
NEUTROPHILS RELATIVE PERCENT: 66.3 %
PDW BLD-RTO: 14 % (ref 12.4–15.4)
PLATELET # BLD: 290 K/UL (ref 135–450)
PMV BLD AUTO: 8 FL (ref 5–10.5)
POTASSIUM SERPL-SCNC: 5.3 MMOL/L (ref 3.5–5.1)
PROTHROMBIN TIME: 11 SEC (ref 10–13.2)
RBC # BLD: 4.47 M/UL (ref 4.2–5.9)
SODIUM BLD-SCNC: 142 MMOL/L (ref 136–145)
WBC # BLD: 8.6 K/UL (ref 4–11)

## 2021-01-06 PROCEDURE — G8482 FLU IMMUNIZE ORDER/ADMIN: HCPCS | Performed by: NURSE PRACTITIONER

## 2021-01-06 PROCEDURE — G8427 DOCREV CUR MEDS BY ELIG CLIN: HCPCS | Performed by: NURSE PRACTITIONER

## 2021-01-06 PROCEDURE — 93000 ELECTROCARDIOGRAM COMPLETE: CPT | Performed by: NURSE PRACTITIONER

## 2021-01-06 PROCEDURE — 4004F PT TOBACCO SCREEN RCVD TLK: CPT | Performed by: NURSE PRACTITIONER

## 2021-01-06 PROCEDURE — G8417 CALC BMI ABV UP PARAM F/U: HCPCS | Performed by: NURSE PRACTITIONER

## 2021-01-06 PROCEDURE — 4040F PNEUMOC VAC/ADMIN/RCVD: CPT | Performed by: NURSE PRACTITIONER

## 2021-01-06 PROCEDURE — 99214 OFFICE O/P EST MOD 30 MIN: CPT | Performed by: NURSE PRACTITIONER

## 2021-01-06 PROCEDURE — 1123F ACP DISCUSS/DSCN MKR DOCD: CPT | Performed by: NURSE PRACTITIONER

## 2021-01-06 ASSESSMENT — PATIENT HEALTH QUESTIONNAIRE - PHQ9
SUM OF ALL RESPONSES TO PHQ QUESTIONS 1-9: 0

## 2021-01-06 NOTE — PROGRESS NOTES
Preoperative Consultation      Suzanne Cason  YOB: 1940    Date of Service:  1/6/2021    Vitals:    01/06/21 0825   BP: 126/76   Site: Left Upper Arm   Position: Sitting   Cuff Size: Medium Adult   Pulse: 68   Resp: 18   Temp: 96.6 °F (35.9 °C)   TempSrc: Temporal   SpO2: 98%   Weight: 234 lb (106.1 kg)   Height: 5' 7\" (1.702 m)      Wt Readings from Last 2 Encounters:   01/06/21 234 lb (106.1 kg)   11/10/20 230 lb (104.3 kg)     BP Readings from Last 3 Encounters:   01/06/21 126/76   11/10/20 134/76   08/12/20 118/68        Chief Complaint   Patient presents with    Pre-op Exam     PT HAVING SURGERY ON 1/12/21 WITH DR Soares [address]  [facility]      No Known Allergies  Outpatient Medications Marked as Taking for the 1/6/21 encounter (Office Visit) with MERCED Chen CNP   Medication Sig Dispense Refill    blood glucose test strips (FREESTYLE LITE) strip 1 each by Does not apply route 2 times daily 200 strip 3    atorvastatin (LIPITOR) 40 MG tablet Take 1 tablet by mouth daily 90 tablet 0    glipiZIDE (GLUCOTROL) 5 MG tablet TAKE 1/2 TO 1 TABLET TWO   TIMES A DAY BEFORE MEALS 180 tablet 0    metoprolol tartrate (LOPRESSOR) 25 MG tablet TAKE 1/2 TABLET TWICE A DAY 90 tablet 0    metFORMIN (GLUCOPHAGE) 1000 MG tablet TAKE 1 TABLET TWICE DAILY  WITH MEALS (SCHEDULE VISIT FOR FUTURE FULL REFILLS) 180 tablet 0    lisinopril (PRINIVIL;ZESTRIL) 5 MG tablet TAKE 1 TABLET DAILY 90 tablet 0    glipiZIDE (GLUCOTROL) 10 MG tablet TAKE 1 TABLET TWO   TIMES A DAY BEFORE MEALS 180 tablet 1    FREESTYLE LANCETS MISC USE TO TEST TWICE DAILY 200 each 3    HYDROcodone-acetaminophen (NORCO)  MG per tablet TK 1 T PO  QID PRN P  0    docusate sodium (COLACE) 100 MG capsule Take 200 mg by mouth daily Indications: Constipation      aspirin 81 MG tablet Take 81 mg by mouth daily. This patient presents to the office today for a preoperative consultation at the request of surgeon, Dr. Todd Aschoff, who plans on performing [address] on January 12 at [facility]. The current problem began multiple years ago, and symptoms have been worsening with time. Conservative therapy: Yes: PT, Injections, Nerve Stimulator, which has been not very effective. .    Planned anesthesia: General   Known anesthesia problems: None   Bleeding risk: No recent or remote history of abnormal bleeding  Personal or FH of DVT/PE: No    Patient objection to receiving blood products: No    Patient Active Problem List   Diagnosis    Vitamin D deficiency    Pure hypercholesterolemia    Alcohol abuse    External hemorrhoids    Recurrent BCC (basal cell carcinoma)    Impotence of organic origin    ANNMARIE on CPAP    Tobacco use disorder    Essential hypertension, benign    Type 2 diabetes mellitus with microalbuminuria, without long-term current use of insulin (HCC)    Degeneration of lumbar or lumbosacral intervertebral disc    Leg swelling    Diverticulosis of colon    Colon polyps    Numbness in left leg    SI joint arthritis    Status post lumbar spinal fusion L4-s1 then L3-s1, [facility] neuro    Degenerative lumbar spinal stenosis    CAD (coronary artery disease)    Postoperative anemia    S/P aortic valve replacement    Pulmonary hypertension (HCC)    Mild diastolic dysfunction    Degenerative disc disease, cervical    Class 2 severe obesity due to excess calories with serious comorbidity and body mass index (BMI) of 36.0 to 36.9 in adult ([facility]    COPD (chronic obstructive pulmonary disease) (HCC)    Low back pain    Spinal stenosis, lumbar region, without neurogenic claudication    Cervical radiculopathy, chronic    Facet arthropathy, cervical    Cervical spine degeneration    Cervical stenosis of spinal canal    Disc displacement, lumbar  Drug-induced constipation    Lumbar stenosis    Narcotic dependency, continuous (HCC)    Osteoarthritis of cervical spine with myelopathy    Cervical spondylosis without myelopathy    Recurrent sinusitis    Hyperkalemia    Submandibular duct obstruction - right    BPH associated with nocturia    AMD (age related macular degeneration) bilateral    Chronic rhinitis    Asbestos exposure       Past Medical History:   Diagnosis Date    Alcohol use     excess in past prior to CABG    AMD (age related macular degeneration) bilateral     Drusen and subretinal fluid on right, drusen on left, with poorly controlled diabetes and patient smoking    Benign essential hypertension     Mod concentric LVH    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 Ruptured disc    Diverticulosis of colon 10/30/2012    Dry eye syndrome of bilateral lacrimal glands     Hip problem 1956    NUMBNESS W/ TRAUMA    Hypercholesterolemia     Impotence     Metabolic syndrome     Mild diastolic dysfunction 05/75/9160    LVEF 55-60%    Obstructive sleep apnea     Obstructive sleep apnea     C-PAP    Osteoarthritis of shoulder     left  @ ac jt/impingement.     Pulmonary hypertension ([address] 10/21/2013    37    Recurrent BCC (basal cell carcinoma)     S/P aortic valve replacement 11/12/2013    Submandibular duct obstruction - right 10/2/2017    12 x 14 x 18 mm right submandibular gland stone causing submandibular duct dilatation and gland prominence    Tobacco use disorder 1957  Type II or unspecified type diabetes mellitus without mention of complication, not stated as uncontrolled 2003    Vitamin D deficiency      Past Surgical History:   Procedure Laterality Date    AORTIC VALVE REPLACEMENT  11/12/2013    BLEPHAROPLASTY  07/2007    UPPER LID    CARDIAC CATHETERIZATION  11/8/2013    dx    CARDIAC SURGERY  11/12/2013    reincised for bleeding    CATARACT REMOVAL WITH IMPLANT Right 01/2019    CATARACT REMOVAL WITH IMPLANT Left 02/2019    CERVICAL DISC SURGERY Right 2015    epidural steroids.  CORONARY ARTERY BYPASS GRAFT  11/12/2013    4 V    EYE SURGERY Bilateral     Laser peripheral iridotomy     FINGER TRIGGER RELEASE Right [id number]    Release of trigger thumb (stenosing tenosynovitis)    HEMORRHOID SURGERY  [id number]    LAPAROSCOPIC APPENDECTOMY  11/14/2014    LUMBAR LAMINECTOMY  1998 & 2003    LUMBAR LAMINECTOMY  05/07/2014    L2-L3 & Repair dural tear x2.  LUMBAR SPINE SURGERY  8/4/2009    SPINaL CORD STIMULATOR FOR PAIN    LUMBAR SPINE SURGERY  08/2014    epidural steroids. 3/2017 NEAL, 10/21/19    LUMBAR SPINE SURGERY Right 04/23/2019    SPINAL CORD STIMULATOR BATTERY REPLACEMENT WITH POCKET REVISION     MOHS SURGERY Left 02/11/2020    Dorsum of hand: type?  MOHS SURGERY Right 01/01/2020    right upper inner pinna with skin graft    NERVE SURGERY  ~12/2012    nerve block     ROTATOR CUFF REPAIR  2001    SKIN CANCER EXCISION Right 07/2017    forehead THEN TOP OF THE HEAD.     SKIN CANCER EXCISION  01/2019    nose    SOFT TISSUE BIOPSY      needle bx right neck mass -benign    TEAR DUCT SURGERY Bilateral 01/01/2015    for dry    TESTICLE REMOVAL  1947    R    TONSILLECTOMY      CHILD    UMBILICAL HERNIA REPAIR  11/14/2014    Laparoscopic     Family History   Problem Relation Age of Onset    Other Mother         CABG    Heart Surgery Mother 66        CABG    Sudden Death Father     Coronary Art Dis Brother     Diabetes Brother  Stroke Sister 77        blinded pt in 1 eye    Cancer Sister         pancreatic cancer    No Known Problems Sister     Other Daughter         DDD L spine    Other Son         DDD L spine    Heart Attack Maternal Aunt     Heart Attack Maternal Uncle      Social History     Socioeconomic History    Marital status:      Spouse name: Derian Orozco. Number of children: 2    Years of education: 15    Highest education level: Not on file   Occupational History    Occupation: [employer] -retired     Comment: 10/1/1997   Social Needs    Financial resource strain: Not hard at all   [employer] insecurity     Worry: Never true     Inability: Never true   [employer] needs     Medical: No     Non-medical: No   Tobacco Use    Smoking status: Current Every Day Smoker     Packs/day: 1.50     Years: 60.00     Pack years: 90.00     Types: Cigarettes     Start date: 5/9/1954    Smokeless tobacco: Never Used    Tobacco comment: Started 17 y/o. Quit 11/8/13. 1 PPD. 1025/19. 2/18/20.5/18/20.    Substance and Sexual Activity    Alcohol use: No     Alcohol/week: 1.0 standard drinks     Types: 1 Standard drinks or equivalent per week     Comment: occ    Drug use: No     Comment: Never tried MJ.   [place] Sexual activity: Not on file   Lifestyle    Physical activity     Days per week: Not on file     Minutes per session: Not on file    Stress: Not on file   Relationships    Social connections     Talks on phone: Not on file     Gets together: Not on file     Attends [employer] service: Not on file     Active member of club or organization: Not on file     Attends meetings of clubs or organizations: Not on file     Relationship status: Not on file    Intimate partner violence     Fear of current or ex partner: Not on file     Emotionally abused: Not on file     Physically abused: Not on file     Forced sexual activity: Not on file   Other Topics Concern    Not on file   Social History Narrative No exercise. 12/21/16.  4/6/17, 6/19/17, 8/14/17. 9/27/27. 12/4/17. 8/27/18. 10/22/18. 4/24/19. 7/30/19. 1025/19.2/18/20. Review of Systems  A comprehensive review of systems was negative except for what was noted in the HPI. Physical Exam   Constitutional: He is oriented to person, place, and time. He appears well-developed and well-nourished. No distress. HENT:   Head: Normocephalic and atraumatic. Mouth/Throat: Uvula is midline, oropharynx is clear and moist and mucous membranes are normal.   Eyes: Conjunctivae and EOM are normal. Pupils are equal, round, and reactive to light. Neck: Trachea normal and normal range of motion. Neck supple. No JVD present. Carotid bruit is not present. No mass and no thyromegaly present. Cardiovascular: Normal rate, regular rhythm, normal heart sounds and intact distal pulses. Exam reveals no gallop and no friction rub. No murmur heard. Pulmonary/Chest: Bibasilar End Expiratory Wheezes. Effort normal and breath sounds normal. No respiratory distress. He has no rales. Abdominal: Soft. Normal aorta and bowel sounds are normal. He exhibits no distension and no mass. There is no hepatosplenomegaly. No tenderness. Musculoskeletal: He exhibits no edema and no tenderness. Neurological: He is alert and oriented to person, place, and time. He has normal strength. No cranial nerve deficit or sensory deficit. Coordination and gait normal.   Skin: Skin is warm and dry. No rash noted. No erythema. Psychiatric: He has a normal mood and affect. His behavior is normal.     EKG Interpretation:  normal EKG, normal sinus rhythm, RBBB, 1st degree AV block, the 1st degree block is new when compared to EKG dated 5/15/2018.     Lab Review   Office Visit on 11/10/2020   Component Date Value    Pro-BNP 11/10/2020 401     PSA 11/10/2020 1.37     Hemoglobin A1C 11/10/2020 7.9     eAG 11/10/2020 180.0     Cholesterol, Total 11/10/2020 116     Triglycerides 11/10/2020 87  HDL 11/10/2020 41     LDL Calculated 11/10/2020 58     VLDL Cholesterol Calcula* 11/10/2020 17     WBC 11/10/2020 9.1     RBC 11/10/2020 4.36     Hemoglobin 11/10/2020 14.1     Hematocrit 11/10/2020 42.9     MCV 11/10/2020 98.3     MCH 11/10/2020 32.4     MCHC 11/10/2020 33.0     RDW 11/10/2020 14.3     Platelets 09/89/6292 262     MPV 11/10/2020 7.9     Neutrophils % 11/10/2020 66.3     Lymphocytes % 11/10/2020 25.9     Monocytes % 11/10/2020 5.8     Eosinophils % 11/10/2020 1.4     Basophils % 11/10/2020 0.6     Neutrophils Absolute 11/10/2020 6.0     Lymphocytes Absolute 11/10/2020 2.3     Monocytes Absolute 11/10/2020 0.5     Eosinophils Absolute 11/10/2020 0.1     Basophils Absolute 11/10/2020 0.1     Sodium 11/10/2020 138     Potassium 11/10/2020 4.7     Chloride 11/10/2020 102     CO2 11/10/2020 28     Anion Gap 11/10/2020 8     Glucose 11/10/2020 115*    BUN 11/10/2020 17     CREATININE 11/10/2020 0.8     GFR Non- 11/10/2020 >60     GFR  11/10/2020 >60     Calcium 11/10/2020 9.3     Total Protein 11/10/2020 6.3*    Alb 11/10/2020 4.1     Albumin/Globulin Ratio 11/10/2020 1.9     Total Bilirubin 11/10/2020 <0.2     Alkaline Phosphatase 11/10/2020 52     ALT 11/10/2020 23     AST 11/10/2020 13*    Globulin 11/10/2020 2.2            Assessment:       68 y.o. patient with planned surgery as above. Known risk factors for perioperative complications: Coronary artery disease, Congestive heart failure, COPD, Diabetes mellitus, Hypertension, Obstructive sleep apnea, Tobacco abuse  Current medications which may produce withdrawal symptoms if withheld perioperatively: Norco      Plan:     1. Preoperative workup as follows: ECG, BMP, CBC, PT/INR, APTT  2. Change in medication regimen before surgery: Discontinue ASA 7 days before surgery  3.  Prophylaxis for cardiac events with perioperative beta-blockers: Currently taking  metoprolol ACC/AHA indications for pre-operative beta-blocker use:    · Vascular surgery with history of postitive stress test  · Intermediate or high risk surgery with history of CAD   · Intermediate or high risk surgery with multiple clinical predictors of CAD- 2 of the following: history of compensated or prior heart failure, history of cerebrovascular disease, DM, or renal insufficiency    Routine administration of higher-dose, long-acting metoprolol in beta-blockernaïve patients on the day of surgery, and in the absence of dose titration is associated with an overall increase in mortality. Beta-blockers should be started days to weeks prior to surgery and titrated to pulse < 70.  4. Deep vein thrombosis prophylaxis: regimen to be chosen by surgical team  5.  No contraindications to planned surgery

## 2021-01-06 NOTE — TELEPHONE ENCOUNTER
ELEAZAR, I took a call from [facility] at [facility]. She called and stated that the surgeon was requesting that HgbA1c be added to Pt's labs as part of the pre op. I add this test with dx of IFG. /mv

## 2021-01-18 DIAGNOSIS — E11.8 TYPE 2 DIABETES MELLITUS WITH COMPLICATION, WITHOUT LONG-TERM CURRENT USE OF INSULIN (HCC): Chronic | ICD-10-CM

## 2021-01-18 RX ORDER — LANCETS 28 GAUGE
EACH MISCELLANEOUS
Qty: 200 EACH | Refills: 3 | Status: SHIPPED | OUTPATIENT
Start: 2021-01-18 | End: 2022-08-17

## 2021-02-04 ENCOUNTER — IMMUNIZATION (OUTPATIENT)
Dept: PRIMARY CARE CLINIC | Age: 81
End: 2021-02-04
Payer: MEDICARE

## 2021-02-04 PROCEDURE — 91301 COVID-19, MODERNA VACCINE 100MCG/0.5ML DOSE: CPT | Performed by: FAMILY MEDICINE

## 2021-02-04 PROCEDURE — 0011A COVID-19, MODERNA VACCINE 100MCG/0.5ML DOSE: CPT | Performed by: FAMILY MEDICINE

## 2021-02-12 DIAGNOSIS — E11.8 TYPE 2 DIABETES MELLITUS WITH COMPLICATION, WITHOUT LONG-TERM CURRENT USE OF INSULIN (HCC): Chronic | ICD-10-CM

## 2021-02-12 DIAGNOSIS — I25.10 CORONARY ARTERY DISEASE DUE TO LIPID RICH PLAQUE: Chronic | ICD-10-CM

## 2021-02-12 DIAGNOSIS — I10 ESSENTIAL HYPERTENSION, BENIGN: Chronic | ICD-10-CM

## 2021-02-12 DIAGNOSIS — I25.83 CORONARY ARTERY DISEASE DUE TO LIPID RICH PLAQUE: Chronic | ICD-10-CM

## 2021-02-12 RX ORDER — LISINOPRIL 5 MG/1
TABLET ORAL
Qty: 90 TABLET | Refills: 0 | Status: SHIPPED | OUTPATIENT
Start: 2021-02-12 | End: 2021-05-06

## 2021-02-12 RX ORDER — GLIPIZIDE 5 MG/1
TABLET ORAL
Qty: 180 TABLET | Refills: 0 | Status: SHIPPED | OUTPATIENT
Start: 2021-02-12 | End: 2021-06-01 | Stop reason: SDUPTHER

## 2021-02-12 NOTE — TELEPHONE ENCOUNTER
CALLED AND SPOKE TO PT AND SCHEDULED AN APPT- HE HAD AN A1C IN January FOR A PRE-OP SO WE SCHEDULED HIS DM F/U 3 MONTHS FROM THAT LAB.

## 2021-03-04 ENCOUNTER — IMMUNIZATION (OUTPATIENT)
Dept: PRIMARY CARE CLINIC | Age: 81
End: 2021-03-04
Payer: MEDICARE

## 2021-03-04 PROCEDURE — 0012A COVID-19, MODERNA VACCINE 100MCG/0.5ML DOSE: CPT | Performed by: FAMILY MEDICINE

## 2021-03-04 PROCEDURE — 91301 COVID-19, MODERNA VACCINE 100MCG/0.5ML DOSE: CPT | Performed by: FAMILY MEDICINE

## 2021-03-16 DIAGNOSIS — I25.10 CORONARY ARTERY DISEASE DUE TO LIPID RICH PLAQUE: Chronic | ICD-10-CM

## 2021-03-16 DIAGNOSIS — I25.83 CORONARY ARTERY DISEASE DUE TO LIPID RICH PLAQUE: Chronic | ICD-10-CM

## 2021-03-16 DIAGNOSIS — E78.00 PURE HYPERCHOLESTEROLEMIA: Chronic | ICD-10-CM

## 2021-03-16 RX ORDER — ATORVASTATIN CALCIUM 40 MG/1
TABLET, FILM COATED ORAL
Qty: 90 TABLET | Refills: 0 | Status: SHIPPED | OUTPATIENT
Start: 2021-03-16 | End: 2021-06-07

## 2021-03-26 NOTE — TELEPHONE ENCOUNTER
Call patient to schedule visit to be seen for future full refills. Close message when patient schedules.

## 2021-04-14 ENCOUNTER — OFFICE VISIT (OUTPATIENT)
Dept: FAMILY MEDICINE CLINIC | Age: 81
End: 2021-04-14
Payer: MEDICARE

## 2021-04-14 VITALS
HEART RATE: 66 BPM | DIASTOLIC BLOOD PRESSURE: 76 MMHG | SYSTOLIC BLOOD PRESSURE: 128 MMHG | OXYGEN SATURATION: 97 % | TEMPERATURE: 97.9 F | WEIGHT: 228 LBS | BODY MASS INDEX: 35.79 KG/M2 | HEIGHT: 67 IN

## 2021-04-14 DIAGNOSIS — J43.1 PANLOBULAR EMPHYSEMA (HCC): ICD-10-CM

## 2021-04-14 DIAGNOSIS — L97.819 NON-PRESSURE CHRONIC ULCER OF OTHER PART OF RIGHT LOWER LEG WITH UNSPECIFIED SEVERITY (HCC): ICD-10-CM

## 2021-04-14 DIAGNOSIS — I27.20 PULMONARY HYPERTENSION (HCC): ICD-10-CM

## 2021-04-14 DIAGNOSIS — R80.9 TYPE 2 DIABETES MELLITUS WITH MICROALBUMINURIA, WITHOUT LONG-TERM CURRENT USE OF INSULIN (HCC): Primary | ICD-10-CM

## 2021-04-14 DIAGNOSIS — F11.20 NARCOTIC DEPENDENCY, CONTINUOUS (HCC): ICD-10-CM

## 2021-04-14 DIAGNOSIS — M46.1 SACROILIITIS, NOT ELSEWHERE CLASSIFIED (HCC): ICD-10-CM

## 2021-04-14 DIAGNOSIS — E11.29 TYPE 2 DIABETES MELLITUS WITH MICROALBUMINURIA, WITHOUT LONG-TERM CURRENT USE OF INSULIN (HCC): Primary | ICD-10-CM

## 2021-04-14 DIAGNOSIS — E66.01 MORBID (SEVERE) OBESITY DUE TO EXCESS CALORIES (HCC): ICD-10-CM

## 2021-04-14 DIAGNOSIS — I48.0 PAROXYSMAL ATRIAL FIBRILLATION (HCC): ICD-10-CM

## 2021-04-14 LAB
CREATININE URINE POCT: ABNORMAL
HBA1C MFR BLD: 7.9 %
MICROALBUMIN/CREAT 24H UR: ABNORMAL MG/G{CREAT}
MICROALBUMIN/CREAT UR-RTO: ABNORMAL

## 2021-04-14 PROCEDURE — G8417 CALC BMI ABV UP PARAM F/U: HCPCS | Performed by: NURSE PRACTITIONER

## 2021-04-14 PROCEDURE — 4040F PNEUMOC VAC/ADMIN/RCVD: CPT | Performed by: NURSE PRACTITIONER

## 2021-04-14 PROCEDURE — 83036 HEMOGLOBIN GLYCOSYLATED A1C: CPT | Performed by: NURSE PRACTITIONER

## 2021-04-14 PROCEDURE — 99214 OFFICE O/P EST MOD 30 MIN: CPT | Performed by: NURSE PRACTITIONER

## 2021-04-14 PROCEDURE — G8427 DOCREV CUR MEDS BY ELIG CLIN: HCPCS | Performed by: NURSE PRACTITIONER

## 2021-04-14 PROCEDURE — 1123F ACP DISCUSS/DSCN MKR DOCD: CPT | Performed by: NURSE PRACTITIONER

## 2021-04-14 PROCEDURE — 3051F HG A1C>EQUAL 7.0%<8.0%: CPT | Performed by: NURSE PRACTITIONER

## 2021-04-14 PROCEDURE — 3023F SPIROM DOC REV: CPT | Performed by: NURSE PRACTITIONER

## 2021-04-14 PROCEDURE — 82044 UR ALBUMIN SEMIQUANTITATIVE: CPT | Performed by: NURSE PRACTITIONER

## 2021-04-14 PROCEDURE — G8926 SPIRO NO PERF OR DOC: HCPCS | Performed by: NURSE PRACTITIONER

## 2021-04-14 PROCEDURE — 4004F PT TOBACCO SCREEN RCVD TLK: CPT | Performed by: NURSE PRACTITIONER

## 2021-04-14 ASSESSMENT — ENCOUNTER SYMPTOMS
EYE DISCHARGE: 0
COUGH: 1
SINUS PAIN: 0
EYE PAIN: 0
SINUS PRESSURE: 0
WHEEZING: 0
DIARRHEA: 0
EYE REDNESS: 0
VOMITING: 0
SORE THROAT: 0
ABDOMINAL PAIN: 0
BACK PAIN: 1
SHORTNESS OF BREATH: 0
ABDOMINAL DISTENTION: 0
NAUSEA: 0

## 2021-04-14 NOTE — PROGRESS NOTES
Ivan Orosco (:  1940) is a 68 y.o. male,Established patient, here for evaluation of the following chief complaint(s):  Diabetes (ROUTINE DM FOLLOW UP WITH A1C AND MICRO )      ASSESSMENT/PLAN:  1. Type 2 diabetes mellitus with microalbuminuria, without long-term current use of insulin (HCC)  -A1c today 7.9. This is improved from previous A1c that was taken before surgery. Recommended increasing glipizide to 10 mg daily. The patient is hesitant to do this as he is concerned about hypoglycemia. It was emphasized to the patient that given his current A1c and his blood sugars at home that this would be fairly unlikely. He states that he would prefer to continue with the 5 mg tablets and will increase the dose to 10 mg if he finds his sugars are running higher at home. Follow-up in 3 months.  -Encouraged low-carb diet as well as staying as active as he can comfortably  -     POCT glycosylated hemoglobin (Hb A1C)  -     POCT microalbumin  2. 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 3. Sacroiliitis, not elsewhere classified ([address]  -Follows neurosurgery and pain management. No improvement with pain following surgery. 4. Panlobular emphysema ([address]  -No acute issues at this time. Does not use inhalers.  -Encourage starting a daily allergy medication to help with congestion. The patient refuses today. Also encouraged as needed Mucinex over-the-counter if secretions worsen. 5. Pulmonary hypertension ([address]  -No new updates at this time. Does not receive current treatment for pulmonary hypertension. 6. Narcotic dependency, continuous ([address]  -Seeing pain management for chronic back pain. Has appointment with them next week. 7. Paroxysmal atrial fibrillation (HCC)  -Rate controlled on metoprolol. Regular today.   8. Morbid (severe) obesity due to excess calories ([address]  -Difficult to stay active given Positive for congestion. Negative for ear discharge, ear pain, hearing loss, sinus pressure, sinus pain and sore throat. Eyes: Negative for pain, discharge and redness. Respiratory: Positive for cough. Negative for shortness of breath and wheezing. Cardiovascular: Negative for chest pain and palpitations. Gastrointestinal: Negative for abdominal distention, abdominal pain, diarrhea, nausea and vomiting. Genitourinary: Negative for dysuria and hematuria. Musculoskeletal: Positive for arthralgias and back pain. Negative for myalgias. Skin: Negative for rash. Neurological: Negative for dizziness, weakness, light-headedness, numbness and headaches. Psychiatric/Behavioral: Negative for decreased concentration, dysphoric mood and sleep disturbance. The patient is not nervous/anxious. Physical Exam  Vitals signs reviewed. Constitutional:       Appearance: Normal appearance. He is normal weight. HENT:      Head: Normocephalic and atraumatic. Right Ear: Tympanic membrane, ear canal and external ear normal.      Left Ear: Tympanic membrane, ear canal and external ear normal.      Nose: Congestion present. Mouth/Throat:      Mouth: Mucous membranes are moist.      Pharynx: Oropharynx is clear. No posterior oropharyngeal erythema. Eyes:      General: No scleral icterus. Right eye: No discharge. Left eye: No discharge. Extraocular Movements: Extraocular movements intact. Pupils: Pupils are equal, round, and reactive to light. Neck:      Musculoskeletal: Normal range of motion and neck supple. Cardiovascular:      Rate and Rhythm: Normal rate and regular rhythm. Pulses: Normal pulses. Heart sounds: Normal heart sounds. No murmur. No gallop. Pulmonary:      Effort: Pulmonary effort is normal.      Breath sounds: Rhonchi present. No wheezing.       Comments: Bilateral upper lobe rhonchi  Abdominal:      General: Bowel sounds are normal. Palpations: Abdomen is soft. Tenderness: There is no abdominal tenderness. There is no guarding or rebound. Musculoskeletal: Normal range of motion. Skin:     General: Skin is warm and dry. Capillary Refill: Capillary refill takes less than 2 seconds. Neurological:      Mental Status: He is alert and oriented to person, place, and time. Mental status is at baseline. Psychiatric:         Mood and Affect: Mood normal.         Behavior: Behavior normal.         Thought Content: Thought content normal.         Judgment: Judgment normal.           On this date 4/14/2021 I have spent 35 minutes reviewing previous notes, test results and face to face with the patient discussing the diagnosis and importance of compliance with the treatment plan as well as documenting on the day of the visit. An electronic signature was used to authenticate this note.     --MERCED Corado - CNP

## 2021-04-14 NOTE — PATIENT INSTRUCTIONS
I would recommend increasing glipizide to 10 mg twice a day. Your goal for your sugar in the morning is below 140 consistently. Follow-up in 3 months.

## 2021-05-06 DIAGNOSIS — I10 ESSENTIAL HYPERTENSION, BENIGN: Chronic | ICD-10-CM

## 2021-05-06 DIAGNOSIS — E11.8 TYPE 2 DIABETES MELLITUS WITH COMPLICATION, WITHOUT LONG-TERM CURRENT USE OF INSULIN (HCC): Chronic | ICD-10-CM

## 2021-05-06 DIAGNOSIS — I25.10 CORONARY ARTERY DISEASE DUE TO LIPID RICH PLAQUE: Chronic | ICD-10-CM

## 2021-05-06 DIAGNOSIS — I25.83 CORONARY ARTERY DISEASE DUE TO LIPID RICH PLAQUE: Chronic | ICD-10-CM

## 2021-05-06 RX ORDER — LISINOPRIL 5 MG/1
TABLET ORAL
Qty: 90 TABLET | Refills: 0 | Status: SHIPPED | OUTPATIENT
Start: 2021-05-06 | End: 2021-07-30

## 2021-06-01 DIAGNOSIS — E11.8 TYPE 2 DIABETES MELLITUS WITH COMPLICATION, WITHOUT LONG-TERM CURRENT USE OF INSULIN (HCC): Chronic | ICD-10-CM

## 2021-06-01 RX ORDER — GLIPIZIDE 5 MG/1
TABLET ORAL
Qty: 180 TABLET | Refills: 0 | Status: SHIPPED | OUTPATIENT
Start: 2021-06-01 | End: 2021-08-27

## 2021-06-05 DIAGNOSIS — I25.10 CORONARY ARTERY DISEASE DUE TO LIPID RICH PLAQUE: Chronic | ICD-10-CM

## 2021-06-05 DIAGNOSIS — E78.00 PURE HYPERCHOLESTEROLEMIA: Chronic | ICD-10-CM

## 2021-06-05 DIAGNOSIS — I25.83 CORONARY ARTERY DISEASE DUE TO LIPID RICH PLAQUE: Chronic | ICD-10-CM

## 2021-06-07 RX ORDER — ATORVASTATIN CALCIUM 40 MG/1
TABLET, FILM COATED ORAL
Qty: 90 TABLET | Refills: 0 | Status: SHIPPED | OUTPATIENT
Start: 2021-06-07 | End: 2021-08-27

## 2021-07-12 ENCOUNTER — OFFICE VISIT (OUTPATIENT)
Dept: FAMILY MEDICINE CLINIC | Age: 81
End: 2021-07-12
Payer: MEDICARE

## 2021-07-12 VITALS
OXYGEN SATURATION: 96 % | HEIGHT: 67 IN | HEART RATE: 67 BPM | SYSTOLIC BLOOD PRESSURE: 130 MMHG | WEIGHT: 233.6 LBS | RESPIRATION RATE: 16 BRPM | BODY MASS INDEX: 36.66 KG/M2 | DIASTOLIC BLOOD PRESSURE: 70 MMHG

## 2021-07-12 DIAGNOSIS — R35.1 BPH ASSOCIATED WITH NOCTURIA: ICD-10-CM

## 2021-07-12 DIAGNOSIS — N40.1 BPH ASSOCIATED WITH NOCTURIA: ICD-10-CM

## 2021-07-12 DIAGNOSIS — J43.1 PANLOBULAR EMPHYSEMA (HCC): ICD-10-CM

## 2021-07-12 DIAGNOSIS — G89.29 CHRONIC RIGHT SHOULDER PAIN: Primary | ICD-10-CM

## 2021-07-12 DIAGNOSIS — M48.061 SPINAL STENOSIS, LUMBAR REGION, WITHOUT NEUROGENIC CLAUDICATION: ICD-10-CM

## 2021-07-12 DIAGNOSIS — M25.511 CHRONIC RIGHT SHOULDER PAIN: Primary | ICD-10-CM

## 2021-07-12 PROCEDURE — 99215 OFFICE O/P EST HI 40 MIN: CPT | Performed by: FAMILY MEDICINE

## 2021-07-12 PROCEDURE — G8427 DOCREV CUR MEDS BY ELIG CLIN: HCPCS | Performed by: FAMILY MEDICINE

## 2021-07-12 PROCEDURE — G8417 CALC BMI ABV UP PARAM F/U: HCPCS | Performed by: FAMILY MEDICINE

## 2021-07-12 PROCEDURE — G8926 SPIRO NO PERF OR DOC: HCPCS | Performed by: FAMILY MEDICINE

## 2021-07-12 PROCEDURE — 4040F PNEUMOC VAC/ADMIN/RCVD: CPT | Performed by: FAMILY MEDICINE

## 2021-07-12 PROCEDURE — 4004F PT TOBACCO SCREEN RCVD TLK: CPT | Performed by: FAMILY MEDICINE

## 2021-07-12 PROCEDURE — 1123F ACP DISCUSS/DSCN MKR DOCD: CPT | Performed by: FAMILY MEDICINE

## 2021-07-12 PROCEDURE — 3023F SPIROM DOC REV: CPT | Performed by: FAMILY MEDICINE

## 2021-07-12 RX ORDER — TAMSULOSIN HYDROCHLORIDE 0.4 MG/1
0.4 CAPSULE ORAL DAILY
Qty: 30 CAPSULE | Refills: 5 | Status: SHIPPED | OUTPATIENT
Start: 2021-07-12 | End: 2021-07-13

## 2021-07-12 RX ORDER — BUDESONIDE AND FORMOTEROL FUMARATE DIHYDRATE 160; 4.5 UG/1; UG/1
2 AEROSOL RESPIRATORY (INHALATION) 2 TIMES DAILY
Qty: 1 INHALER | Refills: 3 | Status: SHIPPED | OUTPATIENT
Start: 2021-07-12 | End: 2021-11-15

## 2021-07-12 ASSESSMENT — ENCOUNTER SYMPTOMS
CHOKING: 0
SHORTNESS OF BREATH: 1
COUGH: 1
WHEEZING: 1
CHEST TIGHTNESS: 0

## 2021-07-12 NOTE — PATIENT INSTRUCTIONS
Ric Martinez was seen today for shoulder pain and other. Diagnoses and all orders for this visit:    Chronic right shoulder pain  BRING IN EMPTY JAR OF PAIN CREAM  Range of motion do every 1-2 hours. Moist heat  Sports cream (Aspercreme doesn't smell). Diclofenac (brand name Voltaren) gel 1% is available over-the-counter. You can place 4 g on the knee NEXT TO the kneecap but NOT ON TOP OF the kneecap. If placed on top of the kneecap it will not penetrate into the joint. You can also put some of the 4 g dose on the joint line on each side of the knee going back towards behind the knee. You can put this on up to 4 times a day. Even if you do both joints 4 times a day there is not enough absorbed into the bloodstream to upset the stomach or significantly affect the kidneys. Tylenol 500 mg up to 2 tabs 3x/day as needed. If diagnosed with arthritis in the past:  Glucosamine 1500 mg/ chondroitin 1200 mg once daily or any combination equaling that dose i.e. 750/600 mg twice daily or 500/400 mg three time daily. This is a daily joint/arthritis supplement without significant side effects. BPH associated with nocturia  -     tamsulosin (FLOMAX) 0.4 MG capsule; Take 1 capsule by mouth daily    Panlobular emphysema (HCC)  -     budesonide-formoterol (SYMBICORT) 160-4.5 MCG/ACT AERO; Inhale 2 puffs into the lungs 2 times daily  -     Spacer/Aero-Holding [facility]; With all spray inhalers. Patient Education     Shoulder Blade: Exercises  Introduction  Here are some examples of exercises for you to try. The exercises may be suggested for a condition or for rehabilitation. Start each exercise slowly. Ease off the exercises if you start to have pain. You will be told when to start these exercises and which ones will work best for you. How to do the exercises  Shoulder roll   1. Stand tall with your chin slightly tucked. Imagine that a string at the top of your head is pulling you straight up.   2. Keep your arms relaxed. All motion will be in your shoulders. 3. Shrug your shoulders up toward your ears, then up and back. [place] your shoulders down and back, like you're sliding your hands down into your back pants pockets. 4. Repeat the circles at least 2 to 4 times. 5. This exercise is also helpful anytime you want to relax. Lower neck and upper back stretch   1. With your arms about shoulder height, clasp your hands in front of you. 2. Drop your chin toward your chest.  3. Reach straight forward so you are rounding your upper back. Think about pulling your shoulder blades apart. [place] feel a stretch across your upper back and shoulders. Hold for at least 6 seconds. 4. Repeat 2 to 4 times. Triceps stretch   1. Reach your arm straight up. 2. Keeping your elbow in place, bend your arm and reach your hand down behind your back. 3. With your other hand, apply gentle pressure to the bent elbow. Dayanara Hope feel a stretch at the back of your upper arm and shoulder. Hold about 6 seconds. 4. Repeat 2 to 4 times with each arm. Shoulder stretch   1. Relax your shoulders. 2. Raise one arm to shoulder height, and reach it across your chest.  3. Pull the arm slightly toward you with your other arm. This will help you get a gentle stretch. Hold for about 6 seconds. 4. Repeat 2 to 4 times. Shoulder blade squeeze   1. Sit or stand up tall with your arms at your sides. 2. Keep your shoulders relaxed and down, not shrugged. 3. Squeeze your shoulder blades together. Hold for 6 seconds, then relax. 4. Repeat 8 to 12 times. Straight-arm shoulder blade squeeze   1. Sit or stand tall. Relax your shoulders. 2. With palms down, hold your elastic tubing or band straight out in front of you. 3. Start with slight tension in the tubing or band, with your hands about shoulder-width apart. 4. Slowly pull straight out to the sides, squeezing your shoulder blades together. Keep your arms straight and at shoulder height.  Slowly release. 5. Repeat 8 to 12 times. Rowing   1. [place] your elastic tubing or band at about waist height. Take one end in each hand. 2. Sit or stand with your feet hip-width apart. 3. Hold your arms straight in front of you. Adjust your distance to create slight tension in the tubing or band. 4. Slightly tuck your chin. Relax your shoulders. 5. Without shrugging your shoulders, pull straight back. Your elbows will pass alongside your waist.    Pull-downs   1. [place] your elastic tubing or band in the top of a closed door. Take one end in each hand. 2. Either sit or stand, depending on what is more comfortable. If you feel unsteady, sit on a chair. 3. Start with your arms up and comfortably apart, elbows straight. There should be a slight tension in the tubing or band. 4. Slightly tuck your chin, and look straight ahead. 5. Keeping your back straight, slowly pull down and back, bending your elbows. 6. Stop where your hands are level with your chin, in a \"goalpost\" position. 7. Repeat 8 to 12 times. Chest T stretch   1. Lie on your back. Raise your knees so they are bent. Plant your feet on the floor, hip-width apart. 2. Tuck your chin, and relax your shoulders. 3. Reach your arms straight out to the sides. If you don't feel a mild stretch in your shoulders and across your chest, use a foam roll or a tightly rolled blanket under your spine, from your tailbone to your head. 4. Relax in this position for at least 15 to 30 seconds while you breathe normally. Repeat 2 to 4 times. 5. As you get used to this stretch, keep adding a little more time until you are able relax in this position for 2 or 3 minutes. When you can relax for at least 2 minutes, you only need to do the exercise 1 time per session. Chest goalpost stretch   1. Lie on your back. Raise your knees so they are bent. Plant your feet on the floor, hip-width apart. 2. Tuck your chin, and relax your shoulders.   3. Reach your arms straight you.  How to do the exercises  Shoulder flexion (lying down)   To make a wand for this exercise, use a piece of PVC pipe or a broom handle with the broom removed. Make the wand about a foot wider than your shoulders. 1. Lie on your back, holding a wand with both hands. Your palms should face down as you hold the wand. 2. Keeping your elbows straight, slowly raise your arms over your head. Raise them until you feel a stretch in your shoulders, upper back, and chest.  3. Hold for 15 to 30 seconds. 4. Repeat 2 to 4 times. Shoulder rotation (lying down)   To make a wand for this exercise, use a piece of PVC pipe or a broom handle with the broom removed. Make the wand about a foot wider than your shoulders. 1. Lie on your back. Hold a wand with both hands with your elbows bent and palms up. 2. Keep your elbows close to your body, and move the wand across your body toward the sore arm. 3. Hold for 8 to 12 seconds. 4. Repeat 2 to 4 times. Shoulder internal rotation with towel   1. Hold a towel above and behind your head with the arm that is not sore. 2. With your sore arm, reach behind your back and grasp the towel. 3. With the arm above your head, pull the towel upward. Do this until you feel a stretch on the front and outside of your sore shoulder. 4. Hold 15 to 30 seconds. 5. Repeat 2 to 4 times. Shoulder blade squeeze   1. Stand with your arms at your sides, and squeeze your shoulder blades together. Do not raise your shoulders up as you squeeze. 2. Hold 6 seconds. 3. Repeat 8 to 12 times. Resisted rows   For this exercise, you will need elastic exercise material, such as surgical tubing or Thera-Band. 1. Put the band around a solid object at about waist level. (A bedpost will work well.) Each hand should hold an end of the band. 2. With your elbows at your sides and bent to 90 degrees, pull the band back. Your shoulder blades should move toward each other.  Return to the starting position. 3. Repeat 8 to 12 times. External rotator strengthening exercise   1. Start by tying a piece of elastic exercise material to a doorknob. You can use surgical tubing or Thera-Band. (You may also hold one end of the band in each hand.)  2. Stand or sit with your shoulder relaxed and your elbow bent 90 degrees. Your upper arm should rest comfortably against your side. Squeeze a rolled towel between your elbow and your body for comfort. This will help keep your arm at your side. 3. Hold one end of the elastic band with the hand of the painful arm. 4. Start with your forearm across your belly. Slowly rotate the forearm out away from your body. Keep your elbow and upper arm tucked against the towel roll or the side of your body until you begin to feel tightness in your shoulder. Slowly move your arm back to where you started. 5. Repeat 8 to 12 times. Internal rotator strengthening exercise   1. Start by tying a piece of elastic exercise material to a doorknob. You can use surgical tubing or Thera-Band. 2. Stand or sit with your shoulder relaxed and your elbow bent 90 degrees. Your upper arm should rest comfortably against your side. Squeeze a rolled towel between your elbow and your body for comfort. This will help keep your arm at your side. 3. Hold one end of the elastic band in the hand of the painful arm. 4. Slowly rotate your forearm toward your body until it touches your belly. Slowly move it back to where you started. 5. Keep your elbow and upper arm firmly tucked against the towel roll or at your side. 6. Repeat 8 to 12 times. Pendulum swing   If you have pain in your back, do not do this exercise. 1. Hold on to a table or the back of a chair with your good arm. Then bend forward a little and let your sore arm hang straight down. This exercise does not use the arm muscles. Rather, use your legs and your hips to create movement that makes your arm swing freely.   2. Use the movement from your hips and legs to guide the slightly swinging arm back and forth like a pendulum (or elephant trunk). Then guide it in circles that start small (about the size of a dinner plate). Make the circles a bit larger each day, as your pain allows. 3. Do this exercise for 5 minutes, 5 to 7 times each day. 4. As you have less pain, try bending over a little farther to do this exercise. This will increase the amount of movement at your shoulder. Follow-up care is a key part of your treatment and safety. Be sure to make and go to all appointments, and call your doctor if you are having problems. It's also a good idea to know your test results and keep a list of the medicines you take. Where can you learn more? Go to https://Articulate Technologies.M86 Security. org and sign in to your Big Six account. Enter H562 in the Iscopia Software box to learn more about \"Shoulder Arthritis: Exercises. \"     If you do not have an account, please click on the \"Sign Up Now\" link. Current as of: November 16, 2020               Content Version: 12.9  © 9894-1148 [employer]fluence. Care instructions adapted under license by [facility] ([facility]). If you have questions about a medical condition or this instruction, always ask your healthcare professional. [address] any warranty or liability for your use of this information. Patient Education        Benign Prostatic Hyperplasia: Care Instructions  Your Care Instructions     Benign prostatic hyperplasia, or BPH, is an enlarged prostate gland. The prostate is a small gland that makes some of the fluid in semen. Prostate enlargement happens to almost all men as they age. It is usually not serious. BPH does not cause prostate cancer. As the prostate gets bigger, it may partly block the flow of urine. You may have a hard time getting a urine stream started or completely stopped.  You may have a weak urine stream, or you may have to urinate more often than you used to, especially at night. Most men find these problems easy to manage. You do not need treatment unless your symptoms bother you a lot or you have other problems, such as bladder infections or stones. In these cases, medicines may help. Surgery is not needed unless the urine flow is blocked or the symptoms do not get better with medicine. Follow-up care is a key part of your treatment and safety. Be sure to make and go to all appointments, and call your doctor if you are having problems. It's also a good idea to know your test results and keep a list of the medicines you take. How can you care for yourself at home? · Urinate as much as you can, relax for a few moments, and then try to urinate again. · Sit on the toilet to urinate. · Avoid caffeine and alcohol. These drinks will increase how often you need to urinate. · Many over-the-counter cold and allergy medicines can make the symptoms of BPH worse. Avoid antihistamines, decongestants, and allergy pills, if you can. Read the warnings on the package. · If you take any prescription medicines such as muscle relaxants, pain medicines, or medicines for depression or anxiety, ask your doctor or pharmacist if they can cause urination problems. When should you call for help? Call your doctor now or seek immediate medical care if:    · You cannot urinate at all.     · You have symptoms of a urinary infection. For example:  ? You have blood or pus in your urine. ? You have pain in your back just below your rib cage. This is called flank pain. ? You have a fever, chills, or body aches. ? It hurts to urinate. ? You have groin or belly pain. Watch closely for changes in your health, and be sure to contact your doctor if:    · It hurts when you ejaculate.     · Your urinary problems get a lot worse or bother you a lot. Where can you learn more? Go to https://alan.health-Proxim Wireless. org and sign in to your [employer] account.  Enter K794 in the Search Health Information box to learn more about \"Benign Prostatic Hyperplasia: Care Instructions. \"     If you do not have an account, please click on the \"Sign Up Now\" link. Current as of: February 10, 2021               Content Version: 12.9  © 2006-2021 ImmuneXcite. Care instructions adapted under license by [facility] ([facility]). If you have questions about a medical condition or this instruction, always ask your healthcare professional. [address] any warranty or liability for your use of this information. Patient Education        Breathing Techniques for COPD: Care Instructions  Your Care Instructions     Breathing is hard when you have chronic obstructive pulmonary disease (COPD). You may take quick, short breaths. Breathing this way makes it harder to get air into your lungs. Learning new ways to control your breathing may help. You may feel better and be able to do more. You can try three basic ways to control your breathing. They are pursed-lip breathing, diaphragmatic breathing, and breathing while bending. Use these methods when you are more short of breath than normal. Practice them often so you can do them well. Follow-up care is a key part of your treatment and safety. Be sure to make and go to all appointments, and call your doctor if you are having problems. It's also a good idea to know your test results and keep a list of the medicines you take. How can you care for yourself at home? · Pursed-lip breathing helps you breathe more air out so that your next breath can be deeper. For this type of breathing, you breathe in through your nose and out through your mouth while almost closing your lips. Breathe in for about 2 seconds, and breathe out for 4 to 6 seconds. Pursed-lip breathing decreases shortness of breath and improves your ability to exercise. · Diaphragmatic breathing helps your lungs expand so that they take in more air.   ? Lie on your back, or prop yourself up on several pillows. ? Put one hand on your belly and the other on your chest. When you breathe in, push your belly out as far as possible. You should feel the hand on your belly move out, while the hand on your chest does not move. ? When you breathe out, you should feel the hand on your belly move in. When you can do this type of breathing well while lying down, learn to do it while sitting or standing. Many people with COPD find this breathing method helpful. ? Practice diaphragmatic breathing for 20 minutes, 2 or 3 times a day. · Breathing while bending forward at the waist may make breathing easier. It can reduce shortness of breath while you exercise or rest. It helps the diaphragm move more easily. The diaphragm is a large muscle that separates your lungs from your belly. It helps draw air into your lungs as you breathe. When should you call for help? Call your doctor now or seek immediate medical care if:    · Your breathing methods do not help.     · Your shortness of breath gets worse.     · You cough up blood.     · You have swelling in your belly and legs.     · You have severe chest pain. Watch closely for changes in your health, and be sure to contact your doctor if you have any problems. Where can you learn more? Go to https://The Edge in College Prep.Limonetik. org and sign in to your [employer]om [employer]. account. Enter X422 in the [employer] box to learn more about \"Breathing Techniques for COPD: Care Instructions. \"     If you do not have an account, please click on the \"Sign Up Now\" link. Current as of: October 26, 2020               Content Version: 12.9  © 8203-7355 [employer]cko [employer]. Care instructions adapted under license by [address] If you have questions about a medical condition or this instruction, always ask your healthcare professional. [place] [address] any warranty or liability for your use of this information.          Patient hips. Use a small pillow under your head. Keep your arms at your sides. Then follow these instructions for breathing:  ? Breathe in: With one hand on your belly and the other on your chest, breathe in. Push your belly out as far as possible. You should be able to feel the hand on your belly move out, while the hand on your chest should not move. ? Breathe out: When you breathe out, you should be able to feel the hand on your belly move in. This is called diaphragmatic breathing. You will use it in the other drainage positions too. 2. To drain the sides of your lungs: Place two or three pillows under your hips. Use a small pillow under your head. Make sure your chest is lower than your hips. Use diaphragmatic breathing. After 5 or 10 minutes, switch sides. 3. To drain the back of your lungs: Place two or three pillows under your hips. Use a small pillow under your head. Kneel over the pillows. Place your arms by your head. Use diaphragmatic breathing. How do you do chest percussion? Chest percussion means that you lightly tap your chest and back. The tapping loosens the mucus in your lungs. · Cup your hand, and lightly tap your chest and back. · Ask your doctor where the best spots are to tap. Avoid your spine and breastbone. · It may be easier to have someone do the tapping for you. Follow-up care is a key part of your treatment and safety. Be sure to make and go to all appointments, and call your doctor if you are having problems. It's also a good idea to know your test results and keep a list of the medicines you take. Where can you learn more? Go to https://LikeMe.Netpenaomieeweb.Soundl.ly. org and sign in to your One Season account. Enter U694 in the [employer] [place] box to learn more about \"Learning About COPD and Clearing Your Lungs. \"     If you do not have an account, please click on the \"Sign Up Now\" link.   Current as of: October 26, 2020               Content Version: 12.9  © 7116-0738 Healthwise, Incorporated. Care instructions adapted under license by [facility] ([facility]). If you have questions about a medical condition or this instruction, always ask your healthcare professional. [address] any warranty or liability for your use of this information. Patient Education        Learning About Using Inhalers Correctly  Why is an inhaler used? An inhaler is used to send medicine right to your lungs. It's often used for asthma, COPD (chronic obstructive pulmonary disease), and other lung diseases that make it hard to breathe. Using an inhaler:  · Sends most of the medicine straight to your lungs. · Provides a measured dose of the medicine. · Can help keep your symptoms under control. · Can limit long-term damage to your lungs. · Can be safer than if you took a pill or liquid medicine. · Works just as well, is easier to carry, and is faster to use than a nebulizer machine. What devices can you use? Different types of devices can send inhaled medicine straight to your lungs. They include metered-dose inhalers and dry powder inhalers. You may need to use more than one type of device. How can you use it correctly? Each kind of inhaler is used differently. Make sure to save the 's instructions. Don't throw them away. Pay attention to the instructions for your specific inhaler. Look for directions on:  · Whether to prime or shake it. · Whether to use a spacer or mask. · Whether you have to load medicine into the inhaler. · How to use the inhaler to deliver the medicine. This includes how to hold it, when to breathe, and how long to hold your breath. · How to know how many doses are left. · How to clean it. · How to store it. · When to throw it away. Why is it important to use it correctly? Correct use of an inhaler:  · Makes sure that the lungs get the full dose. This can mean fewer symptoms and better treatment. · Leads to fewer side effects. Sometimes people have side effects if the medicine hits the inside of the mouth instead of going into the lungs. · Saves money. Ask your doctor, nurse, pharmacist, or respiratory therapist to show you how to use the inhaler. They might ask you to show them how you use it, so they can help fix any problems. Follow-up care is a key part of your treatment and safety. Be sure to make and go to all appointments, and call your doctor if you are having problems. It's also a good idea to know your test results and keep a list of the medicines you take. Where can you learn more? Go to https://Yell.rupepiceweb.Helixis. org and sign in to your Engrade account. Enter I150 in the Safe N Clear box to learn more about \"Learning About Using Inhalers Correctly. \"     If you do not have an account, please click on the \"Sign Up Now\" link. Current as of: October 26, 2020               Content Version: 12.9  © 2006-2021 Healthwise, Incorporated. Care instructions adapted under license by [facility] ([facility]). If you have questions about a medical condition or this instruction, always ask your healthcare professional. [place] [address] any warranty or liability for your use of this information.

## 2021-07-12 NOTE — PROGRESS NOTES
Keysha Campos (:  1940) is a 80 y.o. male,Established patient, here for evaluation of the following chief complaint(s):  Shoulder Pain (PT C/O RIGHT SHOULDER PAIN THAT IS SORE WITH MOVEMENT X 2-3 WEEKS) and Other (PT C/O HAVING DIFFICULTY GETTING A GOOD BREATH, CAN WAKE HIM UP AT NIGHT)       ASSESSMENT/PLAN:  239     Patient Instructions     Ric Martinez was seen today for shoulder pain and other. Diagnoses and all orders for this visit:    Chronic right shoulder pain  BRING IN EMPTY JAR OF PAIN CREAM  Range of motion do every 1-2 hours. Moist heat  Sports cream (Aspercreme doesn't smell). Diclofenac (brand name Voltaren) gel 1% is available over-the-counter. You can place 4 g on the knee NEXT TO the kneecap but NOT ON TOP OF the kneecap. If placed on top of the kneecap it will not penetrate into the joint. You can also put some of the 4 g dose on the joint line on each side of the knee going back towards behind the knee. You can put this on up to 4 times a day. Even if you do both joints 4 times a day there is not enough absorbed into the bloodstream to upset the stomach or significantly affect the kidneys. Should patient the appropriate place to put on his shoulder for shoulder pain. Tylenol 500 mg up to 2 tabs 3x/day as needed. If diagnosed with arthritis in the past:  Glucosamine 1500 mg/ chondroitin 1200 mg once daily or any combination equaling that dose i.e. 750/600 mg twice daily or 500/400 mg three time daily. This is a daily joint/arthritis supplement without significant side effects. BPH associated with nocturia  -     tamsulosin (FLOMAX) 0.4 MG capsule; Take 1 capsule by mouth daily  Consider adding fish oil if this is not controlling his nocturia. Panlobular emphysema (HCC)  -     budesonide-formoterol (SYMBICORT) 160-4.5 MCG/ACT AERO; Inhale 2 puffs into the lungs 2 times daily.   Discussed rinsing and spitting after using.  -     Spacer/Aero-Holding Patpearl Idol; With all spray inhalers. Demonstrated how to use an inhaler with a spacer. The importance of standing, importance of emptying lungs just before using the inhaler. Patient Education   Shoulder Blade: Exercises  Introduction  Here are some examples of exercises for you to try. The exercises may be suggested for a condition or for rehabilitation. Start each exercise slowly. Ease off the exercises if you start to have pain. You will be told when to start these exercises and which ones will work best for you. How to do the exercises  Shoulder roll   1. Stand tall with your chin slightly tucked. Imagine that a string at the top of your head is pulling you straight up. 2. Keep your arms relaxed. All motion will be in your shoulders. 3. Shrug your shoulders up toward your ears, then up and back. [place] your shoulders down and back, like you're sliding your hands down into your back pants pockets. 4. Repeat the circles at least 2 to 4 times. 5. This exercise is also helpful anytime you want to relax. Lower neck and upper back stretch   1. With your arms about shoulder height, clasp your hands in front of you. 2. Drop your chin toward your chest.  3. Reach straight forward so you are rounding your upper back. Think about pulling your shoulder blades apart. Paola Nelson feel a stretch across your upper back and shoulders. Hold for at least 6 seconds. 4. Repeat 2 to 4 times. Triceps stretch   1. Reach your arm straight up. 2. Keeping your elbow in place, bend your arm and reach your hand down behind your back. 3. With your other hand, apply gentle pressure to the bent elbow. Paola Nelson feel a stretch at the back of your upper arm and shoulder. Hold about 6 seconds. 4. Repeat 2 to 4 times with each arm. Shoulder stretch   1. Relax your shoulders. 2. Raise one arm to shoulder height, and reach it across your chest.  3. Pull the arm slightly toward you with your other arm. This will help you get a gentle stretch.  Hold for about 6 seconds. 4. Repeat 2 to 4 times. Shoulder blade squeeze   1. Sit or stand up tall with your arms at your sides. 2. Keep your shoulders relaxed and down, not shrugged. 3. Squeeze your shoulder blades together. Hold for 6 seconds, then relax. 4. Repeat 8 to 12 times. Straight-arm shoulder blade squeeze   1. Sit or stand tall. Relax your shoulders. 2. With palms down, hold your elastic tubing or band straight out in front of you. 3. Start with slight tension in the tubing or band, with your hands about shoulder-width apart. 4. Slowly pull straight out to the sides, squeezing your shoulder blades together. Keep your arms straight and at shoulder height. Slowly release. 5. Repeat 8 to 12 times. Rowing   1. [place] your elastic tubing or band at about waist height. Take one end in each hand. 2. Sit or stand with your feet hip-width apart. 3. Hold your arms straight in front of you. Adjust your distance to create slight tension in the tubing or band. 4. Slightly tuck your chin. Relax your shoulders. 5. Without shrugging your shoulders, pull straight back. Your elbows will pass alongside your waist.    Pull-downs   1. [place] your elastic tubing or band in the top of a closed door. Take one end in each hand. 2. Either sit or stand, depending on what is more comfortable. If you feel unsteady, sit on a chair. 3. Start with your arms up and comfortably apart, elbows straight. There should be a slight tension in the tubing or band. 4. Slightly tuck your chin, and look straight ahead. 5. Keeping your back straight, slowly pull down and back, bending your elbows. 6. Stop where your hands are level with your chin, in a \"goalpost\" position. 7. Repeat 8 to 12 times. Chest T stretch   1. Lie on your back. Raise your knees so they are bent. Plant your feet on the floor, hip-width apart. 2. Tuck your chin, and relax your shoulders. 3. Reach your arms straight out to the sides.  If you don't feel a mild stretch in your shoulders and across your chest, use a foam roll or a tightly rolled blanket under your spine, from your tailbone to your head. 4. Relax in this position for at least 15 to 30 seconds while you breathe normally. Repeat 2 to 4 times. 5. As you get used to this stretch, keep adding a little more time until you are able relax in this position for 2 or 3 minutes. When you can relax for at least 2 minutes, you only need to do the exercise 1 time per session. Chest goalpost stretch   1. Lie on your back. Raise your knees so they are bent. Plant your feet on the floor, hip-width apart. 2. Tuck your chin, and relax your shoulders. 3. Reach your arms straight out to the sides. 4. Bend your arms at the elbows, with your hands pointed toward the top of your head. Your arms should make an L on either side of your head. Your palms should be facing up. 5. If you don't feel a mild stretch in your shoulders and across your chest, use a foam roll or tightly rolled blanket under your spine, from your tailbone to your head. 6. Relax in this position for at least 15 to 30 seconds while you breathe normally. Repeat 2 to 4 times. 7. Each day you do this exercise, add a little more time until you can relax in this position for 2 or 3 minutes. When you can relax for at least 2 minutes, you only need to do the exercise 1 time per session. Follow-up care is a key part of your treatment and safety. Be sure to make and go to all appointments, and call your doctor if you are having problems. It's also a good idea to know your test results and keep a list of the medicines you take. Where can you learn more? Go to https://Tranzlogicpepiceweb.Vocab. org and sign in to your [employer] account. Enter [phone] in the [employer] box to learn more about \"Shoulder Blade: Exercises. \"     If you do not have an account, please click on the \"Sign Up Now\" link.   Current as of: November 16, 2020               Content Version: 12.9  © 2006-2021 Healthwise, [employer]. Care instructions adapted under license by [facility] ([facility]). If you have questions about a medical condition or this instruction, always ask your healthcare professional. [address] any warranty or liability for your use of this information. Patient Education   Shoulder Arthritis: Exercises  Introduction  Here are some examples of exercises for you to try. The exercises may be suggested for a condition or for rehabilitation. Start each exercise slowly. Ease off the exercises if you start to have pain. You will be told when to start these exercises and which ones will work best for you. How to do the exercises  Shoulder flexion (lying down)   To make a wand for this exercise, use a piece of PVC pipe or a broom handle with the broom removed. Make the wand about a foot wider than your shoulders. 1. Lie on your back, holding a wand with both hands. Your palms should face down as you hold the wand. 2. Keeping your elbows straight, slowly raise your arms over your head. Raise them until you feel a stretch in your shoulders, upper back, and chest.  3. Hold for 15 to 30 seconds. 4. Repeat 2 to 4 times. Shoulder rotation (lying down)   To make a wand for this exercise, use a piece of PVC pipe or a broom handle with the broom removed. Make the wand about a foot wider than your shoulders. 1. Lie on your back. Hold a wand with both hands with your elbows bent and palms up. 2. Keep your elbows close to your body, and move the wand across your body toward the sore arm. 3. Hold for 8 to 12 seconds. 4. Repeat 2 to 4 times. Shoulder internal rotation with towel   1. Hold a towel above and behind your head with the arm that is not sore. 2. With your sore arm, reach behind your back and grasp the towel. 3. With the arm above your head, pull the towel upward.  Do this until you feel a stretch on the front and outside of your sore shoulder. 4. Hold 15 to 30 seconds. 5. Repeat 2 to 4 times. Shoulder blade squeeze   1. Stand with your arms at your sides, and squeeze your shoulder blades together. Do not raise your shoulders up as you squeeze. 2. Hold 6 seconds. 3. Repeat 8 to 12 times. Resisted rows   For this exercise, you will need elastic exercise material, such as surgical tubing or Thera-Band. 1. Put the band around a solid object at about waist level. (A bedpost will work well.) Each hand should hold an end of the band. 2. With your elbows at your sides and bent to 90 degrees, pull the band back. Your shoulder blades should move toward each other. Return to the starting position. 3. Repeat 8 to 12 times. External rotator strengthening exercise   1. Start by tying a piece of elastic exercise material to a doorknob. You can use surgical tubing or Thera-Band. (You may also hold one end of the band in each hand.)  2. Stand or sit with your shoulder relaxed and your elbow bent 90 degrees. Your upper arm should rest comfortably against your side. Squeeze a rolled towel between your elbow and your body for comfort. This will help keep your arm at your side. 3. Hold one end of the elastic band with the hand of the painful arm. 4. Start with your forearm across your belly. Slowly rotate the forearm out away from your body. Keep your elbow and upper arm tucked against the towel roll or the side of your body until you begin to feel tightness in your shoulder. Slowly move your arm back to where you started. 5. Repeat 8 to 12 times. Internal rotator strengthening exercise   1. Start by tying a piece of elastic exercise material to a doorknob. You can use surgical tubing or Thera-Band. 2. Stand or sit with your shoulder relaxed and your elbow bent 90 degrees. Your upper arm should rest comfortably against your side. Squeeze a rolled towel between your elbow and your body for comfort.  This will help keep your arm at your side. 3. Hold one end of the elastic band in the hand of the painful arm. 4. Slowly rotate your forearm toward your body until it touches your belly. Slowly move it back to where you started. 5. Keep your elbow and upper arm firmly tucked against the towel roll or at your side. 6. Repeat 8 to 12 times. Pendulum swing   If you have pain in your back, do not do this exercise. 1. Hold on to a table or the back of a chair with your good arm. Then bend forward a little and let your sore arm hang straight down. This exercise does not use the arm muscles. Rather, use your legs and your hips to create movement that makes your arm swing freely. 2. Use the movement from your hips and legs to guide the slightly swinging arm back and forth like a pendulum (or elephant trunk). Then guide it in circles that start small (about the size of a dinner plate). Make the circles a bit larger each day, as your pain allows. 3. Do this exercise for 5 minutes, 5 to 7 times each day. 4. As you have less pain, try bending over a little farther to do this exercise. This will increase the amount of movement at your shoulder. Follow-up care is a key part of your treatment and safety. Be sure to make and go to all appointments, and call your doctor if you are having problems. It's also a good idea to know your test results and keep a list of the medicines you take. Where can you learn more? Go to https://Unowhypefely.BView. org and sign in to your [employer]car account. Enter H562 in the [facility] box to learn more about \"Shoulder Arthritis: Exercises. \"     If you do not have an account, please click on the \"Sign Up Now\" link. Current as of: November 16, 2020               Content Version: 12.9  © 0957-4360 [employer]. Care instructions adapted under license by [employer].  If you have questions about a medical condition or this instruction, always ask your healthcare professional. appointments, and call your doctor if you are having problems. It's also a good idea to know your test results and keep a list of the medicines you take. How can you care for yourself at home? · Pursed-lip breathing helps you breathe more air out so that your next breath can be deeper. For this type of breathing, you breathe in through your nose and out through your mouth while almost closing your lips. Breathe in for about 2 seconds, and breathe out for 4 to 6 seconds. Pursed-lip breathing decreases shortness of breath and improves your ability to exercise. · Diaphragmatic breathing helps your lungs expand so that they take in more air. ? Lie on your back, or prop yourself up on several pillows. ? Put one hand on your belly and the other on your chest. When you breathe in, push your belly out as far as possible. You should feel the hand on your belly move out, while the hand on your chest does not move. ? When you breathe out, you should feel the hand on your belly move in. When you can do this type of breathing well while lying down, learn to do it while sitting or standing. Many people with COPD find this breathing method helpful. ? Practice diaphragmatic breathing for 20 minutes, 2 or 3 times a day. · Breathing while bending forward at the waist may make breathing easier. It can reduce shortness of breath while you exercise or rest. It helps the diaphragm move more easily. The diaphragm is a large muscle that separates your lungs from your belly. It helps draw air into your lungs as you breathe. When should you call for help? Call your doctor now or seek immediate medical care if:    · Your breathing methods do not help.     · Your shortness of breath gets worse.     · You cough up blood.     · You have swelling in your belly and legs.     · You have severe chest pain. Watch closely for changes in your health, and be sure to contact your doctor if you have any problems.   Where can you learn out.  6. Inhale again, but do it slowly and gently through your nose. Do not take quick or deep breaths through your mouth. It can block the mucus coming out of the lungs. It also can cause uncontrolled coughing. 7. Rest, and repeat if you need to. How do you do postural drainage? Postural drainage means lying down in different positions to help drain mucus from your lungs. Hold each position for 5 minutes. Do it about 30 minutes after you use your inhaler. Make sure you have an empty stomach. If you need to cough, sit up and do controlled coughing. 1. To drain the front of your lungs: Make sure your chest is lower than your hips. Put two pillows under your hips. Use a small pillow under your head. Keep your arms at your sides. Then follow these instructions for breathing:  ? Breathe in: With one hand on your belly and the other on your chest, breathe in. Push your belly out as far as possible. You should be able to feel the hand on your belly move out, while the hand on your chest should not move. ? Breathe out: When you breathe out, you should be able to feel the hand on your belly move in. This is called diaphragmatic breathing. You will use it in the other drainage positions too. 2. To drain the sides of your lungs: Place two or three pillows under your hips. Use a small pillow under your head. Make sure your chest is lower than your hips. Use diaphragmatic breathing. After 5 or 10 minutes, switch sides. 3. To drain the back of your lungs: Place two or three pillows under your hips. Use a small pillow under your head. Kneel over the pillows. Place your arms by your head. Use diaphragmatic breathing. How do you do chest percussion? Chest percussion means that you lightly tap your chest and back. The tapping loosens the mucus in your lungs. · Cup your hand, and lightly tap your chest and back. · Ask your doctor where the best spots are to tap. Avoid your spine and breastbone.   · It may be easier to have someone do the tapping for you. Follow-up care is a key part of your treatment and safety. Be sure to make and go to all appointments, and call your doctor if you are having problems. It's also a good idea to know your test results and keep a list of the medicines you take. Where can you learn more? Go to https://chpepiceweb.K[employer]. org and sign in to your [employer] account. Enter R804 in the En[employer] box to learn more about \"Learning About COPD and Clearing Your Lungs. \"     If you do not have an account, please click on the \"Sign Up Now\" link. Current as of: October 26, 2020               Content Version: 12.9  © 2006-2021 Healthwise, [employer]. Care instructions adapted under license by [facility] ([facility]). If you have questions about a medical condition or this instruction, always ask your healthcare professional. [place] [address] any warranty or liability for your use of this information. Patient Education   Learning About Using Inhalers Correctly  Why is an inhaler used? An inhaler is used to send medicine right to your lungs. It's often used for asthma, COPD (chronic obstructive pulmonary disease), and other lung diseases that make it hard to breathe. Using an inhaler:  · Sends most of the medicine straight to your lungs. · Provides a measured dose of the medicine. · Can help keep your symptoms under control. · Can limit long-term damage to your lungs. · Can be safer than if you took a pill or liquid medicine. · Works just as well, is easier to carry, and is faster to use than a nebulizer machine. What devices can you use? Different types of devices can send inhaled medicine straight to your lungs. They include metered-dose inhalers and dry powder inhalers. You may need to use more than one type of device. How can you use it correctly? Each kind of inhaler is used differently. Make sure to save the 's instructions.  Don't throw them away.  Pay attention to the instructions for your specific inhaler. Look for directions on:  · Whether to prime or shake it. · Whether to use a spacer or mask. · Whether you have to load medicine into the inhaler. · How to use the inhaler to deliver the medicine. This includes how to hold it, when to breathe, and how long to hold your breath. · How to know how many doses are left. · How to clean it. · How to store it. · When to throw it away. Why is it important to use it correctly? Correct use of an inhaler:  · Makes sure that the lungs get the full dose. This can mean fewer symptoms and better treatment. · Leads to fewer side effects. Sometimes people have side effects if the medicine hits the inside of the mouth instead of going into the lungs. · Saves money. Ask your doctor, nurse, pharmacist, or respiratory therapist to show you how to use the inhaler. They might ask you to show them how you use it, so they can help fix any problems. Follow-up care is a key part of your treatment and safety. Be sure to make and go to all appointments, and call your doctor if you are having problems. It's also a good idea to know your test results and keep a list of the medicines you take. Where can you learn more? Go to https://[employer].Kewen. org and sign in to your Allmoxy account. Enter I150 in the [facility] box to learn more about \"Learning About Using Inhalers Correctly. \"     If you do not have an account, please click on the \"Sign Up Now\" link. Current as of: October 26, 2020               Content Version: 12.9  © 6137-3318 Healthwise, Incorporated. Care instructions adapted under license by [facility] ([facility]). If you have questions about a medical condition or this instruction, always ask your healthcare professional. [place] [address] any warranty or liability for your use of this information. Return for COPD, right shoulder pain.      Subjective SUBJECTIVE/OBJECTIVE:  Chief Complaint   Patient presents with    Shoulder Pain     PT C/O RIGHT SHOULDER PAIN THAT IS SORE WITH MOVEMENT X 2-3 WEEKS    Other     PT C/O HAVING DIFFICULTY GETTING A GOOD BREATH, CAN WAKE HIM UP AT NIGHT   146  HPI    SOB  Saw Dr Sadie Ordoñez. Using CPAP every night. He never feels rested except rarely. Has more swelling in ankles recently. Not watching salt. He has no orthopnea nor PN dyspnea. Nocturia >/= 3x. No reflux into throat at night. No prior chest CT. No help with Spiriva and was not interested in other MDI. Has a productive cough. He coughs up slimy stuff in the am. Occurring for a couple of months. No treatment except an incentive spirometer. Right shoulder blade pain  Started 2 weeks in scapula but had pain in right shoulder for years. Has nerve damage of C6-7 going down his arm 20 yr ago. Had EMG. Neurosurgery Assessment and Plan 7/2/2021:  Impression:   ICD-10-CM ICD-9-CM   1. Status post lumbar spinal fusion Z98.1 V45.4   2. Scoliosis due to degenerative disease of spine in adult patient M41.80 733.90   737.43   3. Fusion of sacral region of spine M43.28 724.9   4. Sacroiliac joint pain M53.3 724.6   5. Diabetic polyneuropathy associated with other specified diabetes mellitus ([facility] [address] E13.42 250.60   357.2     Previous lumbar and SI infusions with some slight improvement. He continues with chronic back and right radicular pain in combination with neuropathy. He is not interested in further surgery. He will continue with PM. We have had an extensive discussion about treatment options including: physical therapy which has been already done, as well as interventional pain management/NEAL which has been already done. We have discussed the importance of weight loss to prevent further spine deterioration, and ensure a reasonable outcome.     + cough with upper lobe rhonchi and bilateral LE edema, recommend a follow up with PCP    Treatment Plan:  Continue with PM  Discussed with the patient on the importance of core strengthening and back exercises along with managing an appropriate weight with a goal BMI of < 30. Handout given upon discharge    Followup:  No follow-ups on file. Orders: The orders below, if any, were placed during this visit:   Orders Placed This Encounter     [address]   BPH  Patient getting up multiple times at night to urinate. Hard to get a good night sleep. Review of Systems   HENT: Positive for congestion. Respiratory: Positive for cough, shortness of breath and wheezing. Negative for choking and chest tightness. Cardiovascular: Positive for chest pain (in back shoulder blade area) and leg swelling. Negative for palpitations. Past Medical History:   Diagnosis Date    Alcohol use     excess in past prior to CABG    AMD (age related macular degeneration) bilateral     Drusen and subretinal fluid on right, drusen on left, with poorly controlled diabetes and patient smoking    Benign essential hypertension     Mod concentric LVH    Blind right eye 01/01/2015    ~ date    CAD (coronary artery disease) 11/8/2013    s/p 4V CABG 11/12/13    CNVM (choroidal neovascular membrane), right 11/01/2019    With AMD    Colon polyps 10/30/2012    COPD (chronic obstructive pulmonary disease) ([facility] [address]     DDD (degenerative disc disease), lumbar 1962    Degenerative disc disease, cervical 2002    fell w/ radicular sx into his R thumb. Ruptured disc    Diverticulosis of colon 10/30/2012    Dry eye syndrome of bilateral lacrimal glands     Hip problem 1956    NUMBNESS W/ TRAUMA    Hypercholesterolemia     Impotence     Metabolic syndrome     Mild diastolic dysfunction 63/31/1399    LVEF 55-60%    Obstructive sleep apnea     Obstructive sleep apnea     C-PAP    Osteoarthritis of shoulder     left  @ ac jt/impingement.     Pulmonary hypertension ([address] 10/21/2013    37    Recurrent BCC (basal cell carcinoma)     S/P aortic valve replacement 11/12/2013    Submandibular duct obstruction - right 10/2/2017    12 x 14 x 18 mm right submandibular gland stone causing submandibular duct dilatation and gland prominence    Tobacco use disorder 1957    Type II or unspecified type diabetes mellitus without mention of complication, not stated as uncontrolled 2003    Vitamin D deficiency        Past Surgical History:   Procedure Laterality Date    AORTIC VALVE REPLACEMENT  11/12/2013    BLEPHAROPLASTY  07/2007    UPPER LID    BONY PELVIS SURGERY  01/12/2021    SI joint    CARDIAC CATHETERIZATION  11/08/2013    dx    CARDIAC SURGERY  11/12/2013    reincised for bleeding    CATARACT REMOVAL WITH IMPLANT Right 01/2019    CATARACT REMOVAL WITH IMPLANT Left 02/2019    CERVICAL DISC SURGERY Right 2015    epidural steroids.  CORONARY ARTERY BYPASS GRAFT  11/12/2013    4 V    EYE SURGERY Bilateral     Laser peripheral iridotomy     FINGER TRIGGER RELEASE Right 08/19/2014    Release of trigger thumb (stenosing tenosynovitis)    HEMORRHOID SURGERY  [id number]    LAPAROSCOPIC APPENDECTOMY  11/14/2014    LUMBAR LAMINECTOMY  1998 & 2003    LUMBAR LAMINECTOMY  05/07/2014    L2-L3 & Repair dural tear x2.  LUMBAR SPINE SURGERY  08/04/2009    SPINaL CORD STIMULATOR FOR PAIN    LUMBAR SPINE SURGERY  08/2014    epidural steroids. 3/2017 NEAL, 10/21/19    LUMBAR SPINE SURGERY Right 04/23/2019    SPINAL CORD STIMULATOR BATTERY REPLACEMENT WITH POCKET REVISION     MOHS SURGERY Left 02/11/2020    Dorsum of hand: type?  MOHS SURGERY Right 01/01/2020    right upper inner pinna with skin graft    NERVE SURGERY  ~12/2012    nerve block     ROTATOR CUFF REPAIR  2001    SKIN CANCER EXCISION Right 07/2017    forehead THEN TOP OF THE HEAD.    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 Quit 11/8/13. 1 PPD. 1025/19. 2/18/20.5/18/20.7/12/21. Vaping Use    Vaping Use: Never used   Substance and Sexual Activity    Alcohol use: No     Alcohol/week: 1.0 standard drinks     Types: 1 Standard drinks or equivalent per week     Comment: occ    Drug use: No     Comment: Never tried MJJulieth Macedo Sexual activity: Not on file   Other Topics Concern    Not on file   Social History Narrative    No exercise. 12/21/16.  4/6/17, 6/19/17, 8/14/17. 9/27/27. 12/4/17. 8/27/18. 10/22/18. 4/24/19. 7/30/19. 1025/19.2/18/20. 7/12/21     Social Determinants of Health     Financial Resource Strain:     Difficulty of Paying Living Expenses:    Food Insecurity:     Worried About Running Out of Food in the Last Year:     [address] in the Last Year:    Transportation Needs:     Lack of Transportation (Medical):      Lack of Transportation (Non-Medical):    Physical Activity:     Days of Exercise per Week:     Minutes of Exercise per Session:    Stress:     Feeling of Stress :    Social Connections:     Frequency of Communication with Friends and Family:     Frequency of Social Gatherings with Friends and Family:     Attends Alevism Services:     Active Member of Clubs or Organizations:     Attends Club or Organization Meetings:     Marital Status:    Intimate Partner Violence:     Fear of Current or Ex-Partner:     Emotionally Abused:     Physically Abused:     Sexually Abused:        Family History   Problem Relation Age of Onset    Other Mother         CABG    Heart Surgery Mother 66        CABG    Sudden Death Father     Stroke Sister 77        blinded pt in 1 eye, then stroke in the other   Barry Goldsmith Cancer Sister         pancreatic cancer    No Known Problems Sister     Coronary Art Dis Brother     Diabetes Brother     Other Brother         Cellulitis    Other Daughter         DDD L spine    Other Son         DDD L spine    Heart Attack Maternal Aunt     Heart Attack Maternal Uncle        No Known Allergies    Current Outpatient Medications   Medication Sig Dispense Refill    budesonide-formoterol (SYMBICORT) 160-4.5 MCG/ACT AERO Inhale 2 puffs into the lungs 2 times daily 1 Inhaler 3    Spacer/Aero-Holding Chambers JANET With all spray inhalers. 1 Device 0    atorvastatin (LIPITOR) 40 MG tablet TAKE 1 TABLET DAILY 90 tablet 0    glipiZIDE (GLUCOTROL) 5 MG tablet TAKE 1/2 TO 1 TABLET TWO   TIMES A DAY BEFORE MEALS 180 tablet 0    lisinopril (PRINIVIL;ZESTRIL) 5 MG tablet TAKE 1 TABLET DAILY 90 tablet 0    metoprolol tartrate (LOPRESSOR) 25 MG tablet TAKE 1/2 TABLET TWICE A DAY 90 tablet 0    metFORMIN (GLUCOPHAGE) 1000 MG tablet TAKE 1 TABLET TWICE DAILY  WITH MEALS (SCHEDULE VISIT FOR FUTURE FULL REFILLS) 180 tablet 0    FreeStyle Lancets MISC USE TO TEST TWICE DAILY 200 each 3    blood glucose test strips (FREESTYLE LITE) strip 1 each by Does not apply route 2 times daily 200 strip 3    HYDROcodone-acetaminophen (NORCO)  MG per tablet TK 1 T PO  QID PRN P  0    docusate sodium (COLACE) 100 MG capsule Take 200 mg by mouth daily Indications: Constipation      aspirin 81 MG tablet Take 81 mg by mouth daily.  tamsulosin (FLOMAX) 0.4 MG capsule TAKE 1 CAPSULE BY MOUTH DAILY 90 capsule 0     No current facility-administered medications for this visit.         Objective   Physical Exam  Vitals and nursing note reviewed. Cardiovascular:      Rate and Rhythm: Normal rate and regular rhythm. Heart sounds: Murmur heard. Systolic murmur is present with a grade of 1/6. Pulmonary:      Breath sounds: No decreased air movement. Decreased breath sounds, wheezing, rhonchi and rales present. Musculoskeletal:      Right shoulder: Tenderness present. No swelling, deformity, effusion, bony tenderness or crepitus. Normal range of motion. Normal strength. Normal pulse. Arms:       Comments: Pain in scapula and GH joint with ROM. Vitals:    07/12/21 1330   BP: 130/70   Site: Right Upper Arm   Position: Sitting   Cuff Size: Large Adult   Pulse: 67   Resp: 16   SpO2: 96%   Weight: 233 lb 9.6 oz (106 kg)   Height: 5' 7\" (1.702 m)     BP Readings from Last 3 Encounters:   07/12/21 130/70   04/14/21 128/76   01/06/21 126/76     Pulse Readings from Last 3 Encounters:   07/12/21 67   04/14/21 66   01/06/21 68     Wt Readings from Last 3 Encounters:   07/12/21 233 lb 9.6 oz (106 kg)   04/14/21 228 lb (103.4 kg)   01/06/21 234 lb (106.1 kg)     Body mass index is 36.59 kg/m². IMPRESSION EMG (2021 exact date not given)  Comment/Interpretation: Electrophysiological findings are consistent with:  1. 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 This has likely worsened since 3/2018 studies: A small, delayed right sural reported at that time was not reproduced, and distal motor amplitudes were a little smaller today. 2. The neuropathy discussed above is likely contributing to today's needle EMG abnormalities. However:  Disproportionate membrane irritability in the medial gastrocnemii probably represents S1 nerve root irritation/injury on both sides. This is at least in part chronic: Similar medial gastrocnemius findings were present in 2018. Some old chronic bilateral L5 and left L4 injury, at least in part chronic, is also suspected.  Tibialis anterior (L4, L5) abnormalities are less pronounced today on both sides. Very mild left quadriceps membrane irritability today was not reported 3/2018, and may represent some interval left mid lumbar nerve root irritation. This mild finding is difficult to relate to his diffuse bilateral lower limb dysesthesias. In this age group spinal stenosis is the main consideration with the above findings. His 3 prior back surgeries are noted. Assessment and Plan:  Impression:   ICD-10-CM ICD-9-CM   1. Status post lumbar spinal fusion Z98.1 V45.4   2. Scoliosis due to degenerative disease of spine in adult patient M41.80 733.90   737.43   3. Fusion of sacral region of spine M43.28 724.9   4. Sacroiliac joint pain M53.3 724.6   5. Diabetic polyneuropathy associated with other specified diabetes mellitus ([facility] E13.42 250.60   357. 2     EMG study has been completed on 3/5/2018 by Eileen Burns MD.   Impression:   1. Abnormal study. 2. Evidence of generalized sensory peripheral neuropathy affecting axons and myelin of lower extremities. 3. Evidence of chronic greater than acute bilateral L5 greater than S1 motor radiculopathy. 4. No electrodiagnostic evidence of right or left lumbosacral plexopathy or myopathy in nerves / muscles tested. Recommendations:   1. Follow up with referring physician for clinical correlation. 2. A single use, sterile monopolar needle was utilized for this study and was discarded after use. 3. Thank you for the opportunity to participate in his care. CT THORACIC SPINE WO CONTRASTExam Date/Time: 10/13/2020 06:00 AMAdmitting Diagnosis: Back pain, prior   surgery, new or progressive sxReason for Exam: Back pain, prior surgery, new or progressive sx   Impression/Conclusion below     HISTORY:  Back pain, prior surgery, new or progressive sx  tingling from waist down,hx multiple    sx  Spondylosis without myelopathy or radiculopathy, lumbosacral region; Other intervertebral   disc degeneration, lumbar region; Other specified diabetes   mellitus with diabetic polyneuropathy;Fusion of spine, lumbar region; Other forms of scoliosis,   site unspecified   COMPARISON: None   FINDINGS:   ALIGNMENT: No abnormal curvature   BONES: Unremarkable.  No aggressive osseous lesion or fracture     DISC LEVELS:   T5-6:  Moderate degenerative change in the disc. Mild bilateral facet arthropathy. T6-7:  Moderate degenerative change in the disc. Mild bilateral facet arthropathy.     T7-8:  Moderate degenerative change in the disc. Mild bilateral facet arthropathy. T8-9:  Moderate degenerative change in the disc. Mild bilateral facet arthropathy.       T9-10:    Moderate degenerative change in the disc. Mild bilateral facet arthropathy.     T10-11:  Mild degenerative change in the disc. Mild bilateral facet arthropathy.     T11-12:  Mild degenerative change in the disc. Mild bilateral facet arthropathy. T12-L1:  Mild degenerative change in the disc. Mild bilateral facet arthropathy.       OTHER:  Neurostimulator lead enters spinal canal at level of T10 with tip at level of T8. No   central or foraminal stenosis     IMPRESSION:   Multilevel degenerative disc changes and facet arthropathy     CT of the lumbar spine 10/13/2020   Narrative & Impression  HISTORY: Back pain, prior surgery, new or progressive sx   COMPARISON: 6/11/2019  TECHNIQUE: Multiplanar CT images of the lumbar spine  NOTE: If there are questions about the content of this report, please [place] [facility] [place] [phone]     FINDINGS:  ALIGNMENT: Straightening of normal lumbar lordosis  BONES: Unremarkable. No aggressive osseous lesion or fracture     DISC LEVELS:   T12-L1: Small endplate spurs   N6-7: Mild degenerative change in the disc. Mild bilateral facet arthropathy. L2-3: Mild degenerative change in the disc. Moderate bilateral facet arthropathy.  Mild diffuse disc bulge   L3-4: Discectomy and fusion with bilateral laminectomy, unchanged. Solid bony fusion across the disc and facet joints. Bilateral pedicle screws   L4-5: Solid surgical bony fusion posteriorly across the facet joints. Disc narrowing with bridging bony spurs across the disc. Bilateral laminectomy. Findings are unchanged. L5-S1: Solid surgical bony fusion posteriorly across the facet joints. Disc narrowing with bridging bony spurs across the disc. Bilateral S1 pedicle screws. Bilateral laminectomy. Bony spurs cause moderate bilateral foraminal stenosis.      LUMBOSACRAL JUNCTION: Unremarkable   SACRUM AND SI JOINTS: Unremarkable  OTHER: Neurostimulator lead enters spinal canal at level of T10 with tip at level of T8. IMPRESSION  Multilevel degenerative disc changes and facet arthropathy with no significant change since 6/11/2019  Lower lumbar fusion, unchanged    On this date 7/12/2021 I have spent 53 minutes reviewing previous notes, test results and face to face with the patient discussing the diagnosis and importance of compliance with the treatment plan as well as documenting on the day of the visit. An electronic signature was used to authenticate this note.     --Nacho Salcedo DO

## 2021-07-13 RX ORDER — TAMSULOSIN HYDROCHLORIDE 0.4 MG/1
0.4 CAPSULE ORAL DAILY
Qty: 90 CAPSULE | Refills: 0 | Status: SHIPPED | OUTPATIENT
Start: 2021-07-13 | End: 2022-01-03

## 2021-07-24 PROBLEM — M25.511 CHRONIC RIGHT SHOULDER PAIN: Status: ACTIVE | Noted: 2021-07-24

## 2021-07-24 PROBLEM — G89.29 CHRONIC RIGHT SHOULDER PAIN: Status: ACTIVE | Noted: 2021-07-24

## 2021-08-27 DIAGNOSIS — I25.10 CORONARY ARTERY DISEASE DUE TO LIPID RICH PLAQUE: Chronic | ICD-10-CM

## 2021-08-27 DIAGNOSIS — E78.00 PURE HYPERCHOLESTEROLEMIA: Chronic | ICD-10-CM

## 2021-08-27 DIAGNOSIS — I25.83 CORONARY ARTERY DISEASE DUE TO LIPID RICH PLAQUE: Chronic | ICD-10-CM

## 2021-08-27 DIAGNOSIS — E11.8 TYPE 2 DIABETES MELLITUS WITH COMPLICATION, WITHOUT LONG-TERM CURRENT USE OF INSULIN (HCC): Chronic | ICD-10-CM

## 2021-08-27 RX ORDER — ATORVASTATIN CALCIUM 40 MG/1
TABLET, FILM COATED ORAL
Qty: 30 TABLET | Refills: 0 | Status: SHIPPED | OUTPATIENT
Start: 2021-08-27 | End: 2021-09-23 | Stop reason: SDUPTHER

## 2021-08-27 RX ORDER — GLIPIZIDE 5 MG/1
TABLET ORAL
Qty: 180 TABLET | Refills: 0 | Status: SHIPPED | OUTPATIENT
Start: 2021-08-27 | End: 2021-11-16

## 2021-08-27 NOTE — TELEPHONE ENCOUNTER
Last visit 07/14/2021, dm visit 04/14/2021. Next visit none      July 30, 2021  DAMEON Wing    4:12 PM  Note     CALLED AND SPOKE TO PATIENT TO LET HIM KNOW HE NEEDS TO SCHEDULE A FOLLOW UP APPOINTMENT. PATIENT STATES HE DID NOT NEED ONE RIGHT NOW AND THAT HE WOULD CALL WHEN HE NEEDS AN APPOINTMENT.  SC

## 2021-08-27 NOTE — TELEPHONE ENCOUNTER
Pure hypercholesterolemia  -     atorvastatin (LIPITOR) 40 MG tablet; TAKE 1 TABLET DAILY   Coronary artery disease due to lipid rich plaque  -     atorvastatin (LIPITOR) 40 MG tablet; TAKE 1 TABLET DAILY  30 days cholesterol medicine given pending patient's scheduling an appointment.   Type 2 diabetes mellitus with complication, without long-term current use of insulin (HCC)  -     glipiZIDE (GLUCOTROL) 5 MG tablet; TAKE 1/2 TO 1 TABLET TWO   TIMES A DAY BEFORE MEALS

## 2021-09-08 ENCOUNTER — TELEPHONE (OUTPATIENT)
Dept: FAMILY MEDICINE CLINIC | Age: 81
End: 2021-09-08

## 2021-09-08 DIAGNOSIS — E11.8 TYPE 2 DIABETES MELLITUS WITH COMPLICATION, WITHOUT LONG-TERM CURRENT USE OF INSULIN (HCC): Chronic | ICD-10-CM

## 2021-09-09 DIAGNOSIS — E11.29 TYPE 2 DIABETES MELLITUS WITH MICROALBUMINURIA, WITHOUT LONG-TERM CURRENT USE OF INSULIN (HCC): ICD-10-CM

## 2021-09-09 DIAGNOSIS — R80.9 TYPE 2 DIABETES MELLITUS WITH MICROALBUMINURIA, WITHOUT LONG-TERM CURRENT USE OF INSULIN (HCC): ICD-10-CM

## 2021-09-09 RX ORDER — BLOOD-GLUCOSE METER
KIT MISCELLANEOUS
Qty: 200 STRIP | Refills: 3 | Status: SHIPPED | OUTPATIENT
Start: 2021-09-09

## 2021-09-09 NOTE — TELEPHONE ENCOUNTER
Last OV 4/14/2021 TR  Next OV NONE      Called pt and scheduled f/u for DM on 11/15/2021 with JCAITLIN./mv

## 2021-09-23 ENCOUNTER — TELEPHONE (OUTPATIENT)
Dept: FAMILY MEDICINE CLINIC | Age: 81
End: 2021-09-23

## 2021-09-23 DIAGNOSIS — E78.00 PURE HYPERCHOLESTEROLEMIA: Chronic | ICD-10-CM

## 2021-09-23 DIAGNOSIS — I25.10 CORONARY ARTERY DISEASE DUE TO LIPID RICH PLAQUE: Chronic | ICD-10-CM

## 2021-09-23 DIAGNOSIS — I25.83 CORONARY ARTERY DISEASE DUE TO LIPID RICH PLAQUE: Chronic | ICD-10-CM

## 2021-09-23 RX ORDER — ATORVASTATIN CALCIUM 40 MG/1
TABLET, FILM COATED ORAL
Qty: 30 TABLET | Refills: 0 | Status: SHIPPED | OUTPATIENT
Start: 2021-09-23 | End: 2021-10-25

## 2021-10-03 DIAGNOSIS — E11.8 TYPE 2 DIABETES MELLITUS WITH COMPLICATION, WITHOUT LONG-TERM CURRENT USE OF INSULIN (HCC): Chronic | ICD-10-CM

## 2021-10-23 DIAGNOSIS — I10 ESSENTIAL HYPERTENSION, BENIGN: Chronic | ICD-10-CM

## 2021-10-23 DIAGNOSIS — I25.10 CORONARY ARTERY DISEASE DUE TO LIPID RICH PLAQUE: Chronic | ICD-10-CM

## 2021-10-23 DIAGNOSIS — I25.83 CORONARY ARTERY DISEASE DUE TO LIPID RICH PLAQUE: Chronic | ICD-10-CM

## 2021-10-23 DIAGNOSIS — E78.00 PURE HYPERCHOLESTEROLEMIA: Chronic | ICD-10-CM

## 2021-10-23 DIAGNOSIS — E11.8 TYPE 2 DIABETES MELLITUS WITH COMPLICATION, WITHOUT LONG-TERM CURRENT USE OF INSULIN (HCC): Chronic | ICD-10-CM

## 2021-10-25 RX ORDER — ATORVASTATIN CALCIUM 40 MG/1
TABLET, FILM COATED ORAL
Qty: 30 TABLET | Refills: 0 | Status: SHIPPED | OUTPATIENT
Start: 2021-10-25 | End: 2021-11-22

## 2021-10-25 RX ORDER — LISINOPRIL 5 MG/1
TABLET ORAL
Qty: 90 TABLET | Refills: 0 | Status: SHIPPED | OUTPATIENT
Start: 2021-10-25 | End: 2021-11-21 | Stop reason: SINTOL

## 2021-10-26 ENCOUNTER — IMMUNIZATION (OUTPATIENT)
Dept: FAMILY MEDICINE CLINIC | Age: 81
End: 2021-10-26
Payer: MEDICARE

## 2021-10-26 DIAGNOSIS — Z23 NEED FOR IMMUNIZATION AGAINST INFLUENZA: Primary | ICD-10-CM

## 2021-10-26 PROCEDURE — 90694 VACC AIIV4 NO PRSRV 0.5ML IM: CPT | Performed by: FAMILY MEDICINE

## 2021-10-26 PROCEDURE — G0008 ADMIN INFLUENZA VIRUS VAC: HCPCS | Performed by: FAMILY MEDICINE

## 2021-10-26 NOTE — PROGRESS NOTES
Vaccine Information Sheet, \"Influenza - Inactivated\"  given to Kenyatta Prudent, or parent/legal guardian of  Kenyatta Prudent and verbalized understanding. Patient responses:    Have you ever had a reaction to a flu vaccine? No  Do you have any current illness? No  Have you ever had [place] Syndrome? No  Do you have a serious allergy to any of the follow: Neomycin, Polymyxin, Thimerosal, eggs or egg products? No    Flu vaccine given per order. Please see immunization tab. Risks and benefits explained. Current VIS given.       Immunizations Administered     Name Date Dose Route    Influenza, Quadv, adjuvanted, 65 yrs +, IM, PF (Fluad) 10/26/2021 0.5 mL Intramuscular    Site: Deltoid- Left    Lot: [id number]    NDC: 01849-024-33

## 2021-11-02 DIAGNOSIS — E11.8 TYPE 2 DIABETES MELLITUS WITH COMPLICATION, WITHOUT LONG-TERM CURRENT USE OF INSULIN (HCC): Chronic | ICD-10-CM

## 2021-11-15 ENCOUNTER — OFFICE VISIT (OUTPATIENT)
Dept: FAMILY MEDICINE CLINIC | Age: 81
End: 2021-11-15
Payer: MEDICARE

## 2021-11-15 VITALS
BODY MASS INDEX: 36.4 KG/M2 | HEART RATE: 82 BPM | HEIGHT: 67 IN | OXYGEN SATURATION: 20 % | SYSTOLIC BLOOD PRESSURE: 134 MMHG | WEIGHT: 231.9 LBS | DIASTOLIC BLOOD PRESSURE: 86 MMHG

## 2021-11-15 DIAGNOSIS — J43.1 PANLOBULAR EMPHYSEMA (HCC): ICD-10-CM

## 2021-11-15 DIAGNOSIS — E11.69 ONYCHOMYCOSIS OF MULTIPLE TOENAILS WITH TYPE 2 DIABETES MELLITUS (HCC): ICD-10-CM

## 2021-11-15 DIAGNOSIS — I48.0 PAROXYSMAL ATRIAL FIBRILLATION (HCC): ICD-10-CM

## 2021-11-15 DIAGNOSIS — I10 ESSENTIAL HYPERTENSION, BENIGN: ICD-10-CM

## 2021-11-15 DIAGNOSIS — E78.00 PURE HYPERCHOLESTEROLEMIA: ICD-10-CM

## 2021-11-15 DIAGNOSIS — R35.1 BPH ASSOCIATED WITH NOCTURIA: ICD-10-CM

## 2021-11-15 DIAGNOSIS — B35.1 ONYCHOMYCOSIS OF MULTIPLE TOENAILS WITH TYPE 2 DIABETES MELLITUS (HCC): ICD-10-CM

## 2021-11-15 DIAGNOSIS — N40.1 BPH ASSOCIATED WITH NOCTURIA: ICD-10-CM

## 2021-11-15 DIAGNOSIS — E11.8 TYPE 2 DIABETES MELLITUS WITH COMPLICATION, WITHOUT LONG-TERM CURRENT USE OF INSULIN (HCC): Primary | ICD-10-CM

## 2021-11-15 DIAGNOSIS — E55.9 VITAMIN D DEFICIENCY: ICD-10-CM

## 2021-11-15 DIAGNOSIS — E11.9 TYPE 2 DIABETES MELLITUS WITHOUT COMPLICATION, WITHOUT LONG-TERM CURRENT USE OF INSULIN (HCC): Chronic | ICD-10-CM

## 2021-11-15 LAB
A/G RATIO: 1.8 (ref 1.1–2.2)
ALBUMIN SERPL-MCNC: 4.5 G/DL (ref 3.4–5)
ALP BLD-CCNC: 67 U/L (ref 40–129)
ALT SERPL-CCNC: 18 U/L (ref 10–40)
ANION GAP SERPL CALCULATED.3IONS-SCNC: 13 MMOL/L (ref 3–16)
AST SERPL-CCNC: 12 U/L (ref 15–37)
BASOPHILS ABSOLUTE: 0.1 K/UL (ref 0–0.2)
BASOPHILS RELATIVE PERCENT: 1 %
BILIRUB SERPL-MCNC: 0.4 MG/DL (ref 0–1)
BUN BLDV-MCNC: 16 MG/DL (ref 7–20)
CALCIUM SERPL-MCNC: 9.6 MG/DL (ref 8.3–10.6)
CHLORIDE BLD-SCNC: 102 MMOL/L (ref 99–110)
CHOLESTEROL, TOTAL: 123 MG/DL (ref 0–199)
CO2: 28 MMOL/L (ref 21–32)
CREAT SERPL-MCNC: 1.1 MG/DL (ref 0.8–1.3)
EOSINOPHILS ABSOLUTE: 0.2 K/UL (ref 0–0.6)
EOSINOPHILS RELATIVE PERCENT: 3.1 %
GFR AFRICAN AMERICAN: >60
GFR NON-AFRICAN AMERICAN: >60
GLUCOSE BLD-MCNC: 158 MG/DL (ref 70–99)
HBA1C MFR BLD: 7.7 %
HCT VFR BLD CALC: 43.4 % (ref 40.5–52.5)
HDLC SERPL-MCNC: 34 MG/DL (ref 40–60)
HEMOGLOBIN: 14.4 G/DL (ref 13.5–17.5)
LDL CHOLESTEROL CALCULATED: 67 MG/DL
LYMPHOCYTES ABSOLUTE: 2.3 K/UL (ref 1–5.1)
LYMPHOCYTES RELATIVE PERCENT: 28.3 %
MCH RBC QN AUTO: 32.1 PG (ref 26–34)
MCHC RBC AUTO-ENTMCNC: 33.2 G/DL (ref 31–36)
MCV RBC AUTO: 96.7 FL (ref 80–100)
MONOCYTES ABSOLUTE: 0.5 K/UL (ref 0–1.3)
MONOCYTES RELATIVE PERCENT: 5.9 %
NEUTROPHILS ABSOLUTE: 4.9 K/UL (ref 1.7–7.7)
NEUTROPHILS RELATIVE PERCENT: 61.7 %
PDW BLD-RTO: 14.2 % (ref 12.4–15.4)
PLATELET # BLD: 323 K/UL (ref 135–450)
PLATELET SLIDE REVIEW: ADEQUATE
PMV BLD AUTO: 8.4 FL (ref 5–10.5)
POTASSIUM SERPL-SCNC: 5.9 MMOL/L (ref 3.5–5.1)
RBC # BLD: 4.48 M/UL (ref 4.2–5.9)
SLIDE REVIEW: NORMAL
SODIUM BLD-SCNC: 143 MMOL/L (ref 136–145)
TOTAL PROTEIN: 7 G/DL (ref 6.4–8.2)
TRIGL SERPL-MCNC: 109 MG/DL (ref 0–150)
VITAMIN D 25-HYDROXY: 28.8 NG/ML
VLDLC SERPL CALC-MCNC: 22 MG/DL
WBC # BLD: 8 K/UL (ref 4–11)

## 2021-11-15 PROCEDURE — 99215 OFFICE O/P EST HI 40 MIN: CPT | Performed by: FAMILY MEDICINE

## 2021-11-15 PROCEDURE — 4004F PT TOBACCO SCREEN RCVD TLK: CPT | Performed by: FAMILY MEDICINE

## 2021-11-15 PROCEDURE — 36415 COLL VENOUS BLD VENIPUNCTURE: CPT | Performed by: FAMILY MEDICINE

## 2021-11-15 PROCEDURE — 3051F HG A1C>EQUAL 7.0%<8.0%: CPT | Performed by: FAMILY MEDICINE

## 2021-11-15 PROCEDURE — 4040F PNEUMOC VAC/ADMIN/RCVD: CPT | Performed by: FAMILY MEDICINE

## 2021-11-15 PROCEDURE — 1123F ACP DISCUSS/DSCN MKR DOCD: CPT | Performed by: FAMILY MEDICINE

## 2021-11-15 PROCEDURE — G8926 SPIRO NO PERF OR DOC: HCPCS | Performed by: FAMILY MEDICINE

## 2021-11-15 PROCEDURE — 3023F SPIROM DOC REV: CPT | Performed by: FAMILY MEDICINE

## 2021-11-15 PROCEDURE — G8427 DOCREV CUR MEDS BY ELIG CLIN: HCPCS | Performed by: FAMILY MEDICINE

## 2021-11-15 PROCEDURE — G8484 FLU IMMUNIZE NO ADMIN: HCPCS | Performed by: FAMILY MEDICINE

## 2021-11-15 PROCEDURE — G8417 CALC BMI ABV UP PARAM F/U: HCPCS | Performed by: FAMILY MEDICINE

## 2021-11-15 PROCEDURE — 83036 HEMOGLOBIN GLYCOSYLATED A1C: CPT | Performed by: FAMILY MEDICINE

## 2021-11-15 RX ORDER — ALBUTEROL SULFATE 90 UG/1
2 AEROSOL, METERED RESPIRATORY (INHALATION) 4 TIMES DAILY PRN
Qty: 54 G | Refills: 0 | Status: SHIPPED | OUTPATIENT
Start: 2021-11-15

## 2021-11-15 SDOH — ECONOMIC STABILITY: FOOD INSECURITY: WITHIN THE PAST 12 MONTHS, THE FOOD YOU BOUGHT JUST DIDN'T LAST AND YOU DIDN'T HAVE MONEY TO GET MORE.: NEVER TRUE

## 2021-11-15 SDOH — ECONOMIC STABILITY: FOOD INSECURITY: WITHIN THE PAST 12 MONTHS, YOU WORRIED THAT YOUR FOOD WOULD RUN OUT BEFORE YOU GOT MONEY TO BUY MORE.: NEVER TRUE

## 2021-11-15 ASSESSMENT — SOCIAL DETERMINANTS OF HEALTH (SDOH): HOW HARD IS IT FOR YOU TO PAY FOR THE VERY BASICS LIKE FOOD, HOUSING, MEDICAL CARE, AND HEATING?: NOT HARD AT ALL

## 2021-11-15 NOTE — PROGRESS NOTES
Fan Garza (:  1940) is a 80 y.o. male,Established patient, here for evaluation of the following chief complaint(s):  Diabetes (DM ROUTINE FOLLOW UP AIC DUE )       ASSESSMENT/PLAN:  9:12    Patient Instructions   Ellie Arredondo was seen today for diabetes. Diagnoses and all orders for this visit:    Type 2 diabetes mellitus with complication, without long-term current use of insulin (HCC)  -     POCT glycosylated hemoglobin (Hb A1C)  Improved but still not fair control. Avoid sweets. Limit starches like potatoes, rice, bread, cereals to calories burned. Panlobular emphysema (HCC)  Stable. Trial of albuterol inhaler. Pure hypercholesterolemia  -     Good control.  -     Continue meds and lifestyle control. Essential hypertension, benign  For patients in their late 66's and above in age the systolic blood pressure (top number) goal is 120-140 with occasional increases to 150's not concerning. Call if less than 100 as low blood pressures increase the risk of falls and injuries. Call also if more than 180 once or staying above 160 more than 1/3 of the time. If systolic blood pressure is staying 100-120 we may need to decrease the blood pressure medicine. Call if the lower number (diastolic blood pressure) is more than 100. A much lower bottom number even in the 40's is not usually urgent. BPH associated with nocturia  Omega 3 fatty acids such as are found in fish oil can decrease urinary urgency and frequency. Each capsule of fish oil should have 800 mg or more of a combination of EPA & DHA - the active omega 3's- per capsule and you take 1 capsule 3 times per day. The oil lines the bladder decreasing irritation of the bladder wall and helping prevent bacteria from sticking to the bladder wall so fewer UTI's occur. Fish oil also lowers triglycerides, helps with dry eyes and helps lubricate joints to lower arthritis pain. Paroxysmal atrial fibrillation [facility])  See cardiology.     Vitamin D deficiency  -     Vitamin D 25 Hydroxy; Future  Vitamin D 3 5000 IU (125 mcg) per day. Onychomycosis of multiple toenails with type 2 diabetes mellitus ([address]  -     Ambulatory referral to Podiatry    No follow-ups on file. Subjective   SUBJECTIVE/OBJECTIVE:  Chief Complaint   Patient presents with    Diabetes     DM ROUTINE FOLLOW UP AIC DUE    8:07  HPI    Panlobular emphysema ([address]  He is SOB. No energy. He thinks part of low energy is pain. Type 2 diabetes mellitus with complication, without long-term current use of insulin ([address]  Seeing eye Dr and told will need laser. Per pt no DM problems in eyes. Has macular degen. Is taking BS 1-2x/day. His sugar high in am that 180 or so. Checks after coffee with sugar. Pure hypercholesterolemia  Is not watching diet. Not a big appetite. Foods high: meats. Better meat =? No SE's with med. .  Essential hypertension, benign  Not checking at home. Watches salt some. Hardly eats any salty snacks. Still adds salt at the tabel sometimes. BPH associated with nocturia  Nocturia 1-5x/ worse when wakes with back pain. No trouble starting urine in am. Stream is slow. Paroxysmal atrial fibrillation [facility])  Last cardiology visit 18 months ago. They usually sees once a year. Podiatry referral  No sores on feet. Has fungal nails. Unable to trim himself nor his spouse.     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 Ruptured disc    Diverticulosis of colon 10/30/2012    Dry eye syndrome of bilateral lacrimal glands     Hip problem 1956    NUMBNESS W/ TRAUMA    Hypercholesterolemia     Impotence     Metabolic syndrome     Mild diastolic dysfunction 66/44/0432    LVEF 55-60%    Obstructive sleep apnea     Obstructive sleep apnea     C-PAP    Osteoarthritis of shoulder     left  @ ac jt/impingement.  Pulmonary hypertension ([address] 10/21/2013    37    Recurrent BCC (basal cell carcinoma)     S/P aortic valve replacement 11/12/2013    Submandibular duct obstruction - right 10/2/2017    12 x 14 x 18 mm right submandibular gland stone causing submandibular duct dilatation and gland prominence    Tobacco use disorder 1957    Type II or unspecified type diabetes mellitus without mention of complication, not stated as uncontrolled 2003    Vitamin D deficiency        Past Surgical History:   Procedure Laterality Date    AORTIC VALVE REPLACEMENT  11/12/2013    BLEPHAROPLASTY  07/2007    UPPER LID    BONY PELVIS SURGERY  01/12/2021    SI joint    CARDIAC CATHETERIZATION  11/08/2013    dx    CARDIAC SURGERY  11/12/2013    reincised for bleeding    CATARACT REMOVAL WITH IMPLANT Right 01/2019    CATARACT REMOVAL WITH IMPLANT Left 02/2019    CERVICAL DISC SURGERY Right 2015    epidural steroids.  CORONARY ARTERY BYPASS GRAFT  11/12/2013    4 V    EYE SURGERY Bilateral     Laser peripheral iridotomy     FINGER TRIGGER RELEASE Right 08/19/2014    Release of trigger thumb (stenosing tenosynovitis)    HEMORRHOID SURGERY  [id number]    LAPAROSCOPIC APPENDECTOMY  11/14/2014    LUMBAR LAMINECTOMY  1998 & 2003    LUMBAR LAMINECTOMY  05/07/2014    L2-L3 & Repair dural tear x2.     LUMBAR SPINE SURGERY  08/04/2009    SPINaL CORD STIMULATOR FOR PAIN    LUMBAR SPINE SURGERY  08/2014 epidural steroids. 3/2017 NEAL, 10/21/19    LUMBAR SPINE SURGERY Right 04/23/2019    SPINAL CORD STIMULATOR BATTERY REPLACEMENT WITH POCKET REVISION     MOHS SURGERY Left 02/11/2020    Dorsum of hand: type?  MOHS SURGERY Right 01/01/2020    right upper inner pinna with skin graft    NERVE SURGERY  ~12/2012    nerve block     ROTATOR CUFF REPAIR  2001    SKIN CANCER EXCISION Right 07/2017    forehead THEN TOP OF THE HEAD. 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 Quit 11/8/13. 1 PPD. 1025/19. 2/18/20.5/18/20.7/12/21.11/15/21. Vaping Use    Vaping Use: Never used   Substance and Sexual Activity    Alcohol use: No     Alcohol/week: 1.0 standard drink     Types: 1 Standard drinks or equivalent per week     Comment: occ    Drug use: No     Comment: Never tried .   [facility] Sexual activity: Not on file   Other Topics Concern    Not on file   Social History Narrative    No exercise. 12/21/16.  4/6/17, 6/19/17, 8/14/17. 9/27/27. 12/4/17. 8/27/18. 10/22/18. 4/24/19. 7/30/19. 1025/19.2/18/20. 7/12/21.11/15/21.      Social Determinants of Health     Financial Resource Strain: Low Risk     Difficulty of Paying Living Expenses: Not hard at all   Food Insecurity: No Food Insecurity    Worried About Running Out of Food in the Last Year: Never true    Magen of Food in the Last Year: Never true   Transportation Needs: No Transportation Needs    Lack of Transportation (Medical): No    Lack of Transportation (Non-Medical): No   Physical Activity:     Days of Exercise per Week: Not on file    Minutes of Exercise per Session: Not on file   Stress:     Feeling of Stress : Not on file   Social Connections:     Frequency of Communication with Friends and Family: Not on file    Frequency of Social Gatherings with Friends and Family: Not on file    Attends [employer] Services: Not on file    Active Member of [employer] or Organizations: Not on file    Attends Club or Organization Meetings: Not on file    Marital Status: Not on file   Intimate Partner Violence:     Fear of Current or Ex-Partner: Not on file    Emotionally Abused: Not on file    Physically Abused: Not on file    Sexually Abused: Not on file   Housing Stability:     Unable to Pay for Housing in the Last Year: Not on file    Number of [place] in the Last Year: Not on file    Unstable Housing in the Last Year: Not on file       Family History   Problem Relation Age of Onset    Other Mother         CABG    Heart Surgery Mother 66        CABG    Sudden Death Father     Stroke Sister 77        blinded pt in 1 eye, then stroke in the other    Cancer Sister         pancreatic cancer    No Known Problems Sister     Coronary Art Dis Brother     Diabetes Brother     Other Brother         Cellulitis    Other Daughter         DDD L spine    Other Son         DDD L spine    Heart Attack Maternal Aunt     Heart Attack Maternal Uncle        No Known Allergies    Current Outpatient Medications   Medication Sig Dispense Refill    metFORMIN (GLUCOPHAGE) 1000 MG tablet TAKE 1 TABLET TWICE DAILY  WITH MEALS 60 tablet 0    atorvastatin (LIPITOR) 40 MG tablet TAKE 1 TABLET DAILY.  PLEASESCHEDULE AN APPOINTMENT or warmth. Toenail Condition: Right toenails are abnormally thick. Left foot:      Skin integrity: No ulcer, blister, skin breakdown, erythema or warmth. Toenail Condition: Left toenails are abnormally thick. Comments: Foot sensory:  Right 6/10  (absent top and 3 heel sites-calluses likely decreasing sensation at heel), left 9/10 (absent top). DP RIGHT 2/5, LEFT 105 PT BILATERAL nonpalpable. Callus: Mild bilateral heels. Sores None. Onychomycosis: Right toes 2 and 5 proximal onychomycosis, first distal onychomycosis, left first distal onychomycosis. Tinea pedis: None. Deformity: Bilateral minimal bunion. Neurological:      Mental Status: He is alert. Psychiatric:         Attention and Perception: Attention and perception normal.         Mood and Affect: Mood and affect normal.         Speech: Speech normal.         Behavior: Behavior normal. Behavior is cooperative. Cognition and Memory: Cognition and memory normal.         Judgment: Judgment normal.        Vitals:    11/15/21 0757   BP: 134/86   Site: Right Upper Arm   Position: Sitting   Cuff Size: Medium Adult   Pulse: 82   SpO2: (!) 20%   Weight: 231 lb 14.4 oz (105.2 kg)   Height: 5' 7\" (1.702 m)     BP Readings from Last 3 Encounters:   11/15/21 134/86   07/12/21 130/70   04/14/21 128/76     Pulse Readings from Last 3 Encounters:   11/15/21 82   07/12/21 67   04/14/21 66     Wt Readings from Last 3 Encounters:   11/15/21 231 lb 14.4 oz (105.2 kg)   07/12/21 233 lb 9.6 oz (106 kg)   04/14/21 228 lb (103.4 kg)     Body mass index is 36.32 kg/m².      11/10/2020 08:59 1/6/2021 09:32 1/6/2021 10:33 4/14/2021 13:55   Sodium 138 142     Potassium 4.7 5.3 (H)     Chloride 102 104     CO2 28 30     BUN 17 17     Creatinine 0.8 0.8     Anion Gap 8 8     GFR Non- >60 >60     Glucose 115 (H) 140 (H)     Calcium 9.3 9.8     Total Protein 6.3 (L)      Pro-      Cholesterol, Total 116      HDL Cholesterol 41 LDL Calculated 58      Triglycerides 87      VLDL Cholesterol Calculated 17      Albumin 4.1      Globulin 2.2      Albumin/Globulin Ratio 1.9      Alk Phos 52      ALT 23      AST 13 (L)      Bilirubin <0.2      Hemoglobin A1C 7.9  8.4 7.9   eAG (mg/dL) 180.0  194.4      Results for POC orders placed in visit on 11/15/21   POCT glycosylated hemoglobin (Hb A1C)   Result Value Ref Range    Hemoglobin A1C 7.7 %        On this date 11/15/2021 I have spent 65 minutes reviewing previous notes, test results and face to face with the patient discussing the diagnosis and importance of compliance with the treatment plan as well as documenting on the day of the visit. An electronic signature waLatest Units: mg/dLs used to authenticate this note.     --Mame Reid, DO

## 2021-11-15 NOTE — PATIENT INSTRUCTIONS
Chloe Dozier was seen today for diabetes. Diagnoses and all orders for this visit:    Type 2 diabetes mellitus with complication, without long-term current use of insulin (HCC)  -     POCT glycosylated hemoglobin (Hb A1C)  Improved but still not fair control. Avoid sweets. Limit starches like potatoes, rice, bread, cereals to calories burned. Panlobular emphysema (HCC)  Stable. Trial of albuterol inhaler. Pure hypercholesterolemia  -     Good control.  -     Continue meds and lifestyle control. Essential hypertension, benign  For patients in their late 66's and above in age the systolic blood pressure (top number) goal is 120-140 with occasional increases to 150's not concerning. Call if less than 100 as low blood pressures increase the risk of falls and injuries. Call also if more than 180 once or staying above 160 more than 1/3 of the time. If systolic blood pressure is staying 100-120 we may need to decrease the blood pressure medicine. Call if the lower number (diastolic blood pressure) is more than 100. A much lower bottom number even in the 40's is not usually urgent. BPH associated with nocturia  Omega 3 fatty acids such as are found in fish oil can decrease urinary urgency and frequency. Each capsule of fish oil should have 800 mg or more of a combination of EPA & DHA - the active omega 3's- per capsule and you take 1 capsule 3 times per day. The oil lines the bladder decreasing irritation of the bladder wall and helping prevent bacteria from sticking to the bladder wall so fewer UTI's occur. Fish oil also lowers triglycerides, helps with dry eyes and helps lubricate joints to lower arthritis pain. Paroxysmal atrial fibrillation [facility])  See cardiology. Vitamin D deficiency  -     Vitamin D 25 Hydroxy; Future  Vitamin D 3 5000 IU (125 mcg) per day.

## 2021-11-16 DIAGNOSIS — I25.83 CORONARY ARTERY DISEASE DUE TO LIPID RICH PLAQUE: Chronic | ICD-10-CM

## 2021-11-16 DIAGNOSIS — I25.10 CORONARY ARTERY DISEASE DUE TO LIPID RICH PLAQUE: Chronic | ICD-10-CM

## 2021-11-16 DIAGNOSIS — I10 ESSENTIAL HYPERTENSION, BENIGN: Chronic | ICD-10-CM

## 2021-11-16 DIAGNOSIS — E11.8 TYPE 2 DIABETES MELLITUS WITH COMPLICATION, WITHOUT LONG-TERM CURRENT USE OF INSULIN (HCC): Chronic | ICD-10-CM

## 2021-11-16 RX ORDER — GLIPIZIDE 5 MG/1
TABLET ORAL
Qty: 180 TABLET | Refills: 1 | Status: SHIPPED | OUTPATIENT
Start: 2021-11-16 | End: 2022-05-17

## 2021-11-21 DIAGNOSIS — I10 ESSENTIAL HYPERTENSION, BENIGN: Primary | ICD-10-CM

## 2021-11-21 DIAGNOSIS — I10 ESSENTIAL HYPERTENSION, BENIGN: ICD-10-CM

## 2021-11-21 RX ORDER — AMLODIPINE BESYLATE 2.5 MG/1
2.5 TABLET ORAL DAILY
Qty: 30 TABLET | Refills: 0 | Status: SHIPPED | OUTPATIENT
Start: 2021-11-21 | End: 2021-12-20

## 2021-11-22 DIAGNOSIS — I25.10 CORONARY ARTERY DISEASE DUE TO LIPID RICH PLAQUE: Chronic | ICD-10-CM

## 2021-11-22 DIAGNOSIS — I25.83 CORONARY ARTERY DISEASE DUE TO LIPID RICH PLAQUE: Chronic | ICD-10-CM

## 2021-11-22 DIAGNOSIS — E78.00 PURE HYPERCHOLESTEROLEMIA: Chronic | ICD-10-CM

## 2021-11-22 RX ORDER — ATORVASTATIN CALCIUM 40 MG/1
TABLET, FILM COATED ORAL
Qty: 30 TABLET | Refills: 3 | Status: SHIPPED | OUTPATIENT
Start: 2021-11-22 | End: 2022-02-24

## 2021-11-22 RX ORDER — AMLODIPINE BESYLATE 2.5 MG/1
2.5 TABLET ORAL DAILY
Qty: 90 TABLET | OUTPATIENT
Start: 2021-11-22

## 2021-11-26 ENCOUNTER — TELEPHONE (OUTPATIENT)
Dept: FAMILY MEDICINE CLINIC | Age: 81
End: 2021-11-26

## 2021-11-26 NOTE — TELEPHONE ENCOUNTER
[facility] NO NEED TO CALL BACK. Anshul Chance

## 2021-11-26 NOTE — TELEPHONE ENCOUNTER
----- Message from Hilary Hays sent at 11/26/2021  9:10 AM EST -----  Subject: Message to Provider    QUESTIONS  Information for Provider? Patient called in and asked for refill om meds   for ointment for leg   ---------------------------------------------------------------------------  --------------  CALL BACK INFO  What is the best way for the office to contact you? OK to leave message on   voicemail  Preferred Call Back Phone Number? [phone]  ---------------------------------------------------------------------------  --------------  SCRIPT ANSWERS  Relationship to Patient?  Self

## 2021-12-02 DIAGNOSIS — E11.8 TYPE 2 DIABETES MELLITUS WITH COMPLICATION, WITHOUT LONG-TERM CURRENT USE OF INSULIN (HCC): Chronic | ICD-10-CM

## 2021-12-20 DIAGNOSIS — I10 ESSENTIAL HYPERTENSION, BENIGN: ICD-10-CM

## 2021-12-20 RX ORDER — AMLODIPINE BESYLATE 2.5 MG/1
2.5 TABLET ORAL DAILY
Qty: 90 TABLET | Refills: 0 | Status: SHIPPED | OUTPATIENT
Start: 2021-12-20 | End: 2022-04-12 | Stop reason: SDUPTHER

## 2022-01-03 DIAGNOSIS — N40.1 BPH ASSOCIATED WITH NOCTURIA: ICD-10-CM

## 2022-01-03 DIAGNOSIS — R35.1 BPH ASSOCIATED WITH NOCTURIA: ICD-10-CM

## 2022-01-03 RX ORDER — TAMSULOSIN HYDROCHLORIDE 0.4 MG/1
0.4 CAPSULE ORAL DAILY
Qty: 90 CAPSULE | Refills: 0 | Status: SHIPPED | OUTPATIENT
Start: 2022-01-03 | End: 2022-04-28

## 2022-01-12 ENCOUNTER — TELEPHONE (OUTPATIENT)
Dept: FAMILY MEDICINE CLINIC | Age: 82
End: 2022-01-12

## 2022-01-12 DIAGNOSIS — R09.89 CHEST CONGESTION: ICD-10-CM

## 2022-01-12 DIAGNOSIS — Z20.822 SUSPECTED COVID-19 VIRUS INFECTION: ICD-10-CM

## 2022-01-12 DIAGNOSIS — Z20.822 SUSPECTED COVID-19 VIRUS INFECTION: Primary | ICD-10-CM

## 2022-01-12 DIAGNOSIS — R05.9 COUGH: ICD-10-CM

## 2022-01-12 LAB — SARS-COV-2: NOT DETECTED

## 2022-01-12 NOTE — TELEPHONE ENCOUNTER
Correct. He should not get the booster shot until after his test comes back negative. Otherwise he will have any more side effects with the booster if he currently has even a mild case of COVID-19. Order signed. Also in the future he should be tested at least 3 days after symptomsbut within 5 days of symptoms for the best accuracy.

## 2022-01-12 NOTE — TELEPHONE ENCOUNTER
CALLED AND SPOKE TO PATIENT. HE STATES HE HAS HAS ISSUES WITH HIS BREATHING, COUGHING, SOME CONGESTION X 3 WEEKS. NO EXPOSURE TO COVID THAT HE KNOWS OF, HE IS SCHEDULED FOR HIS BOOSTER SHOT THIS AFTERNOON AT 2:15. HE WANTS TO KNOW WHAT TO DO ABOUT THAT? DO YOU WANT HIM TO HOLD OFF ON THE BOOSTER SHOT AND GET TESTED?  SC

## 2022-01-12 NOTE — TELEPHONE ENCOUNTER
----- Message from Noemy Saira sent at 1/11/2022  5:23 PM EST -----  Subject: Appointment Request    Reason for Call: Urgent Cough Cold    QUESTIONS  Type of Appointment? Established Patient  Reason for appointment request? No appointments available during search  Additional Information for Provider? Patient would like to have covid   testing done, unable to schedule VV due to not being able to attend. please reach out to patient with next steps and instructions.   ---------------------------------------------------------------------------  --------------  CALL BACK INFO  What is the best way for the office to contact you? Do not leave any   message, patient will call back for answer  Preferred Call Back Phone Number? [phone]  ---------------------------------------------------------------------------  --------------  SCRIPT ANSWERS  Relationship to Patient? Self  Are you currently unable to finish sentences due to any difficulty   breathing? No  Are you unable to swallow liquids? No  Are you having fevers (100.4 or greater), chills, or sweats? No  Do you have COPD, asthma or a chronic lung condition? Yes   Have you been diagnosed with, awaiting test results for, or told that you   are suspected of having COVID-19 (Coronavirus)? (If patient has tested   negative or was tested as a requirement for work, school, or travel and   not based on symptoms, answer no)? No  Within the past two weeks have you developed any of the following symptoms   (answer no if symptoms have been present longer than 2 weeks or began   more than 2 weeks ago)? Fever or Chills, Cough, Shortness of breath or   difficulty breathing, Loss of taste or smell, Sore throat, Nasal   congestion, Sneezing or runny nose, Fatigue or generalized body aches   (answer no if pain is specific to a body part e.g. back pain), Diarrhea,   Headache?  Yes

## 2022-02-01 ENCOUNTER — OFFICE VISIT (OUTPATIENT)
Dept: PULMONOLOGY | Age: 82
End: 2022-02-01
Payer: MEDICARE

## 2022-02-01 VITALS
TEMPERATURE: 96.2 F | SYSTOLIC BLOOD PRESSURE: 136 MMHG | RESPIRATION RATE: 18 BRPM | OXYGEN SATURATION: 94 % | HEART RATE: 78 BPM | BODY MASS INDEX: 37.64 KG/M2 | DIASTOLIC BLOOD PRESSURE: 80 MMHG | HEIGHT: 67 IN | WEIGHT: 239.8 LBS

## 2022-02-01 DIAGNOSIS — E66.01 SEVERE OBESITY (BMI 35.0-35.9 WITH COMORBIDITY) (HCC): ICD-10-CM

## 2022-02-01 DIAGNOSIS — J43.1 PANLOBULAR EMPHYSEMA (HCC): ICD-10-CM

## 2022-02-01 DIAGNOSIS — F17.200 TOBACCO USE DISORDER: Primary | ICD-10-CM

## 2022-02-01 DIAGNOSIS — E66.01 CLASS 2 SEVERE OBESITY DUE TO EXCESS CALORIES WITH SERIOUS COMORBIDITY AND BODY MASS INDEX (BMI) OF 36.0 TO 36.9 IN ADULT (HCC): Chronic | ICD-10-CM

## 2022-02-01 PROCEDURE — 4004F PT TOBACCO SCREEN RCVD TLK: CPT | Performed by: INTERNAL MEDICINE

## 2022-02-01 PROCEDURE — G8417 CALC BMI ABV UP PARAM F/U: HCPCS | Performed by: INTERNAL MEDICINE

## 2022-02-01 PROCEDURE — G8484 FLU IMMUNIZE NO ADMIN: HCPCS | Performed by: INTERNAL MEDICINE

## 2022-02-01 PROCEDURE — G8427 DOCREV CUR MEDS BY ELIG CLIN: HCPCS | Performed by: INTERNAL MEDICINE

## 2022-02-01 PROCEDURE — 99214 OFFICE O/P EST MOD 30 MIN: CPT | Performed by: INTERNAL MEDICINE

## 2022-02-01 PROCEDURE — 1123F ACP DISCUSS/DSCN MKR DOCD: CPT | Performed by: INTERNAL MEDICINE

## 2022-02-01 PROCEDURE — 4040F PNEUMOC VAC/ADMIN/RCVD: CPT | Performed by: INTERNAL MEDICINE

## 2022-02-01 PROCEDURE — 3023F SPIROM DOC REV: CPT | Performed by: INTERNAL MEDICINE

## 2022-02-01 RX ORDER — FLUTICASONE FUROATE, UMECLIDINIUM BROMIDE AND VILANTEROL TRIFENATATE 100; 62.5; 25 UG/1; UG/1; UG/1
1 POWDER RESPIRATORY (INHALATION) DAILY
Qty: 1 EACH | Refills: 11 | Status: SHIPPED | OUTPATIENT
Start: 2022-02-01 | End: 2022-04-12

## 2022-02-01 ASSESSMENT — SLEEP AND FATIGUE QUESTIONNAIRES
HOW LIKELY ARE YOU TO NOD OFF OR FALL ASLEEP WHILE SITTING AND TALKING TO SOMEONE: 1
HOW LIKELY ARE YOU TO NOD OFF OR FALL ASLEEP WHILE LYING DOWN TO REST IN THE AFTERNOON WHEN CIRCUMSTANCES PERMIT: 1
HOW LIKELY ARE YOU TO NOD OFF OR FALL ASLEEP WHILE WATCHING TV: 3
HOW LIKELY ARE YOU TO NOD OFF OR FALL ASLEEP WHILE SITTING AND READING: 2
HOW LIKELY ARE YOU TO NOD OFF OR FALL ASLEEP WHILE SITTING QUIETLY AFTER LUNCH WITHOUT ALCOHOL: 2
ESS TOTAL SCORE: 12
HOW LIKELY ARE YOU TO NOD OFF OR FALL ASLEEP IN A CAR, WHILE STOPPED FOR A FEW MINUTES IN TRAFFIC: 1
HOW LIKELY ARE YOU TO NOD OFF OR FALL ASLEEP WHILE SITTING INACTIVE IN A PUBLIC PLACE: 1
HOW LIKELY ARE YOU TO NOD OFF OR FALL ASLEEP WHEN YOU ARE A PASSENGER IN A CAR FOR AN HOUR WITHOUT A BREAK: 1

## 2022-02-01 NOTE — ASSESSMENT & PLAN NOTE
Complains of chest congestion shortness of breath. Cough mostly in the morning. Patient is blaming infection and states his smoking is nothing to do with his cough being worse in the morning. Sample of Trelegy given. Prescription for Trelegy given if the sample is effective    Not ready to quit smoking    Requesting antibiotics. Request denied. Last chest x-ray without acute disease or chronic disease.

## 2022-02-01 NOTE — PROGRESS NOTES
REASON FOR CONSULTATION/CC:    Chief Complaint   Patient presents with    Sleep Apnea    Follow-up     panlobular emphysema    Shortness of Breath    Cough        Consult at request of   Mu Nunez DO for  emphysema    PCP: Mu Nunez DO    HISTORY OF PRESENT ILLNESS: Lola Houston is a 80y.o. year old male with a history of tobacco abuse who presents :              ANNMARIE  No using device. Tobacco abuse  Still smoking. Not ready to quit and does think its contributing. Sinus and chest congestion. shortness of breath   Duration: years. Slight mucus. Think infection. Better with antibiotics   Using albuterol   chest X-ray December was normal.              Objective:   PHYSICAL EXAM:  Blood pressure 136/80, pulse 78, temperature 96.2 °F (35.7 °C), temperature source Infrared, resp. rate 18, height 5' 7\" (1.702 m), weight 239 lb 12.8 oz (108.8 kg), SpO2 94 %.'  Gen: No distress. ENT:   Resp: No accessory muscle use. No crackles. No wheezes. + rhonchi. CV: Regular rate. Regular rhythm. No murmur or rub. No edema. Skin: Warm, dry, normal texture and turgor. No nodule on exposed extremities. M/S: No cyanosis. No clubbing. No joint deformity. Psych: Oriented x 3. No anxiety. Awake. Alert. Intact judgement and insight. Good Mood / Affect. Memory appears in tact          Data Reviewed:        Assessment:     ·  Tobacco abuse  · ANNMARIE  · COPD  · Chronic rhinitis    · Asbestos exposures      Plan:      Problem List Items Addressed This Visit     Tobacco use disorder - Primary (Chronic)     No interest in quitting smoking. Relevant Orders    Full PFT Study With Bronchodilator    COPD (chronic obstructive pulmonary disease) (HCC) (Chronic)     Complains of chest congestion shortness of breath. Cough mostly in the morning. Patient is blaming infection and states his smoking is nothing to do with his cough being worse in the morning. Sample of Trelegy given.   Prescription for Trelegy given if the sample is effective    Not ready to quit smoking    Requesting antibiotics. Request denied. Last chest x-ray without acute disease or chronic disease. Relevant Medications    fluticasone-umeclidin-vilant (TRELEGY ELLIPTA) 100-62.5-25 MCG/INH AEPB    Class 2 severe obesity due to excess calories with serious comorbidity and body mass index (BMI) of 36.0 to 36.9 in adult [facility]) (Chronic)     Not interested in weight loss at this time           Other Visit Diagnoses     Severe obesity (BMI 35.0-35.9 with comorbidity) ([facility] [address]                    This note was transcribed using [id number] [employer]. Please disregard any translational errors.     Juan Manuel Gallegos Pul[facility], [facility] and [facility] [phone]

## 2022-02-15 LAB — SARS-COV-2: NOT DETECTED

## 2022-02-18 ENCOUNTER — HOSPITAL ENCOUNTER (OUTPATIENT)
Dept: PULMONOLOGY | Age: 82
Discharge: HOME | End: 2022-02-18
Payer: MEDICARE

## 2022-02-18 DIAGNOSIS — F17.200 TOBACCO USE DISORDER: ICD-10-CM

## 2022-02-18 LAB
DLCO %PRED: 58 %
DLCO PRED: NORMAL
DLCO/VA %PRED: NORMAL
DLCO/VA PRED: NORMAL
DLCO/VA: NORMAL
DLCO: NORMAL
EXPIRATORY TIME-POST: NORMAL
EXPIRATORY TIME: NORMAL
FEF 25-75% %CHNG: NORMAL
FEF 25-75% %PRED-POST: NORMAL
FEF 25-75% %PRED-PRE: NORMAL
FEF 25-75% PRED: NORMAL
FEF 25-75%-POST: NORMAL
FEF 25-75%-PRE: NORMAL
FEV1 %PRED-POST: 47 %
FEV1 %PRED-PRE: 44 %
FEV1 PRED: NORMAL
FEV1-POST: NORMAL
FEV1-PRE: NORMAL
FEV1/FVC %PRED-POST: NORMAL
FEV1/FVC %PRED-PRE: NORMAL
FEV1/FVC PRED: NORMAL
FEV1/FVC-POST: 90 %
FEV1/FVC-PRE: 85 %
FVC %PRED-POST: NORMAL
FVC %PRED-PRE: NORMAL
FVC PRED: NORMAL
FVC-POST: NORMAL
FVC-PRE: NORMAL
GAW %PRED: NORMAL
GAW PRED: NORMAL
GAW: NORMAL
IC %PRED: NORMAL
IC PRED: NORMAL
IC: NORMAL
MEP: NORMAL
MIP: NORMAL
MVV %PRED-PRE: NORMAL
MVV PRED: NORMAL
MVV-PRE: NORMAL
PEF %PRED-POST: NORMAL
PEF %PRED-PRE: NORMAL
PEF PRED: NORMAL
PEF%CHNG: NORMAL
PEF-POST: NORMAL
PEF-PRE: NORMAL
RAW %PRED: NORMAL
RAW PRED: NORMAL
RAW: NORMAL
RV %PRED: NORMAL
RV PRED: NORMAL
RV: NORMAL
SVC %PRED: NORMAL
SVC PRED: NORMAL
SVC: NORMAL
TLC %PRED: 68 %
TLC PRED: NORMAL
TLC: NORMAL
VA %PRED: NORMAL
VA PRED: NORMAL
VA: NORMAL
VTG %PRED: NORMAL
VTG PRED: NORMAL
VTG: NORMAL

## 2022-02-18 PROCEDURE — 94060 EVALUATION OF WHEEZING: CPT

## 2022-02-18 PROCEDURE — 94729 DIFFUSING CAPACITY: CPT

## 2022-02-18 PROCEDURE — 94726 PLETHYSMOGRAPHY LUNG VOLUMES: CPT

## 2022-02-18 PROCEDURE — 94640 AIRWAY INHALATION TREATMENT: CPT

## 2022-02-18 PROCEDURE — 6370000000 HC RX 637 (ALT 250 FOR IP): Performed by: INTERNAL MEDICINE

## 2022-02-18 RX ORDER — ALBUTEROL SULFATE 90 UG/1
4 AEROSOL, METERED RESPIRATORY (INHALATION) ONCE
Status: COMPLETED | OUTPATIENT
Start: 2022-02-18 | End: 2022-02-18

## 2022-02-18 RX ADMIN — ALBUTEROL SULFATE 4 PUFF: 90 AEROSOL, METERED RESPIRATORY (INHALATION) at 09:26

## 2022-02-18 ASSESSMENT — PULMONARY FUNCTION TESTS
FEV1/FVC_PRE: 85
FEV1_PERCENT_PREDICTED_POST: 47
FEV1_PERCENT_PREDICTED_PRE: 44
FEV1/FVC_POST: 90

## 2022-02-20 PROCEDURE — 94729 DIFFUSING CAPACITY: CPT | Performed by: INTERNAL MEDICINE

## 2022-02-20 PROCEDURE — 94726 PLETHYSMOGRAPHY LUNG VOLUMES: CPT | Performed by: INTERNAL MEDICINE

## 2022-02-20 PROCEDURE — 94060 EVALUATION OF WHEEZING: CPT | Performed by: INTERNAL MEDICINE

## 2022-02-20 NOTE — PROCEDURES
Reason for PFT:  Tobacco abuse disorder    Spirometry  1. Spirometry shows severe obstructive defect. 2. The FVC is diminished suggesting a possible restrictive defect; suggest lung volumes if clinically indicated. Lung Volumes  3. Lung volumes show moderate restrictive defect. 4. Air trapping is present. Bronchodilator  1. There is no response to bronchodilator demonstrated. [Increase in FEV1 and/or FVC => 12% of control and => 200 ml]    Flow-Volume Loop  5. The flow-volume loop is compatible with an obstructive process. Diffusing Capacity  6. Diffusing capacity is moderately decreased. [40-60%]      Overall Interpretation  Severe obstruction is present    Moderate restriction is present    There is not a bronchodilator response present    Diffusion capacity is moderately reduced    FEV1 is 1.13 L at 44% predicted. FVC is 1.77 L at 52% predicted    FEV1/FVC ratio is 64    Severe obstruction (reduced FEV1/SVC) without significant bronchodilator response. Moderate restriction with air trapping. Moderately reduced diffusing capacity that normalizes from alveolar volume. Finds may be related to COPD and obesity, correlate clinically        OBSTRUCTION % Predicted FEV1   MILD >70%   MODERATE 60-69%   MODERATELY-SEVERE 50-59%   SEVERE 35-49%   VERY SEVERE <35%         RESTRICTION % Predicted TLC   MILD Less than LLN but > than 70%   MODERATE 60-69%   MODERATELY-SEVERE <60%                 DIFFUSION CAPACITY DL,CO % Pred   MILD >60% AND < LLN   MODERATE 40-60%   SEVERE <40%       PFT data will be scanned into the media tab in epic.     Arti [address] [facility] Pulmonology

## 2022-02-23 ENCOUNTER — TELEPHONE (OUTPATIENT)
Dept: PULMONOLOGY | Age: 82
End: 2022-02-23

## 2022-02-23 DIAGNOSIS — I25.83 CORONARY ARTERY DISEASE DUE TO LIPID RICH PLAQUE: Chronic | ICD-10-CM

## 2022-02-23 DIAGNOSIS — I25.10 CORONARY ARTERY DISEASE DUE TO LIPID RICH PLAQUE: Chronic | ICD-10-CM

## 2022-02-23 DIAGNOSIS — E78.00 PURE HYPERCHOLESTEROLEMIA: Chronic | ICD-10-CM

## 2022-02-23 NOTE — TELEPHONE ENCOUNTER
Ernesto calls. Trelegy prescribed on  2/1/2022 not helping at all. Sob increases with any type of daily activities,\" walking only 20 feet I feel like I ran a mile,\" but finding his pulse ox is staying in 90's. I could hear patient wheezing while on the phone. Please advise? Can you give a couple different options as Trelegy was $ 500.00. We can have patient call [employer] to see what else might be covered.

## 2022-02-24 RX ORDER — ATORVASTATIN CALCIUM 40 MG/1
TABLET, FILM COATED ORAL
Qty: 30 TABLET | Refills: 0 | Status: SHIPPED | OUTPATIENT
Start: 2022-02-24 | End: 2022-04-19 | Stop reason: SDUPTHER

## 2022-02-24 NOTE — TELEPHONE ENCOUNTER
LAST VISIT 11/15/2021 DR. AYALA, NEXT VISIT NONE.    Component      Latest Ref Rng & Units 11/15/2021           9:06 AM   Cholesterol, Total      0 - 199 mg/dL 123   Triglycerides      0 - 150 mg/dL 109   HDL Cholesterol      40 - 60 mg/dL 34 (L)   LDL Calculated      <100 mg/dL 67   VLDL Cholesterol Calculated      Not Established mg/dL 22

## 2022-02-26 ENCOUNTER — APPOINTMENT (OUTPATIENT)
Dept: GENERAL RADIOLOGY | Age: 82
DRG: 292 | End: 2022-02-26
Payer: MEDICARE

## 2022-02-26 ENCOUNTER — HOSPITAL ENCOUNTER (INPATIENT)
Age: 82
LOS: 1 days | Discharge: LEFT BEFORE DISCHARGE | DRG: 292 | End: 2022-02-26
Attending: EMERGENCY MEDICINE | Admitting: STUDENT IN AN ORGANIZED HEALTH CARE EDUCATION/TRAINING PROGRAM
Payer: MEDICARE

## 2022-02-26 VITALS
HEIGHT: 66 IN | SYSTOLIC BLOOD PRESSURE: 142 MMHG | OXYGEN SATURATION: 92 % | DIASTOLIC BLOOD PRESSURE: 64 MMHG | WEIGHT: 242.29 LBS | TEMPERATURE: 97.9 F | HEART RATE: 58 BPM | BODY MASS INDEX: 38.94 KG/M2 | RESPIRATION RATE: 21 BRPM

## 2022-02-26 DIAGNOSIS — J81.0 ACUTE PULMONARY EDEMA (HCC): ICD-10-CM

## 2022-02-26 DIAGNOSIS — R09.02 HYPOXIA: Primary | ICD-10-CM

## 2022-02-26 DIAGNOSIS — J44.1 COPD EXACERBATION (HCC): ICD-10-CM

## 2022-02-26 PROBLEM — R09.89 SUSPECTED CHF (CONGESTIVE HEART FAILURE): Status: ACTIVE | Noted: 2022-02-26

## 2022-02-26 LAB
ANION GAP SERPL CALCULATED.3IONS-SCNC: 10 MMOL/L (ref 3–16)
BASE EXCESS VENOUS: 4.8 MMOL/L
BASOPHILS ABSOLUTE: 0 K/UL (ref 0–0.2)
BASOPHILS RELATIVE PERCENT: 0.7 %
BUN BLDV-MCNC: 18 MG/DL (ref 7–20)
CALCIUM SERPL-MCNC: 9.3 MG/DL (ref 8.3–10.6)
CARBOXYHEMOGLOBIN: 5.6 %
CHLORIDE BLD-SCNC: 102 MMOL/L (ref 99–110)
CO2: 28 MMOL/L (ref 21–32)
CREAT SERPL-MCNC: 0.9 MG/DL (ref 0.8–1.3)
EOSINOPHILS ABSOLUTE: 0.1 K/UL (ref 0–0.6)
EOSINOPHILS RELATIVE PERCENT: 1.2 %
GFR AFRICAN AMERICAN: >60
GFR NON-AFRICAN AMERICAN: >60
GLUCOSE BLD-MCNC: 187 MG/DL (ref 70–99)
HCO3 VENOUS: 32 MMOL/L (ref 23–29)
HCT VFR BLD CALC: 41 % (ref 40.5–52.5)
HEMOGLOBIN: 13.2 G/DL (ref 13.5–17.5)
LYMPHOCYTES ABSOLUTE: 1.3 K/UL (ref 1–5.1)
LYMPHOCYTES RELATIVE PERCENT: 22.3 %
MCH RBC QN AUTO: 30.5 PG (ref 26–34)
MCHC RBC AUTO-ENTMCNC: 32.1 G/DL (ref 31–36)
MCV RBC AUTO: 94.9 FL (ref 80–100)
METHEMOGLOBIN VENOUS: 0.6 %
MONOCYTES ABSOLUTE: 0.4 K/UL (ref 0–1.3)
MONOCYTES RELATIVE PERCENT: 6.9 %
NEUTROPHILS ABSOLUTE: 4 K/UL (ref 1.7–7.7)
NEUTROPHILS RELATIVE PERCENT: 68.9 %
O2 SAT, VEN: 42 %
O2 THERAPY: ABNORMAL
PCO2, VEN: 60.7 MMHG (ref 40–50)
PDW BLD-RTO: 15.5 % (ref 12.4–15.4)
PH VENOUS: 7.34 (ref 7.35–7.45)
PLATELET # BLD: 277 K/UL (ref 135–450)
PMV BLD AUTO: 6.9 FL (ref 5–10.5)
PO2, VEN: <30 MMHG
POTASSIUM REFLEX MAGNESIUM: 5.7 MMOL/L (ref 3.5–5.1)
PRO-BNP: 1291 PG/ML (ref 0–449)
RBC # BLD: 4.32 M/UL (ref 4.2–5.9)
SARS-COV-2, NAAT: NOT DETECTED
SODIUM BLD-SCNC: 140 MMOL/L (ref 136–145)
TCO2 CALC VENOUS: 34 MMOL/L
TROPONIN: <0.01 NG/ML
WBC # BLD: 5.8 K/UL (ref 4–11)

## 2022-02-26 PROCEDURE — 93005 ELECTROCARDIOGRAM TRACING: CPT | Performed by: EMERGENCY MEDICINE

## 2022-02-26 PROCEDURE — 82803 BLOOD GASES ANY COMBINATION: CPT

## 2022-02-26 PROCEDURE — 80048 BASIC METABOLIC PNL TOTAL CA: CPT

## 2022-02-26 PROCEDURE — 99284 EMERGENCY DEPT VISIT MOD MDM: CPT

## 2022-02-26 PROCEDURE — 87635 SARS-COV-2 COVID-19 AMP PRB: CPT

## 2022-02-26 PROCEDURE — 94761 N-INVAS EAR/PLS OXIMETRY MLT: CPT

## 2022-02-26 PROCEDURE — 84484 ASSAY OF TROPONIN QUANT: CPT

## 2022-02-26 PROCEDURE — 1200000000 HC SEMI PRIVATE

## 2022-02-26 PROCEDURE — 96374 THER/PROPH/DIAG INJ IV PUSH: CPT

## 2022-02-26 PROCEDURE — 85025 COMPLETE CBC W/AUTO DIFF WBC: CPT

## 2022-02-26 PROCEDURE — 6360000002 HC RX W HCPCS: Performed by: EMERGENCY MEDICINE

## 2022-02-26 PROCEDURE — 36415 COLL VENOUS BLD VENIPUNCTURE: CPT

## 2022-02-26 PROCEDURE — 94640 AIRWAY INHALATION TREATMENT: CPT

## 2022-02-26 PROCEDURE — 2700000000 HC OXYGEN THERAPY PER DAY

## 2022-02-26 PROCEDURE — 83880 ASSAY OF NATRIURETIC PEPTIDE: CPT

## 2022-02-26 PROCEDURE — 6370000000 HC RX 637 (ALT 250 FOR IP): Performed by: EMERGENCY MEDICINE

## 2022-02-26 PROCEDURE — 71045 X-RAY EXAM CHEST 1 VIEW: CPT

## 2022-02-26 RX ORDER — FUROSEMIDE 20 MG/1
20 TABLET ORAL 2 TIMES DAILY
Qty: 3 TABLET | Refills: 0 | Status: SHIPPED | OUTPATIENT
Start: 2022-02-26 | End: 2022-02-28 | Stop reason: DRUGHIGH

## 2022-02-26 RX ORDER — SODIUM CHLORIDE 0.9 % (FLUSH) 0.9 %
5-40 SYRINGE (ML) INJECTION PRN
Status: CANCELLED | OUTPATIENT
Start: 2022-02-26

## 2022-02-26 RX ORDER — AZITHROMYCIN 500 MG/1
500 TABLET, FILM COATED ORAL DAILY
Status: CANCELLED | OUTPATIENT
Start: 2022-02-26 | End: 2022-03-01

## 2022-02-26 RX ORDER — ACETAMINOPHEN 650 MG/1
650 SUPPOSITORY RECTAL EVERY 6 HOURS PRN
Status: CANCELLED | OUTPATIENT
Start: 2022-02-26

## 2022-02-26 RX ORDER — DEXTROSE MONOHYDRATE 50 MG/ML
100 INJECTION, SOLUTION INTRAVENOUS PRN
Status: CANCELLED | OUTPATIENT
Start: 2022-02-26

## 2022-02-26 RX ORDER — ATORVASTATIN CALCIUM 40 MG/1
40 TABLET, FILM COATED ORAL DAILY
Status: CANCELLED | OUTPATIENT
Start: 2022-02-26

## 2022-02-26 RX ORDER — METHYLPREDNISOLONE SODIUM SUCCINATE 40 MG/ML
40 INJECTION, POWDER, LYOPHILIZED, FOR SOLUTION INTRAMUSCULAR; INTRAVENOUS EVERY 8 HOURS
Status: CANCELLED | OUTPATIENT
Start: 2022-02-26 | End: 2022-02-28

## 2022-02-26 RX ORDER — FUROSEMIDE 10 MG/ML
20 INJECTION INTRAMUSCULAR; INTRAVENOUS 2 TIMES DAILY
Status: CANCELLED | OUTPATIENT
Start: 2022-02-26

## 2022-02-26 RX ORDER — FUROSEMIDE 10 MG/ML
40 INJECTION INTRAMUSCULAR; INTRAVENOUS ONCE
Status: COMPLETED | OUTPATIENT
Start: 2022-02-26 | End: 2022-02-26

## 2022-02-26 RX ORDER — PREDNISONE 20 MG/1
40 TABLET ORAL DAILY
Qty: 6 TABLET | Refills: 0 | Status: SHIPPED | OUTPATIENT
Start: 2022-02-26 | End: 2022-02-28 | Stop reason: DRUGHIGH

## 2022-02-26 RX ORDER — ONDANSETRON 2 MG/ML
4 INJECTION INTRAMUSCULAR; INTRAVENOUS EVERY 6 HOURS PRN
Status: CANCELLED | OUTPATIENT
Start: 2022-02-26

## 2022-02-26 RX ORDER — IPRATROPIUM BROMIDE AND ALBUTEROL SULFATE 2.5; .5 MG/3ML; MG/3ML
1 SOLUTION RESPIRATORY (INHALATION) ONCE
Status: COMPLETED | OUTPATIENT
Start: 2022-02-26 | End: 2022-02-26

## 2022-02-26 RX ORDER — ACETAMINOPHEN 325 MG/1
650 TABLET ORAL EVERY 6 HOURS PRN
Status: CANCELLED | OUTPATIENT
Start: 2022-02-26

## 2022-02-26 RX ORDER — IPRATROPIUM BROMIDE AND ALBUTEROL SULFATE 2.5; .5 MG/3ML; MG/3ML
1 SOLUTION RESPIRATORY (INHALATION) EVERY 4 HOURS PRN
Status: CANCELLED | OUTPATIENT
Start: 2022-02-26

## 2022-02-26 RX ORDER — TAMSULOSIN HYDROCHLORIDE 0.4 MG/1
0.4 CAPSULE ORAL DAILY
Status: CANCELLED | OUTPATIENT
Start: 2022-02-26

## 2022-02-26 RX ORDER — PREDNISONE 20 MG/1
40 TABLET ORAL DAILY
Status: CANCELLED | OUTPATIENT
Start: 2022-02-28

## 2022-02-26 RX ORDER — NICOTINE POLACRILEX 4 MG
15 LOZENGE BUCCAL PRN
Status: CANCELLED | OUTPATIENT
Start: 2022-02-26

## 2022-02-26 RX ORDER — DEXTROSE MONOHYDRATE 25 G/50ML
12.5 INJECTION, SOLUTION INTRAVENOUS PRN
Status: CANCELLED | OUTPATIENT
Start: 2022-02-26

## 2022-02-26 RX ORDER — SODIUM CHLORIDE 0.9 % (FLUSH) 0.9 %
5-40 SYRINGE (ML) INJECTION EVERY 12 HOURS SCHEDULED
Status: CANCELLED | OUTPATIENT
Start: 2022-02-26

## 2022-02-26 RX ORDER — SODIUM CHLORIDE 9 MG/ML
25 INJECTION, SOLUTION INTRAVENOUS PRN
Status: CANCELLED | OUTPATIENT
Start: 2022-02-26

## 2022-02-26 RX ADMIN — FUROSEMIDE 40 MG: 10 INJECTION, SOLUTION INTRAMUSCULAR; INTRAVENOUS at 06:29

## 2022-02-26 RX ADMIN — PREDNISONE 50 MG: 5 TABLET ORAL at 05:51

## 2022-02-26 RX ADMIN — IPRATROPIUM BROMIDE AND ALBUTEROL SULFATE 1 AMPULE: .5; 3 SOLUTION RESPIRATORY (INHALATION) at 06:01

## 2022-02-26 ASSESSMENT — ENCOUNTER SYMPTOMS
NAUSEA: 0
CHOKING: 0
BACK PAIN: 0
EYE ITCHING: 0
PHOTOPHOBIA: 0
WHEEZING: 1
EYE REDNESS: 0
SHORTNESS OF BREATH: 1
CONSTIPATION: 0
CHEST TIGHTNESS: 0
EYE PAIN: 0
VOMITING: 0
BLOOD IN STOOL: 0
STRIDOR: 0
RECTAL PAIN: 0
ABDOMINAL DISTENTION: 0
DIARRHEA: 0
COLOR CHANGE: 0
COUGH: 1
ABDOMINAL PAIN: 0
EYE DISCHARGE: 0
APNEA: 0
ANAL BLEEDING: 0

## 2022-02-26 NOTE — ED NOTES
Pt ambulated on room air per Dr. Neil Neff request. Pts O2 91-92% the entire time. Pts she he walked further now than he has at home recently.       Eneida Patterson RN  02/26/22 4721

## 2022-02-26 NOTE — ED PROVIDER NOTES
[address] [facility]      Pt Name: Wan Ames  MRN: [id number]  [place] 1940  Date of evaluation: 2/26/2022  Provider: Stephanie Barber MD    CHIEF COMPLAINT       Chief Complaint   Patient presents with    Shortness of Breath       HISTORY OF PRESENT ILLNESS    Wan Ames is a 80 y.o. male who presents to the emergency department with shortness of breath. Patient presents with shortness of breath from home. Positive for wheezing. Positive for dry cough. Endorses bilateral leg swelling. States symptoms really been going on the last few days but worsened tonight. Denies any history of congestive heart failure. No chest pain. Denies fevers or chills. Exertion make symptoms worse. Rest makes symptoms better. No other associated symptoms    Nursing Notes were reviewed. Including nursing noted for FM, Surgical History, Past Medical History, Social History, vitals, and allergies; agree with all. REVIEW OF SYSTEMS       Review of Systems   Constitutional: Negative for activity change, appetite change, chills, diaphoresis, fatigue, fever and unexpected weight change. HENT: Negative for congestion, dental problem, drooling, ear discharge and ear pain. Eyes: Negative for photophobia, pain, discharge, redness, itching and visual disturbance. Respiratory: Positive for cough, shortness of breath and wheezing. Negative for apnea, choking, chest tightness and stridor. Cardiovascular: Negative for chest pain, palpitations and leg swelling. Gastrointestinal: Negative for abdominal distention, abdominal pain, anal bleeding, blood in stool, constipation, diarrhea, nausea, rectal pain and vomiting. Endocrine: Negative for cold intolerance and heat intolerance. Genitourinary: Negative for decreased urine volume and urgency. Musculoskeletal: Negative for arthralgias and back pain. Skin: Negative for color change and pallor. Neurological: Negative for tremors, facial asymmetry and weakness. Hematological: Negative for adenopathy. Does not bruise/bleed easily. Psychiatric/Behavioral: Negative for agitation, behavioral problems, confusion and decreased concentration. Except as noted above the remainder of the review of systems was reviewed and negative. PAST MEDICAL HISTORY     Past Medical History:   Diagnosis Date    Alcohol use     excess in past prior to CABG    AMD (age related macular degeneration) bilateral     Drusen and subretinal fluid on right, drusen on left, with poorly controlled diabetes and patient smoking    Benign essential hypertension     Mod concentric LVH    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 Ruptured disc    Diverticulosis of colon 10/30/2012    Dry eye syndrome of bilateral lacrimal glands     Hip problem 1956    NUMBNESS W/ TRAUMA    Hypercholesterolemia     Impotence     Metabolic syndrome     Mild diastolic dysfunction 01/76/3441    LVEF 55-60%    Obstructive sleep apnea     Obstructive sleep apnea     C-PAP    Osteoarthritis of shoulder     left  @ ac jt/impingement.     Pulmonary hypertension ([address] 10/21/2013    37    Recurrent BCC (basal cell carcinoma)     S/P aortic valve replacement 11/12/2013    Submandibular duct obstruction - right 10/2/2017    12 x 14 x 18 mm right submandibular gland stone causing submandibular duct dilatation and gland prominence    Tobacco use disorder 1957    Type II or unspecified type diabetes mellitus without mention of complication, not stated as uncontrolled 2003    Vitamin D deficiency SURGICAL HISTORY       Past Surgical History:   Procedure Laterality Date    AORTIC VALVE REPLACEMENT  11/12/2013    BLEPHAROPLASTY  07/2007    UPPER LID    BONY PELVIS SURGERY  01/12/2021    SI joint    CARDIAC CATHETERIZATION  11/08/2013    dx    CARDIAC SURGERY  11/12/2013    reincised for bleeding    CATARACT REMOVAL WITH IMPLANT Right 01/2019    CATARACT REMOVAL WITH IMPLANT Left 02/2019    CERVICAL DISC SURGERY Right 2015    epidural steroids.  CORONARY ARTERY BYPASS GRAFT  11/12/2013    4 V    EYE SURGERY Bilateral     Laser peripheral iridotomy     FINGER TRIGGER RELEASE Right 08/19/2014    Release of trigger thumb (stenosing tenosynovitis)    HEMORRHOID SURGERY  [id number]    LAPAROSCOPIC APPENDECTOMY  11/14/2014    LUMBAR LAMINECTOMY  1998 & 2003    LUMBAR LAMINECTOMY  05/07/2014    L2-L3 & Repair dural tear x2.  LUMBAR SPINE SURGERY  08/04/2009    SPINaL CORD STIMULATOR FOR PAIN    LUMBAR SPINE SURGERY  08/2014    epidural steroids. 3/2017 NEAL, 10/21/19    LUMBAR SPINE SURGERY Right 04/23/2019    SPINAL CORD STIMULATOR BATTERY REPLACEMENT WITH POCKET REVISION     MOHS SURGERY Left 02/11/2020    Dorsum of hand: type?  MOHS SURGERY Right 01/01/2020    right upper inner pinna with skin graft    NERVE SURGERY  ~12/2012    nerve block     ROTATOR CUFF REPAIR  2001    SKIN CANCER EXCISION Right 07/2017    forehead THEN TOP OF THE HEAD.     SKIN CANCER EXCISION  01/2019    nose    SOFT TISSUE BIOPSY      needle bx right neck mass -benign    TEAR DUCT SURGERY Bilateral 01/01/2015    for dry    TESTICLE REMOVAL  1947    R    TONSILLECTOMY      CHILD    UMBILICAL HERNIA REPAIR  11/14/2014    Laparoscopic       CURRENT MEDICATIONS       Previous Medications    ALBUTEROL SULFATE HFA (VENTOLIN HFA) 108 (90 BASE) MCG/ACT INHALER    Inhale 2 puffs into the lungs 4 times daily as needed for Wheezing    AMLODIPINE (NORVASC) 2.5 MG TABLET    TAKE 1 TABLET BY MOUTH DAILY    ASPIRIN 81 MG TABLET    Take 81 mg by mouth daily. ATORVASTATIN (LIPITOR) 40 MG TABLET    TAKE 1 TABLET DAILY    DOCUSATE SODIUM (COLACE) 100 MG CAPSULE    Take 200 mg by mouth daily Indications: Constipation    FLUTICASONE-UMECLIDIN-VILANT (TRELEGY ELLIPTA) 100-62.5-25 MCG/INH AEPB    Inhale 1 puff into the lungs daily    FREESTYLE LANCETS MISC    USE TO TEST TWICE DAILY    FREESTYLE LITE STRIP    TEST TWICE DAILY    GLIPIZIDE (GLUCOTROL) 5 MG TABLET    TAKE 1/2 TO 1 TABLET TWO   TIMES A DAY BEFORE MEALS    HYDROCODONE-ACETAMINOPHEN (NORCO)  MG PER TABLET    TK 1 T PO  QID PRN P    METFORMIN (GLUCOPHAGE) 1000 MG TABLET    TAKE 1 TABLET TWICE DAILY  WITH MEALS    METOPROLOL TARTRATE (LOPRESSOR) 25 MG TABLET    TAKE 1/2 TABLET TWICE A DAY    SPACER/AERO-HOLDING CHAMBERS JANET    With all spray inhalers. TAMSULOSIN (FLOMAX) 0.4 MG CAPSULE    TAKE 1 CAPSULE BY MOUTH DAILY       ALLERGIES     Patient has no known allergies.     FAMILY HISTORY        Family History   Problem Relation Age of Onset    Other Mother         CABG    Heart Surgery Mother 66        CABG    Sudden Death Father     Stroke Sister 77        blinded pt in 1 eye, then stroke in the other   Flores Cancer Sister         pancreatic cancer    No Known Problems Sister     Coronary Art Dis Brother     Diabetes Brother     Other Brother         Cellulitis    Other Daughter         DDD L spine    Other Son         DDD L spine    Heart Attack Maternal Aunt     Heart Attack Maternal Uncle        SOCIAL HISTORY       Social History     Socioeconomic History    Marital status:      Spouse name: [place]    Number of children: 2    Years of education: 15    Highest education level: Not on file   Occupational History    Occupation: [employer] -retired     Comment: 10/1/1997   Tobacco Use    Smoking status: Current Every Day Smoker     Packs/day: 1.50     Years: 60.00     Pack years: 90.00     Types: Cigarettes     Start date: 5/9/1954    Smokeless tobacco: Never Used    Tobacco comment: Started 17 y/o. Quit 11/8/13. 1 PPD. 1025/19. 2/18/20.5/18/20.7/12/21.11/15/21. Vaping Use    Vaping Use: Never used   Substance and Sexual Activity    Alcohol use: No     Alcohol/week: 1.0 standard drink     Types: 1 Standard drinks or equivalent per week     Comment: occ    Drug use: No     Comment: Never tried MJ.   [facility] Sexual activity: Not on file   Other Topics Concern    Not on file   Social History Narrative    No exercise. 12/21/16.  4/6/17, 6/19/17, 8/14/17. 9/27/27. 12/4/17. 8/27/18. 10/22/18. 4/24/19. 7/30/19. 1025/19.2/18/20. 7/12/21.11/15/21. Social Determinants of Health     Financial Resource Strain: Low Risk     Difficulty of Paying Living Expenses: Not hard at all   Food Insecurity: No Food Insecurity    Worried About Running Out of Food in the Last Year: Never true    Magen of Food in the Last Year: Never true   Transportation Needs: No Transportation Needs    Lack of Transportation (Medical): No    Lack of Transportation (Non-Medical):  No   Physical Activity:     Days of Exercise per Week: Not on file    Minutes of Exercise per Session: Not on file   Stress:     Feeling of Stress : Not on file   Social Connections:     Frequency of Communication with Friends and Family: Not on file    Frequency of Social Gatherings with Friends and Family: Not on file    Attends Jewish Services: Not on file    Active Member of Clubs or Organizations: Not on file    Attends Club or Organization Meetings: Not on file    Marital Status: Not on file   Intimate Partner Violence:     Fear of Current or Ex-Partner: Not on file    Emotionally Abused: Not on file    Physically Abused: Not on file    Sexually Abused: Not on file   Housing Stability:     Unable to Pay for Housing in the Last Year: Not on file    Number of [place] in the Last Year: Not on file    Unstable Housing in the Last Year: Not on file       PHYSICAL EXAM       ED Triage Vitals [02/26/22 0535]   BP Temp Temp src Pulse Resp SpO2 Height Weight   (!) 158/80 97.9 °F (36.6 °C) -- 58 20 -- 5' 6\" (1.676 m) 242 lb 4.6 oz (109.9 kg)       Physical Exam  Vitals and nursing note reviewed. Constitutional:       General: He is not in acute distress. Appearance: He is well-developed. He is ill-appearing. He is not diaphoretic. HENT:      Head: Normocephalic and atraumatic. Eyes:      General:         Right eye: No discharge. Left eye: No discharge. Pupils: Pupils are equal, round, and reactive to light. Neck:      Thyroid: No thyromegaly. Trachea: No tracheal deviation. Cardiovascular:      Rate and Rhythm: Normal rate and regular rhythm. Heart sounds: No murmur heard. Pulmonary:      Breath sounds: Wheezing and rales present. Chest:      Chest wall: No tenderness. Abdominal:      General: There is no distension. Palpations: Abdomen is soft. There is no mass. Tenderness: There is no abdominal tenderness. There is no guarding or rebound. Musculoskeletal:         General: No tenderness or deformity. Normal range of motion. Cervical back: Normal range of motion. Skin:     General: Skin is warm. Coloration: Skin is not pale. Findings: No erythema or rash. Neurological:      Mental Status: He is alert. Cranial Nerves: No cranial nerve deficit. Motor: No abnormal muscle tone. Coordination: Coordination normal.         DIAGNOSTIC RESULTS     RADIOLOGY:   Non-plain film images such as CT, Ultrasoundand MRI are read by the radiologist. Plain radiographic images are visualized and preliminarily interpreted by the emergency physician with the below findings:    Impression   Layering small to moderate pleural effusions likely with some associated   basilar atelectasis.  Mild pulmonary vascular congestion is present.      ED BEDSIDE ULTRASOUND:   Performed by ED Physician - none    LABS:  Labs Reviewed   CBC WITH AUTO DIFFERENTIAL - Abnormal; Notable for the following components:       Result Value    Hemoglobin 13.2 (*)     RDW 15.5 (*)     All other components within normal limits    Narrative:     Performed at:  [address] [facility]   Phone [phone]   BASIC METABOLIC PANEL W/ REFLEX TO MG FOR LOW K - Abnormal; Notable for the following components:    Potassium reflex Magnesium 5.7 (*)     Glucose 187 (*)     All other components within normal limits    Narrative:     Performed at:  [address] [address]   Phone [phone]   BRAIN NATRIURETIC PEPTIDE - Abnormal; Notable for the following components:    Pro-BNP 1,291 (*)     All other components within normal limits    Narrative:     Performed at:  [address] [facility]   Phone [phone]   BLOOD GAS, VENOUS - Abnormal; Notable for the following components:    pH, Royce 7.336 (*)     pCO2, Royce 60.7 (*)     HCO3, Venous 32 (*)     All other components within normal limits    Narrative:     Performed at:  [address] [address]   Phone [phone] RAPID    Narrative:     Performed at:  [address] [address]   Phone [phone]   TROPONIN    Narrative:     Performed at:  [facility]  [address] [place] [address]   Phone [phone]       All other labs were withinnormal range or not returned as of this dictation.     EMERGENCY DEPARTMENT COURSE and DIFFERENTIAL DIAGNOSIS/MDM:     PMH, Surgical Hx, FH, Social Hx reviewed by myself (ETOH usage, Tobacco usage, Drug usage reviewed by myself, no pertinent Hx)- No Pertinent Hx     Old records were reviewed by me 81-year-old acute pulmonary edema, hypoxia, COPD exacerbation. Nebs, steroids given for COPD. Lasix initiated for diuresis. Patient is hypoxic requiring nasal cannula. This is new. Will be admitted for hypoxia. CRITICAL CARE TIME   Total Critical Caretime was 39 minutes, excluding separately reportable procedures. There was a high probability of clinically significant/life threatening deterioration in the patient's condition which required my urgent intervention. PROCEDURES:  Unlessotherwise noted below, none    FINAL IMPRESSION      1. Hypoxia    2. COPD exacerbation ([facility]    3. Acute pulmonary edema ([facility]          DISPOSITION/PLAN   DISPOSITION Admitted 02/26/2022 06:29:47 AM    PATIENT REFERRED TO:  No follow-up provider specified.     DISCHARGE MEDICATIONS:  New Prescriptions    No medications on file          (Please note that portions ofthis note were completed with a voice recognition program.  Efforts were made to edit the dictations but occasionally words are mis-transcribed.)    Elsy Manrique MD(electronically signed)  Attending Emergency Physician       Elsy Manrique MD  02/26/22 4865

## 2022-02-26 NOTE — ED PROVIDER NOTES
0940: This patient is an admitted patient in the emergency department. Patient was checked out with me by Dr. Jorge Meléndez who cared for him on night shift. He was admitted for COPD exacerbation, congestive heart failure, hypoxia. He is not on home oxygen. He is on supplemental oxygen here. Apparently according to the nursing staff when he was assigned to room he decided that he did not want to stay in the hospital.  The nursing staff notified the hospitalist.  The hospitalist inform the nursing staff that the patient would have to sign out AMA. They apparently could not come down to see him for an hour or 2. She asked me to speak with the patient. I went in and evaluated the patient. General: Alert morbidly obese male in no obvious distress. Heart: Regular rate and rhythm. No murmurs or gallops noted. Lungs: Breath sounds decreased bilaterally with some minimal expiratory wheezing. Few basilar rales. Abdomen: Obese, soft, nontender. I reviewed his chart. I reviewed his laboratory and chest x-ray. I had the nurse discontinue his oxygen. His oxygen saturations remained in the low 90s. I had the nurse ambulate him. His oxygen saturation remained in the low 90s, as low as 92, but he was significantly dyspneic when he walked back to the bed. I again recommended admission to the patient. He does not want to stay. He wants to sign out [address] He has the capacity to make this decision. He is alert and oriented and appropriate. I explained to him the risk of leaving included but not limited to increased difficulty breathing, respiratory failure, cardiac arrhythmia, and death. He understands the risk and wants to sign out [address]     In order to prevent worsening of his symptoms I am going to prescribe short-term prednisone for his COPD exacerbation and some Lasix for a couple of days until he can get into see his primary care provider early this week or if he decides to return here for admission/further evaluation. Diagnosis: COPD exacerbation  Acute pulmonary edema  Hypoxia    Disposition: AMA. Romayne Kitchens, MD  02/26/22 8287

## 2022-02-26 NOTE — ED TRIAGE NOTES
Shortness of breath that started three to four months ago, got worse tonight. Short of breath walking twenty feet. Sitting up on the couch trying to sleep.

## 2022-02-26 NOTE — ED NOTES
Pt sts he was unaware of his admission status and does not wish to stay. Pt sts he has made up his mind that he is going home.  Writer sent message to hospitalist.      Young Vega RN  02/26/22 1790

## 2022-02-26 NOTE — ED NOTES
Report given to Replaced by [facility]. All questions answered and pt to go to floor.       Corie Moreno RN  02/26/22 1441

## 2022-02-26 NOTE — ED NOTES
Writer received message back from hospitalist \"Ok. I havent seen that patient. Please talk to the Ed provider. It sounds ir could go ama\"  Dr. Briseno Hum informed.         Pricila Velazquez RN  02/26/22 0895

## 2022-02-28 ENCOUNTER — CARE COORDINATION (OUTPATIENT)
Dept: CASE MANAGEMENT | Age: 82
End: 2022-02-28

## 2022-02-28 ENCOUNTER — OFFICE VISIT (OUTPATIENT)
Dept: FAMILY MEDICINE CLINIC | Age: 82
End: 2022-02-28
Payer: MEDICARE

## 2022-02-28 VITALS
HEIGHT: 66 IN | WEIGHT: 238 LBS | HEART RATE: 91 BPM | BODY MASS INDEX: 38.25 KG/M2 | RESPIRATION RATE: 20 BRPM | OXYGEN SATURATION: 92 % | SYSTOLIC BLOOD PRESSURE: 120 MMHG | DIASTOLIC BLOOD PRESSURE: 70 MMHG

## 2022-02-28 DIAGNOSIS — I27.20 PULMONARY HYPERTENSION (HCC): ICD-10-CM

## 2022-02-28 DIAGNOSIS — I48.0 PAROXYSMAL ATRIAL FIBRILLATION (HCC): ICD-10-CM

## 2022-02-28 DIAGNOSIS — R09.89 PULMONARY VASCULAR CONGESTION: ICD-10-CM

## 2022-02-28 DIAGNOSIS — J44.1 COPD EXACERBATION (HCC): Primary | ICD-10-CM

## 2022-02-28 LAB
EKG ATRIAL RATE: 68 BPM
EKG DIAGNOSIS: NORMAL
EKG Q-T INTERVAL: 466 MS
EKG QRS DURATION: 134 MS
EKG QTC CALCULATION (BAZETT): 461 MS
EKG R AXIS: -60 DEGREES
EKG T AXIS: 34 DEGREES
EKG VENTRICULAR RATE: 59 BPM

## 2022-02-28 PROCEDURE — G8417 CALC BMI ABV UP PARAM F/U: HCPCS | Performed by: FAMILY MEDICINE

## 2022-02-28 PROCEDURE — 99215 OFFICE O/P EST HI 40 MIN: CPT | Performed by: FAMILY MEDICINE

## 2022-02-28 PROCEDURE — 1123F ACP DISCUSS/DSCN MKR DOCD: CPT | Performed by: FAMILY MEDICINE

## 2022-02-28 PROCEDURE — 4040F PNEUMOC VAC/ADMIN/RCVD: CPT | Performed by: FAMILY MEDICINE

## 2022-02-28 PROCEDURE — 93010 ELECTROCARDIOGRAM REPORT: CPT | Performed by: INTERNAL MEDICINE

## 2022-02-28 PROCEDURE — G8427 DOCREV CUR MEDS BY ELIG CLIN: HCPCS | Performed by: FAMILY MEDICINE

## 2022-02-28 PROCEDURE — 1111F DSCHRG MED/CURRENT MED MERGE: CPT | Performed by: FAMILY MEDICINE

## 2022-02-28 PROCEDURE — 3023F SPIROM DOC REV: CPT | Performed by: FAMILY MEDICINE

## 2022-02-28 PROCEDURE — 4004F PT TOBACCO SCREEN RCVD TLK: CPT | Performed by: FAMILY MEDICINE

## 2022-02-28 PROCEDURE — G8484 FLU IMMUNIZE NO ADMIN: HCPCS | Performed by: FAMILY MEDICINE

## 2022-02-28 RX ORDER — FUROSEMIDE 40 MG/1
TABLET ORAL
Qty: 60 TABLET | Refills: 0 | Status: SHIPPED | OUTPATIENT
Start: 2022-02-28 | End: 2022-04-04

## 2022-02-28 RX ORDER — PREDNISONE 20 MG/1
60 TABLET ORAL DAILY
Qty: 15 TABLET | Refills: 0 | Status: SHIPPED | OUTPATIENT
Start: 2022-02-28 | End: 2022-03-05

## 2022-02-28 RX ORDER — AZITHROMYCIN 250 MG/1
250 TABLET, FILM COATED ORAL SEE ADMIN INSTRUCTIONS
Qty: 6 TABLET | Refills: 0 | Status: SHIPPED | OUTPATIENT
Start: 2022-02-28 | End: 2022-03-05

## 2022-02-28 RX ORDER — FUROSEMIDE 40 MG/1
40 TABLET ORAL 2 TIMES DAILY
Qty: 60 TABLET | Refills: 2 | Status: SHIPPED | OUTPATIENT
Start: 2022-02-28 | End: 2022-02-28

## 2022-02-28 ASSESSMENT — PATIENT HEALTH QUESTIONNAIRE - PHQ9
SUM OF ALL RESPONSES TO PHQ QUESTIONS 1-9: 0
1. LITTLE INTEREST OR PLEASURE IN DOING THINGS: 0
SUM OF ALL RESPONSES TO PHQ QUESTIONS 1-9: 0
2. FEELING DOWN, DEPRESSED OR HOPELESS: 0
SUM OF ALL RESPONSES TO PHQ9 QUESTIONS 1 & 2: 0

## 2022-02-28 NOTE — CARE COORDINATION
[facility] Transitions Initial Follow Up Call    Call within 2 business days of discharge: Yes    Patient: Ivan Orosco Patient : 1940   MRN: [id number]  Reason for Admission: COPD exacerbation  Discharge Date: 22 RARS: Readmission Risk Score: 12 ( )      Last Discharge [facility]       Complaint Diagnosis Description Type Department Provider    22 Shortness of Breath Hypoxia . .. ED [facility]) [facility] ED Michael Magana MD; [facility] . .. Spoke with: NA    Facility: [facility]    Upon chart review it is noted that patient is at PCP f/u. Will attempt outreach later in the day.     Kassi Covarrubias LPN [address] [place] [phone]    Care Transitions 24 Hour Call    Do you have all of your prescriptions and are they filled?: No  Do you have support at home?: Partner/Spouse/SO  Are you an active caregiver in your home?: No  Care Transitions Interventions         Follow Up  Future Appointments   Date Time Provider Theo Cruz   2022 11:00 AM MD Bernabe Sanchez LPN

## 2022-02-28 NOTE — CARE COORDINATION
[facility]ie [facility] Transitions Initial Follow Up Call    Call within 2 business days of discharge: Yes    Patient: Brock Shah Patient : 1940   MRN: [id number]  Reason for Admission: Hypoxia  Discharge Date: 22 RARS: Readmission Risk Score: 12 ( )      Last Discharge [facility]       Complaint Diagnosis Description Type Department Provider    22 Shortness of Breath Hypoxia . .. ED [facility]) [facility] ED Artie Underwood MD; Blair Rice . .. Spoke with: Brock Shah - patient    Facility: [facility]    Initial attempt at [address] [employer] discharge phone call. Spoke briefly with Iris Garrisno. He declines completing CT discharge phone call or any additional CT follow up \" I don't need any follow up right now\". He declined taking writer's contact info.         Follow Up  Future Appointments   Date Time Provider Theo Cruz   2022 11:00 AM Silvana Guerra MD [facility]       Brunilda Martin RN BSN  Care Transition Nurse  [phone]

## 2022-02-28 NOTE — PROGRESS NOTES
Patric Dhillon (:  1940) is a 80 y.o. male,Established patient, here for evaluation of the following chief complaint(s):  Follow-Up from Hospital ([facility] DUE TO SOB)        ASSESSMENT/PLAN:  101    Patient Instructions   Georgia Knowles was seen today for follow-up from hospital.    Diagnoses and all orders for this visit:    COPD exacerbation ([address]  -     predniSONE (DELTASONE) 20 MG tablet; Take 3 tablets by mouth daily for 5 days  -     fluticasone-salmeterol (ADVAIR HFA) 230-21 MCG/ACT inhaler; Inhale 2 puffs into the lungs 2 times daily  -     azithromycin (ZITHROMAX) 250 MG tablet; Take 1 tablet by mouth See Admin Instructions for 5 days 500mg on day 1 followed by 250mg on days 2 - 5  IF you develop ANY respiratory infection symptoms (not just allergy symptoms) suggestive of COVID-19 start the recommendations below: Instructions for Respiratory Infections (SAVE THIS SHEET)    For the first 7-14 days of symptoms follow instructions below, even before being seen in the office or even during treatment with antibiotics, until symptom free. 1. Water: Drink 1 ounce of water for every 2 pounds of body weight for adults, you need 48 Ounces of water/fluids per day. This will loosen mucus in the head and chest & improve the weak feeling of dehydration, allow the body to get germ fighting resources to the infection. Half of fluids can be juice or sugar free Crystal Light. Don't count drinks with caffeine, alcohol or carbonation. Infants can have Pedialyte liquid or freezer pops. Avoid salt and sports drinks if you have high Blood Pressure, swelling in the feet or ankles or have heart problems. 2. Humidity: Humidify the air to 35-50% ( or until the windows fog over slightly). Can use a humidifier, vaporizer, boil water on the stove or put a coffee can full of water on the heater vents. This will loosen mucus from infections and allergies.   3. Sleep: Get 8-10 hours a night and rest during the evening after work or school. If you have trouble sleeping, adults can take Melatonin 5mg up to 2 tabs at bedtime ( not for children or pregnant women). If Mono is suspected then sleep during 9PM to 9AM time span (if possible.)  4. Cough: Take cough medicines with Guaifenesin ( to loosen chest or head congestion) and Dextromethorphan ( to decrease excess cough). Robitussin D.M. Syrup every 4-6 hrs OR Mucinex D. M. pills OR Delsym DM syrup twice a day. Use the pediatric formulations for children over 6 months making sure they are alcohol & sugar free for children, pregnant women, and diabetics. 5. Pain And Fevers: Take Acetaminophen ( Tylenol) for fevers, aches, and headaches. 2-500 mg every 8 hours for adults. Appropriate doses at bedtime for children may help them sleep better. If pregnant take 1 -500 mg (Tylenol) every 8 hours as needed. Ibuprofen/Aleve/aspirin for pain and fevers SHOULD NOT BE USED IN THE SETTING OF POSSIBLE COVID-19 viral infection NOR if pregnant, if you have acid reflux, high blood pressure, CHF, or kidney problems. 6.Gargle: (DAY ONE OF SYMPTOMS) Gargle in the back of the throat with the head tilted back and to the sides with a strong mouthwash  ( Listerine or Scope) after meals and at bedtime at least 4 -5 times a day. This helps kill bacteria and viruses in the back of the throat and will shorten the duration and decrease the severity of your symptoms: sore throat, cough, ear popping,/ear pain, and possibly dizziness. 7. Smoking: Avoid smoking or exposure to second hand smoke. 8. Zinc: (DAY ONE OF SYMPTOMS)  Zinc lozenges such as Cold Artem (available most stores), or Basic ([employer] brand) will help shorten the duration and lessen symptoms such as sore throat, cough, nasal congestion, runny nose, and post nasal drip. Use 1 lozenge every 2-4 hours ( after meals if stomach is sensitive). Children can use 10-15 mg or less 3-4 times a day or Zinc lollypops. In pregnancy limit to 50-60 mg a day for 7 days as prenatals have Zinc also. With diarrhea use zinc pills 50 mg 1/2 to 1 pill 2x/day WITH OR SHORTLY AFTER A MEAL. 9. Vitamins: Vitamin C 500 mg with breakfast and dinner (with suspected or diagnosed COVID-19 infection take vitamin C 1000 mg 2-3 times a day). Children and pregnant women should drink citrus juices. This speeds healing and strengthens immune system. 10. Chest Symptoms: Vicks Vapor rub to the chest at bedtime. 11. Decongestants: Avoid all decongestants and antihistamine cold preparations in children. Decongestants should always be avoided in people with high blood pressure, palpitations, heart disease and those on stimulant medications. Antihistamines may impede the body's ability to fight COVID-19.  12. Frequent hand washing and/or hand gel especially after coughing or sneezing into the hands or blowing the nose to help prevent spreading to others. Use kleenex and NOT handkerchiefs. Try all of the above starting with day 1 of symptoms. If Strep throat symptoms appear call to be seen in the office as soon as possible and don't gargle on that day. Newborns, infants, or anyone with earaches or influenza may need to be seen quickly. Adults with fevers over 103 degrees or shortness of breath should call the office immediately. Schedule with Dr Bridgette Olea. Pulmonary vascular congestion  -     furosemide (LASIX) 40 MG tablet; Take 1 tablet by mouth 2 times daily  Do daily weights. Schedule with Dr Richard Palacios cardiology. Pulmonary hypertension (HCC)  -     furosemide (LASIX) 40 MG tablet; Take 1 tablet by mouth 2 times daily    Paroxysmal atrial fibrillation (HCC)  NSR today. Patient Education   DASH Diet: Care Instructions  Your Care Instructions     The DASH diet is an eating plan that can help lower your blood pressure. DASH stands for Dietary Approaches to Stop Hypertension. Hypertension is high blood pressure.   The DASH diet focuses on eating foods that are high in calcium, potassium, and magnesium. These nutrients can lower blood pressure. The foods that are highest in these nutrients are fruits, vegetables, low-fat dairy products, nuts, seeds, and legumes. But taking calcium, potassium, and magnesium supplements instead of eating foods that are high in those nutrients does not have the same effect. The DASH diet also includes whole grains, fish, and poultry. The DASH diet is one of several lifestyle changes your doctor may recommend to lower your high blood pressure. Your doctor may also want you to decrease the amount of sodium in your diet. Lowering sodium while following the DASH diet can lower blood pressure even further than just the DASH diet alone. Follow-up care is a key part of your treatment and safety. Be sure to make and go to all appointments, and call your doctor if you are having problems. It's also a good idea to know your test results and keep a list of the medicines you take. How can you care for yourself at home? Following the DASH diet  · Eat 4 to 5 servings of fruit each day. A serving is 1 medium-sized piece of fruit, ½ cup chopped or canned fruit, 1/4 cup dried fruit, or 4 ounces (½ cup) of fruit juice. Choose fruit more often than fruit juice. · Eat 4 to 5 servings of vegetables each day. A serving is 1 cup of lettuce or raw leafy vegetables, ½ cup of chopped or cooked vegetables, or 4 ounces (½ cup) of vegetable juice. Choose vegetables more often than vegetable juice. · Get 2 to 3 servings of low-fat and fat-free dairy each day. A serving is 8 ounces of milk, 1 cup of yogurt, or 1 ½ ounces of cheese. · Eat 6 to 8 servings of grains each day. A serving is 1 slice of bread, 1 ounce of dry cereal, or ½ cup of cooked rice, pasta, or cooked cereal. Try to choose whole-grain products as much as possible. · Limit lean meat, poultry, and fish to 2 servings each day.  A serving is 3 ounces, about the size of a deck of cards.  · Eat 4 to 5 servings of nuts, seeds, and legumes (cooked dried beans, lentils, and split peas) each week. A serving is 1/3 cup of nuts, 2 tablespoons of seeds, or ½ cup of cooked beans or peas. · Limit fats and oils to 2 to 3 servings each day. A serving is 1 teaspoon of vegetable oil or 2 tablespoons of salad dressing. · Limit sweets and added sugars to 5 servings or less a week. A serving is 1 tablespoon jelly or jam, ½ cup sorbet, or 1 cup of lemonade. · Eat less than 2,300 milligrams (mg) of sodium a day. If you limit your sodium to 1,500 mg a day, you can lower your blood pressure even more. · Be aware that all of these are the suggested number of servings for people who eat 1,800 to 2,000 calories a day. Your recommended number of servings may be different if you need more or fewer calories. Tips for success  · Start small. Do not try to make dramatic changes to your diet all at once. You might feel that you are missing out on your favorite foods and then be more likely to not follow the plan. Make small changes, and stick with them. Once those changes become habit, add a few more changes. · Try some of the following:  ? Make it a goal to eat a fruit or vegetable at every meal and at snacks. This will make it easy to get the recommended amount of fruits and vegetables each day. ? Try yogurt topped with fruit and nuts for a snack or healthy dessert. ? Add lettuce, tomato, cucumber, and onion to sandwiches. ? Combine a ready-made pizza crust with low-fat mozzarella cheese and lots of vegetable toppings. Try using tomatoes, squash, spinach, broccoli, carrots, cauliflower, and onions. ? Have a variety of cut-up vegetables with a low-fat dip as an appetizer instead of chips and dip. ? Sprinkle sunflower seeds or chopped almonds over salads. Or try adding chopped walnuts or almonds to cooked vegetables. ? Try some vegetarian meals using beans and peas. Add garbanzo or kidney beans to salads. Make burritos and tacos with mashed nowak beans or black beans. Where can you learn more? Go to https://Bubblespepiceweb.Sponto. org and sign in to your [employer] account. Enter H365 in the [facility] box to learn more about \"DASH Diet: Care Instructions. \"     If you do not have an account, please click on the \"Sign Up Now\" link. Current as of: April 29, 2021               Content Version: 13.1  © 3207-6396 [employer]. Care instructions adapted under license by [facility] ([facility]). If you have questions about a medical condition or this instruction, always ask your healthcare professional. [address] any warranty or liability for your use of this information. Patient Education   Learning About Heart Failure Zones  What are heart failure zones? Heart failure zones give you an easy way to see changes in your heart failure symptoms. They also tell you when you need to get help. Check every day to see which zone you are in. Green zone. You are doing well. This is where you want to be. · Your weight is stable. It's not going up or down. · You breathe easily. · You are sleeping well. You are able to lie flat without shortness of breath. · You can do your usual activities. Yellow zone. Be careful. Your symptoms are changing. Call your doctor. · You have new or increased shortness of breath. · You are dizzy or lightheaded, or you feel like you may faint. · You have sudden weight gain, such as more than 2 to 3 pounds in a day or 5 pounds in a week. (Your doctor may suggest a different range of weight gain.)  · You have increased swelling in your legs, ankles, or feet. · You are so tired or weak that you can't do your usual activities. · You are not sleeping well. Shortness of breath wakes you up at night. You need extra pillows. Red zone. This is an emergency. Call 911. You have symptoms of sudden heart failure.  For example:  · You have severe trouble breathing. · You cough up pink, foamy mucus. · You have a new irregular or fast heartbeat. You have symptoms of a heart attack. These may include:  · Chest pain or pressure, or a strange feeling in the chest.  · Sweating. · Shortness of breath. · Nausea or vomiting. · Pain, pressure, or a strange feeling in the back, neck, jaw, or upper belly or in one or both shoulders or arms. · Lightheadedness or sudden weakness. · A fast or irregular heartbeat. If you have symptoms of a heart attack: After you call 911, the  may tell you to chew 1 adult-strength or 2 to 4 low-dose aspirin. Wait for an ambulance. Do not try to drive yourself. Follow-up care is a key part of your treatment and safety. Be sure to make and go to all appointments, and call your doctor if you are having problems. It's also a good idea to know your test results and keep a list of the medicines you take. Where can you learn more? Go to https://igadget.asia.ConcernTrak. org and sign in to your [employer]ena account. Enter T174 in the [facility] box to learn more about \"Learning About Heart Failure Zones. \"     If you do not have an account, please click on the \"Sign Up Now\" link. Current as of: April 29, 2021               Content Version: 13.1  © 4610-7100 [employer]. Care instructions adapted under license by [facility] ([facility]). If you have questions about a medical condition or this instruction, always ask your healthcare professional. [place] [address] any warranty or liability for your use of this information. Return in about 3 months (around 5/28/2022) for Diabetes, Hypertension, COPD, Cholesterol, CAD, ANNMARIE, Tobacco.     Subjective   SUBJECTIVE/OBJECTIVE:  Chief Complaint   Patient presents with    Follow-Up from [address] SOB   1210  HPI    1. Hypoxia    2. COPD exacerbation ([address]    3.  Acute pulmonary edema (HCC)    COPD  In ER he got Neb x 2 and felt better for a couple of days. He could get to the bathroom for a couple days without stopping to rest afterward. Had trouble getting there for 2-3 months. He saw Dr Heron Pulido. He gave him the Trellegy. It was no help. He could not blow his nose nor cough up anything. Did PFTs and has severe COPD. He thinks he is at the end of the road. CHF  Has gained 6-8 lbs on his home scale. Has swelling in ankles that was going up and down. Now it is swollen since in the ER. He is using his CPAP. He uses CPAP even when awake watching TV. 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 Ruptured disc    Diverticulosis of colon 10/30/2012    Dry eye syndrome of bilateral lacrimal glands     Hip problem 1956    NUMBNESS W/ TRAUMA    Hypercholesterolemia     Impotence     Metabolic syndrome     Mild diastolic dysfunction 73/93/6183    LVEF 55-60%    Obstructive sleep apnea     Obstructive sleep apnea     C-PAP    Osteoarthritis of shoulder     left  @ ac jt/impingement.     Pulmonary hypertension ([address] 10/21/2013    37    Recurrent BCC (basal cell carcinoma)     S/P aortic valve replacement 11/12/2013    Submandibular duct obstruction - right 10/2/2017    12 x 14 x 18 mm right submandibular gland stone causing submandibular duct dilatation Number of children: 2    Years of education: 15    Highest education level: Not on file   Occupational History    Occupation: [employer] hand [employer] -retired     Comment: 10/1/1997   Tobacco Use    Smoking status: Current Every Day Smoker     Packs/day: 1.50     Years: 60.00     Pack years: 90.00     Types: Cigarettes     Start date: 5/9/1954    Smokeless tobacco: Never Used    Tobacco comment: Started 17 y/o. Quit 11/8/13. 1 PPD. 1025/19. 2/18/20.5/18/20.7/12/21.11/15/21.2/28/22. Vaping Use    Vaping Use: Never used   Substance and Sexual Activity    Alcohol use: No     Alcohol/week: 1.0 standard drink     Types: 1 Standard drinks or equivalent per week     Comment: occ    Drug use: No     Comment: Never tried MJ.   [place] Sexual activity: Not on file   Other Topics Concern    Not on file   Social History Narrative    No exercise. 12/21/16.  4/6/17, 6/19/17, 8/14/17. 9/27/27. 12/4/17. 8/27/18. 10/22/18. 4/24/19. 7/30/19. 1025/19.2/18/20. 7/12/21.11/15/21.2/28/22. Social Determinants of Health     Financial Resource Strain: Low Risk     Difficulty of Paying Living Expenses: Not hard at all   Food Insecurity: No Food Insecurity    Worried About Running Out of Food in the Last Year: Never true    Magen of Food in the Last Year: Never true   Transportation Needs: No Transportation Needs    Lack of Transportation (Medical): No    Lack of Transportation (Non-Medical):  No   Physical Activity:     Days of Exercise per Week: Not on file    Minutes of Exercise per Session: Not on file   Stress:     Feeling of Stress : Not on file   Social Connections:     Frequency of Communication with Friends and Family: Not on file    Frequency of Social Gatherings with Friends and Family: Not on file    Attends Scientology Services: Not on file    Active Member of Clubs or Organizations: Not on file    Attends Club or Organization Meetings: Not on file    Marital Status: Not on file   Intimate Partner Violence:  Fear of Current or Ex-Partner: Not on file    Emotionally Abused: Not on file    Physically Abused: Not on file    Sexually Abused: Not on file   Housing Stability:     Unable to Pay for Housing in the Last Year: Not on file    Number of Places Lived in the Last Year: Not on file    Unstable Housing in the Last Year: Not on file       Family History   Problem Relation Age of Onset    Other Mother         CABG    Heart Surgery Mother 66        CABG    Sudden Death Father     Stroke Sister 77        blinded pt in 1 eye, then stroke in the other    Cancer Sister         pancreatic cancer    No Known Problems Sister     Coronary Art Dis Brother     Diabetes Brother     Other Brother         Cellulitis    Other Daughter         DDD L spine    Other Son         DDD L spine    Heart Attack Maternal Aunt     Heart Attack Maternal Uncle        No Known Allergies    Current Outpatient Medications   Medication Sig Dispense Refill    predniSONE (DELTASONE) 20 MG tablet Take 2 tablets by mouth daily for 3 days 6 tablet 0    furosemide (LASIX) 20 MG tablet Take 1 tablet by mouth 2 times daily 3 tablet 0    atorvastatin (LIPITOR) 40 MG tablet TAKE 1 TABLET DAILY 30 tablet 0    fluticasone-umeclidin-vilant (TRELEGY ELLIPTA) 100-62.5-25 MCG/INH AEPB Inhale 1 puff into the lungs daily 1 each 11    tamsulosin (FLOMAX) 0.4 MG capsule TAKE 1 CAPSULE BY MOUTH DAILY 90 capsule 0    amLODIPine (NORVASC) 2.5 MG tablet TAKE 1 TABLET BY MOUTH DAILY 90 tablet 0    metFORMIN (GLUCOPHAGE) 1000 MG tablet TAKE 1 TABLET TWICE DAILY  WITH MEALS 60 tablet 2    glipiZIDE (GLUCOTROL) 5 MG tablet TAKE 1/2 TO 1 TABLET TWO   TIMES A DAY BEFORE MEALS 180 tablet 1    metoprolol tartrate (LOPRESSOR) 25 MG tablet TAKE 1/2 TABLET TWICE A DAY 90 tablet 1    albuterol sulfate HFA (VENTOLIN HFA) 108 (90 Base) MCG/ACT inhaler Inhale 2 puffs into the lungs 4 times daily as needed for Wheezing 54 g 0    FREESTYLE LITE strip TEST TWICE DAILY 200 strip 3    Spacer/Aero-Holding Chambers JANET With all spray inhalers. 1 Device 0    FreeStyle Lancets MISC USE TO TEST TWICE DAILY 200 each 3    HYDROcodone-acetaminophen (NORCO)  MG per tablet TK 1 T PO  QID PRN P  0    docusate sodium (COLACE) 100 MG capsule Take 200 mg by mouth daily Indications: Constipation      aspirin 81 MG tablet Take 81 mg by mouth daily. No current facility-administered medications for this visit. Objective   Physical Exam  Vitals and nursing note reviewed. Constitutional:       General: He is in acute distress ( Mild respiratory distress). Appearance: Normal appearance. He is obese. He is ill-appearing. He is not toxic-appearing or diaphoretic. Eyes:      General: Gaze aligned appropriately. No scleral icterus. Right eye: No discharge. Left eye: No discharge. Neck:      Thyroid: No thyroid mass, thyromegaly or thyroid tenderness. Vascular: Carotid bruit (Left vs transmitted heart murmur) present. Cardiovascular:      Rate and Rhythm: Normal rate and regular rhythm. Heart sounds: S1 normal and S2 normal. Murmur heard. Crescendo systolic murmur is present with a grade of 3/6. No diastolic murmur is present. No friction rub. No gallop. No S3 or S4 sounds. Comments: Edema at medial malleolus bilateral.  Pulmonary:      Effort: Tachypnea, accessory muscle usage, prolonged expiration, respiratory distress and retractions present. Breath sounds: Decreased air movement present. Examination of the right-upper field reveals decreased breath sounds, wheezing and rhonchi. Examination of the left-upper field reveals decreased breath sounds, wheezing and rhonchi. Examination of the right-middle field reveals decreased breath sounds, wheezing and rhonchi. Examination of the left-middle field reveals decreased breath sounds, wheezing and rhonchi.  Examination of the right-lower field reveals decreased breath sounds, wheezing and rhonchi. Examination of the left-lower field reveals decreased breath sounds, wheezing and rhonchi. Decreased breath sounds, wheezing and rhonchi present. No rales. Musculoskeletal:      Cervical back: Neck supple. Right lower le+ Pitting Edema present. Left lower le+ Pitting Edema present. Neurological:      Mental Status: He is alert. Psychiatric:         Attention and Perception: Attention and perception normal.         Mood and Affect: Mood and affect normal.         Speech: Speech normal.         Behavior: Behavior normal. Behavior is cooperative. Cognition and Memory: Cognition and memory normal.         Judgment: Judgment normal.        Vitals:    22 1055   BP: 120/70   Site: Left Upper Arm   Position: Sitting   Cuff Size: Medium Adult   Pulse: 91   Resp: 20   SpO2: 92%   Weight: 238 lb (108 kg)   Height: 5' 6\" (1.676 m)     BP Readings from Last 3 Encounters:   22 120/70   22 (!) 142/64   22 136/80     Pulse Readings from Last 3 Encounters:   22 91   22 58   22 78     Wt Readings from Last 3 Encounters:   22 238 lb (108 kg)   22 242 lb 4.6 oz (109.9 kg)   22 239 lb 12.8 oz (108.8 kg)     Body mass index is 38.41 kg/m².       11/15/2021 09:06   Sodium 143   Potassium 5.9 (H)   Chloride 102   CO2 28   BUN,BUNPL 16   Creatinine 1.1   Anion Gap 13   GFR Non- >60   GLUCOSE, FASTING, (H)   CALCIUM, SERUM, 413308 9.6   Total Protein 7.0   CHOLESTEROL, TOTAL, 127227 123   HDL Cholesterol 34 (L)   LDL Calculated 67   Triglycerides 109   VLDL Cholesterol Calculated 22   Albumin 4.5   Albumin/Globulin Ratio 1.8   Alk Phos 67   ALT 18   AST 12 (L)   Bilirubin 0.4   Vit D, 25-Hydroxy 28.8 (L)   WBC 8.0   RBC 4.48   Hemoglobin Quant 14.4   Hematocrit 43.4   MCV 96.7   MCH 32.1   MCHC 33.2   MPV 8.4   RDW 14.2   Platelet Count 841   Neutrophils % 61.7   Lymphocyte % 28.3   Monocytes % 5.9 Eosinophils % 3.1   Basophils % 1.0   Neutrophils Absolute 4.9   Lymphocytes Absolute 2.3   Monocytes Absolute 0.5   Eosinophils Absolute 0.2   Basophils Absolute 0.1   PLATELET SLIDE REVIEW Adequate      2/26/2022 05:30 2/26/2022 05:51   Sodium 140    Potassium 5.7 (H)    Chloride 102    CO2 28    BUN,BUNPL 18    Creatinine 0.9    Anion Gap 10    GFR Non- >60    GLUCOSE, FASTING, (H)    CALCIUM, SERUM, 450103 9.3    Pro-BNP 1,291 (H)    Troponin <0.01    WBC 5.8    RBC 4.32    Hemoglobin Quant 13.2 (L)    Hematocrit 41.0    MCV 94.9    MCH 30.5    MCHC 32.1    MPV 6.9    RDW 15.5 (H)    Platelet Count 481    Neutrophils % 68.9    Lymphocyte % 22.3    Monocytes % 6.9    Eosinophils % 1.2    Basophils % 0.7    Neutrophils Absolute 4.0    Lymphocytes Absolute 1.3    Monocytes Absolute 0.4    Eosinophils Absolute 0.1    Basophils Absolute 0.0    SARS-CoV-2, NAAT  Not Detected   Carboxyhemoglobin  5.6   O2 Therapy  Unknown   pH, Royce  7.336 (L)   pCO2, Royce  60.7 (H)   pO2, Royce  <30   HCO3, Venous  32 (H)   TC02 (Calc), Royce  [id number] Excess, Royce  4.8   MetHgb, Royce  0.6   O2 Sat, Royce  42     ONE XRAY VIEW OF THE CHEST  2/26/2022 5:28 am  COMPARISON:  05/27/2020  HISTORY:  ORDERING SYSTEM PROVIDED HISTORY: SOB  TECHNOLOGIST PROVIDED HISTORY:  Reason for exam:->SOB  Reason for Exam: sob     FINDINGS:  Sternotomy wires and external leads are present. Obscuration of the right  and left hemidiaphragm with blunting of the expected costophrenic angles. There is thickening of the minor fissure. Hazy increased density of the  lower hemithorax with a graded appearance suggesting layering pleural  effusion and basilar atelectasis. The upper lungs are clear. The cardiac  silhouette is not enlarged. However a component of mild vascular congestion  is suspected. There is no pneumothorax.     There are degenerative changes in the spine and a surgical anchor over the  right humeral head.   Nerve stimulator leads are present over the lower  thoracic spine.     IMPRESSION:  Layering small to moderate pleural effusions likely with some associated  basilar atelectasis. Mild pulmonary vascular congestion is present.                  Nuclear Myocardial Perfusion Imaging Stress Report   Name: He Lopez   : 1940                   Age: 68 yrs   EPI: [id number]              Gender: Male   Height: 79 in                     Weight: 230 lb   Accession Number: [id number]   Order Number: [id number]   Account Number: [[id number]]   Patient Location: [facility]   Study Date: 2021 10:15 AM     Interpetation Summary:   The LV EF is 72%   All segments are normal.   Subdiaphragmatic artifact.        o Negative nuclear stress imaging for inducible ischemia.      o Non-diagnostic ECG for ischemia with pharmocologic stress. An electronic signature was used to authenticate this note.     --Darshan Olivo, DO

## 2022-02-28 NOTE — PATIENT INSTRUCTIONS
Clementine Singh was seen today for follow-up from hospital.    Diagnoses and all orders for this visit:    COPD exacerbation ([facility] [address]  -     predniSONE (DELTASONE) 20 MG tablet; Take 3 tablets by mouth daily for 5 days  -     fluticasone-salmeterol (ADVAIR HFA) 230-21 MCG/ACT inhaler; Inhale 2 puffs into the lungs 2 times daily  -     azithromycin (ZITHROMAX) 250 MG tablet; Take 1 tablet by mouth See Admin Instructions for 5 days 500mg on day 1 followed by 250mg on days 2 - 5  IF you develop ANY respiratory infection symptoms (not just allergy symptoms) suggestive of COVID-19 start the recommendations below: Instructions for Respiratory Infections (SAVE THIS SHEET)    For the first 7-14 days of symptoms follow instructions below, even before being seen in the office or even during treatment with antibiotics, until symptom free. 1. Water: Drink 1 ounce of water for every 2 pounds of body weight for adults, you need 48 Ounces of water/fluids per day. This will loosen mucus in the head and chest & improve the weak feeling of dehydration, allow the body to get germ fighting resources to the infection. Half of fluids can be juice or sugar free Crystal Light. Don't count drinks with caffeine, alcohol or carbonation. Infants can have Pedialyte liquid or freezer pops. Avoid salt and sports drinks if you have high Blood Pressure, swelling in the feet or ankles or have heart problems. 2. Humidity: Humidify the air to 35-50% ( or until the windows fog over slightly). Can use a humidifier, vaporizer, boil water on the stove or put a coffee can full of water on the heater vents. This will loosen mucus from infections and allergies. 3. Sleep: Get 8-10 hours a night and rest during the evening after work or school. If you have trouble sleeping, adults can take Melatonin 5mg up to 2 tabs at bedtime ( not for children or pregnant women).  If Mono is suspected then sleep during 9PM to 9AM time span (if possible.)  4. Cough: Take cough medicines with Guaifenesin ( to loosen chest or head congestion) and Dextromethorphan ( to decrease excess cough). Robitussin D.M. Syrup every 4-6 hrs OR Mucinex D. M. pills OR Delsym DM syrup twice a day. Use the pediatric formulations for children over 6 months making sure they are alcohol & sugar free for children, pregnant women, and diabetics. 5. Pain And Fevers: Take Acetaminophen ( Tylenol) for fevers, aches, and headaches. 2-500 mg every 8 hours for adults. Appropriate doses at bedtime for children may help them sleep better. If pregnant take 1 -500 mg (Tylenol) every 8 hours as needed. Ibuprofen/Aleve/aspirin for pain and fevers SHOULD NOT BE USED IN THE SETTING OF POSSIBLE COVID-19 viral infection NOR if pregnant, if you have acid reflux, high blood pressure, CHF, or kidney problems. 6.Gargle: (DAY ONE OF SYMPTOMS) Gargle in the back of the throat with the head tilted back and to the sides with a strong mouthwash  ( Listerine or Scope) after meals and at bedtime at least 4 -5 times a day. This helps kill bacteria and viruses in the back of the throat and will shorten the duration and decrease the severity of your symptoms: sore throat, cough, ear popping,/ear pain, and possibly dizziness. 7. Smoking: Avoid smoking or exposure to second hand smoke. 8. Zinc: (DAY ONE OF SYMPTOMS)  Zinc lozenges such as Cold Artem (available most stores), or Basic ([employer] brand) will help shorten the duration and lessen symptoms such as sore throat, cough, nasal congestion, runny nose, and post nasal drip. Use 1 lozenge every 2-4 hours ( after meals if stomach is sensitive). Children can use 10-15 mg or less 3-4 times a day or Zinc lollypops. In pregnancy limit to 50-60 mg a day for 7 days as prenatals have Zinc also. With diarrhea use zinc pills 50 mg 1/2 to 1 pill 2x/day WITH OR SHORTLY AFTER A MEAL.   9. Vitamins: Vitamin C 500 mg with breakfast and dinner (with suspected or diagnosed COVID-19 infection take vitamin C 1000 mg 2-3 times a day). Children and pregnant women should drink citrus juices. This speeds healing and strengthens immune system. 10. Chest Symptoms: Vicks Vapor rub to the chest at bedtime. 11. Decongestants: Avoid all decongestants and antihistamine cold preparations in children. Decongestants should always be avoided in people with high blood pressure, palpitations, heart disease and those on stimulant medications. Antihistamines may impede the body's ability to fight COVID-19.  12. Frequent hand washing and/or hand gel especially after coughing or sneezing into the hands or blowing the nose to help prevent spreading to others. Use kleenex and NOT handkerchiefs. Try all of the above starting with day 1 of symptoms. If Strep throat symptoms appear call to be seen in the office as soon as possible and don't gargle on that day. Newborns, infants, or anyone with earaches or influenza may need to be seen quickly. Adults with fevers over 103 degrees or shortness of breath should call the office immediately. Schedule with Dr Christina Mcintosh. Pulmonary vascular congestion  -     furosemide (LASIX) 40 MG tablet; Take 1 tablet by mouth 2 times daily  Do daily weights. Schedule with Dr Mason Glass cardiology. Pulmonary hypertension (HCC)  -     furosemide (LASIX) 40 MG tablet; Take 1 tablet by mouth 2 times daily    Paroxysmal atrial fibrillation (HCC)  NSR today. Patient Education        DASH Diet: Care Instructions  Your Care Instructions     The DASH diet is an eating plan that can help lower your blood pressure. DASH stands for Dietary Approaches to Stop Hypertension. Hypertension is high blood pressure. The DASH diet focuses on eating foods that are high in calcium, potassium, and magnesium. These nutrients can lower blood pressure. The foods that are highest in these nutrients are fruits, vegetables, low-fat dairy products, nuts, seeds, and legumes.  But taking calcium, potassium, and magnesium supplements instead of eating foods that are high in those nutrients does not have the same effect. The DASH diet also includes whole grains, fish, and poultry. The DASH diet is one of several lifestyle changes your doctor may recommend to lower your high blood pressure. Your doctor may also want you to decrease the amount of sodium in your diet. Lowering sodium while following the DASH diet can lower blood pressure even further than just the DASH diet alone. Follow-up care is a key part of your treatment and safety. Be sure to make and go to all appointments, and call your doctor if you are having problems. It's also a good idea to know your test results and keep a list of the medicines you take. How can you care for yourself at home? Following the DASH diet  · Eat 4 to 5 servings of fruit each day. A serving is 1 medium-sized piece of fruit, ½ cup chopped or canned fruit, 1/4 cup dried fruit, or 4 ounces (½ cup) of fruit juice. Choose fruit more often than fruit juice. · Eat 4 to 5 servings of vegetables each day. A serving is 1 cup of lettuce or raw leafy vegetables, ½ cup of chopped or cooked vegetables, or 4 ounces (½ cup) of vegetable juice. Choose vegetables more often than vegetable juice. · Get 2 to 3 servings of low-fat and fat-free dairy each day. A serving is 8 ounces of milk, 1 cup of yogurt, or 1 ½ ounces of cheese. · Eat 6 to 8 servings of grains each day. A serving is 1 slice of bread, 1 ounce of dry cereal, or ½ cup of cooked rice, pasta, or cooked cereal. Try to choose whole-grain products as much as possible. · Limit lean meat, poultry, and fish to 2 servings each day. A serving is 3 ounces, about the size of a deck of cards. · Eat 4 to 5 servings of nuts, seeds, and legumes (cooked dried beans, lentils, and split peas) each week. A serving is 1/3 cup of nuts, 2 tablespoons of seeds, or ½ cup of cooked beans or peas.   · Limit fats and oils to 2 to 3 servings each day. A serving is 1 teaspoon of vegetable oil or 2 tablespoons of salad dressing. · Limit sweets and added sugars to 5 servings or less a week. A serving is 1 tablespoon jelly or jam, ½ cup sorbet, or 1 cup of lemonade. · Eat less than 2,300 milligrams (mg) of sodium a day. If you limit your sodium to 1,500 mg a day, you can lower your blood pressure even more. · Be aware that all of these are the suggested number of servings for people who eat 1,800 to 2,000 calories a day. Your recommended number of servings may be different if you need more or fewer calories. Tips for success  · Start small. Do not try to make dramatic changes to your diet all at once. You might feel that you are missing out on your favorite foods and then be more likely to not follow the plan. Make small changes, and stick with them. Once those changes become habit, add a few more changes. · Try some of the following:  ? Make it a goal to eat a fruit or vegetable at every meal and at snacks. This will make it easy to get the recommended amount of fruits and vegetables each day. ? Try yogurt topped with fruit and nuts for a snack or healthy dessert. ? Add lettuce, tomato, cucumber, and onion to sandwiches. ? Combine a ready-made pizza crust with low-fat mozzarella cheese and lots of vegetable toppings. Try using tomatoes, squash, spinach, broccoli, carrots, cauliflower, and onions. ? Have a variety of cut-up vegetables with a low-fat dip as an appetizer instead of chips and dip. ? Sprinkle sunflower seeds or chopped almonds over salads. Or try adding chopped walnuts or almonds to cooked vegetables. ? Try some vegetarian meals using beans and peas. Add garbanzo or kidney beans to salads. Make burritos and tacos with mashed nowak beans or black beans. Where can you learn more? Go to https://alan.[employer]. org and sign in to your [employer] account.  Enter L993 in the [facility] box to learn more about \"DASH Diet: Care Instructions. \"     If you do not have an account, please click on the \"Sign Up Now\" link. Current as of: April 29, 2021               Content Version: 13.1  © 2006-2021 [employer]. Care instructions adapted under license by [facility] ([facility]). If you have questions about a medical condition or this instruction, always ask your healthcare professional. [address] any warranty or liability for your use of this information. Patient Education        Learning About Heart Failure Zones  What are heart failure zones? Heart failure zones give you an easy way to see changes in your heart failure symptoms. They also tell you when you need to get help. Check every day to see which zone you are in. Green zone. You are doing well. This is where you want to be. · Your weight is stable. It's not going up or down. · You breathe easily. · You are sleeping well. You are able to lie flat without shortness of breath. · You can do your usual activities. Yellow zone. Be careful. Your symptoms are changing. Call your doctor. · You have new or increased shortness of breath. · You are dizzy or lightheaded, or you feel like you may faint. · You have sudden weight gain, such as more than 2 to 3 pounds in a day or 5 pounds in a week. (Your doctor may suggest a different range of weight gain.)  · You have increased swelling in your legs, ankles, or feet. · You are so tired or weak that you can't do your usual activities. · You are not sleeping well. Shortness of breath wakes you up at night. You need extra pillows. Red zone. This is an emergency. Call 911. You have symptoms of sudden heart failure. For example:  · You have severe trouble breathing. · You cough up pink, foamy mucus. · You have a new irregular or fast heartbeat. You have symptoms of a heart attack. These may include:  · Chest pain or pressure, or a strange feeling in the chest.  · Sweating.   · Shortness of breath. · Nausea or vomiting. · Pain, pressure, or a strange feeling in the back, neck, jaw, or upper belly or in one or both shoulders or arms. · Lightheadedness or sudden weakness. · A fast or irregular heartbeat. If you have symptoms of a heart attack: After you call 911, the  may tell you to chew 1 adult-strength or 2 to 4 low-dose aspirin. Wait for an ambulance. Do not try to drive yourself. Follow-up care is a key part of your treatment and safety. Be sure to make and go to all appointments, and call your doctor if you are having problems. It's also a good idea to know your test results and keep a list of the medicines you take. Where can you learn more? Go to https://[employer]. org and sign in to your [employer] account. Enter T174 in the [employer] box to learn more about \"Learning About Heart Failure Zones. \"     If you do not have an account, please click on the \"Sign Up Now\" link. Current as of: April 29, 2021               Content Version: 13.1  © 6340-5964 [employer]. Care instructions adapted under license by [facility] ([facility]). If you have questions about a medical condition or this instruction, always ask your healthcare professional. [address] any warranty or liability for your use of this information.

## 2022-03-01 RX ORDER — FUROSEMIDE 40 MG/1
TABLET ORAL
Qty: 180 TABLET | OUTPATIENT
Start: 2022-03-01

## 2022-03-01 NOTE — TELEPHONE ENCOUNTER
LAST VISIT YESTERDAY WITH DR Nohemy Ellis,Third Floor ED FOLLOW UP, NEXT VISIT NONE. [address]       2/26/2022  6:05 AM - Russell Kennedy Incoming Lab Results From Soft (Epic Adt)    Component Value Flag Ref Range Units Status   Sodium 140   136 - 145 mmol/L Final   Potassium reflex Magnesium 5.7   High   3.5 - 5.1 mmol/L Final   Chloride 102   99 - 110 mmol/L Final   CO2 28   21 - 32 mmol/L Final   Anion Gap 10   3 - 16  Final   Glucose 187   High   70 - 99 mg/dL Final   BUN 18   7 - 20 mg/dL Final   CREATININE 0.9   0.8 - 1.3 mg/dL Final   GFR Non- >60   >60  Final   Comment:   >60 mL/min/1.73m2 EGFR, calc. for ages 25 and older using the   MDRD formula (not corrected for weight), is valid for stable   renal function. GFR  >60   >60  Final   Comment:   Chronic Kidney Disease: less than 60 ml/min/1.73 sq. m.         Kidney Failure: less than 15 ml/min/1.73 sq.m. Results valid for patients 18 years and older.     Calcium 9.3   8.3 - 10.6 mg/dL Final

## 2022-03-02 DIAGNOSIS — E11.8 TYPE 2 DIABETES MELLITUS WITH COMPLICATION, WITHOUT LONG-TERM CURRENT USE OF INSULIN (HCC): Chronic | ICD-10-CM

## 2022-03-02 NOTE — TELEPHONE ENCOUNTER
LAST VISIT 02/28/2022 DR. AYALA, NEXT VISIT NONE.    Component      Latest Ref Rng & Units 11/15/2021           8:48 AM   Hemoglobin A1C      % 7.7

## 2022-03-14 ENCOUNTER — TELEPHONE (OUTPATIENT)
Dept: FAMILY MEDICINE CLINIC | Age: 82
End: 2022-03-14

## 2022-03-14 DIAGNOSIS — J32.9 RECURRENT SINUSITIS: Primary | ICD-10-CM

## 2022-03-14 NOTE — TELEPHONE ENCOUNTER
----- Message from Mable Chavez sent at 3/14/2022 12:41 PM EDT -----  Subject: Referral Request    QUESTIONS   Reason for referral request? Pt needs a new referral to an ENT, as he has   not seen the ENT in a year or two. Pt would prefer to go an ENT in   [place]. Has the physician seen you for this condition before? Yes  Select a date? 2022-02-28  Select the Provider the patient wants to be referred to, if known (PCP or   Specialist)? Outside Physician - Unknown   Preferred Specialist (if applicable)? Do you already have an appointment scheduled? No  Additional Information for Provider? Call pt and provide phone number for   ENT office so that pt can schedule an appt.  ---------------------------------------------------------------------------  --------------  CALL BACK INFO  What is the best way for the office to contact you? Do not leave any   message, patient will call back for answer  Preferred Call Back Phone Number?  [phone]

## 2022-03-15 ENCOUNTER — TELEPHONE (OUTPATIENT)
Dept: PULMONOLOGY | Age: 82
End: 2022-03-15

## 2022-03-15 ENCOUNTER — TELEPHONE (OUTPATIENT)
Dept: FAMILY MEDICINE CLINIC | Age: 82
End: 2022-03-15

## 2022-03-15 NOTE — TELEPHONE ENCOUNTER
Patient was seen recently in the ED and by Dr. Cintia Llanes . His breathing has worsened and Dr. Cintia Llanes would like him assessed prior to June appointment and sooner than Next avail.      Please advise

## 2022-03-15 NOTE — TELEPHONE ENCOUNTER
Called patient. He is having breathing difficulties which he believes is caused by postnasal drip from his allergies in the back of his throat. I explained that he really needs to see a lung doctor because he has COPD and that he will not get better until he quit smoking. He was somewhat better he reports after taking steroids and antibiotics. He has still having and popping on the phone. He has a lesion on the left side of his tongue and that is why he wanted to see the ENT also. Recurrent sinusitis  -     Ambulatory referral to ENT        -     sore on the left side of the tongue and a smoker.   New history

## 2022-03-15 NOTE — TELEPHONE ENCOUNTER
CALLED AND SPOKE TO PATIENT TO CHECK ON HIM. HE STATES HE IS STILL HACKING AND COUGHING. HE DID TAKE ANTIBIOTICS AND STEROIDS TOWARD THE END OF February AND BOTH OF THESE THINGS HELPED, HIS CHEST IS NOT AS TIGHT AS IT WAS. HE INFORMED ME THAT HE REACHED OUT TO HIS PULMONOLOGIST TO SEE WHEN THEY COULD GET HIM IN FOR AN APPOINTMENT, AND CURRENTLY AWAITING A CALL BACK FROM THEM. I TOLD HIM I WOULD SEND MESSAGE TO DR. Fred Blackburn TO SEE WHAT HE MAY BE ABLE TO DO IN THE MEANTIME.  SC

## 2022-03-15 NOTE — TELEPHONE ENCOUNTER
Spoke with the patient today another message about referral to ENT. Talked to him about seeing his pulmonologist.  Also referred him to his ENT for a lesion on his tongue and recurrent sinusitis.

## 2022-03-15 NOTE — TELEPHONE ENCOUNTER
Please call this patient and schedule a visit with me ASAP. If there are no other openings before this Friday, I can see him on this Friday.

## 2022-03-17 ENCOUNTER — OFFICE VISIT (OUTPATIENT)
Dept: PULMONOLOGY | Age: 82
End: 2022-03-17
Payer: MEDICARE

## 2022-03-17 ENCOUNTER — OFFICE VISIT (OUTPATIENT)
Dept: ENT CLINIC | Age: 82
End: 2022-03-17
Payer: MEDICARE

## 2022-03-17 VITALS
WEIGHT: 228 LBS | RESPIRATION RATE: 20 BRPM | DIASTOLIC BLOOD PRESSURE: 40 MMHG | HEART RATE: 70 BPM | BODY MASS INDEX: 36.64 KG/M2 | TEMPERATURE: 97.5 F | OXYGEN SATURATION: 93 % | HEIGHT: 66 IN | SYSTOLIC BLOOD PRESSURE: 110 MMHG

## 2022-03-17 VITALS — HEART RATE: 64 BPM | DIASTOLIC BLOOD PRESSURE: 78 MMHG | SYSTOLIC BLOOD PRESSURE: 118 MMHG | TEMPERATURE: 97.3 F

## 2022-03-17 DIAGNOSIS — J43.1 PANLOBULAR EMPHYSEMA (HCC): Chronic | ICD-10-CM

## 2022-03-17 DIAGNOSIS — J01.90 ACUTE RHINOSINUSITIS: ICD-10-CM

## 2022-03-17 DIAGNOSIS — J01.90 ACUTE RHINOSINUSITIS: Primary | ICD-10-CM

## 2022-03-17 DIAGNOSIS — K14.0 TONGUE ULCER: ICD-10-CM

## 2022-03-17 DIAGNOSIS — J30.9 ALLERGIC RHINITIS, UNSPECIFIED SEASONALITY, UNSPECIFIED TRIGGER: ICD-10-CM

## 2022-03-17 DIAGNOSIS — J30.9 ALLERGIC RHINITIS, UNSPECIFIED SEASONALITY, UNSPECIFIED TRIGGER: Chronic | ICD-10-CM

## 2022-03-17 DIAGNOSIS — I50.42 CHRONIC COMBINED SYSTOLIC AND DIASTOLIC CONGESTIVE HEART FAILURE (HCC): Primary | ICD-10-CM

## 2022-03-17 DIAGNOSIS — F17.200 TOBACCO USE DISORDER: Chronic | ICD-10-CM

## 2022-03-17 PROCEDURE — G8484 FLU IMMUNIZE NO ADMIN: HCPCS | Performed by: OTOLARYNGOLOGY

## 2022-03-17 PROCEDURE — 1111F DSCHRG MED/CURRENT MED MERGE: CPT | Performed by: INTERNAL MEDICINE

## 2022-03-17 PROCEDURE — 1123F ACP DISCUSS/DSCN MKR DOCD: CPT | Performed by: OTOLARYNGOLOGY

## 2022-03-17 PROCEDURE — 4004F PT TOBACCO SCREEN RCVD TLK: CPT | Performed by: OTOLARYNGOLOGY

## 2022-03-17 PROCEDURE — G8484 FLU IMMUNIZE NO ADMIN: HCPCS | Performed by: INTERNAL MEDICINE

## 2022-03-17 PROCEDURE — 4040F PNEUMOC VAC/ADMIN/RCVD: CPT | Performed by: INTERNAL MEDICINE

## 2022-03-17 PROCEDURE — 1123F ACP DISCUSS/DSCN MKR DOCD: CPT | Performed by: INTERNAL MEDICINE

## 2022-03-17 PROCEDURE — G8427 DOCREV CUR MEDS BY ELIG CLIN: HCPCS | Performed by: OTOLARYNGOLOGY

## 2022-03-17 PROCEDURE — 99214 OFFICE O/P EST MOD 30 MIN: CPT | Performed by: INTERNAL MEDICINE

## 2022-03-17 PROCEDURE — G8417 CALC BMI ABV UP PARAM F/U: HCPCS | Performed by: OTOLARYNGOLOGY

## 2022-03-17 PROCEDURE — G8427 DOCREV CUR MEDS BY ELIG CLIN: HCPCS | Performed by: INTERNAL MEDICINE

## 2022-03-17 PROCEDURE — 99214 OFFICE O/P EST MOD 30 MIN: CPT | Performed by: OTOLARYNGOLOGY

## 2022-03-17 PROCEDURE — 1111F DSCHRG MED/CURRENT MED MERGE: CPT | Performed by: OTOLARYNGOLOGY

## 2022-03-17 PROCEDURE — 3023F SPIROM DOC REV: CPT | Performed by: INTERNAL MEDICINE

## 2022-03-17 PROCEDURE — 4040F PNEUMOC VAC/ADMIN/RCVD: CPT | Performed by: OTOLARYNGOLOGY

## 2022-03-17 PROCEDURE — 4004F PT TOBACCO SCREEN RCVD TLK: CPT | Performed by: INTERNAL MEDICINE

## 2022-03-17 PROCEDURE — G8417 CALC BMI ABV UP PARAM F/U: HCPCS | Performed by: INTERNAL MEDICINE

## 2022-03-17 RX ORDER — FEXOFENADINE HCL 180 MG/1
180 TABLET ORAL DAILY
Qty: 30 TABLET | Refills: 1 | Status: SHIPPED | OUTPATIENT
Start: 2022-03-17 | End: 2022-05-18

## 2022-03-17 RX ORDER — CEFDINIR 300 MG/1
600 CAPSULE ORAL DAILY
Qty: 28 CAPSULE | Refills: 0 | Status: SHIPPED | OUTPATIENT
Start: 2022-03-17 | End: 2022-03-31

## 2022-03-17 RX ORDER — AZELASTINE 1 MG/ML
SPRAY, METERED NASAL
Qty: 30 ML | Refills: 2 | Status: SHIPPED | OUTPATIENT
Start: 2022-03-17 | End: 2022-10-11 | Stop reason: SDUPTHER

## 2022-03-17 RX ORDER — FLUTICASONE PROPIONATE 50 MCG
SPRAY, SUSPENSION (ML) NASAL
Qty: 16 G | Refills: 2 | Status: SHIPPED | OUTPATIENT
Start: 2022-03-17 | End: 2022-10-11 | Stop reason: SDUPTHER

## 2022-03-17 ASSESSMENT — COPD QUESTIONNAIRES
QUESTION2_CHESTPHLEGM: 5
QUESTION8_ENERGYLEVEL: 5
CAT_TOTALSCORE: 37
QUESTION5_HOMEACTIVITIES: 5
QUESTION4_WALKINCLINE: 5
QUESTION1_COUGHFREQUENCY: 4
QUESTION6_LEAVINGHOUSE: 5
QUESTION7_SLEEPQUALITY: 4
QUESTION3_CHESTTIGHTNESS: 4

## 2022-03-17 ASSESSMENT — ENCOUNTER SYMPTOMS
SINUS PAIN: 0
RHINORRHEA: 1
VOICE CHANGE: 0
TROUBLE SWALLOWING: 0
SORE THROAT: 1

## 2022-03-17 NOTE — PROGRESS NOTES
[facility]       NEW PATIENT VISIT      PCP:  Elton Ya DO      REFERRED BY:   self      CHIEF COMPLAINT  Chief Complaint   Patient presents with    Sinus Problem    Pharyngitis       HISTORY OF PRESENT ILLNESS       Scott Santana is a 80 y.o. male here for evaluation and treatment of Congestion of nose and throat, rhinorrhea, PND, and coughing up phlegm. \"Back of my throat has slimy drainage. It's been forever, all my life. Left side of my tongue is sore. \"   NKA      REVIEW OF SYSTEMS   Review of Systems   Constitutional: Negative for appetite change, fever and unexpected weight change. HENT: Positive for congestion, postnasal drip, rhinorrhea and sore throat. Negative for ear discharge, ear pain, hearing loss, sinus pain, tinnitus, trouble swallowing and voice change. Neurological: Negative for dizziness. 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  Ruptured disc    Diverticulosis of colon 10/30/2012    Dry eye syndrome of bilateral lacrimal glands     Hip problem 1956    NUMBNESS W/ TRAUMA    Hypercholesterolemia     Impotence     Metabolic syndrome     Mild diastolic dysfunction 99/73/2434    LVEF 55-60%    Obstructive sleep apnea     Obstructive sleep apnea     C-PAP    Osteoarthritis of shoulder     left  @ ac jt/impingement.  Pulmonary hypertension ([address] 10/21/2013    37    Recurrent BCC (basal cell carcinoma)     S/P aortic valve replacement 11/12/2013    Submandibular duct obstruction - right 10/2/2017    12 x 14 x 18 mm right submandibular gland stone causing submandibular duct dilatation and gland prominence    Tobacco use disorder 1957    Type II or unspecified type diabetes mellitus without mention of complication, not stated as uncontrolled 2003    Vitamin D deficiency        Past Surgical History:   Procedure Laterality Date    AORTIC VALVE REPLACEMENT  11/12/2013    BLEPHAROPLASTY  07/2007    UPPER LID    BONY PELVIS SURGERY  01/12/2021    SI joint    CARDIAC CATHETERIZATION  11/08/2013    dx    CARDIAC SURGERY  11/12/2013    reincised for bleeding    CATARACT REMOVAL WITH IMPLANT Right 01/2019    CATARACT REMOVAL WITH IMPLANT Left 02/2019    CERVICAL DISC SURGERY Right 2015    epidural steroids.  CORONARY ARTERY BYPASS GRAFT  11/12/2013    4 V    EYE SURGERY Bilateral     Laser peripheral iridotomy     FINGER TRIGGER RELEASE Right 08/19/2014    Release of trigger thumb (stenosing tenosynovitis)    HEMORRHOID SURGERY  8947-1680    LAPAROSCOPIC APPENDECTOMY  11/14/2014    LUMBAR LAMINECTOMY  1998 & 2003    LUMBAR LAMINECTOMY  05/07/2014    L2-L3 & Repair dural tear x2.  LUMBAR SPINE SURGERY  08/04/2009    SPINaL CORD STIMULATOR FOR PAIN    LUMBAR SPINE SURGERY  08/2014    epidural steroids. 3/2017 NEAL, 10/21/19    LUMBAR SPINE SURGERY Right 04/23/2019    SPINAL CORD STIMULATOR BATTERY REPLACEMENT WITH POCKET REVISION     MOHS SURGERY Left 02/11/2020    Dorsum of hand: type?     MOHS SURGERY Right 01/01/2020    right upper inner pinna with skin graft    NERVE SURGERY  ~12/2012    nerve block     ROTATOR CUFF REPAIR  2001    SKIN CANCER EXCISION Right 07/2017    forehead THEN TOP OF THE HEAD.    SKIN CANCER EXCISION  01/2019    nose    SOFT TISSUE BIOPSY      needle bx right neck mass -benign    TEAR DUCT SURGERY Bilateral 01/01/2015    for dry    TESTICLE REMOVAL  1947    R    TONSILLECTOMY      CHILD    UMBILICAL HERNIA REPAIR  11/14/2014    Laparoscopic         EXAMINATION    Vitals:    03/17/22 0931   BP: 118/78   Site: Left Upper Arm   Position: Sitting   Cuff Size: Large Adult   Pulse: 64   Temp: 97.3 °F (36.3 °C)   TempSrc: Temporal       ENT Physical Exam  Consititutional  Appearance: patient appears well-developed, well-nourished and well-groomed,  Communication/Voice: communication appropriate for developmental age; vocal quality normal;  Head and Face  Appearance: head appears normal, face appears normal and face appears atraumatic;  Palpation: facial palpation normal;  Salivary: glands normal;  Ear  Hearing: intact;   Auricles: right auricle normal; left auricle normal;  External Mastoids: right external mastoid normal; left external mastoid normal;  Ear Canals: right ear canal normal; left ear canal normal;  Tympanic Membranes: right tympanic membrane normal; left tympanic membrane normal;  Nose  External Nose: nares patent bilaterally; external nose normal;  Internal Nose: nasal mucosa normal; Nasal mucosa comments: congestion bilaterally   septum normal; bilateral inferior turbinates normal;  Oral Cavity/Oropharynx  Lips: normal;  Teeth: normal;  Gums: gingiva normal;  Tongue: tongue lesion present; aphthous ulcers and leukoplakia noted; Oral Tongue comments: left lateral posterior tongue  Oral mucosa: normal;  Hard palate: normal;  Soft palate: normal;  Tonsils: bilateral tonsils absent,  Base of Tongue: normal;  Posterior pharyngeal wall: normal;  Neck  Neck: neck normal; neck palpation normal;  Thyroid: thyroid normal;  Lymphatic  Palpation: no cervical adenopathy noted; no preauricular adenopathy palpable;        IMPRESSION / Waqar Smith / Eric Anderson was seen today for sinus problem and pharyngitis. Diagnoses and all orders for this visit:    Acute rhinosinusitis  -     cefdinir (OMNICEF) 300 MG capsule; Take 2 capsules by mouth daily for 14 days Take both capsules together at the same time, once daily. -     fluticasone (FLONASE) 50 MCG/ACT nasal spray; 2 sprays in each nostril daily, 15 minutes after morning dose of Astelin. .    Tongue ulcer  Comments:  undiagnosed new problem with uncertain prognosis    Allergic rhinitis, unspecified seasonality, unspecified trigger  -     azelastine (ASTELIN) 0.1 % nasal spray; Use 2 sprays in each nostril 2 times daily. Use fluticasone 15 minutes after the morning dose of astelin. -     fluticasone (FLONASE) 50 MCG/ACT nasal spray; 2 sprays in each nostril daily, 15 minutes after morning dose of Astelin. .  -     fexofenadine (ALLEGRA) 180 MG tablet; Take 1 tablet by mouth daily        RECOMMENDATIONS/PLAN      1. Consider biopsy of tongue ulcer, if fails to heal.  2. Return in about 1 month (around 4/17/2022) for recheck/follow-up, and sooner if condition worsens. Patient Instructions   RHINOSINUSITIS CARE  · You may use acetaminophen (eg. Tylenol) or Ibuprofen (eg. Advil) (over the counter medications) as needed for fever or pain. · Take Probiotic while you are taking antibiotics, to prevent diarrhea, stomach upset, pseudomembranous colitis, and C. difficile diarrhea. This may be obtained at your pharmacy or health food store. Alternatively, you may eat one cup of yogurt with active or live cultures twice daily while taking the antibiotic. Continue for two to three days after completion of the antibiotic. · Use a 12 hour decongestant spray, 0.05% oxymetazoline (e.g. Afrin, Duration, 4-Way). Spray each nostril twice three times a day for three days, then two times a day for 2 days, and then stop for two days and then repeat the cycle once.   · Take one Mucinex (blue box) maximum strength tablet every 12 hours daily for the next 14 days. · A steam inhaler mask device may be helpful. These can be purchased at your pharmacy. · Use a saline nasal/sinus irrigation system, e.g. NeilMed sinus rinse, twice daily to help to clear secretions and drainage. Use distilled water; DO NOT USE TAP WATER! This is because of the possibility of amoeba or other microorganisms in tap water, which can result in a fatal disease. Alternatively, you could use a blue bulb syringe and solution of 1/4 tsp of table salt in 8 ounces (one cup or 240 ml) of distilled water. · Use fluticasone 2 sprays in each nostril once daily. · It may take several days to several weeks for your sinusitis to clear up. It is important to take all your medications as prescribed. Please continue your antibiotics as prescribed. · Please call the office if your condition worsens or if symptoms persist after treatment is completed.

## 2022-03-17 NOTE — PROGRESS NOTES
REASON FOR CONSULTATION/CC:    Chief Complaint   Patient presents with    Follow-up    COPD        Consult at request of   Margarita Corbett DO for  emphysema    PCP: Margarita Corbett DO    HISTORY OF PRESENT ILLNESS: Julissa Hodges is a 80y.o. year old male with a history of tobacco abuse who presents :               ANNMARIE  No using device. Tobacco abuse  Still smoking. Not ready to quit and does think its contributing. Sinus and chest congestion. shortness of breath   Duration: years. Slight mucus. Think infection. Better with antibiotics   Using albuterol   chest X-ray December was normal.      chf  Lasix - improving shortness of breath   Increased fluid      Tobacco abuse  Still smoking     sinus  complains of cough and phlegm, worse in morning. ENT with new order. Objective:   PHYSICAL EXAM:  Blood pressure (!) 110/40, pulse 70, temperature 97.5 °F (36.4 °C), resp. rate 20, height 5' 6\" (1.676 m), weight 228 lb (103.4 kg), SpO2 93 %.'  Gen: No distress. ENT:   Resp: No accessory muscle use. ++ crackles. No wheezes. No  rhonchi. CV: Regular rate. Regular rhythm. + murmur no rub. +++ edema. Skin: Warm, dry, normal texture and turgor. No nodule on exposed extremities. M/S: No cyanosis. No clubbing. No joint deformity. Psych: Oriented x 3. No anxiety. Awake. Alert. Intact judgement and insight. Good Mood / Affect. Memory appears in tact          Data Reviewed:        Assessment:     ·  Tobacco abuse  · ANNMARIE  · COPD  · Chronic rhinitis    · Asbestos exposures  · Aortic murmur      Plan:      Problem List Items Addressed This Visit     Tobacco use disorder (Chronic)      Continues to smoke and not interested in quitting. COPD (chronic obstructive pulmonary disease) (HCC) (Chronic)      Currently patient's largest problem is congestive heart failure.   Inhalers have not improved her symptoms in the past.         Chronic combined systolic and diastolic congestive heart failure [facility]) - Primary     Patient is currently in heart failure with pulmonary edema when chest x-ray and significant lower extremity edema with weight gain. Murmur noted on exam.  Last echocardiogram 2019 consistent with diastolic dysfunction. Repeat echocardiogram.  He has not seen his cardiologist in over 1 year. He was advised to call him today for quick follow-up. Continue Lasix             Relevant Orders    ECHO Complete 2D W Doppler W Color                This note was transcribed using [id number] [employer]. Please disregard any translational errors.     [facility] [phone]

## 2022-03-17 NOTE — ASSESSMENT & PLAN NOTE
Patient is currently in heart failure with pulmonary edema when chest x-ray and significant lower extremity edema with weight gain. Murmur noted on exam.  Last echocardiogram 2019 consistent with diastolic dysfunction. Repeat echocardiogram.  He has not seen his cardiologist in over 1 year. He was advised to call him today for quick follow-up.   Continue Lasix

## 2022-03-17 NOTE — PATIENT INSTRUCTIONS
RHINOSINUSITIS CARE  · You may use acetaminophen (eg. Tylenol) or Ibuprofen (eg. Advil) (over the counter medications) as needed for fever or pain. · Take Probiotic while you are taking antibiotics, to prevent diarrhea, stomach upset, pseudomembranous colitis, and C. difficile diarrhea. This may be obtained at your pharmacy or health food store. Alternatively, you may eat one cup of yogurt with active or live cultures twice daily while taking the antibiotic. Continue for two to three days after completion of the antibiotic. · Use a 12 hour decongestant spray, 0.05% oxymetazoline (e.g. Afrin, Duration, 4-Way). Spray each nostril twice three times a day for three days, then two times a day for 2 days, and then stop for two days and then repeat the cycle once. · Take one Mucinex (blue box) maximum strength tablet every 12 hours daily for the next 14 days. · A steam inhaler mask device may be helpful. These can be purchased at your pharmacy. · Use a saline nasal/sinus irrigation system, e.g. NeilMed sinus rinse, twice daily to help to clear secretions and drainage. Use distilled water; DO NOT USE TAP WATER! This is because of the possibility of amoeba or other microorganisms in tap water, which can result in a fatal disease. Alternatively, you could use a blue bulb syringe and solution of 1/4 tsp of table salt in 8 ounces (one cup or 240 ml) of distilled water. · Use fluticasone 2 sprays in each nostril once daily. · It may take several days to several weeks for your sinusitis to clear up. It is important to take all your medications as prescribed. Please continue your antibiotics as prescribed.     · Please call the office if your condition worsens or if symptoms persist after treatment is completed.        ===================================================================      ADVERSE AND SIDE EFFECTS OF MEDICATIONS:    Please be aware of the following possible adverse reactions, side effects, and complications of the following medications, including, but not limited to: allergic reaction, interactions with other medications, nausea, headache, diarrhea, persistent symptoms, failure to improve, and the following:     Omnicef (antibiotic):  diarrhea, colitis (severe infection and inflammation of the large intestine), pseudomembranous colitis (severe infection and inflammation of the colon, usually due to C. difficile bacteria)  yeast or fungal  infections, Richey-Quinton syndrome (very rare necrotic skin reaction with peeling of skin, blisters and arthritis), persistent symptoms/infection, and failure to improve. Fluticasone:  nosebleed, burning sensation, atrophy of nasal mucosa, septal ulceration or perforation, nasal irritation, nasal yeast infection,  drowsiness, bad taste.        ~~~>>> Also please read the medication information in this AVS and all information given to you by the pharmacist.         Patient Education        cefdinir  Pronunciation:  ALEX brown  Brand:  Jamison Cristina Omni-Pac  What is the most important information I should know about cefdinir? Do not take this medicine if you are allergic to cefdinir, or to similar antibiotics, such as Ceftin, Cefzil, Keflex, and others. What is cefdinir? Cefdinir is a cephalosporin (SEF a low spor in) antibiotic that is used to treat many different types of infections caused by bacteria. Cefdinir may also be used for purposes not listed in this medication guide. What should I discuss with my healthcare provider before taking cefdinir? You should not take this medicine if you are allergic to cefdinir or any other cephalosporin antibiotic (cefadroxil, cefprozil, cefazolin, cefalexin, Keflex, and others). Tell your doctor if you have ever had:  · kidney disease (or if you are on dialysis);  · intestinal problems, such as colitis; or  · an allergy to any drugs (especially penicillins). Cefdinir liquid contains sucrose.  Talk to your doctor before using this form of cefdinir if you have diabetes. Tell your doctor if you are pregnant or breastfeeding. How should I take cefdinir? Follow all directions on your prescription label and read all medicine guides or instruction sheets. Use the medicine exactly as directed. Shake the oral suspension (liquid) before you measure a dose. Use the dosing syringe provided, or use a medicine dose-measuring device (not a kitchen spoon). You may take cefdinir with or without food. Use this medicine for the full prescribed length of time, even if your symptoms quickly improve. Skipping doses can increase your risk of infection that is resistant to medication. Cefdinir will not treat a viral infection such as the flu or a common cold. Cefdinir can affect the results of certain medical tests. Tell any doctor who treats you that you are using cefdinir. Store at room temperature away from moisture and heat. Throw away any unused cefdinir liquid that is older than 10 days. What happens if I miss a dose? Take the medicine as soon as you can, but skip the missed dose if it is almost time for your next dose. Do not take two doses at one time. What happens if I overdose? Seek emergency medical attention or call the Poison Help line at 1-573.515.9415. Overdose symptoms may include nausea, vomiting, stomach pain, diarrhea, or a seizure. What should I avoid while taking cefdinir? Avoid using antacids or mineral supplements that contain aluminum, magnesium, or iron within 2 hours before or after taking cefdinir. Antacids or iron can make it harder for your body to absorb cefdinir. This does not include baby formula fortified with iron. Antibiotic medicines can cause diarrhea, which may be a sign of a new infection. If you have diarrhea that is watery or bloody, call your doctor before using anti-diarrhea medicine. What are the possible side effects of cefdinir?   Get emergency medical help if you have signs of an allergic reaction (hives, difficult breathing, swelling in your face or throat) or a severe skin reaction (fever, sore throat, burning eyes, skin pain, red or purple skin rash with blistering and peeling). Call your doctor at once if you have:  · severe stomach pain, diarrhea that is watery or bloody (even if it occurs months after your last dose);  · fever, chills, body aches, flu symptoms;  · pale skin, easy bruising, unusual bleeding;  · seizure (convulsions);  · fever, weakness, confusion;  · dark colored urine, jaundice (yellowing of the skin or eyes); or  · kidney problems --little or no urination, swelling in your feet or ankles, feeling tired or short of breath. Common side effects may include:  · nausea, vomiting, stomach pain, diarrhea;  · vaginal itching or discharge;  · headache; or  · rash (including diaper rash in an infant taking liquid cefdinir. This is not a complete list of side effects and others may occur. Call your doctor for medical advice about side effects. You may report side effects to FDA at 8-365-FDA-7494. What other drugs will affect cefdinir? Tell your doctor about all your other medicines, especially:  · probenecid; or  · vitamin or mineral supplements that contain iron. This list is not complete. Other drugs may affect cefdinir, including prescription and over-the-counter medicines, vitamins, and herbal products. Not all possible drug interactions are listed here. Where can I get more information? Your pharmacist can provide more information about cefdinir. Remember, keep this and all other medicines out of the reach of children, never share your medicines with others, and use this medication only for the indication prescribed. Every effort has been made to ensure that the information provided by [address] is accurate, up-to-date, and complete, but no guarantee is made to that effect. Drug information contained herein may be time sensitive.  Nitin information has been compiled for use by healthcare practitioners and consumers in the [place] and therefore [facility] does not warrant that uses outside of the [place] are appropriate, unless specifically indicated otherwise. [facility]'s drug information does not endorse drugs, diagnose patients or recommend therapy. [facility]'s drug information is an informational resource designed to assist licensed healthcare practitioners in caring for their patients and/or to serve consumers viewing this service as a supplement to, and not a substitute for, the expertise, skill, knowledge and judgment of healthcare practitioners. The absence of a warning for a given drug or drug combination in no way should be construed to indicate that the drug or drug combination is safe, effective or appropriate for any given patient. [facility] does not assume any responsibility for any aspect of healthcare administered with the aid of information [facility] provides. The information contained herein is not intended to cover all possible uses, directions, precautions, warnings, drug interactions, allergic reactions, or adverse effects. If you have questions about the drugs you are taking, check with your doctor, nurse or pharmacist.  Copyright 7581-5454 [address] [place] [address] Dr FERRERA. Version: 7.03. Revision date: 1/4/2021. Care instructions adapted under license by [facility] ([facility]). If you have questions about a medical condition or this instruction, always ask your healthcare professional. [address] any warranty or liability for your use of this information.

## 2022-03-17 NOTE — ASSESSMENT & PLAN NOTE
Currently patient's largest problem is congestive heart failure.   Inhalers have not improved her symptoms in the past.

## 2022-03-18 RX ORDER — FLUTICASONE PROPIONATE 50 MCG
SPRAY, SUSPENSION (ML) NASAL
Qty: 48 G | OUTPATIENT
Start: 2022-03-18

## 2022-03-18 RX ORDER — AZELASTINE 1 MG/ML
SPRAY, METERED NASAL
Qty: 90 ML | OUTPATIENT
Start: 2022-03-18

## 2022-04-03 DIAGNOSIS — I27.20 PULMONARY HYPERTENSION (HCC): ICD-10-CM

## 2022-04-03 DIAGNOSIS — R09.89 PULMONARY VASCULAR CONGESTION: ICD-10-CM

## 2022-04-04 DIAGNOSIS — R09.89 PULMONARY VASCULAR CONGESTION: ICD-10-CM

## 2022-04-04 DIAGNOSIS — I27.20 PULMONARY HYPERTENSION (HCC): ICD-10-CM

## 2022-04-04 RX ORDER — FUROSEMIDE 40 MG/1
TABLET ORAL
Qty: 60 TABLET | Refills: 0 | Status: SHIPPED | OUTPATIENT
Start: 2022-04-04 | End: 2022-04-05

## 2022-04-05 RX ORDER — FUROSEMIDE 40 MG/1
TABLET ORAL
Qty: 180 TABLET | Refills: 0 | Status: SHIPPED | OUTPATIENT
Start: 2022-04-05 | End: 2022-07-22 | Stop reason: SDUPTHER

## 2022-04-05 NOTE — TELEPHONE ENCOUNTER
LV 2/28/22 WITH DR Nohemy Ellis,Third Floor ED F/U NV NONE   Return in about 3 months (around 5/28/2022), or if symptoms worsen or fail to improve, for Diabetes, Hypertension, COPD, Cholesterol, CAD, ANNMARIE, Tobacco.    REQUESTING 90 DAY SUPPLY

## 2022-04-07 ENCOUNTER — TELEPHONE (OUTPATIENT)
Dept: FAMILY MEDICINE CLINIC | Age: 82
End: 2022-04-07

## 2022-04-07 NOTE — TELEPHONE ENCOUNTER
Call patient to schedule visit to be seen for future full refills. Patient due for diabetes visit 2/15/2022. Was seen for COPD exacerbation but no check on diabetes.

## 2022-04-07 NOTE — TELEPHONE ENCOUNTER
Note  LV 2/28/22 WITH DR Nohemy Ellis,Third Floor ED F/U NV NONE   Return in about 3 months (around 5/28/2022), or if symptoms worsen or fail to improve, for Diabetes, Hypertension, COPD, Cholesterol          THIS IS YOUR NOTE FROM 2/28/22 AT HIS APPOINTMENT WITH Genny Ruiz

## 2022-04-12 ENCOUNTER — OFFICE VISIT (OUTPATIENT)
Dept: FAMILY MEDICINE CLINIC | Age: 82
End: 2022-04-12
Payer: MEDICARE

## 2022-04-12 VITALS
DIASTOLIC BLOOD PRESSURE: 76 MMHG | SYSTOLIC BLOOD PRESSURE: 124 MMHG | HEIGHT: 66 IN | HEART RATE: 60 BPM | BODY MASS INDEX: 37.28 KG/M2 | OXYGEN SATURATION: 97 % | WEIGHT: 232 LBS | RESPIRATION RATE: 18 BRPM

## 2022-04-12 DIAGNOSIS — L97.211 VENOUS STASIS ULCER OF RIGHT CALF LIMITED TO BREAKDOWN OF SKIN WITHOUT VARICOSE VEINS (HCC): Primary | ICD-10-CM

## 2022-04-12 DIAGNOSIS — I10 ESSENTIAL HYPERTENSION, BENIGN: ICD-10-CM

## 2022-04-12 DIAGNOSIS — L97.819 NON-PRESSURE CHRONIC ULCER OF OTHER PART OF RIGHT LOWER LEG WITH UNSPECIFIED SEVERITY (HCC): ICD-10-CM

## 2022-04-12 DIAGNOSIS — R60.0 BILATERAL LOWER EXTREMITY EDEMA: ICD-10-CM

## 2022-04-12 DIAGNOSIS — I87.2 VENOUS STASIS ULCER OF RIGHT CALF LIMITED TO BREAKDOWN OF SKIN WITHOUT VARICOSE VEINS (HCC): Primary | ICD-10-CM

## 2022-04-12 DIAGNOSIS — I27.20 PULMONARY HYPERTENSION (HCC): ICD-10-CM

## 2022-04-12 PROCEDURE — 99215 OFFICE O/P EST HI 40 MIN: CPT | Performed by: FAMILY MEDICINE

## 2022-04-12 PROCEDURE — 36415 COLL VENOUS BLD VENIPUNCTURE: CPT | Performed by: FAMILY MEDICINE

## 2022-04-12 PROCEDURE — G8417 CALC BMI ABV UP PARAM F/U: HCPCS | Performed by: FAMILY MEDICINE

## 2022-04-12 PROCEDURE — G8427 DOCREV CUR MEDS BY ELIG CLIN: HCPCS | Performed by: FAMILY MEDICINE

## 2022-04-12 PROCEDURE — 4040F PNEUMOC VAC/ADMIN/RCVD: CPT | Performed by: FAMILY MEDICINE

## 2022-04-12 PROCEDURE — 4004F PT TOBACCO SCREEN RCVD TLK: CPT | Performed by: FAMILY MEDICINE

## 2022-04-12 PROCEDURE — 1123F ACP DISCUSS/DSCN MKR DOCD: CPT | Performed by: FAMILY MEDICINE

## 2022-04-12 RX ORDER — AMLODIPINE BESYLATE 2.5 MG/1
2.5 TABLET ORAL DAILY
Qty: 90 TABLET | Refills: 0 | Status: SHIPPED | OUTPATIENT
Start: 2022-04-12 | End: 2022-07-11

## 2022-04-12 NOTE — PATIENT INSTRUCTIONS
Peyton Chaudhary was seen today for other. Diagnoses and all orders for this visit:    Pulmonary hypertension ([facility] [address]  -     Basic Metabolic Panel; Future  -     Magnesium; Future  Caused by COPD causing shortness of breath. Venous stasis ulcer of right calf limited to breakdown of skin without varicose veins (HCC)  1) Put Mupirocin cream on. 2) Put a nonstick pad. Uses the nonstick wrap to hold it on ie Coban OR Coflex. 3) Wrap with an ace wrap tightly  4) If scabs over Desitin (Zinc oxide) cream twice daily. Bilateral lower extremity edema   -     Basic Metabolic Panel; Future  -     Magnesium; Future  Keep leg elevated when sitting. Wear compression stockings as much as possible. If feet are down while sitting press toes and front of foot down then lift them firmly with heel on the ground. When standing gently rock from heels to standing on toe tips. These are calf pumps that move fluid out of the calf and back to the central circulation and prevent worsened swelling in the foot and calf. Avoid adding salt at the table in the cooking and eating salty foods (such as pork, canned soups, fast food and salty snacks.)   Take Furosemide 40 mg 2 in am and 1 at dinner. Essential hypertension, benign  -     amLODIPine (NORVASC) 2.5 MG tablet; Take 1 tablet by mouth daily AT [facility]   -     Basic Metabolic Panel; Future  -     Magnesium;  Future

## 2022-04-12 NOTE — PROGRESS NOTES
Bibi Bowles (:  1940) is a 80 y.o. male,Established patient, here for evaluation of the following chief complaint(s): Other (PT C/O SORE ON HIS RIGHT LEG AND FOOT that has been there for 1 year and getting worse)       ASSESSMENT/PLAN:  531    Patient Instructions   Nik Harris was seen today for other. Diagnoses and all orders for this visit:    Pulmonary hypertension ([address]  -     Basic Metabolic Panel; Future  -     Magnesium; Future  Caused by COPD causing shortness of breath. Venous stasis ulcer of right calf limited to breakdown of skin without varicose veins (HCC)  1) Put Mupirocin cream on. 2) Put a nonstick pad. Uses the nonstick wrap to hold it on ie Coban OR Coflex. 3) Wrap with an ace wrap tightly  4) If scabs over Desitin (Zinc oxide) cream twice daily. Bilateral lower extremity edema   -     Basic Metabolic Panel; Future  -     Magnesium; Future  Keep leg elevated when sitting. Wear compression stockings as much as possible. If feet are down while sitting press toes and front of foot down then lift them firmly with heel on the ground. When standing gently rock from heels to standing on toe tips. These are calf pumps that move fluid out of the calf and back to the central circulation and prevent worsened swelling in the foot and calf. Avoid adding salt at the table in the cooking and eating salty foods (such as pork, canned soups, fast food and salty snacks.)   Take Furosemide 40 mg 2 in am and 1 at dinner. Essential hypertension, benign  -     amLODIPine (NORVASC) 2.5 MG tablet; Take 1 tablet by mouth daily AT [facility]   -     Basic Metabolic Panel; Future  -     Magnesium; Future  -     Good control.  -     Continue meds and lifestyle control. Return in about 2 weeks (around 2022), or if symptoms worsen or fail to improve.        Subjective   SUBJECTIVE/OBJECTIVE:  Chief Complaint   Patient presents with    Other     PT C/O SORE ON HIS RIGHT LEG AND FOOT that has been there for 1 year and getting worse   433  HPI    SKIN BREAKDOWN WITH EDEMA  Legs swelling off and on. Has sores on front of the calf of both legs. The dermatologist gave him mupirocin 2%. It has worked on both legs until 1-2 weeks ago. He did notice draining but only once when a scab came off. No other treatment. Sometimes elevates legs. On Lasix 40 mg am and pm.  SOB not gone but is greatly improved. Is still very congested in his lungs. Had lost 10 lbs but has gained 4 lbs back. He is watching salt. Seldom eats salty snacks. 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 Ruptured disc    Diverticulosis of colon 10/30/2012    Dry eye syndrome of bilateral lacrimal glands     Hip problem 1956    NUMBNESS W/ TRAUMA    Hypercholesterolemia     Impotence     Metabolic syndrome     Mild diastolic dysfunction 42/85/8182    LVEF 55-60%    Obstructive sleep apnea     Obstructive sleep apnea     C-PAP    Osteoarthritis of shoulder     left  @ ac jt/impingement.     Pulmonary hypertension ([address] 10/21/2013    37    Recurrent BCC (basal cell carcinoma)     S/P aortic valve replacement 11/12/2013    Submandibular duct obstruction - right 10/2/2017    12 x 14 x 18 mm right submandibular gland stone causing submandibular duct dilatation and gland prominence    Tobacco use disorder 1957    Type II or unspecified type diabetes mellitus without mention of complication, not stated as uncontrolled 2003    Vitamin D deficiency        Past Surgical History:   Procedure Laterality Date    AORTIC VALVE REPLACEMENT  11/12/2013    BLEPHAROPLASTY  07/2007    UPPER LID    BONY PELVIS SURGERY  01/12/2021    SI joint    CARDIAC CATHETERIZATION  11/08/2013    dx    CARDIAC SURGERY  11/12/2013    reincised for bleeding    CATARACT REMOVAL WITH IMPLANT Right 01/2019    CATARACT REMOVAL WITH IMPLANT Left 02/2019    CERVICAL DISC SURGERY Right 2015    epidural steroids.  CORONARY ARTERY BYPASS GRAFT  11/12/2013    4 V    EYE SURGERY Bilateral     Laser peripheral iridotomy     FINGER TRIGGER RELEASE Right 08/19/2014    Release of trigger thumb (stenosing tenosynovitis)    HEMORRHOID SURGERY  [id number]    LAPAROSCOPIC APPENDECTOMY  11/14/2014    LUMBAR LAMINECTOMY  1998 & 2003    LUMBAR LAMINECTOMY  05/07/2014    L2-L3 & Repair dural tear x2.  LUMBAR SPINE SURGERY  08/04/2009    SPINaL CORD STIMULATOR FOR PAIN    LUMBAR SPINE SURGERY  08/2014    epidural steroids. 3/2017 NEAL, 10/21/19    LUMBAR SPINE SURGERY Right 04/23/2019    SPINAL CORD STIMULATOR BATTERY REPLACEMENT WITH POCKET REVISION     MOHS SURGERY Left 02/11/2020    Dorsum of hand: type?  MOHS SURGERY Right 01/01/2020    right upper inner pinna with skin graft    NERVE SURGERY  ~12/2012    nerve block     ROTATOR CUFF REPAIR  2001    SKIN CANCER EXCISION Right 07/2017    forehead THEN TOP OF THE HEAD.     SKIN CANCER EXCISION  01/2019    nose    SOFT TISSUE BIOPSY      needle bx right neck mass -benign    TEAR DUCT SURGERY Bilateral 01/01/2015    for dry    TESTICLE REMOVAL  1947    R    TONSILLECTOMY      CHILD    UMBILICAL HERNIA REPAIR  11/14/2014    Laparoscopic       Social History     Socioeconomic History    Marital status:      Spouse name: [address] 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 Quit 11/8/13. 1 PPD. 1025/19. 2/18/20.5/18/20.7/12/21.11/15/21.2/28/22.4/12/22   Vaping Use    Vaping Use: Never used   Substance and Sexual Activity    Alcohol use: No     Alcohol/week: 1.0 standard drink     Types: 1 Standard drinks or equivalent per week     Comment: occ    Drug use: No     Comment: Never tried MJ.   Flores Sexual activity: Not on file   Other Topics Concern    Not on file   Social History Narrative    No exercise. 12/21/16.  4/6/17, 6/19/17, 8/14/17. 9/27/27. 12/4/17. 8/27/18. 10/22/18. 4/24/19. 7/30/19. 1025/19.2/18/20. 7/12/21.11/15/21.2/28/22.4/12/22. Social Determinants of Health     Financial Resource Strain: Low Risk     Difficulty of Paying Living Expenses: Not hard at all   Food Insecurity: No Food Insecurity    Worried About Running Out of Food in the Last Year: Never true    Magen of Food in the Last Year: Never true   Transportation Needs: No Transportation Needs    Lack of Transportation (Medical): No    Lack of Transportation (Non-Medical):  No   Physical Activity:     Days of Exercise per Week: Not on file    Minutes of Exercise per Session: Not on file   Stress:     Feeling of Stress : Not on file   Social Connections:     Frequency of Communication with Friends and Family: Not on file    Frequency of Social Gatherings with Friends and Family: Not on file    Attends Yazidi Services: Not on file    Active Member of Clubs or Organizations: Not on file    Attends Club or Organization Meetings: Not on file    Marital Status: Not on file Intimate Partner Violence:     Fear of Current or Ex-Partner: Not on file    Emotionally Abused: Not on file    Physically Abused: Not on file    Sexually Abused: Not on file   Housing Stability:     Unable to Pay for Housing in the Last Year: Not on file    Number of [place] in the Last Year: Not on file    Unstable Housing in the Last Year: Not on file       Family History   Problem Relation Age of Onset    Other Mother         CABG    Heart Surgery Mother 66        CABG    Sudden Death Father     Stroke Sister 77        blinded pt in 1 eye, then stroke in the other   [facility] Cancer Sister         pancreatic cancer    No Known Problems Sister     Coronary Art Dis Brother     Diabetes Brother     Other Brother         Cellulitis    Other Daughter         DDD L spine    Other Son         DDD L spine    Heart Attack Maternal Aunt     Heart Attack Maternal Uncle        No Known Allergies    Current Outpatient Medications   Medication Sig Dispense Refill    vitamin D (CHOLECALCIFEROL) 125 MCG (5000 UT) CAPS capsule Take 5,000 Units by mouth daily Indications: Vitamin A Deficiency      furosemide (LASIX) 40 MG tablet TAKE 1 TABLET BY MOUTH TWICE DAILY 180 tablet 0    mupirocin (BACTROBAN) 2 % ointment APPLY TOPICALLY TO THE AFFECTED AREA THREE TIMES DAILY 22 g 0    azelastine (ASTELIN) 0.1 % nasal spray Use 2 sprays in each nostril 2 times daily. Use fluticasone 15 minutes after the morning dose of astelin. 30 mL 2    fluticasone (FLONASE) 50 MCG/ACT nasal spray 2 sprays in each nostril daily, 15 minutes after morning dose of Astelin. . 16 g 2    fexofenadine (ALLEGRA) 180 MG tablet Take 1 tablet by mouth daily 30 tablet 1    metFORMIN (GLUCOPHAGE) 1000 MG tablet TAKE 1 TABLET TWICE DAILY  WITH MEALS 60 tablet 2    fluticasone-salmeterol (ADVAIR HFA) 230-21 MCG/ACT inhaler Inhale 2 puffs into the lungs 2 times daily 1 each 2    atorvastatin (LIPITOR) 40 MG tablet TAKE 1 TABLET DAILY 30 tablet 0    tamsulosin (FLOMAX) 0.4 MG capsule TAKE 1 CAPSULE BY MOUTH DAILY 90 capsule 0    glipiZIDE (GLUCOTROL) 5 MG tablet TAKE 1/2 TO 1 TABLET TWO   TIMES A DAY BEFORE MEALS 180 tablet 1    metoprolol tartrate (LOPRESSOR) 25 MG tablet TAKE 1/2 TABLET TWICE A DAY 90 tablet 1    FREESTYLE LITE strip TEST TWICE DAILY 200 strip 3    Spacer/Aero-Holding Chambers JANET With all spray inhalers. 1 Device 0    FreeStyle Lancets MISC USE TO TEST TWICE DAILY 200 each 3    HYDROcodone-acetaminophen (NORCO)  MG per tablet TK 1 T PO  QID PRN P  0    docusate sodium (COLACE) 100 MG capsule Take 200 mg by mouth daily Indications: Constipation      aspirin 81 MG tablet Take 81 mg by mouth daily.  amLODIPine (NORVASC) 2.5 MG tablet TAKE 1 TABLET BY MOUTH DAILY (Patient not taking: Reported on 4/12/2022) 90 tablet 0    albuterol sulfate HFA (VENTOLIN HFA) 108 (90 Base) MCG/ACT inhaler Inhale 2 puffs into the lungs 4 times daily as needed for Wheezing (Patient not taking: Reported on 4/12/2022) 54 g 0     No current facility-administered medications for this visit. Objective    Vitals:    04/12/22 1622   BP: 124/76   Site: Right Upper Arm   Position: Sitting   Cuff Size: Medium Adult   Pulse: 60   Resp: 18   SpO2: 97%   Weight: 232 lb (105.2 kg)   Height: 5' 6\" (1.676 m)     BP Readings from Last 3 Encounters:   04/12/22 124/76   03/17/22 (!) 110/40   03/17/22 118/78     Pulse Readings from Last 3 Encounters:   04/12/22 60   03/17/22 70   03/17/22 64     Wt Readings from Last 3 Encounters:   04/12/22 232 lb (105.2 kg)   03/17/22 228 lb (103.4 kg)   02/28/22 238 lb (108 kg)     Body mass index is 37.45 kg/m². Physical Exam  Vitals and nursing note reviewed. Constitutional:       Appearance: Normal appearance. He is obese. He is not diaphoretic. Neck:      Thyroid: No thyroid mass, thyromegaly or thyroid tenderness. Vascular: Carotid bruit (Left vs transmitted heart murmur) present. Cardiovascular:      Rate and Rhythm: Normal rate and regular rhythm. Heart sounds: Murmur heard. Crescendo systolic murmur is present with a grade of 1/6. No diastolic murmur is present. No friction rub. No gallop. No S3 or S4 sounds. Pulmonary:      Effort: No tachypnea, accessory muscle usage or respiratory distress. Breath sounds: Decreased air movement present. Examination of the right-middle field reveals decreased breath sounds, rhonchi and rales. Examination of the left-middle field reveals decreased breath sounds, rhonchi and rales. Examination of the right-lower field reveals decreased breath sounds, rhonchi and rales. Examination of the left-lower field reveals decreased breath sounds, rhonchi and rales. Decreased breath sounds, wheezing, rhonchi and rales present. Musculoskeletal:      Cervical back: Neck supple. No edema or erythema. Right lower le+ Pitting Edema present. Left lower le+ Pitting Edema present. Feet:      Comments:     Lymphadenopathy:      Cervical: No cervical adenopathy. Right cervical: No superficial, deep or posterior cervical adenopathy. Left cervical: No superficial, deep or posterior cervical adenopathy. Skin:            Comments: Small scabbed sores on the front of both calves. All are currently dry. Neurological:      Mental Status: He is alert. Psychiatric:         Attention and Perception: Attention and perception normal.         Mood and Affect: Mood and affect normal.         Speech: Speech normal.         Behavior: Behavior normal. Behavior is cooperative. Cognition and Memory: Cognition and memory normal.         Judgment: Judgment normal.       EKG 2022 0602  Atrial fibrillation with slow ventricular response. Left axis deviation. Right bundle branch block. Inferior infarct , age undetermined. QT interval long for rate  Abnormal ECG.   When compared with ECG of 2014 12:50, Atrial fibrillation has replaced Sinus rhythm. Right bundle branch block is now Present. Inferior infarct is now Present. Confirmed      On this date 4/12/2022 I have spent the 58 minutes reviewing previous notes, test results and face to face with the patient discussing the diagnosis and importance of compliance with the treatment plan as well as documenting on the day of the visit. An electronic signature was used to authenticate this note.     --Suhas Centeno, DO

## 2022-04-13 LAB
ANION GAP SERPL CALCULATED.3IONS-SCNC: 15 MMOL/L (ref 3–16)
BUN BLDV-MCNC: 19 MG/DL (ref 7–20)
CALCIUM SERPL-MCNC: 8.8 MG/DL (ref 8.3–10.6)
CHLORIDE BLD-SCNC: 100 MMOL/L (ref 99–110)
CO2: 27 MMOL/L (ref 21–32)
CREAT SERPL-MCNC: 1.1 MG/DL (ref 0.8–1.3)
GFR AFRICAN AMERICAN: >60
GFR NON-AFRICAN AMERICAN: >60
GLUCOSE BLD-MCNC: 168 MG/DL (ref 70–99)
MAGNESIUM: 2 MG/DL (ref 1.8–2.4)
POTASSIUM SERPL-SCNC: 4.7 MMOL/L (ref 3.5–5.1)
SODIUM BLD-SCNC: 142 MMOL/L (ref 136–145)

## 2022-04-18 ENCOUNTER — OFFICE VISIT (OUTPATIENT)
Dept: ENT CLINIC | Age: 82
End: 2022-04-18
Payer: MEDICARE

## 2022-04-18 VITALS
SYSTOLIC BLOOD PRESSURE: 111 MMHG | HEART RATE: 59 BPM | OXYGEN SATURATION: 94 % | TEMPERATURE: 97.5 F | DIASTOLIC BLOOD PRESSURE: 80 MMHG

## 2022-04-18 DIAGNOSIS — K14.0 TONGUE ULCER: ICD-10-CM

## 2022-04-18 DIAGNOSIS — J30.9 ALLERGIC RHINITIS, UNSPECIFIED SEASONALITY, UNSPECIFIED TRIGGER: Primary | Chronic | ICD-10-CM

## 2022-04-18 DIAGNOSIS — J01.90 ACUTE RHINOSINUSITIS: ICD-10-CM

## 2022-04-18 PROCEDURE — G8427 DOCREV CUR MEDS BY ELIG CLIN: HCPCS | Performed by: OTOLARYNGOLOGY

## 2022-04-18 PROCEDURE — 4004F PT TOBACCO SCREEN RCVD TLK: CPT | Performed by: OTOLARYNGOLOGY

## 2022-04-18 PROCEDURE — G8417 CALC BMI ABV UP PARAM F/U: HCPCS | Performed by: OTOLARYNGOLOGY

## 2022-04-18 PROCEDURE — 99213 OFFICE O/P EST LOW 20 MIN: CPT | Performed by: OTOLARYNGOLOGY

## 2022-04-18 PROCEDURE — 1123F ACP DISCUSS/DSCN MKR DOCD: CPT | Performed by: OTOLARYNGOLOGY

## 2022-04-18 PROCEDURE — 4040F PNEUMOC VAC/ADMIN/RCVD: CPT | Performed by: OTOLARYNGOLOGY

## 2022-04-18 ASSESSMENT — ENCOUNTER SYMPTOMS
SORE THROAT: 0
RHINORRHEA: 0
SINUS PAIN: 0

## 2022-04-18 NOTE — PROGRESS NOTES
[facility]       PCP:  Glenn Peres DO      CHIEF COMPLAINT  Chief Complaint   Patient presents with    Sinusitis    Mouth Lesions     tongue ulcer    Allergic Rhinitis        HISTORY OF PRESENT ILLNESS       Alfred Beaulieu is a [de-identified] y.o. male here for recheck and follow up of the above chief complaints. All symptoms are better. \"Breathing is better. I can breath on my own without forcing myself. \"  Lenda Doom its definitely improved quite a bit. \"  Tooth fell out and tonuge is better, \"it don't bother me no more\"        REVIEW OF SYSTEMS   Review of Systems   Constitutional: Negative for chills and fever. HENT: Negative for ear discharge, ear pain, rhinorrhea, sinus pain and sore throat. PAST MEDICAL HISTORY    Past Medical History:   Diagnosis Date    Alcohol use     excess in past prior to CABG    AMD (age related macular degeneration) bilateral     Drusen and subretinal fluid on right, drusen on left, with poorly controlled diabetes and patient smoking    Benign essential hypertension     Mod concentric LVH    Blind right eye 01/01/2015    ~ date    CAD (coronary artery disease) 11/08/2013    s/p 4V CABG 11/12/13    CNVM (choroidal neovascular membrane), right 11/01/2019    With AMD    Colon polyps 10/30/2012    COPD (chronic obstructive pulmonary disease) ([address]     DDD (degenerative disc disease), lumbar 1962    Degenerative disc disease, cervical 2002    fell w/ radicular sx into his R thumb. Ruptured disc    Diverticulosis of colon 10/30/2012    Dry eye syndrome of bilateral lacrimal glands     Hip problem 1956    NUMBNESS W/ TRAUMA    Hypercholesterolemia     Impotence     Metabolic syndrome     Mild diastolic dysfunction 64/53/0992    LVEF 55-60%    Obstructive sleep apnea     Obstructive sleep apnea     C-PAP    Osteoarthritis of shoulder     left  @ ac jt/impingement.     Paroxysmal atrial fibrillation ([facility] 02/26/2022    On EKG with RBBB, inferior infarct age undetermined, QT interval long for rate    Pulmonary hypertension ([address] 10/21/2013    37    Recurrent BCC (basal cell carcinoma)     Right bundle branch block 05/15/2018    S/P aortic valve replacement 11/12/2013    Submandibular duct obstruction - right 10/02/2017    12 x 14 x 18 mm right submandibular gland stone causing submandibular duct dilatation and gland prominence    Tobacco use disorder 1957    Type II or unspecified type diabetes mellitus without mention of complication, not stated as uncontrolled 2003    Vitamin D deficiency        Past Surgical History:   Procedure Laterality Date    AORTIC VALVE REPLACEMENT  11/12/2013    BLEPHAROPLASTY  07/2007    UPPER LID    BONY PELVIS SURGERY  01/12/2021    SI joint    CARDIAC CATHETERIZATION  11/08/2013    dx    CARDIAC SURGERY  11/12/2013    reincised for bleeding    CATARACT REMOVAL WITH IMPLANT Right 01/2019    CATARACT REMOVAL WITH IMPLANT Left 02/2019    CERVICAL DISC SURGERY Right 2015    epidural steroids.  CORONARY ARTERY BYPASS GRAFT  11/12/2013    4 V    EYE SURGERY Bilateral     Laser peripheral iridotomy     FINGER TRIGGER RELEASE Right 08/19/2014    Release of trigger thumb (stenosing tenosynovitis)    HEMORRHOID SURGERY  [id number]    LAPAROSCOPIC APPENDECTOMY  11/14/2014    LUMBAR LAMINECTOMY  1998 & 2003    LUMBAR LAMINECTOMY  05/07/2014    L2-L3 & Repair dural tear x2.  LUMBAR SPINE SURGERY  08/04/2009    SPINaL CORD STIMULATOR FOR PAIN    LUMBAR SPINE SURGERY  08/2014    epidural steroids. 3/2017 NEAL, 10/21/19    LUMBAR SPINE SURGERY Right 04/23/2019    SPINAL CORD STIMULATOR BATTERY REPLACEMENT WITH POCKET REVISION     MOHS SURGERY Left 02/11/2020    Dorsum of hand: type?     MOHS SURGERY Right 01/01/2020    right upper inner pinna with skin graft    NERVE SURGERY  ~12/2012    nerve block     ROTATOR CUFF REPAIR  2001    SKIN CANCER EXCISION Right 07/2017    forehead THEN TOP OF THE HEAD.  SKIN CANCER EXCISION  01/2019    nose    SOFT TISSUE BIOPSY      needle bx right neck mass -benign    TEAR DUCT SURGERY Bilateral 01/01/2015    for dry    TESTICLE REMOVAL  1947    R    TONSILLECTOMY      CHILD    UMBILICAL HERNIA REPAIR  11/14/2014    Laparoscopic       EXAMINATION    Vitals:    04/18/22 0839   BP: 111/80   Site: Left Upper Arm   Position: Sitting   Cuff Size: Medium Adult   Pulse: 59   Temp: 97.5 °F (36.4 °C)   TempSrc: Temporal   SpO2: 94%     General:  WDWN, NAD, alert and oriented  Face: There was no swelling or lesions detected. Voice: Normal with no hoarseness or hot potato voice. Ears:  TMs and EACs appeared to be normal        Nose: The nasal septum, turbinates, secretions, and mucosa appeared to be normal.   Sinuses:  Maxillary and frontal sinuses were nontender to palpation and percussion.    (+) Oral cavity:  Previous tongue ulcer is no longer present and appeared to be totally healed and resolved. Mucosa, secretions, tongue, and gingiva appeared to be normal.   (+) Oropharynx:  Post tonsillectomy. The hard and soft palates, uvula, tongue, posterior oropharyngeal wall, mucosa and secretions appeared to be normal.     Salivary Glands:  Normal bilateral parotid and bilateral submandibular salivary glands. Neck:  No masses or tenderness. Trachea midline. Laryngeal cartilages and hyoid bone normal.    Thyroid:  Normal, nontender, no goiter or nodules palpable. Lymph nodes: No cervical lymphadenopathy. Jennifer Medina / Aram Tomas / Karolina Reed was seen today for sinusitis, mouth lesions and allergic rhinitis . Diagnoses and all orders for this visit:    Allergic rhinitis, unspecified seasonality, unspecified trigger    Acute rhinosinusitis  Comments:  Resolved. Tongue ulcer  Comments:  appears to be resolved. RECOMMENDATIONS/PLAN      1.  Continue fexofenadine, fluticasone and azelastine for allergies. 2. Return in about 6 months (around 10/18/2022) for recheck/follow-up, and sooner if condition worsens.

## 2022-04-19 ENCOUNTER — TELEPHONE (OUTPATIENT)
Dept: FAMILY MEDICINE CLINIC | Age: 82
End: 2022-04-19

## 2022-04-19 DIAGNOSIS — E78.00 PURE HYPERCHOLESTEROLEMIA: Chronic | ICD-10-CM

## 2022-04-19 DIAGNOSIS — I25.83 CORONARY ARTERY DISEASE DUE TO LIPID RICH PLAQUE: Chronic | ICD-10-CM

## 2022-04-19 DIAGNOSIS — I25.10 CORONARY ARTERY DISEASE DUE TO LIPID RICH PLAQUE: Chronic | ICD-10-CM

## 2022-04-19 RX ORDER — ATORVASTATIN CALCIUM 40 MG/1
TABLET, FILM COATED ORAL
Qty: 90 TABLET | Refills: 1 | Status: SHIPPED | OUTPATIENT
Start: 2022-04-19 | End: 2022-09-26

## 2022-04-19 NOTE — TELEPHONE ENCOUNTER
Patient needs a refill on his atorvastatin       [facility] [address] [place], [place] - [place] [phone]

## 2022-04-23 PROBLEM — I48.0 PAROXYSMAL ATRIAL FIBRILLATION (HCC): Chronic | Status: ACTIVE | Noted: 2022-02-26

## 2022-04-23 PROBLEM — I45.10 RIGHT BUNDLE BRANCH BLOCK: Chronic | Status: ACTIVE | Noted: 2022-02-26

## 2022-04-23 PROBLEM — I45.10 RIGHT BUNDLE BRANCH BLOCK: Chronic | Status: ACTIVE | Noted: 2018-05-15

## 2022-04-27 DIAGNOSIS — N40.1 BPH ASSOCIATED WITH NOCTURIA: ICD-10-CM

## 2022-04-27 DIAGNOSIS — R35.1 BPH ASSOCIATED WITH NOCTURIA: ICD-10-CM

## 2022-04-28 RX ORDER — TAMSULOSIN HYDROCHLORIDE 0.4 MG/1
0.4 CAPSULE ORAL DAILY
Qty: 90 CAPSULE | Refills: 0 | Status: SHIPPED | OUTPATIENT
Start: 2022-04-28 | End: 2022-07-22 | Stop reason: SDUPTHER

## 2022-05-02 DIAGNOSIS — I25.83 CORONARY ARTERY DISEASE DUE TO LIPID RICH PLAQUE: Chronic | ICD-10-CM

## 2022-05-02 DIAGNOSIS — I10 ESSENTIAL HYPERTENSION, BENIGN: Chronic | ICD-10-CM

## 2022-05-02 DIAGNOSIS — I25.10 CORONARY ARTERY DISEASE DUE TO LIPID RICH PLAQUE: Chronic | ICD-10-CM

## 2022-05-03 NOTE — TELEPHONE ENCOUNTER
LV 4/12/22 WITH DR LUCY SAWYER NONE  Return in about 2 weeks (around 4/26/2022), or if symptoms worsen or fail to improve.

## 2022-05-16 DIAGNOSIS — E11.8 TYPE 2 DIABETES MELLITUS WITH COMPLICATION, WITHOUT LONG-TERM CURRENT USE OF INSULIN (HCC): Chronic | ICD-10-CM

## 2022-05-16 DIAGNOSIS — J30.9 ALLERGIC RHINITIS, UNSPECIFIED SEASONALITY, UNSPECIFIED TRIGGER: Chronic | ICD-10-CM

## 2022-05-16 NOTE — CARE COORDINATION
Ambulatory Care Coordination Note  5/8/2019  CM Risk Score: 3  Lily Mortality Risk Score: 15.18    ACC: Kira Johnson, MARILU    Summary Note: RNCC called patient today to follow up on COPD and diabetes. Writer wanted to discuss increase in the A1C from 7.5 up to 8.4%. Pt states that he is not that worried about it right now. Pt states that with the increase in the medication by Dr. Kike Jones his sugars are now running in the 160's. Pt states that he does have neuropathy in his feet and has for years but that is the only side effect that he notices from DM. Pt states that he has not stopped smoking and has been told that can affect him too multiple times. Pt does state that the pain in his ear has finally subsided and has not had to do any treatment with it in the last few days. Pt states that he does not weigh himself daily but does weigh once a week about. Pt states that he has no other needs or concerns at this time but is agreeable to continued care coordination follow-up calls. Mailings : DM and COPD zone tool           Smoking cessation                     Know your numbers - NovoNordisk        Care Coordination Interventions    Program Enrollment:  Complex Care  Referral from Primary Care Provider:  No  Suggested Interventions and Community Resources  Diabetes Education:  Completed (Comment: Mailed Zone tool for DM and diet education. )  Fall Risk Prevention:  Completed  Disease Association:  Completed (Comment: ENT; Cardiology)  Medication Assistance Program:  Not Started  Medi Set or Pill Pack:  Completed  Pharmacist:   (Comment: Faxed medical necessity form for test strips and lancets to [facility] [place] )  Registered Dietician:  Declined  Smoking Cessation:  Declined (Comment: Is not ready to quit at this time )  Transportation Support:  Not Started  Zone Management Tools: In Process (Comment: DM and COPD)  Other Services or Interventions:  Able to get test strips now.  Will bring readings to appointment SURGERY 1/14/19  Yes Historical Provider, MD   HYDROcodone-acetaminophen (NORCO)  MG per tablet TK 1 T PO  QID PRN P 11/26/18  Yes Historical Provider, MD   fluticasone (FLONASE) 50 MCG/ACT nasal spray 1 spray by Nasal route daily 6/29/18  Yes MERCED Gamez CNP   docusate sodium (COLACE) 100 MG capsule Take 200 mg by mouth daily Indications: Constipation   Yes Historical Provider, MD   aspirin 81 MG tablet Take 81 mg by mouth daily. Yes Historical Provider, MD   FREESTYLE LITE strip TEST THREE TIMES DAILY 2/5/19   Jocelyn Jonas, DO   FREESTYLE LANCETS MISC Use to test blood two times per day 12/27/18   Jocelyn Jonas, DO       No future appointments. ,   Diabetes Assessment    Medic Alert ID:  No  Meal Planning:  None   How often do you test your blood sugar?:  Daily   Do you have barriers with adherence to non-pharmacologic self-management interventions?  (Nutrition/Exercise/Self-Monitoring):  No   Have you ever had to go to the ED for symptoms of low blood sugar?:  No       Increase or Decrease trend in Blood Sugars   Last Blood Sugar Value:  160   Blood Sugar Monitoring Regimen:  Morning Fasting   Blood Sugar Trends:  No Change       and   COPD Assessment    Does the patient understand envrionmental exposure?:  No  Is the patient able to verbalize Rescue vs. Long Acting medications?:  No  Does the patient have a nebulizer?:  No     No patient-reported symptoms         Symptoms: No

## 2022-05-17 RX ORDER — GLIPIZIDE 5 MG/1
TABLET ORAL
Qty: 180 TABLET | Refills: 0 | Status: SHIPPED | OUTPATIENT
Start: 2022-05-17 | End: 2022-08-15

## 2022-05-17 NOTE — TELEPHONE ENCOUNTER
Refill Request:     Last Office Visit:  4/18/2022     Next Scheduled Visit : Visit date not found     Medication Requested:    Pharmacy:    [facility], [address] [phone] - F [phone]  [facility] [address]  Phone: [phone] Fax: [phone], [address] [facility] [phone] [facility] [phone]  [facility] [facility] [address]  Phone: [phone] Fax: [phone]

## 2022-05-18 RX ORDER — FEXOFENADINE HYDROCHLORIDE 180 MG/1
TABLET, FILM COATED ORAL
Qty: 30 TABLET | Refills: 4 | Status: SHIPPED | OUTPATIENT
Start: 2022-05-18 | End: 2022-07-22 | Stop reason: SDUPTHER

## 2022-07-09 DIAGNOSIS — I10 ESSENTIAL HYPERTENSION, BENIGN: ICD-10-CM

## 2022-07-11 RX ORDER — AMLODIPINE BESYLATE 2.5 MG/1
2.5 TABLET ORAL DAILY
Qty: 90 TABLET | Refills: 0 | Status: SHIPPED | OUTPATIENT
Start: 2022-07-11 | End: 2022-10-07

## 2022-07-12 ENCOUNTER — TELEPHONE (OUTPATIENT)
Dept: FAMILY MEDICINE CLINIC | Age: 82
End: 2022-07-12

## 2022-07-12 NOTE — TELEPHONE ENCOUNTER
Called patient to schedule appt for future refills. No voicemail to leave a message, phone keeps ringing.

## 2022-07-22 ENCOUNTER — OFFICE VISIT (OUTPATIENT)
Dept: FAMILY MEDICINE CLINIC | Age: 82
End: 2022-07-22
Payer: MEDICARE

## 2022-07-22 VITALS
WEIGHT: 225.2 LBS | HEART RATE: 60 BPM | BODY MASS INDEX: 36.19 KG/M2 | HEIGHT: 66 IN | DIASTOLIC BLOOD PRESSURE: 52 MMHG | OXYGEN SATURATION: 98 % | SYSTOLIC BLOOD PRESSURE: 108 MMHG | RESPIRATION RATE: 20 BRPM

## 2022-07-22 DIAGNOSIS — F11.20 NARCOTIC DEPENDENCY, CONTINUOUS (HCC): ICD-10-CM

## 2022-07-22 DIAGNOSIS — R09.89 PULMONARY VASCULAR CONGESTION: ICD-10-CM

## 2022-07-22 DIAGNOSIS — I10 ESSENTIAL HYPERTENSION, BENIGN: Chronic | ICD-10-CM

## 2022-07-22 DIAGNOSIS — I48.0 PAROXYSMAL ATRIAL FIBRILLATION (HCC): ICD-10-CM

## 2022-07-22 DIAGNOSIS — R80.9 TYPE 2 DIABETES MELLITUS WITH MICROALBUMINURIA, WITHOUT LONG-TERM CURRENT USE OF INSULIN (HCC): Primary | ICD-10-CM

## 2022-07-22 DIAGNOSIS — N40.1 BPH ASSOCIATED WITH NOCTURIA: ICD-10-CM

## 2022-07-22 DIAGNOSIS — R35.1 NOCTURIA: ICD-10-CM

## 2022-07-22 DIAGNOSIS — I25.83 CORONARY ARTERY DISEASE DUE TO LIPID RICH PLAQUE: Chronic | ICD-10-CM

## 2022-07-22 DIAGNOSIS — I25.10 CORONARY ARTERY DISEASE DUE TO LIPID RICH PLAQUE: Chronic | ICD-10-CM

## 2022-07-22 DIAGNOSIS — M48.02 CERVICAL STENOSIS OF SPINAL CANAL: ICD-10-CM

## 2022-07-22 DIAGNOSIS — I48.19 PERSISTENT ATRIAL FIBRILLATION (HCC): ICD-10-CM

## 2022-07-22 DIAGNOSIS — M46.1 SACROILIITIS, NOT ELSEWHERE CLASSIFIED (HCC): ICD-10-CM

## 2022-07-22 DIAGNOSIS — J30.9 ALLERGIC RHINITIS, UNSPECIFIED SEASONALITY, UNSPECIFIED TRIGGER: Chronic | ICD-10-CM

## 2022-07-22 DIAGNOSIS — I27.20 PULMONARY HYPERTENSION (HCC): ICD-10-CM

## 2022-07-22 DIAGNOSIS — E11.29 TYPE 2 DIABETES MELLITUS WITH MICROALBUMINURIA, WITHOUT LONG-TERM CURRENT USE OF INSULIN (HCC): Primary | ICD-10-CM

## 2022-07-22 DIAGNOSIS — R35.1 BPH ASSOCIATED WITH NOCTURIA: ICD-10-CM

## 2022-07-22 LAB
BILIRUBIN, POC: NEGATIVE
BLOOD URINE, POC: NEGATIVE
CLARITY, POC: CLEAR
COLOR, POC: YELLOW
CREATININE URINE: 100.9 MG/DL (ref 39–259)
GLUCOSE URINE, POC: NORMAL
HBA1C MFR BLD: 8.4 %
KETONES, POC: NEGATIVE
LEUKOCYTE EST, POC: NEGATIVE
MICROALBUMIN UR-MCNC: 3.4 MG/DL
MICROALBUMIN/CREAT UR-RTO: 33.7 MG/G (ref 0–30)
NITRITE, POC: NEGATIVE
PH, POC: 5.5
PROTEIN, POC: NEGATIVE
SPECIFIC GRAVITY, POC: 1.02
UROBILINOGEN, POC: 0.2

## 2022-07-22 PROCEDURE — 1123F ACP DISCUSS/DSCN MKR DOCD: CPT | Performed by: FAMILY MEDICINE

## 2022-07-22 PROCEDURE — 83036 HEMOGLOBIN GLYCOSYLATED A1C: CPT | Performed by: FAMILY MEDICINE

## 2022-07-22 PROCEDURE — 99215 OFFICE O/P EST HI 40 MIN: CPT | Performed by: FAMILY MEDICINE

## 2022-07-22 PROCEDURE — G8417 CALC BMI ABV UP PARAM F/U: HCPCS | Performed by: FAMILY MEDICINE

## 2022-07-22 PROCEDURE — 4004F PT TOBACCO SCREEN RCVD TLK: CPT | Performed by: FAMILY MEDICINE

## 2022-07-22 PROCEDURE — G8427 DOCREV CUR MEDS BY ELIG CLIN: HCPCS | Performed by: FAMILY MEDICINE

## 2022-07-22 PROCEDURE — 3052F HG A1C>EQUAL 8.0%<EQUAL 9.0%: CPT | Performed by: FAMILY MEDICINE

## 2022-07-22 PROCEDURE — 81002 URINALYSIS NONAUTO W/O SCOPE: CPT | Performed by: FAMILY MEDICINE

## 2022-07-22 RX ORDER — FEXOFENADINE HCL 180 MG/1
TABLET ORAL
Qty: 90 TABLET | Refills: 0 | Status: SHIPPED | OUTPATIENT
Start: 2022-07-22 | End: 2022-10-11 | Stop reason: SDUPTHER

## 2022-07-22 RX ORDER — TAMSULOSIN HYDROCHLORIDE 0.4 MG/1
0.4 CAPSULE ORAL DAILY
Qty: 90 CAPSULE | Refills: 0 | Status: SHIPPED | OUTPATIENT
Start: 2022-07-22

## 2022-07-22 RX ORDER — FUROSEMIDE 40 MG/1
TABLET ORAL
Qty: 180 TABLET | Refills: 0 | Status: SHIPPED | OUTPATIENT
Start: 2022-07-22

## 2022-07-22 NOTE — PROGRESS NOTES
Freddie Waite (:  1940) is a [de-identified] y.o. male,Established patient, here for evaluation of the following chief complaint(s):  Diabetes (FOLLOW UP ON DIABETES, A1C AND MICRO DUE TODAY) and Hypertension (FOLLOW UP ON HTN )       ASSESSMENT/PLAN:  236    Patient Instructions   Min Alexander was seen today for diabetes and hypertension. Diagnoses and all orders for this visit:    Type 2 diabetes mellitus with microalbuminuria, without long-term current use of insulin (HCC)  -     POCT glycosylated hemoglobin (Hb A1C)  -     metFORMIN (GLUCOPHAGE) 1000 MG tablet; Take 1 tablet 2 times daily with meals  -     Microalbumin / Creatinine Urine Ratio; Future  Decrease sugar intake to decrease the night time urination. Essential hypertension, benign  -     metoprolol tartrate (LOPRESSOR) 25 MG tablet; Take 1/2 tablet 2 times daily  -     Microalbumin / Creatinine Urine Ratio; Future  -     Good control.  -     Continue meds and lifestyle control. Paroxysmal atrial fibrillation (HCC)  See below. High risk of stroke. Coronary artery disease due to lipid rich plaque  -     metoprolol tartrate (LOPRESSOR) 25 MG tablet; Take 1/2 tablet 2 times daily    BPH associated with nocturia  -     tamsulosin (FLOMAX) 0.4 MG capsule; Take 1 capsule by mouth in the morning. Pulmonary hypertension (HCC)  -     furosemide (LASIX) 40 MG tablet; TAKE 1 TABLET BY MOUTH TWICE DAILY    Pulmonary vascular congestion  -     furosemide (LASIX) 40 MG tablet; TAKE 1 TABLET BY MOUTH TWICE DAILY    Allergic rhinitis, unspecified seasonality, unspecified trigger  -     fexofenadine (ALLERGY RELIEF) 180 MG tablet; TAKE 1 TABLET BY MOUTH DAILY    Cervical stenosis of spinal canal  Sacroiliitis, not elsewhere classified ([address]  Narcotic dependency, continuous ([address]  Per pain doctor. Nocturia due to poor diabetes control  -     POCT Urinalysis no Micro  Sugar is part of problem.     Omega 3 fatty acids such as are found in fish oil can decrease urinary urgency and frequency. Each capsule of fish oil should have 800 mg or more of a combination of EPA & DHA - the active omega 3's- per capsule and you take 1 capsule 3 times per day. The oil lines the bladder decreasing irritation of the bladder wall and helping prevent bacteria from sticking to the bladder wall so fewer UTI's occur. Fish oil also lowers triglycerides, helps with dry eyes and helps lubricate joints to lower arthritis pain. Return in about 3 months (around 10/22/2022) for Diabetes, Hypertension, COPD, Cholesterol, CAD. Subjective   SUBJECTIVE/OBJECTIVE:  Chief Complaint   Patient presents with    Diabetes     FOLLOW UP ON DIABETES, A1C AND MICRO DUE TODAY    Hypertension     FOLLOW UP ON HTN    152  HPI    Type 2 diabetes mellitus with microalbuminuria, without long-term current use of insulin (HCC)  Eating a more candy and sweets. Not much regular pop. No sugar in coffee. He is checking BS a couple times. Has been 160-200 in  am. Takes Glipizide with evening meal. Can't exercise with back pain. Essential hypertension, benign  Has cuff but hardly ever uses. Is watching salt. Hardly ever adds salt. Coronary artery disease due to lipid rich plaque  No chest pain. No NTG ever taken. Seen by cardio yearly. Had a monitor and echo on him at last visit. BPH associated with nocturia  Getting up 6x/night. Had glucose in urine last check. On Tamsulosin qd. Pulmonary hypertension ([address]  He stays SOB. Pulmonary vascular congestion  He has swelling in feet. Allergic rhinitis, unspecified seasonality, unspecified trigger  Head feels like water in it sometimes. Throat congestion. Still using CPAP. Cervical stenosis of spinal canal  Sacroiliitis, not elsewhere classified ([address]  Narcotic dependency, continuous ([address]  Seeing pain management. Nocturia  With high sugar. Persistent A fib on monitor  Not aware of diagnosis. ICD-10-CM ICD-9-CM   1.  S/P CABG x 4 LIMA-LAD-D;SVG-PDA-OM Z95.1 V45.81   2. Paroxysmal atrial fibrillation (HCC) I48.0 427.31   3. s/p Bioprosthetic AVR (23 mm [employer] Ease) Z95.2 V43.3     Continue same  Await monitoring report  I asked that he follow his weights, blood pressure, and swelling. I explained to the patient how to check for edema. Depending on these parameters, I explained, how the diuretics can be adjusted weekly to maintain baseline status. I also explained the dosing should change throughout the year as activity and consumption change. Patient is understanding of the instructions. Return sooner if any change/worsening. I asked that the patient call for the results of the planned studies. No orders of the defined types were placed in this encounter. Return in about 3 months (around 8/16/2022). If there is any change, patient will return. Sudhakar Flaherty MD [facility]    Review of Systems       Objective   Vitals:    07/22/22 1326 07/22/22 1347   BP: (!) 122/58 (!) 108/52   Site: Right Upper Arm Left Upper Arm   Position: Sitting Sitting   Cuff Size: Medium Adult Large Adult   Pulse: 60    Resp: 20    SpO2: 98%    Weight: 225 lb 3.2 oz (102.2 kg)    Height: 5' 6\" (1.676 m)      BP Readings from Last 3 Encounters:   07/22/22 (!) 108/52   04/18/22 111/80   04/12/22 124/76     Pulse Readings from Last 3 Encounters:   07/22/22 60   04/18/22 59   04/12/22 60     Wt Readings from Last 3 Encounters:   07/22/22 225 lb 3.2 oz (102.2 kg)   04/12/22 232 lb (105.2 kg)   03/17/22 228 lb (103.4 kg)     Body mass index is 36.35 kg/m². Physical Exam  Vitals and nursing note reviewed. Constitutional:       Appearance: Normal appearance. He is obese. He is not diaphoretic. Neck:      Thyroid: No thyroid mass, thyromegaly or thyroid tenderness. Vascular: Carotid bruit (Left vs transmitted heart murmur) present. Cardiovascular:      Rate and Rhythm: Normal rate and regular rhythm. Heart sounds: Murmur heard.    Crescendo systolic murmur is present with a grade of 1/6. No diastolic murmur is present. No friction rub. No gallop. No S3 or S4 sounds. Pulmonary:      Effort: No tachypnea, accessory muscle usage or respiratory distress. Breath sounds: Decreased air movement present. Examination of the right-middle field reveals decreased breath sounds, rhonchi and rales. Examination of the left-middle field reveals decreased breath sounds, rhonchi and rales. Examination of the right-lower field reveals decreased breath sounds, rhonchi and rales. Examination of the left-lower field reveals decreased breath sounds, rhonchi and rales. Decreased breath sounds, wheezing, rhonchi and rales present. Musculoskeletal:      Cervical back: Neck supple. No edema or erythema. Right lower le+ Pitting Edema present. Left lower le+ Pitting Edema present. Feet:      Comments:     Lymphadenopathy:      Cervical: No cervical adenopathy. Right cervical: No superficial, deep or posterior cervical adenopathy. Left cervical: No superficial, deep or posterior cervical adenopathy. Neurological:      Mental Status: He is alert. Psychiatric:         Attention and Perception: Attention and perception normal.         Mood and Affect: Mood and affect normal.         Speech: Speech normal.         Behavior: Behavior normal. Behavior is cooperative.          Cognition and Memory: Cognition and memory normal.         Judgment: Judgment normal.     Results for POC orders placed in visit on 22   POCT glycosylated hemoglobin (Hb A1C)   Result Value Ref Range    Hemoglobin A1C 8.4 %   POCT Urinalysis no Micro   Result Value Ref Range    Color, UA yellow     Clarity, UA clear     Glucose, UA POC 100mg     Bilirubin, UA negative     Ketones, UA negative     Spec Grav, UA 1.020     Blood, UA POC negative     pH, UA 5.5     Protein, UA POC negative     Urobilinogen, UA 0.2     Leukocytes, UA negative     Nitrite, UA negative Adult Echocardiogram Report   Name: Shilpa Matos   Study Date: 05/13/2022 09:50 AM   Summary:   Aortic valve prosthesis is a bioprosthetic pericardial bovine type valve. The left ventricular wall motion is normal.   There is mild concentric left ventricular hypertrophy. There is mild mitral regurgitation. Left atrium is moderately dilated. Mild tricuspid regurgitation is present. Right atrium is moderately dilated. Trace mitral regurgitation. The gradient is abnormal for this prosthetic aortic valve. A bioprosthetic prosthesis is present in the aortic valve position. Mild to moderate valvular aortic stenosis. Left ventricular systolic function is normal. (LVEF>/=55%)   Overall left ventricular ejection fraction is estimated to be 55-60%. A contrast agent was used to demonstrate all left ventricular wall segments. Reason for Study: S/P AVR- 23 mm [facility] Ease bovine pericardial   bioprosthesis [Z95.2 (ICD-10-CM)]. CD HOLTER EXTENDED 3-7-14 DAY  Anatomical Region Laterality Modality   Chest -- Other   Narrative  This result has an attachment that is not available. · Atrial fibrillation with a controlled ventricular response. · Rare PVCs and a single 12 beat run of nonsustained VT.   · No symptoms noted while wearing the monitor. Holter Monitor   Hookup date: 5/13/2022. Scan date: 5/23/2022. Start time: 10:19 EDT. Ventricular prematurities - pairs: 1   Ventricular prematurities - runs: 1 VT longest run:12 beats   VT fastest run: 12 beats or    Atrial fibrillation was noted with average HR 59 bpm, Atrial Fibrillation ranging from 30 to 158. Minimum atrial fibrillation was reached at 9 hour, 58 min, on the 1 day. Maximum atrial fibrillation was reached at 3 hr, 45 min, on the 2 day. Overall AF burden was 100 %.      No significant pauses noted   Symptom diary not returned    On this date 7/22/2022 I have spent 44 minutes reviewing previous notes, test results and face to face with the patient discussing the diagnosis and importance of compliance with the treatment plan as well as documenting on the day of the visit. An electronic signature was used to authenticate this note.     --Mamadou Campos, DO

## 2022-07-22 NOTE — PATIENT INSTRUCTIONS
wall so fewer UTI's occur. Fish oil also lowers triglycerides, helps with dry eyes and helps lubricate joints to lower arthritis pain.

## 2022-08-09 ENCOUNTER — TELEPHONE (OUTPATIENT)
Dept: FAMILY MEDICINE CLINIC | Age: 82
End: 2022-08-09

## 2022-08-09 DIAGNOSIS — R35.1 BPH ASSOCIATED WITH NOCTURIA: Primary | ICD-10-CM

## 2022-08-09 DIAGNOSIS — N40.1 BPH ASSOCIATED WITH NOCTURIA: Primary | ICD-10-CM

## 2022-08-09 NOTE — TELEPHONE ENCOUNTER
----- Message from Grant Yu sent at 8/9/2022  8:07 AM EDT -----  Subject: Referral Request    Reason for referral request? Patient would like a referral to a Urologist.   Please call patient to advise him who he should be seeing and that the   referral has been done  Provider patient wants to be referred to(if known):     Provider Phone Number(if known): Additional Information for Provider?   ---------------------------------------------------------------------------  --------------  [address]    [phone];  Do not leave any message, patient will call back for answer  ---------------------------------------------------------------------------  --------------

## 2022-08-09 NOTE — TELEPHONE ENCOUNTER
Call patient with referral:  Dr. Tanya Martínez  [facility]  [address] [place] [address] Vikash Sandoval [facility]  Phone: [phone]  Fax: [phone]    BPH associated with nocturia  -     MAN - Maura Santiago MD, Urology, [facility]    Also inform the patient that he should cut sweets out of his diet in order to decrease number times he has to get up to go to the bathroom at night.

## 2022-08-15 DIAGNOSIS — E11.8 TYPE 2 DIABETES MELLITUS WITH COMPLICATION, WITHOUT LONG-TERM CURRENT USE OF INSULIN (HCC): Chronic | ICD-10-CM

## 2022-08-15 RX ORDER — GLIPIZIDE 5 MG/1
TABLET ORAL
Qty: 180 TABLET | Refills: 0 | Status: SHIPPED | OUTPATIENT
Start: 2022-08-15 | End: 2022-11-04

## 2022-08-17 DIAGNOSIS — E11.8 TYPE 2 DIABETES MELLITUS WITH COMPLICATION, WITHOUT LONG-TERM CURRENT USE OF INSULIN (HCC): Chronic | ICD-10-CM

## 2022-08-17 RX ORDER — LANCETS 28 GAUGE
EACH MISCELLANEOUS
Qty: 200 EACH | Refills: 3 | Status: SHIPPED | OUTPATIENT
Start: 2022-08-17

## 2022-09-26 DIAGNOSIS — I25.83 CORONARY ARTERY DISEASE DUE TO LIPID RICH PLAQUE: Chronic | ICD-10-CM

## 2022-09-26 DIAGNOSIS — E78.00 PURE HYPERCHOLESTEROLEMIA: Chronic | ICD-10-CM

## 2022-09-26 DIAGNOSIS — I25.10 CORONARY ARTERY DISEASE DUE TO LIPID RICH PLAQUE: Chronic | ICD-10-CM

## 2022-09-26 RX ORDER — ATORVASTATIN CALCIUM 40 MG/1
TABLET, FILM COATED ORAL
Qty: 90 TABLET | Refills: 0 | Status: SHIPPED | OUTPATIENT
Start: 2022-09-26

## 2022-10-06 ENCOUNTER — IMMUNIZATION (OUTPATIENT)
Dept: FAMILY MEDICINE CLINIC | Age: 82
End: 2022-10-06
Payer: MEDICARE

## 2022-10-06 VITALS — HEIGHT: 66 IN | BODY MASS INDEX: 36.35 KG/M2

## 2022-10-06 DIAGNOSIS — Z23 NEED FOR INFLUENZA VACCINATION: Primary | ICD-10-CM

## 2022-10-06 PROCEDURE — 90694 VACC AIIV4 NO PRSRV 0.5ML IM: CPT | Performed by: FAMILY MEDICINE

## 2022-10-06 PROCEDURE — G0008 ADMIN INFLUENZA VIRUS VAC: HCPCS | Performed by: FAMILY MEDICINE

## 2022-10-06 NOTE — PROGRESS NOTES
Immunizations Administered       Name Date Dose Route    Influenza, FLUAD, (age 72 y+), Adjuvanted, 0.5mL 10/6/2022 0.5 mL Intramuscular    Site: Deltoid- Left    Lot: [id number]    NDC: 89286-409-59

## 2022-10-07 DIAGNOSIS — I10 ESSENTIAL HYPERTENSION, BENIGN: ICD-10-CM

## 2022-10-07 RX ORDER — AMLODIPINE BESYLATE 2.5 MG/1
2.5 TABLET ORAL DAILY
Qty: 90 TABLET | Refills: 0 | Status: SHIPPED | OUTPATIENT
Start: 2022-10-07

## 2022-10-11 ENCOUNTER — OFFICE VISIT (OUTPATIENT)
Dept: ENT CLINIC | Age: 82
End: 2022-10-11
Payer: MEDICARE

## 2022-10-11 VITALS — TEMPERATURE: 97.7 F | SYSTOLIC BLOOD PRESSURE: 112 MMHG | HEART RATE: 76 BPM | DIASTOLIC BLOOD PRESSURE: 71 MMHG

## 2022-10-11 DIAGNOSIS — J01.90 ACUTE RHINOSINUSITIS: ICD-10-CM

## 2022-10-11 DIAGNOSIS — J34.2 NASAL SEPTAL DEVIATION: Chronic | ICD-10-CM

## 2022-10-11 DIAGNOSIS — J30.9 ALLERGIC RHINITIS, UNSPECIFIED SEASONALITY, UNSPECIFIED TRIGGER: Primary | Chronic | ICD-10-CM

## 2022-10-11 DIAGNOSIS — J30.9 ALLERGIC RHINITIS, UNSPECIFIED SEASONALITY, UNSPECIFIED TRIGGER: Chronic | ICD-10-CM

## 2022-10-11 PROCEDURE — G8417 CALC BMI ABV UP PARAM F/U: HCPCS | Performed by: OTOLARYNGOLOGY

## 2022-10-11 PROCEDURE — G8427 DOCREV CUR MEDS BY ELIG CLIN: HCPCS | Performed by: OTOLARYNGOLOGY

## 2022-10-11 PROCEDURE — 4004F PT TOBACCO SCREEN RCVD TLK: CPT | Performed by: OTOLARYNGOLOGY

## 2022-10-11 PROCEDURE — 3078F DIAST BP <80 MM HG: CPT | Performed by: OTOLARYNGOLOGY

## 2022-10-11 PROCEDURE — 99214 OFFICE O/P EST MOD 30 MIN: CPT | Performed by: OTOLARYNGOLOGY

## 2022-10-11 PROCEDURE — 3074F SYST BP LT 130 MM HG: CPT | Performed by: OTOLARYNGOLOGY

## 2022-10-11 PROCEDURE — 1123F ACP DISCUSS/DSCN MKR DOCD: CPT | Performed by: OTOLARYNGOLOGY

## 2022-10-11 PROCEDURE — G8484 FLU IMMUNIZE NO ADMIN: HCPCS | Performed by: OTOLARYNGOLOGY

## 2022-10-11 RX ORDER — FLUTICASONE PROPIONATE 50 MCG
SPRAY, SUSPENSION (ML) NASAL
Qty: 16 G | Refills: 2 | Status: SHIPPED | OUTPATIENT
Start: 2022-10-11

## 2022-10-11 RX ORDER — AMOXICILLIN AND CLAVULANATE POTASSIUM 875; 125 MG/1; MG/1
1 TABLET, FILM COATED ORAL 2 TIMES DAILY
Qty: 28 TABLET | Refills: 0 | Status: SHIPPED | OUTPATIENT
Start: 2022-10-11 | End: 2022-10-25

## 2022-10-11 RX ORDER — AZELASTINE 1 MG/ML
SPRAY, METERED NASAL
Qty: 30 ML | Refills: 2 | Status: SHIPPED | OUTPATIENT
Start: 2022-10-11

## 2022-10-11 RX ORDER — FEXOFENADINE HCL 180 MG/1
TABLET ORAL
Qty: 90 TABLET | Refills: 0 | Status: SHIPPED | OUTPATIENT
Start: 2022-10-11

## 2022-10-11 ASSESSMENT — ENCOUNTER SYMPTOMS
SORE THROAT: 0
SINUS PAIN: 0
RHINORRHEA: 0

## 2022-10-11 NOTE — PATIENT INSTRUCTIONS
RHINOSINUSITIS CARE  You may use acetaminophen (eg. Tylenol) or Ibuprofen (eg. Advil) (over the counter medications) as needed for fever or pain. Take Probiotic while you are taking antibiotics, to prevent diarrhea, stomach upset, pseudomembranous colitis, and C. difficile diarrhea. This may be obtained at your pharmacy or health food store. Alternatively, you may eat one cup of yogurt with active or live cultures twice daily while taking the antibiotic. Continue for two to three days after completion of the antibiotic. Use a 12 hour decongestant spray, 0.05% oxymetazoline (e.g. Afrin, Duration, 4-Way). Spray each nostril twice three times a day for three days, then two times a day for 2 days, and then stop for two days and then repeat the cycle once. Take one Mucinex (blue box) maximum strength 1200 mg tablet every 12 hours later, daily for the next ten days. Use a saline nasal/sinus irrigation system, e.g. NeilMed sinus rinse, twice daily to help to clear secretions and drainage. Use distilled water; DO NOT USE TAP WATER! This is because of the possibility of amoeba or other microorganisms in tap water, which can result in a fatal disease. Alternatively, you could use a blue bulb syringe and solution of 1/4 tsp of table salt in 8 ounces (one cup or 240 ml) of distilled water. Use fluticasone 2 sprays in each nostril once daily. It may take several days to several weeks for your sinusitis to clear up. It is important to take all your medications as prescribed. Please continue your antibiotics as prescribed.     Please call the office if your condition worsens or if symptoms persist after treatment is completed.        ===================================================================      ADVERSE AND SIDE EFFECTS OF MEDICATIONS:    Please be aware of the following possible adverse reactions, side effects, and complications of the following medications, including, but not limited to: allergic reaction, interactions with other medications, nausea, headache, diarrhea, persistent symptoms, failure to improve, and the following:     Jes (antibiotic):  diarrhea, colitis (severe infection and inflammation of the large intestine), pseudomembranous colitis (severe infection and inflammation of the colon, usually due to C. difficile bacteria)  yeast or fungal  infections, Richey-Quinton syndrome (very rare necrotic skin reaction with peeling of skin, blisters and arthritis), persistent symptoms/infection, and failure to improve.      Fluticasone:  nosebleed, burning sensation, atrophy of nasal mucosa, septal ulceration or perforation, nasal irritation, nasal yeast infection,  drowsiness, bad taste.      ~~~>>> Also please read the medication information in this AVS and all information given to you by the pharmacist.

## 2022-10-11 NOTE — PROGRESS NOTES
[facility]       PCP:  Hitesh Alarcon DO      CHIEF COMPLAINT  Chief Complaint   Patient presents with    Nasal Congestion       HISTORY OF PRESENT ILLNESS    Romi Cameron is a [de-identified] y.o. male who presented today for evaluation and management for congestion and mucus. I have a lot of congestion. Stuffy nose. I can't breath through my nose. \"Not using any Astelin, fexofenafdine or Flonase allergy medication because I ran out. I'm using Mucinex and sudafed. \"       REVIEW OF SYSTEMS   Review of Systems   Constitutional:  Negative for chills and fever. HENT:  Positive for congestion and postnasal drip. Negative for ear discharge, hearing loss, rhinorrhea, sinus pain, sore throat and tinnitus. 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 Ruptured disc    Diverticulosis of colon 10/30/2012    Dry eye syndrome of bilateral lacrimal glands     Hip problem 1956    NUMBNESS W/ TRAUMA    Hypercholesterolemia     Impotence     Metabolic syndrome     Mild diastolic dysfunction 29/10/4519    LVEF 55-60%    Obstructive sleep apnea     Obstructive sleep apnea     C-PAP    Osteoarthritis of shoulder     left  @ ac jt/impingement.     Paroxysmal atrial fibrillation (HCC) 02/26/2022    On EKG with RBBB, inferior infarct age undetermined, QT interval long for rate    Pulmonary hypertension ([facility] [address] 10/21/2013    39    Recurrent BCC (basal cell carcinoma)     Right bundle branch block 05/15/2018    S/P aortic valve replacement 11/12/2013    Submandibular duct obstruction - right 10/02/2017    12 x 14 x 18 mm right submandibular gland stone causing submandibular duct dilatation and gland prominence    Tobacco use disorder 1957    Type II or unspecified type diabetes mellitus without mention of complication, not stated as uncontrolled 2003    Vitamin D deficiency        Past Surgical History:   Procedure Laterality Date    AORTIC VALVE REPLACEMENT  11/12/2013    BLEPHAROPLASTY  07/2007    UPPER LID    BONY PELVIS SURGERY  01/12/2021    SI joint    CARDIAC CATHETERIZATION  11/08/2013    dx    CARDIAC SURGERY  11/12/2013    reincised for bleeding    CATARACT REMOVAL WITH IMPLANT Right 01/2019    CATARACT REMOVAL WITH IMPLANT Left 02/2019    CERVICAL DISC SURGERY Right 2015    epidural steroids. CORONARY ARTERY BYPASS GRAFT  11/12/2013    4 V    EYE SURGERY Bilateral     Laser peripheral iridotomy     FINGER TRIGGER RELEASE Right 08/19/2014    Release of trigger thumb (stenosing tenosynovitis)    HEMORRHOID SURGERY  4672-2508    LAPAROSCOPIC APPENDECTOMY  11/14/2014    LUMBAR LAMINECTOMY  1998 & 2003    LUMBAR LAMINECTOMY  05/07/2014    L2-L3 & Repair dural tear x2. LUMBAR SPINE SURGERY  08/04/2009    SPINaL CORD STIMULATOR FOR PAIN    LUMBAR SPINE SURGERY  08/2014    epidural steroids. 3/2017 NEAL, 10/21/19    LUMBAR SPINE SURGERY Right 04/23/2019    SPINAL CORD STIMULATOR BATTERY REPLACEMENT WITH POCKET REVISION     MOHS SURGERY Left 02/11/2020    Dorsum of hand: type? MOHS SURGERY Right 01/01/2020    right upper inner pinna with skin graft    NERVE SURGERY  ~12/2012    nerve block     ROTATOR CUFF REPAIR  2001    SKIN CANCER EXCISION Right 07/2017    forehead THEN TOP OF THE HEAD. SKIN CANCER EXCISION  01/2019    nose    SOFT TISSUE BIOPSY      needle bx right neck mass -benign    TEAR DUCT SURGERY Bilateral 01/01/2015    for dry    TESTICLE REMOVAL  1947    R    TONSILLECTOMY      CHILD    UMBILICAL HERNIA REPAIR  11/14/2014    Laparoscopic         EXAMINATION    Vitals:    10/11/22 1517   BP: 112/71   Site: Right Upper Arm   Position: Sitting   Cuff Size: Medium Adult   Pulse: 76   Temp: 97.7 °F (36.5 °C)   TempSrc: Temporal     General:  WDWN, NAD, alert and oriented  Face: There was no swelling or lesions detected. Voice: Normal with no hoarseness or hot potato voice. Ears: The external ears, mastoids, TMs and EACs appeared to be normal.   (+) Nose:  nasal septum deviation to right. The external nose,  turbinates, secretions, and mucosa appeared to be normal.   Sinuses:  Maxillary and frontal sinuses were nontender to palpation and percussion. Oral cavity:  Mucosa, secretions, tongue, and gingiva appeared to be normal.   (+) Oropharynx:  Post tonsillectomy. The hard and soft palates, uvula, tongue, posterior oropharyngeal wall, mucosa and secretions appeared to be normal.     Salivary Glands:  Normal bilateral parotid and bilateral submandibular salivary glands. Neck:  No masses or tenderness. Trachea midline. Laryngeal cartilages and hyoid bone normal.  Normal laryngeal crepitus. Thyroid:  Normal, nontender, no goiter or nodules palpable. Lymph nodes:  No cervical lymphadenopathy. Eileen Forman / Cristin Slight / Oval Borne was seen today for nasal congestion. Diagnoses and all orders for this visit:    Allergic rhinitis, unspecified seasonality, unspecified trigger  -     azelastine (ASTELIN) 0.1 % nasal spray; Use 2 sprays in each nostril 2 times daily. Use fluticasone 15 minutes after the morning dose of astelin. -     fluticasone (FLONASE) 50 MCG/ACT nasal spray; 2 sprays in each nostril daily, 15 minutes after morning dose of Astelin. Vernon Anaya   - fexofenadine (ALLERGY RELIEF) 180 MG tablet; TAKE 1 TABLET BY MOUTH DAILY    Acute rhinosinusitis  -     amoxicillin-clavulanate (AUGMENTIN) 875-125 MG per tablet; Take 1 tablet by mouth 2 times daily for 14 days  -     fluticasone (FLONASE) 50 MCG/ACT nasal spray; 2 sprays in each nostril daily, 15 minutes after morning dose of Astelin. .    Nasal septal deviation       RECOMMENDATIONS/PLAN      Acetaminophen (eg. Tylenol) or Ibuprofen (eg. Advil) (over the counter medications) as needed for fever or pain. Return in about 1 month (around 11/11/2022) for recheck/follow-up, and sooner if condition worsens.

## 2022-10-12 RX ORDER — FLUTICASONE PROPIONATE 50 MCG
SPRAY, SUSPENSION (ML) NASAL
Qty: 48 G | OUTPATIENT
Start: 2022-10-12

## 2022-10-12 RX ORDER — AZELASTINE 1 MG/ML
SPRAY, METERED NASAL
Qty: 90 ML | OUTPATIENT
Start: 2022-10-12

## 2022-11-04 DIAGNOSIS — I25.10 CORONARY ARTERY DISEASE DUE TO LIPID RICH PLAQUE: Chronic | ICD-10-CM

## 2022-11-04 DIAGNOSIS — I10 ESSENTIAL HYPERTENSION, BENIGN: Chronic | ICD-10-CM

## 2022-11-04 DIAGNOSIS — I25.83 CORONARY ARTERY DISEASE DUE TO LIPID RICH PLAQUE: Chronic | ICD-10-CM

## 2022-11-04 DIAGNOSIS — E11.8 TYPE 2 DIABETES MELLITUS WITH COMPLICATION, WITHOUT LONG-TERM CURRENT USE OF INSULIN (HCC): Chronic | ICD-10-CM

## 2022-11-04 RX ORDER — GLIPIZIDE 5 MG/1
TABLET ORAL
Qty: 60 TABLET | Refills: 0 | Status: SHIPPED | OUTPATIENT
Start: 2022-11-04 | End: 2022-12-05

## 2022-11-09 DIAGNOSIS — I27.20 PULMONARY HYPERTENSION (HCC): ICD-10-CM

## 2022-11-09 DIAGNOSIS — R09.89 PULMONARY VASCULAR CONGESTION: ICD-10-CM

## 2022-11-09 RX ORDER — FUROSEMIDE 40 MG/1
TABLET ORAL
Qty: 180 TABLET | Refills: 0 | Status: SHIPPED | OUTPATIENT
Start: 2022-11-09

## 2022-11-14 ENCOUNTER — OFFICE VISIT (OUTPATIENT)
Dept: ENT CLINIC | Age: 82
End: 2022-11-14
Payer: MEDICARE

## 2022-11-14 VITALS
BODY MASS INDEX: 35.65 KG/M2 | SYSTOLIC BLOOD PRESSURE: 124 MMHG | DIASTOLIC BLOOD PRESSURE: 69 MMHG | HEART RATE: 74 BPM | WEIGHT: 221.8 LBS | HEIGHT: 66 IN | TEMPERATURE: 98.1 F

## 2022-11-14 DIAGNOSIS — J32.9 CHRONIC SINUSITIS, UNSPECIFIED LOCATION: Chronic | ICD-10-CM

## 2022-11-14 DIAGNOSIS — J34.2 NASAL SEPTAL DEVIATION: Chronic | ICD-10-CM

## 2022-11-14 DIAGNOSIS — J30.9 ALLERGIC RHINITIS, UNSPECIFIED SEASONALITY, UNSPECIFIED TRIGGER: Chronic | ICD-10-CM

## 2022-11-14 DIAGNOSIS — J01.90 ACUTE RHINOSINUSITIS: Primary | ICD-10-CM

## 2022-11-14 PROCEDURE — G8417 CALC BMI ABV UP PARAM F/U: HCPCS | Performed by: OTOLARYNGOLOGY

## 2022-11-14 PROCEDURE — 3074F SYST BP LT 130 MM HG: CPT | Performed by: OTOLARYNGOLOGY

## 2022-11-14 PROCEDURE — 1123F ACP DISCUSS/DSCN MKR DOCD: CPT | Performed by: OTOLARYNGOLOGY

## 2022-11-14 PROCEDURE — 3078F DIAST BP <80 MM HG: CPT | Performed by: OTOLARYNGOLOGY

## 2022-11-14 PROCEDURE — 4004F PT TOBACCO SCREEN RCVD TLK: CPT | Performed by: OTOLARYNGOLOGY

## 2022-11-14 PROCEDURE — G8484 FLU IMMUNIZE NO ADMIN: HCPCS | Performed by: OTOLARYNGOLOGY

## 2022-11-14 PROCEDURE — G8427 DOCREV CUR MEDS BY ELIG CLIN: HCPCS | Performed by: OTOLARYNGOLOGY

## 2022-11-14 PROCEDURE — 99214 OFFICE O/P EST MOD 30 MIN: CPT | Performed by: OTOLARYNGOLOGY

## 2022-11-14 RX ORDER — AZITHROMYCIN 250 MG/1
TABLET, FILM COATED ORAL
Qty: 2 PACKET | Refills: 0 | Status: SHIPPED | OUTPATIENT
Start: 2022-11-14

## 2022-11-14 NOTE — PATIENT INSTRUCTIONS
RHINOSINUSITIS CARE  You may use acetaminophen (eg. Tylenol) or Ibuprofen (eg. Advil) (over the counter medications) as needed for fever or pain. Take Probiotic while you are taking antibiotics, to prevent diarrhea, stomach upset, pseudomembranous colitis, and C. difficile diarrhea. This may be obtained at your pharmacy or health food store. Alternatively, you may eat one cup of yogurt with active or live cultures twice daily while taking the antibiotic. Continue for two to three days after completion of the antibiotic. Use a 12 hour decongestant spray, 0.05% oxymetazoline (e.g. Afrin, Duration, 4-Way). Spray each nostril twice three times a day for three days, then two times a day for 2 days, and then stop for two days and then repeat the cycle once. Take one Mucinex (blue box) maximum strength 1200 mg tablet every 12 hours later, daily for the next ten days. Use a saline nasal/sinus irrigation system, e.g. NeilMed sinus rinse, twice daily to help to clear secretions and drainage. Use distilled water; DO NOT USE TAP WATER! This is because of the possibility of amoeba or other microorganisms in tap water, which can result in a fatal disease. Alternatively, you could use a blue bulb syringe and solution of 1/4 tsp of table salt in 8 ounces (one cup or 240 ml) of distilled water. Use fluticasone 2 sprays in each nostril once daily. It may take several days to several weeks for your sinusitis to clear up. It is important to take all your medications as prescribed. Please continue your antibiotics as prescribed.     Please call the office if your condition worsens or if symptoms persist after treatment is completed.        ===================================================================      ADVERSE AND SIDE EFFECTS OF MEDICATIONS:    Please be aware of the following possible adverse reactions, side effects, and complications of the following medications, including, but not limited to: allergic reaction, interactions with other medications, nausea, headache, diarrhea, persistent symptoms, failure to improve, and the following:     Azithromycin/Zithromax/Z-coco  (antibiotic):  diarrhea, colitis (severe infection and inflammation of the large intestine), pseudomembranous colitis (severe infection and inflammation of the colon, usually due to C. difficile bacteria)  yeast or fungal  infections, Richey-Quinton syndrome (very rare necrotic skin reaction with peeling of skin, blisters and arthritis), persistent symptoms/infection, and failure to improve.      Fluticasone:  nosebleed, burning sensation, atrophy of nasal mucosa, septal ulceration or perforation, nasal irritation, nasal yeast infection,  drowsiness, bad taste.      ~~~>>> Also please read the medication information in this AVS and all information given to you by the pharmacist.

## 2022-11-14 NOTE — PROGRESS NOTES
[facility]           PCP:  Shen Raya DO      CHIEF COMPLAINT  Chief Complaint   Patient presents with    Sinusitis     Excessive post nasal drainage and phlegm in throat        HISTORY OF PRESENT ILLNESS    Cintia Gleason is a [de-identified] y.o. male   here for recheck and follow up of sinusitis. Stuffy nose, throat mucus and phlegm, tightens up at night gotta get up and stretch my lung with a deep breath. Went to a [employer] gave me one of those inhalers and that didn't do no good. 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 Ruptured disc    Diverticulosis of colon 10/30/2012    Dry eye syndrome of bilateral lacrimal glands     Hip problem 1956    NUMBNESS W/ TRAUMA    Hypercholesterolemia     Impotence     Metabolic syndrome     Mild diastolic dysfunction 28/64/7147    LVEF 55-60%    Obstructive sleep apnea     Obstructive sleep apnea     C-PAP    Osteoarthritis of shoulder     left  @ ac jt/impingement.     Paroxysmal atrial fibrillation ([address] 02/26/2022    On EKG with RBBB, inferior infarct age undetermined, QT interval long for rate    Pulmonary hypertension ([address] 10/21/2013    39    Recurrent BCC (basal cell carcinoma)     Right bundle branch block 05/15/2018    S/P aortic valve replacement 11/12/2013    Submandibular duct obstruction - right 10/02/2017    12 x 14 x 18 mm right submandibular gland stone causing submandibular duct dilatation and gland prominence    Tobacco use disorder 1957    Type II or unspecified type diabetes mellitus without mention of complication, not stated as uncontrolled 2003    Vitamin D deficiency          Past Surgical History:   Procedure Laterality Date    AORTIC VALVE REPLACEMENT  11/12/2013    BLEPHAROPLASTY  07/2007    UPPER LID    BONY PELVIS SURGERY  01/12/2021    SI joint    CARDIAC CATHETERIZATION  11/08/2013    dx    CARDIAC SURGERY  11/12/2013    reincised for bleeding    CATARACT REMOVAL WITH IMPLANT Right 01/2019    CATARACT REMOVAL WITH IMPLANT Left 02/2019    CERVICAL DISC SURGERY Right 2015    epidural steroids. CORONARY ARTERY BYPASS GRAFT  11/12/2013    4 V    EYE SURGERY Bilateral     Laser peripheral iridotomy     FINGER TRIGGER RELEASE Right 08/19/2014    Release of trigger thumb (stenosing tenosynovitis)    HEMORRHOID SURGERY  [id number]    LAPAROSCOPIC APPENDECTOMY  11/14/2014    LUMBAR LAMINECTOMY  1998 & 2003    LUMBAR LAMINECTOMY  05/07/2014    L2-L3 & Repair dural tear x2. LUMBAR SPINE SURGERY  08/04/2009    SPINaL CORD STIMULATOR FOR PAIN    LUMBAR SPINE SURGERY  08/2014    epidural steroids. 3/2017 NEAL, 10/21/19    LUMBAR SPINE SURGERY Right 04/23/2019    SPINAL CORD STIMULATOR BATTERY REPLACEMENT WITH POCKET REVISION     MOHS SURGERY Left 02/11/2020    Dorsum of hand: type? MOHS SURGERY Right 01/01/2020    right upper inner pinna with skin graft    NERVE SURGERY  ~12/2012    nerve block     ROTATOR CUFF REPAIR  2001    SKIN CANCER EXCISION Right 07/2017    forehead THEN TOP OF THE HEAD.     SKIN CANCER EXCISION  01/2019    nose    SOFT TISSUE BIOPSY      needle bx right neck mass -benign    TEAR DUCT SURGERY Bilateral 01/01/2015    for dry    TESTICLE REMOVAL  1947    R    TONSILLECTOMY CHILD    UMBILICAL HERNIA REPAIR  11/14/2014    Laparoscopic         EXAMINATION    Vitals:    11/14/22 0758   BP: 124/69   Site: Right Upper Arm   Position: Sitting   Cuff Size: Large Adult   Pulse: 74   Temp: 98.1 °F (36.7 °C)   Weight: 221 lb 12.8 oz (100.6 kg)   Height: 5' 6\" (1.676 m)     General:  WDWN, NAD, alert and oriented  Face: There was no swelling or lesions detected. Voice: normal with no hoarseness or hot potato voice. (+) Ears:  Cerumen impaction right EAC. The external ears, mastoids, left TM and left EAC appeared to be normal.   (+) Nose:  moderate to severe NSD to the right with a left crevice deformity and left inferior spur. The external nose, turbinates, secretions, and mucosa appeared to be normal.   Sinuses:  Maxillary and frontal sinuses were nontender to palpation and percussion. Oral cavity:  Mucosa, secretions, tongue, and gingiva appeared to be normal.   (+) Oropharynx:  Post tonsillectomy. The hard and soft palates, uvula, tongue, posterior oropharyngeal wall, mucosa and secretions appeared to be normal.     Salivary Glands:  Normal bilateral parotid and bilateral submandibular salivary glands. Neck:  No masses or tenderness. Trachea midline. Laryngeal cartilages and hyoid bone normal.  Normal laryngeal crepitus. Thyroid:  Normal, nontender, no goiter or nodules palpable. Lymph nodes:  No cervical lymphadenopathy. John Lynn / Emily Baker / Reece Herrera was seen today for sinusitis. Diagnoses and all orders for this visit:    Acute rhinosinusitis  -     azithromycin (ZITHROMAX Z-NALLELY) 250 MG tablet; Take by mouth: 2 tablets on day 1, then 1 tab on day 2 to 5. Start 2nd nallely on day 10. Take 2 tablets on day 10, then 1 tab on day 11 to 15  -     CT SINUS FOR IMAGE GUIDANCE; Future    Chronic sinusitis, unspecified location  -     azithromycin (ZITHROMAX Z-NALLELY) 250 MG tablet; Take by mouth: 2 tablets on day 1, then 1 tab on day 2 to 5.   Start 2nd coco on day 10. Take 2 tablets on day 10, then 1 tab on day 11 to 15  -     CT SINUS FOR IMAGE GUIDANCE; Future    Allergic rhinitis, unspecified seasonality, unspecified trigger    Nasal septal deviation           RECOMMENDATIONS/PLAN      Acetaminophen (e.g. Tylenol) or Ibuprofen (e.g. Advil) (over the counter medications) as needed for fever or pain. FESS discussed and recommended unless CT documents resolution of sinusitis. Return in about 1 month (around 12/14/2022) for recheck/follow-up, and sooner if condition worsens. Patient Instructions   RHINOSINUSITIS CARE  You may use acetaminophen (eg. Tylenol) or Ibuprofen (eg. Advil) (over the counter medications) as needed for fever or pain. Take Probiotic while you are taking antibiotics, to prevent diarrhea, stomach upset, pseudomembranous colitis, and C. difficile diarrhea. This may be obtained at your pharmacy or health food store. Alternatively, you may eat one cup of yogurt with active or live cultures twice daily while taking the antibiotic. Continue for two to three days after completion of the antibiotic. Use a 12 hour decongestant spray, 0.05% oxymetazoline (e.g. Afrin, Duration, 4-Way). Spray each nostril twice three times a day for three days, then two times a day for 2 days, and then stop for two days and then repeat the cycle once. Take one Mucinex (blue box) maximum strength 1200 mg tablet every 12 hours later, daily for the next ten days. Use a saline nasal/sinus irrigation system, e.g. NeilMed sinus rinse, twice daily to help to clear secretions and drainage. Use distilled water; DO NOT USE TAP WATER! This is because of the possibility of amoeba or other microorganisms in tap water, which can result in a fatal disease. Alternatively, you could use a blue bulb syringe and solution of 1/4 tsp of table salt in 8 ounces (one cup or 240 ml) of distilled water. Use fluticasone 2 sprays in each nostril once daily.   It may take several days to several weeks for your sinusitis to clear up. It is important to take all your medications as prescribed. Please continue your antibiotics as prescribed. Please call the office if your condition worsens or if symptoms persist after treatment is completed.        ===================================================================      ADVERSE AND SIDE EFFECTS OF MEDICATIONS:    Please be aware of the following possible adverse reactions, side effects, and complications of the following medications, including, but not limited to: allergic reaction, interactions with other medications, nausea, headache, diarrhea, persistent symptoms, failure to improve, and the following:     Azithromycin/Zithromax/Z-coco  (antibiotic):  diarrhea, colitis (severe infection and inflammation of the large intestine), pseudomembranous colitis (severe infection and inflammation of the colon, usually due to C. difficile bacteria)  yeast or fungal  infections, Richey-Quinton syndrome (very rare necrotic skin reaction with peeling of skin, blisters and arthritis), persistent symptoms/infection, and failure to improve.      Fluticasone:  nosebleed, burning sensation, atrophy of nasal mucosa, septal ulceration or perforation, nasal irritation, nasal yeast infection,  drowsiness, bad taste.      ~~~>>> Also please read the medication information in this AVS and all information given to you by the pharmacist.

## 2022-12-04 DIAGNOSIS — E11.8 TYPE 2 DIABETES MELLITUS WITH COMPLICATION, WITHOUT LONG-TERM CURRENT USE OF INSULIN (HCC): Chronic | ICD-10-CM

## 2022-12-05 RX ORDER — GLIPIZIDE 5 MG/1
TABLET ORAL
Qty: 60 TABLET | Refills: 0 | Status: SHIPPED | OUTPATIENT
Start: 2022-12-05

## 2022-12-05 NOTE — TELEPHONE ENCOUNTER
Past   07/22/2022 Yi Adkins DO Family Medicine Office Visit Type 2 diabetes mellitus with microalbuminuria, without long-term current use of insulin [facility])     Next  12/12/2022  8:00 AM DIABETES FOLLOW UP Jade Kim 89, DO    Appointment Notes:    ROUTINE DM

## 2022-12-06 ENCOUNTER — HOSPITAL ENCOUNTER (OUTPATIENT)
Dept: CT IMAGING | Age: 82
Discharge: HOME | End: 2022-12-06
Payer: MEDICARE

## 2022-12-06 DIAGNOSIS — J01.90 ACUTE RHINOSINUSITIS: ICD-10-CM

## 2022-12-06 DIAGNOSIS — J32.9 CHRONIC SINUSITIS, UNSPECIFIED LOCATION: Chronic | ICD-10-CM

## 2022-12-06 PROCEDURE — G1010 CDSM STANSON: HCPCS

## 2022-12-12 ENCOUNTER — OFFICE VISIT (OUTPATIENT)
Dept: ENT CLINIC | Age: 82
End: 2022-12-12
Payer: MEDICARE

## 2022-12-12 ENCOUNTER — OFFICE VISIT (OUTPATIENT)
Dept: FAMILY MEDICINE CLINIC | Age: 82
End: 2022-12-12
Payer: MEDICARE

## 2022-12-12 VITALS
SYSTOLIC BLOOD PRESSURE: 127 MMHG | HEART RATE: 82 BPM | HEIGHT: 66 IN | TEMPERATURE: 97.5 F | WEIGHT: 221 LBS | BODY MASS INDEX: 35.52 KG/M2 | DIASTOLIC BLOOD PRESSURE: 71 MMHG

## 2022-12-12 VITALS
SYSTOLIC BLOOD PRESSURE: 128 MMHG | DIASTOLIC BLOOD PRESSURE: 82 MMHG | WEIGHT: 218 LBS | BODY MASS INDEX: 35.03 KG/M2 | HEART RATE: 80 BPM | HEIGHT: 66 IN | OXYGEN SATURATION: 96 %

## 2022-12-12 DIAGNOSIS — N40.1 BPH ASSOCIATED WITH NOCTURIA: ICD-10-CM

## 2022-12-12 DIAGNOSIS — E78.00 PURE HYPERCHOLESTEROLEMIA: Chronic | ICD-10-CM

## 2022-12-12 DIAGNOSIS — I27.20 PULMONARY HYPERTENSION (HCC): ICD-10-CM

## 2022-12-12 DIAGNOSIS — E11.29 TYPE 2 DIABETES MELLITUS WITH MICROALBUMINURIA, WITHOUT LONG-TERM CURRENT USE OF INSULIN (HCC): Primary | ICD-10-CM

## 2022-12-12 DIAGNOSIS — I10 ESSENTIAL HYPERTENSION, BENIGN: ICD-10-CM

## 2022-12-12 DIAGNOSIS — E55.9 VITAMIN D DEFICIENCY: ICD-10-CM

## 2022-12-12 DIAGNOSIS — R35.1 BPH ASSOCIATED WITH NOCTURIA: ICD-10-CM

## 2022-12-12 DIAGNOSIS — J30.9 ALLERGIC RHINITIS, UNSPECIFIED SEASONALITY, UNSPECIFIED TRIGGER: Primary | Chronic | ICD-10-CM

## 2022-12-12 DIAGNOSIS — R80.9 TYPE 2 DIABETES MELLITUS WITH MICROALBUMINURIA, WITHOUT LONG-TERM CURRENT USE OF INSULIN (HCC): Primary | ICD-10-CM

## 2022-12-12 DIAGNOSIS — I25.83 CORONARY ARTERY DISEASE DUE TO LIPID RICH PLAQUE: Chronic | ICD-10-CM

## 2022-12-12 DIAGNOSIS — I25.10 CORONARY ARTERY DISEASE DUE TO LIPID RICH PLAQUE: Chronic | ICD-10-CM

## 2022-12-12 DIAGNOSIS — R09.89 PULMONARY VASCULAR CONGESTION: ICD-10-CM

## 2022-12-12 DIAGNOSIS — H61.23 BILATERAL IMPACTED CERUMEN: ICD-10-CM

## 2022-12-12 DIAGNOSIS — D64.9 ANEMIA, UNSPECIFIED TYPE: ICD-10-CM

## 2022-12-12 DIAGNOSIS — J34.2 NASAL SEPTAL DEVIATION: Chronic | ICD-10-CM

## 2022-12-12 LAB
A/G RATIO: 1.8 (ref 1.1–2.2)
ALBUMIN SERPL-MCNC: 4.6 G/DL (ref 3.4–5)
ALP BLD-CCNC: 65 U/L (ref 40–129)
ALT SERPL-CCNC: 18 U/L (ref 10–40)
ANION GAP SERPL CALCULATED.3IONS-SCNC: 10 MMOL/L (ref 3–16)
AST SERPL-CCNC: 16 U/L (ref 15–37)
BASOPHILS ABSOLUTE: 0 K/UL (ref 0–0.2)
BASOPHILS RELATIVE PERCENT: 0.6 %
BILIRUB SERPL-MCNC: 0.3 MG/DL (ref 0–1)
BUN BLDV-MCNC: 25 MG/DL (ref 7–20)
CALCIUM SERPL-MCNC: 10.2 MG/DL (ref 8.3–10.6)
CHLORIDE BLD-SCNC: 98 MMOL/L (ref 99–110)
CHOLESTEROL, TOTAL: 135 MG/DL (ref 0–199)
CO2: 32 MMOL/L (ref 21–32)
CREAT SERPL-MCNC: 1.3 MG/DL (ref 0.8–1.3)
EOSINOPHILS ABSOLUTE: 0.1 K/UL (ref 0–0.6)
EOSINOPHILS RELATIVE PERCENT: 1.7 %
GFR SERPL CREATININE-BSD FRML MDRD: 55 ML/MIN/{1.73_M2}
GLUCOSE BLD-MCNC: 184 MG/DL (ref 70–99)
HBA1C MFR BLD: 8 %
HCT VFR BLD CALC: 42.2 % (ref 40.5–52.5)
HDLC SERPL-MCNC: 37 MG/DL (ref 40–60)
HEMOGLOBIN: 13.9 G/DL (ref 13.5–17.5)
LDL CHOLESTEROL CALCULATED: 67 MG/DL
LYMPHOCYTES ABSOLUTE: 2.1 K/UL (ref 1–5.1)
LYMPHOCYTES RELATIVE PERCENT: 25 %
MCH RBC QN AUTO: 31.8 PG (ref 26–34)
MCHC RBC AUTO-ENTMCNC: 32.9 G/DL (ref 31–36)
MCV RBC AUTO: 96.6 FL (ref 80–100)
MONOCYTES ABSOLUTE: 0.5 K/UL (ref 0–1.3)
MONOCYTES RELATIVE PERCENT: 5.5 %
NEUTROPHILS ABSOLUTE: 5.6 K/UL (ref 1.7–7.7)
NEUTROPHILS RELATIVE PERCENT: 67.2 %
PDW BLD-RTO: 14.5 % (ref 12.4–15.4)
PLATELET # BLD: 279 K/UL (ref 135–450)
PMV BLD AUTO: 8.1 FL (ref 5–10.5)
POTASSIUM SERPL-SCNC: 5.7 MMOL/L (ref 3.5–5.1)
RBC # BLD: 4.36 M/UL (ref 4.2–5.9)
SODIUM BLD-SCNC: 140 MMOL/L (ref 136–145)
TOTAL PROTEIN: 7.1 G/DL (ref 6.4–8.2)
TRIGL SERPL-MCNC: 153 MG/DL (ref 0–150)
VLDLC SERPL CALC-MCNC: 31 MG/DL
WBC # BLD: 8.3 K/UL (ref 4–11)

## 2022-12-12 PROCEDURE — G8427 DOCREV CUR MEDS BY ELIG CLIN: HCPCS | Performed by: OTOLARYNGOLOGY

## 2022-12-12 PROCEDURE — 4004F PT TOBACCO SCREEN RCVD TLK: CPT | Performed by: OTOLARYNGOLOGY

## 2022-12-12 PROCEDURE — 3078F DIAST BP <80 MM HG: CPT | Performed by: OTOLARYNGOLOGY

## 2022-12-12 PROCEDURE — G8427 DOCREV CUR MEDS BY ELIG CLIN: HCPCS | Performed by: NURSE PRACTITIONER

## 2022-12-12 PROCEDURE — G8484 FLU IMMUNIZE NO ADMIN: HCPCS | Performed by: NURSE PRACTITIONER

## 2022-12-12 PROCEDURE — 3052F HG A1C>EQUAL 8.0%<EQUAL 9.0%: CPT | Performed by: NURSE PRACTITIONER

## 2022-12-12 PROCEDURE — 1123F ACP DISCUSS/DSCN MKR DOCD: CPT | Performed by: OTOLARYNGOLOGY

## 2022-12-12 PROCEDURE — 99214 OFFICE O/P EST MOD 30 MIN: CPT | Performed by: NURSE PRACTITIONER

## 2022-12-12 PROCEDURE — 3074F SYST BP LT 130 MM HG: CPT | Performed by: OTOLARYNGOLOGY

## 2022-12-12 PROCEDURE — 83036 HEMOGLOBIN GLYCOSYLATED A1C: CPT | Performed by: NURSE PRACTITIONER

## 2022-12-12 PROCEDURE — 99214 OFFICE O/P EST MOD 30 MIN: CPT | Performed by: OTOLARYNGOLOGY

## 2022-12-12 PROCEDURE — 1123F ACP DISCUSS/DSCN MKR DOCD: CPT | Performed by: NURSE PRACTITIONER

## 2022-12-12 PROCEDURE — G8417 CALC BMI ABV UP PARAM F/U: HCPCS | Performed by: NURSE PRACTITIONER

## 2022-12-12 PROCEDURE — 3078F DIAST BP <80 MM HG: CPT | Performed by: NURSE PRACTITIONER

## 2022-12-12 PROCEDURE — 36415 COLL VENOUS BLD VENIPUNCTURE: CPT | Performed by: NURSE PRACTITIONER

## 2022-12-12 PROCEDURE — 4004F PT TOBACCO SCREEN RCVD TLK: CPT | Performed by: NURSE PRACTITIONER

## 2022-12-12 PROCEDURE — G8484 FLU IMMUNIZE NO ADMIN: HCPCS | Performed by: OTOLARYNGOLOGY

## 2022-12-12 PROCEDURE — 3074F SYST BP LT 130 MM HG: CPT | Performed by: NURSE PRACTITIONER

## 2022-12-12 PROCEDURE — G8417 CALC BMI ABV UP PARAM F/U: HCPCS | Performed by: OTOLARYNGOLOGY

## 2022-12-12 RX ORDER — TAMSULOSIN HYDROCHLORIDE 0.4 MG/1
0.4 CAPSULE ORAL DAILY
Qty: 90 CAPSULE | Refills: 3 | Status: SHIPPED | OUTPATIENT
Start: 2022-12-12

## 2022-12-12 RX ORDER — AMLODIPINE BESYLATE 2.5 MG/1
2.5 TABLET ORAL DAILY
Qty: 90 TABLET | Refills: 1 | Status: SHIPPED | OUTPATIENT
Start: 2022-12-12

## 2022-12-12 RX ORDER — FUROSEMIDE 40 MG/1
TABLET ORAL
Qty: 180 TABLET | Refills: 0 | Status: SHIPPED | OUTPATIENT
Start: 2022-12-12

## 2022-12-12 RX ORDER — GLIPIZIDE 5 MG/1
TABLET ORAL
Qty: 180 TABLET | Refills: 0 | Status: SHIPPED | OUTPATIENT
Start: 2022-12-12

## 2022-12-12 RX ORDER — ATORVASTATIN CALCIUM 40 MG/1
TABLET, FILM COATED ORAL
Qty: 90 TABLET | Refills: 3 | Status: SHIPPED | OUTPATIENT
Start: 2022-12-12

## 2022-12-12 ASSESSMENT — ENCOUNTER SYMPTOMS
COUGH: 0
SINUS PRESSURE: 0
COLOR CHANGE: 0
CONSTIPATION: 0
SINUS PAIN: 0
CHEST TIGHTNESS: 0
WHEEZING: 1
ABDOMINAL DISTENTION: 0
NAUSEA: 0
DIARRHEA: 0
ABDOMINAL PAIN: 0
BACK PAIN: 0
EYE DISCHARGE: 0
SHORTNESS OF BREATH: 0

## 2022-12-12 NOTE — PATIENT INSTRUCTIONS
Patient Education        Learning About Carbohydrate (Carb) Counting and Eating Out When You Have Diabetes  Why plan your meals? Meal planning can be a key part of managing diabetes. Planning meals and snacks with the right balance of carbohydrate, protein, and fat can help you keep your blood sugar at the target level you set with your doctor. You don't have to eat special foods. You can eat what your family eats, including sweets once in a while. But you do have to pay attention to how often you eat and how much you eat of certain foods. You may want to work with a dietitian or a diabetes educator. They can give you tips and meal ideas and can answer your questions about meal planning. This health professional can also help you reach a healthy weight if that is one of your goals. What should you know about eating carbs? Managing the amount of carbohydrate (carbs) you eat is an important part of healthy meals when you have diabetes. Carbohydrate is found in many foods. Learn which foods have carbs. And learn the amounts of carbs in different foods. Bread, cereal, pasta, and rice have about 15 grams of carbs in a serving. A serving is 1 slice of bread (1 ounce), ½ cup of cooked cereal, or 1/3 cup of cooked pasta or rice. Fruits have 15 grams of carbs in a serving. A serving is 1 small fresh fruit, such as an apple or orange; ½ of a banana; ½ cup of cooked or canned fruit; ½ cup of fruit juice; 1 cup of melon or raspberries; or 2 tablespoons of dried fruit. Milk and no-sugar-added yogurt have 15 grams of carbs in a serving. A serving is 1 cup of milk or 3/4 cup (6 oz) of no-sugar-added yogurt. Starchy vegetables have 15 grams of carbs in a serving. A serving is ½ cup of mashed potatoes or sweet potato; 1 cup winter squash; ½ of a small baked potato; ½ cup of cooked beans; or ½ cup cooked corn or green peas.   Learn how much carbs to eat each day and at each meal. A dietitian or certified diabetes educator can teach you how to keep track of the amount of carbs you eat. This is called carbohydrate counting. If you are not sure how to count carbohydrate grams, use the plate method to plan meals. It is a quick way to make sure that you have a balanced meal. It also can help you manage the amount of carbohydrate you eat at meals. Divide your plate by types of foods. Put non-starchy vegetables on half the plate, meat or other protein food on one-quarter of the plate, and a grain or starchy vegetable in the final quarter of the plate. To this you can add a small piece of fruit and 1 cup of milk or yogurt, depending on how many carbs you are supposed to eat at a meal.  Try to eat about the same amount of carbs at each meal. Do not \"save up\" your daily allowance of carbs to eat at one meal.  Proteins have very little or no carbs. Examples of proteins are beef, chicken, turkey, fish, eggs, tofu, cheese, cottage cheese, and peanut butter. How can you eat out and still eat healthy? Learn to estimate the serving sizes of foods that have carbohydrate. If you measure food at home, it will be easier to estimate the amount in a serving of restaurant food. If the meal you order has too much carbohydrate (such as potatoes, corn, or baked beans), ask to have a low-carbohydrate food instead. Ask for a salad or non-starchy vegetables like broccoli, cauliflower, green beans, or peppers. If you eat more carbohydrate at a meal than you had planned, take a walk or do other exercise. This will help lower your blood sugar. What are some tips for eating healthy? Limit saturated fat, such as the fat from meat and dairy products. This is a healthy choice because people who have diabetes are at higher risk of heart disease. So choose lean cuts of meat and nonfat or low-fat dairy products. Use olive or canola oil instead of butter or shortening when cooking. Don't skip meals.  Your blood sugar may drop too low if you skip meals and take

## 2022-12-12 NOTE — PROGRESS NOTES
Date of Service:  2022    Domi Dove (:  1940) is a [de-identified] y.o. male, here for evaluation of the following medical concerns:    Chief Complaint   Patient presents with    Diabetes     Follow up on diabetes, a1c due today        HPI    A1C 8.4 4.5 months ago. Glipizide 5 mg BID, metformin 1000 mg BID    A1C today is 8    Treatment Adherence:   Medication compliance:  compliant most of the time  Diet compliance:  noncompliant: not at all  Weight trend: decreasing, down 7 lbs from diabetes visit  Current exercise: no regular exercise- can hardly walk into office he says  Barriers: impairment:  physical: arthritis    Diabetes Mellitus Type 2: Current symptoms/problems include neuropathy. Home blood sugar records: fasting range: 143-180  Any episodes of hypoglycemia? no  Eye exam current (within one year): yes, blind in right eye  Tobacco history: He  reports that he has been smoking cigarettes. He started smoking about 68 years ago. He has a 60.00 pack-year smoking history. He has never used smokeless tobacco.   Daily Aspirin? Yes    Hypertension:  Home blood pressure monitoring: Yes - hardly ever. He is not adherent to a low sodium diet. Patient denies chest pain, shortness of breath, headache, lightheadedness, blurred vision, peripheral edema, palpitations, dry cough, and fatigue. Antihypertensive medication side effects: no medication side effects noted. Use of agents associated with hypertension: none. Hyperlipidemia:  No new myalgias or GI upset on atorvastatin (Lipitor).        Lab Results   Component Value Date    LABA1C 8.4 2022    LABA1C 7.7 11/15/2021    LABA1C 7.9 2021     Lab Results   Component Value Date    LABMICR 3.40 (H) 2022    CREATININE 1.1 2022     Lab Results   Component Value Date    ALT 18 11/15/2021    AST 12 (L) 11/15/2021     Lab Results   Component Value Date    CHOL 123 11/15/2021    TRIG 109 11/15/2021    HDL 34 (L) 11/15/2021    [facility] 67 11/15/2021          Review of Systems   Constitutional:  Negative for activity change, appetite change, fatigue, fever and unexpected weight change. HENT:  Positive for congestion and postnasal drip. Negative for ear pain, sinus pressure and sinus pain. Eyes:  Negative for discharge and visual disturbance. Respiratory:  Positive for wheezing. Negative for cough, chest tightness and shortness of breath. Cardiovascular:  Negative for chest pain, palpitations and leg swelling. Gastrointestinal:  Negative for abdominal distention, abdominal pain, constipation, diarrhea and nausea. Endocrine: Negative for cold intolerance, heat intolerance, polydipsia, polyphagia and polyuria. Genitourinary:  Negative for decreased urine volume, difficulty urinating, dysuria, flank pain, frequency and urgency. Musculoskeletal:  Negative for arthralgias, back pain, gait problem, joint swelling, myalgias and neck pain. Skin:  Negative for color change, rash and wound. Allergic/Immunologic: Negative for food allergies and immunocompromised state. Neurological:  Positive for numbness. Negative for dizziness, tremors, speech difficulty, weakness, light-headedness and headaches. Hematological:  Negative for adenopathy. Does not bruise/bleed easily. Psychiatric/Behavioral:  Negative for confusion, decreased concentration, self-injury, sleep disturbance and suicidal ideas. The patient is not nervous/anxious. Prior to Visit Medications    Medication Sig Taking?  Authorizing Provider   atorvastatin (LIPITOR) 40 MG tablet Take 1 tablet daily Yes MERCED Redman CNP   metFORMIN (GLUCOPHAGE) 1000 MG tablet Take 1 tablet 2 times daily with meals Yes MERCED Redman CNP   amLODIPine (NORVASC) 2.5 MG tablet Take 1 tablet by mouth daily AT DINNER Yes MERCED Redman CNP   glipiZIDE (GLUCOTROL) 5 MG tablet TAKE 1/2 TO 1 TABLET TWO TIMES A DAY BEFORE MEALS Yes Kenyon Snyder MERCED Ruffin CNP   metoprolol tartrate (LOPRESSOR) 25 MG tablet TAKE 1/2 TABLET TWICE A DAY Yes MERCED David CNP   tamsulosin (FLOMAX) 0.4 MG capsule Take 1 capsule by mouth daily Yes MERCED David CNP   furosemide (LASIX) 40 MG tablet TAKE 1 TABLET TWICE A DAY Yes MERCED David CNP   azelastine (ASTELIN) 0.1 % nasal spray Use 2 sprays in each nostril 2 times daily. Use fluticasone 15 minutes after the morning dose of astelin. Yes Xiomara Lawler MD   fluticasone (FLONASE) 50 MCG/ACT nasal spray 2 sprays in each nostril daily, 15 minutes after morning dose of Astelin. . Yes Xiomara Lawler MD   fexofenadine (ALLERGY RELIEF) 180 MG tablet TAKE 1 TABLET BY MOUTH DAILY Yes Xiomara Lawler MD   mupirocin (BACTROBAN) 2 % ointment APPLY TOPICALLY TO THE AFFECTED AREA THREE TIMES DAILY Yes Erika Noriega MD   FreeStyle Lancets MISC USE TO TEST TWICE DAILY Yes Radha Needle, DO   vitamin D (CHOLECALCIFEROL) 125 MCG (5000 UT) CAPS capsule Take 5,000 Units by mouth daily Indications: Vitamin A Deficiency Yes Historical Provider, MD   fluticasone-salmeterol (ADVAIR HFA) 230-21 MCG/ACT inhaler Inhale 2 puffs into the lungs 2 times daily Yes Radha Needle, DO   albuterol sulfate HFA (VENTOLIN HFA) 108 (90 Base) MCG/ACT inhaler Inhale 2 puffs into the lungs 4 times daily as needed for Wheezing Yes Radha Needle, DO   FREESTYLE LITE strip TEST TWICE DAILY Yes Radha Needle, DO   Spacer/Aero-Holding Kinga Hansen With all spray inhalers. Yes Radha Needle, DO   HYDROcodone-acetaminophen (NORCO)  MG per tablet TK 1 T PO  QID PRN P Yes Historical Provider, MD   docusate sodium (COLACE) 100 MG capsule Take 200 mg by mouth daily Indications: Constipation Yes Historical Provider, MD   aspirin 81 MG tablet Take 81 mg by mouth daily.  Yes Historical Provider, MD        Social History     Tobacco Use    Smoking status: Every Day     Packs/day: 1.00     Years: 60.00     Pack years: 60.00     Types: Cigarettes     Start date: 5/9/1952    Smokeless tobacco: Never    Tobacco comments:     Started 17 y/o. Quit 11/8/13. 1 PPD. 1025/19. 2/18/20.5/18/20.7/12/21.11/15/21.2/28/22.4/12/22.7/22/22. Substance Use Topics    Alcohol use: No     Alcohol/week: 1.0 standard drink     Types: 1 Standard drinks or equivalent per week     Comment: occ        Vitals:    12/12/22 0755   BP: 128/82   Site: Right Upper Arm   Position: Sitting   Cuff Size: Large Adult   Pulse: 80   SpO2: 96%   Weight: 218 lb (98.9 kg)   Height: 5' 6\" (1.676 m)     Estimated body mass index is 35.19 kg/m² as calculated from the following:    Height as of this encounter: 5' 6\" (1.676 m). Weight as of this encounter: 218 lb (98.9 kg). Physical Exam  Vitals reviewed. Constitutional:       General: He is awake. Appearance: Normal appearance. He is well-developed and well-groomed. He is obese. He is not ill-appearing or toxic-appearing. HENT:      Head: Normocephalic and atraumatic. Right Ear: External ear normal. Decreased hearing noted. There is impacted cerumen. Left Ear: External ear normal. Decreased hearing noted. There is impacted cerumen. Ears:      Comments: Patient going to see ENT today after this appt, says ENT can try and get him unclogged, decline removal by me today     Nose: Congestion present. Mouth/Throat:      Lips: Pink. Mouth: Mucous membranes are moist.      Pharynx: Oropharynx is clear. Eyes:      General: Lids are normal.      Extraocular Movements: Extraocular movements intact. Conjunctiva/sclera: Conjunctivae normal.      Pupils: Pupils are equal, round, and reactive to light. Neck:      Thyroid: No thyromegaly. Vascular: No carotid bruit. Cardiovascular:      Rate and Rhythm: Normal rate. Pulses: Normal pulses. Carotid pulses are 2+ on the right side and 2+ on the left side.        Radial pulses are 2+ on the right side and 2+ on the left side. Posterior tibial pulses are 2+ on the right side and 2+ on the left side. Heart sounds: S1 normal and S2 normal. Heart sounds not distant. Murmur heard. Pulmonary:      Effort: Pulmonary effort is normal.      Breath sounds: Examination of the right-lower field reveals wheezing. Examination of the left-lower field reveals wheezing. Wheezing present. Abdominal:      General: Bowel sounds are normal. There is no abdominal bruit. Palpations: Abdomen is soft. Tenderness: There is no abdominal tenderness. Genitourinary:     Comments: Deferred  Musculoskeletal:         General: Normal range of motion. Cervical back: Full passive range of motion without pain, normal range of motion and neck supple. Right lower leg: No edema. Left lower leg: No edema. Lymphadenopathy:      Head:      Right side of head: No submental, submandibular, tonsillar, preauricular, posterior auricular or occipital adenopathy. Left side of head: No submental, submandibular, tonsillar, preauricular, posterior auricular or occipital adenopathy. Cervical: No cervical adenopathy. Right cervical: No superficial, deep or posterior cervical adenopathy. Left cervical: No superficial, deep or posterior cervical adenopathy. Upper Body:      Right upper body: No supraclavicular adenopathy. Left upper body: No supraclavicular adenopathy. Skin:     General: Skin is warm and dry. Capillary Refill: Capillary refill takes less than 2 seconds. Neurological:      General: No focal deficit present. Mental Status: He is alert and oriented to person, place, and time. Mental status is at baseline. Motor: Motor function is intact. No weakness. Coordination: Coordination is intact. Gait: Gait is intact.    Psychiatric:         Attention and Perception: Attention and perception normal.         Mood and Affect: Mood and affect normal.         Speech: Speech normal. Behavior: Behavior normal. Behavior is cooperative. Thought Content: Thought content normal.         Cognition and Memory: Cognition and memory normal.         Judgment: Judgment normal.       ASSESSMENT/PLAN:  1. Type 2 diabetes mellitus with microalbuminuria, without long-term current use of insulin ([facility])  -     POCT glycosylated hemoglobin (Hb A1C)   A1C 8, improved from 8.4 last visit  -     metFORMIN (GLUCOPHAGE) 1000 MG tablet; Take 1 tablet 2 times daily with meals, Disp-180 tablet, R-0Normal  -     Comprehensive Metabolic Panel; Future  -     glipiZIDE (GLUCOTROL) 5 MG tablet; TAKE 1/2 TO 1 TABLET TWO TIMES A DAY BEFORE MEALS, Disp-180 tablet, R-0Normal   Continue on current regimen but discussed with pt not to take 1/2 tab of glipizide   Pt does not follow diabetic diet at all, admittedly   Discussed even small changes in his diet could help to improve his A1C, discussed goal A1C <7, or at least <8 at his age. A1C improved from last visit   Discussed with him I do not want him taking only 1/2 tab of glipizide, he was agreeable with this, discussed we could even look at increasing to 10 mg BID but glipizide has risk of hypoglycemia and with elderly we must be careful with hypoglycemia. Overall his fasting glucose levels he brought into office today look good over the past few months, if he keeps trending in this direction, I would expect a 7.5 at next visit, discussed with him   He has little to no interest in changing his diet   Reports no exercise, cannot walk well, wishes he could exercise, encouraged arm exercises   Weight loss, initial goal 10% of body weight recommended   Down a few lbs he says from his last diabetes visit  2. Essential hypertension, benign  -     Comprehensive Metabolic Panel; Future  -     amLODIPine (NORVASC) 2.5 MG tablet;  Take 1 tablet by mouth daily AT DINNER, Disp-90 tablet, R-1Normal  -     metoprolol tartrate (LOPRESSOR) 25 MG tablet; TAKE 1/2 TABLET TWICE A DAY, Disp-90 tablet, R-1Normal    BP well controlled at visit today   Rarely checks BP at home   Follow a low sodium diet- does not follow this at home either   Physical activity 150 minutes weekly recommended    Weight loss, initial goal 10% of body weight recommended   Continue current BP regimen, refilled medications  3. Pure hypercholesterolemia  -     atorvastatin (LIPITOR) 40 MG tablet; Take 1 tablet daily, Disp-90 tablet, R-3Normal  -     Comprehensive Metabolic Panel; Future  -     Lipid Panel; Future   Lipid panel today, fasting   Work on limiting saturated fats in diet, and eating a healthy balance of fruits, vegetables, lean proteins, and multigrains. The ASCVD Risk score (Cynthia DK, et al., 2019) failed to calculate for the following reasons: The 2019 ASCVD risk score is only valid for ages 36 to 78   Over the age of checking 8 year cardiac risk score  4. Coronary artery disease due to lipid rich plaque  -     atorvastatin (LIPITOR) 40 MG tablet; Take 1 tablet daily, Disp-90 tablet, R-3Normal  -     metoprolol tartrate (LOPRESSOR) 25 MG tablet; TAKE 1/2 TABLET TWICE A DAY, Disp-90 tablet, R-1Normal   Discussed risk, on beta blocker and statin  5. Anemia, unspecified type  -     CBC with Auto Differential; Future   Stable, was post op last year  6. Vitamin D deficiency  -     Vitamin D 25 Hydroxy; Future   On vitamin D daily per pt   Vitamin D helps with immune support, bone health, and energy levels. 7. BPH associated with nocturia  -     tamsulosin (FLOMAX) 0.4 MG capsule; Take 1 capsule by mouth daily, Disp-90 capsule, R-3Normal   Says this does not help much   Mybetriq was too expensive   Does not always take lasix because he pees so often  8. Pulmonary hypertension (HCC)  -     furosemide (LASIX) 40 MG tablet; TAKE 1 TABLET TWICE A DAY, Disp-180 tablet, R-0Normal   Discussed importance of adherence to lasix to manage congestion   Lungs- mild wheezing today  9.  Pulmonary vascular congestion  - furosemide (LASIX) 40 MG tablet; TAKE 1 TABLET TWICE A DAY, Disp-180 tablet, R-0Normal    Discussed importance of adherence to lasix to manage congestion   Lungs- mild wheezing today   Does not take lasix when out and about, cannot make it to bathroom, hates the frequent voiding  10. Bilateral impacted cerumen   Seeing Dr Noa Graham ENT later this morning, says he will speak to him about this   Avoid use of Qtips   Recommend use of hydrogen peroxide or debrox   Recommend removal    Care Gaps Addressed  Call insurance company to discuss coverage for shingles vaccine (Shingrix) 2 dose series   Low dose CT scan recommended- declined  COVID booster recommended  AWV due 2022  Lipids due      I have reviewed patient's pertinent medical history, relevant laboratory and imaging studies, and past/future health maintenance. Discussed with the patient the importance of adhering to their current medication regimen as directed. Advised the patient that they should continue to work on eating a healthy balanced diet and staying active by exercising within their personal limits. Orders as listed above. Patient was advised to keep future appointments with their respective specialty care team(s). Patient had the opportunity to ask questions, all of which were answered to the best of my ability and with patient satisfaction. Patient understands and is agreeable with the care plan following today's visit. Patient is to schedule an appointment for any new or worsening symptoms. Go to ER for significant shortness of breath, chest pain, or uncontrolled pain or fever. I discussed with patient the risk and benefits of any medications that were prescribed today. I verified that the patient understands their medications, labs, and/or procedures. The patient is doing well with current medication regimen and does not have any barriers to adherence. The patient's self-management abilities are good.      Return in about 3 months (around 3/13/2023) for Annual Wellness Visit, Diabetes Follow Up, HTN Follow Up. An electronic signature was used to authenticate this note.     --MERCED Ricketts - CNP on 12/12/2022 at 8:31 AM

## 2022-12-12 NOTE — PROGRESS NOTES
[facility]           PCP:  Nia Zelaya DO      CHIEF COMPLAINT  Chief Complaint   Patient presents with    Nose Problem     Post nasal drainage       HISTORY OF PRESENT ILLNESS    Freddie Waite is a [de-identified] y.o. male. \"It seems like my breathing has improved a little bit. I'm still getting quite a bit of drainage though. I can blow it out and it goes down my throat too. It kind of gets hung up in there. 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 Ruptured disc    Diverticulosis of colon 10/30/2012    Dry eye syndrome of bilateral lacrimal glands     Hip problem 1956    NUMBNESS W/ TRAUMA    Hypercholesterolemia     Impotence     Metabolic syndrome     Mild diastolic dysfunction 67/20/7252    LVEF 55-60%    Obstructive sleep apnea     Obstructive sleep apnea     C-PAP    Osteoarthritis of shoulder     left  @ ac jt/impingement.     Paroxysmal atrial fibrillation ([address] 02/26/2022    On EKG with RBBB, inferior infarct age undetermined, QT interval long for rate    Pulmonary hypertension ([address] 10/21/2013    39    Recurrent BCC (basal cell carcinoma)     Right bundle branch block 05/15/2018    S/P aortic valve replacement 11/12/2013    Submandibular duct obstruction - right 10/02/2017    12 x 14 x 18 mm right submandibular gland stone causing submandibular duct dilatation and gland prominence    Tobacco use disorder 1957    Type II or unspecified type diabetes mellitus without mention of complication, not stated as uncontrolled 2003    Vitamin D deficiency        Past Surgical History:   Procedure Laterality Date    AORTIC VALVE REPLACEMENT  11/12/2013    BLEPHAROPLASTY  07/2007    UPPER LID    BONY PELVIS SURGERY  01/12/2021    SI joint    CARDIAC CATHETERIZATION  11/08/2013    dx    CARDIAC SURGERY  11/12/2013    reincised for bleeding    CATARACT REMOVAL WITH IMPLANT Right 01/2019    CATARACT REMOVAL WITH IMPLANT Left 02/2019    CERVICAL DISC SURGERY Right 2015    epidural steroids. CORONARY ARTERY BYPASS GRAFT  11/12/2013    4 V    EYE SURGERY Bilateral     Laser peripheral iridotomy     FINGER TRIGGER RELEASE Right 08/19/2014    Release of trigger thumb (stenosing tenosynovitis)    HEMORRHOID SURGERY  [id number]    LAPAROSCOPIC APPENDECTOMY  11/14/2014    LUMBAR LAMINECTOMY  1998 & 2003    LUMBAR LAMINECTOMY  05/07/2014    L2-L3 & Repair dural tear x2. LUMBAR SPINE SURGERY  08/04/2009    SPINaL CORD STIMULATOR FOR PAIN    LUMBAR SPINE SURGERY  08/2014    epidural steroids. 3/2017 NEAL, 10/21/19    LUMBAR SPINE SURGERY Right 04/23/2019    SPINAL CORD STIMULATOR BATTERY REPLACEMENT WITH POCKET REVISION     MOHS SURGERY Left 02/11/2020    Dorsum of hand: type? MOHS SURGERY Right 01/01/2020    right upper inner pinna with skin graft    NERVE SURGERY  ~12/2012    nerve block     ROTATOR CUFF REPAIR  2001    SKIN CANCER EXCISION Right 07/2017    forehead THEN TOP OF THE HEAD.     SKIN CANCER EXCISION  01/2019    nose    SOFT TISSUE BIOPSY      needle bx right neck mass -benign    TEAR DUCT SURGERY Bilateral 01/01/2015    for dry    TESTICLE REMOVAL  1947    R    TONSILLECTOMY      CHILD    UMBILICAL HERNIA REPAIR  11/14/2014    Laparoscopic         EXAMINATION    Vitals: 12/12/22 1034   BP: 127/71   Pulse: 82   Temp: 97.5 °F (36.4 °C)   TempSrc: Temporal   Weight: 221 lb (100.2 kg)   Height: 5' 6\" (1.676 m)     General:  WDWN, NAD, alert and oriented  Face: There was no swelling or lesions detected. Voice:  Normal with no hoarseness or hot potato voice. (+) Nose:  mucus crusting on the right nasal cavity between septum and middle turbinate. The external nose, nasal septum, turbinates, secretions, and mucosa appeared to be normal.   Sinuses:  Maxillary and frontal sinuses were nontender to palpation and percussion. REVIEW OF IMAGES          Narrative   EXAMINATION:   CT OF THE SINUS WITHOUT CONTRAST 12/6/2022 6:49 am       TECHNIQUE:   CT of the sinuses was performed without the administration of intravenous   contrast. Multiplanar reformatted images are provided for review. Automated   exposure control, iterative reconstruction, and/or weight based adjustment of   the mA/kV was utilized to reduce the radiation dose to as low as reasonably   achievable. COMPARISON:   11/07/2018 CT       HISTORY:   ORDERING SYSTEM PROVIDED HISTORY: Acute rhinosinusitis   TECHNOLOGIST PROVIDED HISTORY:   Reason for exam:->check chronic sinusitis   Reason for Exam: Acute rhinosinusitis J01.90 (ICD-10-CM) (acute on chronic); Chronic sinusitis, unspecified location       FINDINGS:   LEFT OMC: The left ostiomeatal complex and frontal recess are patent. Left   maxillary, anterior ethmoid, and frontal sinuses are clear. RIGHT OMC: The right ostiomeatal complex and frontal recess are patent. Right   maxillary, anterior ethmoid, and frontal sinuses are clear. SPHENOETHMOID: The sphenoethmoidal recesses are patent, and the bilateral   posterior ethmoid and sphenoid sinuses are clear. Sphenoid pneumatization   does not extend into the anterior clinoid processes. The optic nerve and   carotid canals are not dehiscent. The ethmoid roofs are symmetric.        NASAL CAVITY: Nasal cavity is clear. Turbinate anatomy is conventional.       OTHER: The mastoid air cells are well aerated. Impression   No evidence of sinusitis. Scar Mederos / Christine Villalobos / Allyn Gates was seen today for nose problem. Diagnoses and all orders for this visit:    Allergic rhinitis, unspecified seasonality, unspecified trigger  -     Allergen, Respiratory, Region 5 Panel; Future       RECOMMENDATIONS/PLAN      Return in 2 months (on 2/12/2023) for recheck/follow-up, and sooner if condition worsens. Patient Instructions   Continue Astelin and Flonase for nasal allergy symptoms. Continue fexofenadine daily for allergies. SINUS RINSE - Do a nasal-sinus rinse two times daily (eg. NeilMed sinus rinse, OTC) using distilled water; DO NOT USE TAP WATER! This is because of the possibility of ameoba or other microorganisms in tap water, which can result in a fatal disease. Alternatively, you could use a blue bulb syringe and solution of 1/4 tsp of table salt in 8 ounces (one cup or 240 ml) of distilled water.

## 2022-12-12 NOTE — PATIENT INSTRUCTIONS
Continue Astelin and Flonase for nasal allergy symptoms. Continue fexofenadine daily for allergies. SINUS RINSE - Do a nasal-sinus rinse two times daily (eg. NeilMed sinus rinse, OTC) using distilled water; DO NOT USE TAP WATER! This is because of the possibility of ameoba or other microorganisms in tap water, which can result in a fatal disease. Alternatively, you could use a blue bulb syringe and solution of 1/4 tsp of table salt in 8 ounces (one cup or 240 ml) of distilled water.

## 2022-12-13 DIAGNOSIS — E87.5 HYPERKALEMIA: Primary | ICD-10-CM

## 2022-12-13 DIAGNOSIS — N17.9 AKI (ACUTE KIDNEY INJURY) (HCC): ICD-10-CM

## 2022-12-13 LAB — VITAMIN D 25-HYDROXY: 65.9 NG/ML

## 2022-12-13 RX ORDER — SODIUM POLYSTYRENE SULFONATE 15 G/60ML
15 SUSPENSION ORAL; RECTAL ONCE
Qty: 60 ML | Refills: 0 | Status: SHIPPED | OUTPATIENT
Start: 2022-12-13 | End: 2022-12-13

## 2022-12-14 DIAGNOSIS — E11.29 TYPE 2 DIABETES MELLITUS WITH MICROALBUMINURIA, WITHOUT LONG-TERM CURRENT USE OF INSULIN (HCC): ICD-10-CM

## 2022-12-14 DIAGNOSIS — R80.9 TYPE 2 DIABETES MELLITUS WITH MICROALBUMINURIA, WITHOUT LONG-TERM CURRENT USE OF INSULIN (HCC): ICD-10-CM

## 2022-12-14 RX ORDER — BLOOD-GLUCOSE METER
KIT MISCELLANEOUS
Qty: 200 STRIP | Refills: 3 | Status: SHIPPED | OUTPATIENT
Start: 2022-12-14

## 2022-12-20 ENCOUNTER — TELEPHONE (OUTPATIENT)
Dept: ADMINISTRATIVE | Age: 82
End: 2022-12-20

## 2022-12-20 NOTE — TELEPHONE ENCOUNTER
Submitted PA for FreeStyle Lite Test strips  Via Gist  Key: [id number] STATUS: \"The patient currently has access to the requested medication and a Prior Authorization is not needed for the medication. \"    If this requires a response please respond to the pool. [address]). Please advise patient thank you.

## 2023-01-03 DIAGNOSIS — I10 ESSENTIAL HYPERTENSION, BENIGN: ICD-10-CM

## 2023-01-04 ENCOUNTER — TELEPHONE (OUTPATIENT)
Dept: FAMILY MEDICINE CLINIC | Age: 83
End: 2023-01-04

## 2023-01-04 RX ORDER — AMLODIPINE BESYLATE 2.5 MG/1
2.5 TABLET ORAL DAILY
Qty: 90 TABLET | Refills: 1 | OUTPATIENT
Start: 2023-01-04

## 2023-01-04 NOTE — TELEPHONE ENCOUNTER
Patient would like all of his prescriptions from now on to go to:        [facility] [address], [address] - F [phone]

## 2023-01-06 ENCOUNTER — HOSPITAL ENCOUNTER (OUTPATIENT)
Age: 83
Discharge: HOME | End: 2023-01-06
Payer: MEDICARE

## 2023-01-06 DIAGNOSIS — J30.9 ALLERGIC RHINITIS, UNSPECIFIED SEASONALITY, UNSPECIFIED TRIGGER: Chronic | ICD-10-CM

## 2023-01-06 PROCEDURE — 86003 ALLG SPEC IGE CRUDE XTRC EA: CPT

## 2023-01-06 PROCEDURE — 82785 ASSAY OF IGE: CPT

## 2023-01-27 ENCOUNTER — HOSPITAL ENCOUNTER (OUTPATIENT)
Dept: ULTRASOUND IMAGING | Age: 83
Discharge: HOME | End: 2023-01-27
Payer: MEDICARE

## 2023-01-27 ENCOUNTER — HOSPITAL ENCOUNTER (OUTPATIENT)
Age: 83
Discharge: HOME | End: 2023-01-27
Payer: MEDICARE

## 2023-01-27 DIAGNOSIS — N17.9 AKI (ACUTE KIDNEY INJURY) (HCC): ICD-10-CM

## 2023-01-27 LAB
ALBUMIN SERPL-MCNC: 4.2 G/DL (ref 3.4–5)
ALP BLD-CCNC: 61 U/L (ref 40–129)
ALT SERPL-CCNC: 14 U/L (ref 10–40)
ANION GAP SERPL CALCULATED.3IONS-SCNC: 12 MMOL/L (ref 3–16)
AST SERPL-CCNC: 13 U/L (ref 15–37)
BACTERIA: NORMAL /HPF
BASOPHILS ABSOLUTE: 0 K/UL (ref 0–0.2)
BASOPHILS RELATIVE PERCENT: 0.5 %
BILIRUB SERPL-MCNC: <0.2 MG/DL (ref 0–1)
BILIRUBIN DIRECT: <0.2 MG/DL (ref 0–0.3)
BILIRUBIN URINE: NEGATIVE
BILIRUBIN, INDIRECT: ABNORMAL MG/DL (ref 0–1)
BLOOD, URINE: NEGATIVE
BUN BLDV-MCNC: 31 MG/DL (ref 7–20)
CALCIUM SERPL-MCNC: 9.6 MG/DL (ref 8.3–10.6)
CHLORIDE BLD-SCNC: 101 MMOL/L (ref 99–110)
CLARITY: CLEAR
CO2: 28 MMOL/L (ref 21–32)
COLOR: YELLOW
CREAT SERPL-MCNC: 1.1 MG/DL (ref 0.8–1.3)
EOSINOPHILS ABSOLUTE: 0.2 K/UL (ref 0–0.6)
EOSINOPHILS RELATIVE PERCENT: 2.3 %
EPITHELIAL CELLS, UA: 0 /HPF (ref 0–5)
FERRITIN: 80.2 NG/ML (ref 30–400)
GFR SERPL CREATININE-BSD FRML MDRD: >60 ML/MIN/{1.73_M2}
GLUCOSE BLD-MCNC: 164 MG/DL (ref 70–99)
GLUCOSE URINE: NEGATIVE MG/DL
HCT VFR BLD CALC: 39.7 % (ref 40.5–52.5)
HEMOGLOBIN: 12.8 G/DL (ref 13.5–17.5)
HYALINE CASTS: 0 /LPF (ref 0–8)
IRON SATURATION: 21 % (ref 20–50)
IRON: 68 UG/DL (ref 59–158)
KETONES, URINE: NEGATIVE MG/DL
LEUKOCYTE ESTERASE, URINE: NEGATIVE
LYMPHOCYTES ABSOLUTE: 2.9 K/UL (ref 1–5.1)
LYMPHOCYTES RELATIVE PERCENT: 32.6 %
MAGNESIUM: 2 MG/DL (ref 1.8–2.4)
MCH RBC QN AUTO: 31 PG (ref 26–34)
MCHC RBC AUTO-ENTMCNC: 32.3 G/DL (ref 31–36)
MCV RBC AUTO: 96.1 FL (ref 80–100)
MICROSCOPIC EXAMINATION: YES
MONOCYTES ABSOLUTE: 0.6 K/UL (ref 0–1.3)
MONOCYTES RELATIVE PERCENT: 6.3 %
NEUTROPHILS ABSOLUTE: 5.1 K/UL (ref 1.7–7.7)
NEUTROPHILS RELATIVE PERCENT: 58.3 %
NITRITE, URINE: NEGATIVE
PARATHYROID HORMONE INTACT: 69.8 PG/ML (ref 14–72)
PDW BLD-RTO: 14.6 % (ref 12.4–15.4)
PH UA: 5.5 (ref 5–8)
PHOSPHORUS: 3 MG/DL (ref 2.5–4.9)
PLATELET # BLD: 312 K/UL (ref 135–450)
PMV BLD AUTO: 7.3 FL (ref 5–10.5)
POTASSIUM SERPL-SCNC: 4.5 MMOL/L (ref 3.5–5.1)
PROTEIN PROTEIN: 0.02 G/DL
PROTEIN PROTEIN: 20 MG/DL
PROTEIN UA: ABNORMAL MG/DL
RBC # BLD: 4.13 M/UL (ref 4.2–5.9)
RBC UA: 1 /HPF (ref 0–4)
SODIUM BLD-SCNC: 141 MMOL/L (ref 136–145)
SPECIFIC GRAVITY UA: 1.02 (ref 1–1.03)
TOTAL IRON BINDING CAPACITY: 328 UG/DL (ref 260–445)
TOTAL PROTEIN: 6.9 G/DL (ref 6.4–8.2)
URIC ACID, SERUM: 8.9 MG/DL (ref 3.5–7.2)
URINE TYPE: ABNORMAL
UROBILINOGEN, URINE: 0.2 E.U./DL
VITAMIN D 25-HYDROXY: 50.7 NG/ML
WBC # BLD: 8.7 K/UL (ref 4–11)
WBC UA: 0 /HPF (ref 0–5)

## 2023-01-27 PROCEDURE — 83036 HEMOGLOBIN GLYCOSYLATED A1C: CPT

## 2023-01-27 PROCEDURE — 84156 ASSAY OF PROTEIN URINE: CPT

## 2023-01-27 PROCEDURE — 80076 HEPATIC FUNCTION PANEL: CPT

## 2023-01-27 PROCEDURE — 85025 COMPLETE CBC W/AUTO DIFF WBC: CPT

## 2023-01-27 PROCEDURE — 82088 ASSAY OF ALDOSTERONE: CPT

## 2023-01-27 PROCEDURE — 83550 IRON BINDING TEST: CPT

## 2023-01-27 PROCEDURE — 82728 ASSAY OF FERRITIN: CPT

## 2023-01-27 PROCEDURE — 36415 COLL VENOUS BLD VENIPUNCTURE: CPT

## 2023-01-27 PROCEDURE — 84166 PROTEIN E-PHORESIS/URINE/CSF: CPT

## 2023-01-27 PROCEDURE — 83540 ASSAY OF IRON: CPT

## 2023-01-27 PROCEDURE — 81001 URINALYSIS AUTO W/SCOPE: CPT

## 2023-01-27 PROCEDURE — 84244 ASSAY OF RENIN: CPT

## 2023-01-27 PROCEDURE — 76770 US EXAM ABDO BACK WALL COMP: CPT

## 2023-01-27 PROCEDURE — 83970 ASSAY OF PARATHORMONE: CPT

## 2023-01-27 PROCEDURE — 86038 ANTINUCLEAR ANTIBODIES: CPT

## 2023-01-27 PROCEDURE — 84155 ASSAY OF PROTEIN SERUM: CPT

## 2023-01-27 PROCEDURE — 83735 ASSAY OF MAGNESIUM: CPT

## 2023-01-27 PROCEDURE — 84165 PROTEIN E-PHORESIS SERUM: CPT

## 2023-01-27 PROCEDURE — 84550 ASSAY OF BLOOD/URIC ACID: CPT

## 2023-01-27 PROCEDURE — 80069 RENAL FUNCTION PANEL: CPT

## 2023-01-27 PROCEDURE — 82306 VITAMIN D 25 HYDROXY: CPT

## 2023-01-28 LAB
ANTI-NUCLEAR ANTIBODY (ANA): NEGATIVE
ESTIMATED AVERAGE GLUCOSE: 200.1 MG/DL
HBA1C MFR BLD: 8.6 %

## 2023-02-06 ENCOUNTER — TELEPHONE (OUTPATIENT)
Dept: FAMILY MEDICINE CLINIC | Age: 83
End: 2023-02-06

## 2023-02-06 DIAGNOSIS — I25.10 CORONARY ARTERY DISEASE DUE TO LIPID RICH PLAQUE: Chronic | ICD-10-CM

## 2023-02-06 DIAGNOSIS — I10 ESSENTIAL HYPERTENSION, BENIGN: ICD-10-CM

## 2023-02-06 DIAGNOSIS — R80.9 TYPE 2 DIABETES MELLITUS WITH MICROALBUMINURIA, WITHOUT LONG-TERM CURRENT USE OF INSULIN (HCC): ICD-10-CM

## 2023-02-06 DIAGNOSIS — I25.83 CORONARY ARTERY DISEASE DUE TO LIPID RICH PLAQUE: Chronic | ICD-10-CM

## 2023-02-06 DIAGNOSIS — E11.29 TYPE 2 DIABETES MELLITUS WITH MICROALBUMINURIA, WITHOUT LONG-TERM CURRENT USE OF INSULIN (HCC): ICD-10-CM

## 2023-02-06 DIAGNOSIS — E78.00 PURE HYPERCHOLESTEROLEMIA: Chronic | ICD-10-CM

## 2023-02-06 DIAGNOSIS — I27.20 PULMONARY HYPERTENSION (HCC): ICD-10-CM

## 2023-02-06 DIAGNOSIS — R09.89 PULMONARY VASCULAR CONGESTION: ICD-10-CM

## 2023-02-06 RX ORDER — ATORVASTATIN CALCIUM 40 MG/1
TABLET, FILM COATED ORAL
Qty: 90 TABLET | Refills: 0 | Status: SHIPPED | OUTPATIENT
Start: 2023-02-06

## 2023-02-06 RX ORDER — FUROSEMIDE 40 MG/1
TABLET ORAL
Qty: 180 TABLET | Refills: 0 | Status: SHIPPED | OUTPATIENT
Start: 2023-02-06

## 2023-02-06 NOTE — TELEPHONE ENCOUNTER
Pt is now using a new Pharmacy and needs refills.     Metformin 1000 mg #180    Furosemide  40 mg #180    Metoprolol 25 mg #90    atorvastatin  40 mg #90    [facility] Cuellar

## 2023-02-06 NOTE — TELEPHONE ENCOUNTER
LV 12/12/22 WITH CB FOR DM NV NONE    Return in about 3 months (around 3/13/2023) for Annual Wellness Visit, Diabetes Follow Up, HTN Follow Up.

## 2023-02-07 DIAGNOSIS — J01.90 ACUTE RHINOSINUSITIS: ICD-10-CM

## 2023-02-07 DIAGNOSIS — J30.9 ALLERGIC RHINITIS, UNSPECIFIED SEASONALITY, UNSPECIFIED TRIGGER: Chronic | ICD-10-CM

## 2023-02-07 RX ORDER — FLUTICASONE PROPIONATE 50 MCG
SPRAY, SUSPENSION (ML) NASAL
Qty: 48 G | OUTPATIENT
Start: 2023-02-07

## 2023-02-07 RX ORDER — AZELASTINE 1 MG/ML
SPRAY, METERED NASAL
Qty: 90 ML | OUTPATIENT
Start: 2023-02-07

## 2023-02-13 ENCOUNTER — OFFICE VISIT (OUTPATIENT)
Dept: ENT CLINIC | Age: 83
End: 2023-02-13
Payer: MEDICARE

## 2023-02-13 VITALS
SYSTOLIC BLOOD PRESSURE: 116 MMHG | BODY MASS INDEX: 36.64 KG/M2 | TEMPERATURE: 97.3 F | DIASTOLIC BLOOD PRESSURE: 68 MMHG | HEIGHT: 66 IN | HEART RATE: 79 BPM | WEIGHT: 228 LBS

## 2023-02-13 DIAGNOSIS — J30.0 NON-ALLERGIC VASOMOTOR RHINITIS: Primary | Chronic | ICD-10-CM

## 2023-02-13 PROCEDURE — 3078F DIAST BP <80 MM HG: CPT | Performed by: OTOLARYNGOLOGY

## 2023-02-13 PROCEDURE — G8427 DOCREV CUR MEDS BY ELIG CLIN: HCPCS | Performed by: OTOLARYNGOLOGY

## 2023-02-13 PROCEDURE — G8484 FLU IMMUNIZE NO ADMIN: HCPCS | Performed by: OTOLARYNGOLOGY

## 2023-02-13 PROCEDURE — 4004F PT TOBACCO SCREEN RCVD TLK: CPT | Performed by: OTOLARYNGOLOGY

## 2023-02-13 PROCEDURE — 1123F ACP DISCUSS/DSCN MKR DOCD: CPT | Performed by: OTOLARYNGOLOGY

## 2023-02-13 PROCEDURE — 3074F SYST BP LT 130 MM HG: CPT | Performed by: OTOLARYNGOLOGY

## 2023-02-13 PROCEDURE — 99213 OFFICE O/P EST LOW 20 MIN: CPT | Performed by: OTOLARYNGOLOGY

## 2023-02-13 PROCEDURE — G8417 CALC BMI ABV UP PARAM F/U: HCPCS | Performed by: OTOLARYNGOLOGY

## 2023-02-13 RX ORDER — IPRATROPIUM BROMIDE 42 UG/1
2 SPRAY, METERED NASAL 4 TIMES DAILY
Qty: 45 ML | Refills: 2 | Status: SHIPPED | OUTPATIENT
Start: 2023-02-13

## 2023-02-13 NOTE — PROGRESS NOTES
[facility]       PCP:  Kris Recinos DO      CHIEF COMPLAINT  Chief Complaint   Patient presents with    Nose Problem     Postnasal drip. HISTORY OF PRESENT ILLNESS    Hai Moe is a [de-identified] y.o. male who presented today for recheck and follow up of allergic rhinitis     Patient stated that it is about the same. 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 Ruptured disc    Diverticulosis of colon 10/30/2012    Dry eye syndrome of bilateral lacrimal glands     Hip problem 1956    NUMBNESS W/ TRAUMA    Hypercholesterolemia     Impotence     Metabolic syndrome     Mild diastolic dysfunction 55/56/1686    LVEF 55-60%    Obstructive sleep apnea     Obstructive sleep apnea     C-PAP    Osteoarthritis of shoulder     left  @ ac jt/impingement.     Paroxysmal atrial fibrillation ([address] 02/26/2022    On EKG with RBBB, inferior infarct age undetermined, QT interval long for rate    Pulmonary hypertension ([address] 10/21/2013    39    Recurrent BCC (basal cell carcinoma)     Right bundle branch block 05/15/2018    S/P aortic valve replacement 11/12/2013    Submandibular duct obstruction - right 10/02/2017    12 x 14 x 18 mm right submandibular gland stone causing submandibular duct dilatation and gland prominence    Tobacco use disorder 1957    Type II or unspecified type diabetes mellitus without mention of complication, not stated as uncontrolled 2003    Vitamin D deficiency          Past Surgical History:   Procedure Laterality Date    AORTIC VALVE REPLACEMENT  11/12/2013    BLEPHAROPLASTY  07/2007    UPPER LID    BONY PELVIS SURGERY  01/12/2021    SI joint    CARDIAC CATHETERIZATION  11/08/2013    dx    CARDIAC SURGERY  11/12/2013    reincised for bleeding    CATARACT REMOVAL WITH IMPLANT Right 01/2019    CATARACT REMOVAL WITH IMPLANT Left 02/2019    CERVICAL DISC SURGERY Right 2015    epidural steroids. CORONARY ARTERY BYPASS GRAFT  11/12/2013    4 V    EYE SURGERY Bilateral     Laser peripheral iridotomy     FINGER TRIGGER RELEASE Right 08/19/2014    Release of trigger thumb (stenosing tenosynovitis)    HEMORRHOID SURGERY  [id number]    LAPAROSCOPIC APPENDECTOMY  11/14/2014    LUMBAR LAMINECTOMY  1998 & 2003    LUMBAR LAMINECTOMY  05/07/2014    L2-L3 & Repair dural tear x2. LUMBAR SPINE SURGERY  08/04/2009    SPINaL CORD STIMULATOR FOR PAIN    LUMBAR SPINE SURGERY  08/2014    epidural steroids. 3/2017 NEAL, 10/21/19    LUMBAR SPINE SURGERY Right 04/23/2019    SPINAL CORD STIMULATOR BATTERY REPLACEMENT WITH POCKET REVISION     MOHS SURGERY Left 02/11/2020    Dorsum of hand: type? MOHS SURGERY Right 01/01/2020    right upper inner pinna with skin graft    NERVE SURGERY  ~12/2012    nerve block     ROTATOR CUFF REPAIR  2001    SKIN CANCER EXCISION Right 07/2017    forehead THEN TOP OF THE HEAD.     SKIN CANCER EXCISION  01/2019    nose    SOFT TISSUE BIOPSY      needle bx right neck mass -benign    TEAR DUCT SURGERY Bilateral 01/01/2015    for dry    TESTICLE REMOVAL  1947    R    TONSILLECTOMY      CHILD    UMBILICAL HERNIA REPAIR  11/14/2014    Laparoscopic         EXAMINATION    Vitals:    02/13/23 1433   BP: 116/68   Pulse: 79   Temp: 97.3 °F (36.3 °C) TempSrc: Temporal   Weight: 228 lb (103.4 kg)   Height: 5' 6\" (1.676 m)     General:  WDWN, NAD, alert and oriented  Face: There was no swelling or lesions detected. Voice: Normal with no hoarseness or hot potato voice. Ears: The external ears, mastoids, TMs and EACs appeared to be normal.   Nose: The external nose, nasal septum, turbinates, secretions, and mucosa appeared to be normal.   Sinuses:  Maxillary and frontal sinuses were nontender to palpation and percussion. LABORATORY RESULTS  In vitro respiratory allergy testing was negative for all antigens tested. Allergen, Respiratory, Region 5 Panel  Order: [id number]  Status: Final result    Visible to patient: No (not released)    Next appt: 03/23/2023 at 03:00 PM in [address] [place] [address] ([facility])    Dx: Allergic rhinitis, unspecified season. ..    0 Result Notes       Component Ref Range & Units 1/6/23 1050   Alternaria Alternata 0.00 - 0.34 kU/L <0.10    Maple/Boxelder Tree 0.00 - 0.34 kU/L <0.10    Cat Dander Antibody 0.00 - 0.34 kU/L <0.10    M[place] Tree 0.00 - 0.34 kU/L <0.10    [place] Tree 0.00 - 0.34 kU/L <0.10    ALLERGEN PIGWEED ROUGH IGE 0.00 - 0.34 kU/L <0.10    Russian Thistle 0.00 - 0.34 kU/L <0.10    Fabian Grass 0.00 - 0.34 kU/L <0.10    Allergen Hormodendrum IgE 0.00 - 0.34 kUL/L <0.10    Elm Tree 0.00 - 0.34 kU/L <0.10    Oak Tree IgE 0.00 - 0.34 kU/L <0.10    Allergen Birch IgE 0.00 - 0.34 kU/L <0.10    Aspergillus Fumigatus 0.00 - 0.34 kU/L <0.10    Comment:    ALLERGEN, INTERP, IMMUNOCAP SCORE IGE   <0.10            Class 0                       No significant                       level detected   0.10-0.34        Class 0/1                       Clinical relevance                       undertermined   0.35 to 0.70     Class 1  Low   0.71 to 3.50     Class 2  Moderate   3.51 to 17.50    Class 3  High   17.51 to 50.00   Class 4  Very High   50.01 to 100.00  Class 5  Very High   >100.00          Class 6  Very High   units: kU/L     Increasing ranges are reflective of increasing concentrations of   allergen specific IgE. These concentrations may not correlate with the   degree of clinical response or skin testing results when challenged   with a specific allergen. The correlation of allergy laboratory results with the clinical history   and in vivo reactivity to specific allergens is essential.  A negative   test may not rule out clinical allergy or even anaphylaxis. D. pteronyssinus 0.00 - 0.34 kU/L <0.10    D. Farinae 0.00 - 0.34 kU/L <0.10    Bermuda Grass IgE 0.00 - 0.34 kU/L <0.10    Allergen, Tree, White Shashi IgE 0.00 - 0.34 kU/L <0.10    P. Notatum 0.00 - 0.34 kU/L <0.10    Short Ragwd(A cherelle.) IgE 0.00 - 0.34 kU/L <0.10    Cockroach IgE 0.00 - 0.34 kU/L <0.10    Allergen Tree [place] 0.00 - 0.34 kU/L <0.10    [place] Tree IgE 0.00 - 0.34 kU/L <0.10    Pecan Tree IgE 0.00 - 0.34 kU/L <0.10    Allergen Mouse Epithelial 0.00 - 0.34 kU/L 0.14    Mucor Racemosus 0.00 - 0.34 kU/L <0.10    Allergen White [place] Tree, IGE 0.00 - 0.34 kU/L <0.10    Dog Dander IgE 0.00 - 0.34 kU/L 0.11    Sheep Sorrel IgE 0.00 - 0.34 kU/L <0.10             IMPRESSION / DIAGNOSES / Ellis Amara was seen today for nose problem. Diagnoses and all orders for this visit:    Non-allergic vasomotor rhinitis  -     ipratropium (ATROVENT) 0.06 % nasal spray; 2 sprays by Each Nostril route 4 times daily       RECOMMENDATIONS/PLAN      Cryosurgery for vasomotor rhinitis, will consider. Atrovent. Resume   Continue Astelin/azelaztine and Flonase/fluticasone. .    Return in about 6 weeks (around 3/27/2023) for recheck/follow-up, and sooner if condition worsens.

## 2023-05-03 ENCOUNTER — OFFICE VISIT (OUTPATIENT)
Dept: FAMILY MEDICINE CLINIC | Age: 83
End: 2023-05-03

## 2023-05-03 VITALS
RESPIRATION RATE: 18 BRPM | BODY MASS INDEX: 35.36 KG/M2 | DIASTOLIC BLOOD PRESSURE: 60 MMHG | OXYGEN SATURATION: 92 % | WEIGHT: 220 LBS | HEIGHT: 66 IN | SYSTOLIC BLOOD PRESSURE: 104 MMHG | HEART RATE: 58 BPM

## 2023-05-03 DIAGNOSIS — F11.20 NARCOTIC DEPENDENCY, CONTINUOUS (HCC): ICD-10-CM

## 2023-05-03 DIAGNOSIS — E66.01 SEVERE OBESITY (BMI 35.0-39.9) WITH COMORBIDITY (HCC): ICD-10-CM

## 2023-05-03 DIAGNOSIS — R10.12 LUQ PAIN: ICD-10-CM

## 2023-05-03 DIAGNOSIS — I48.0 PAROXYSMAL ATRIAL FIBRILLATION (HCC): ICD-10-CM

## 2023-05-03 DIAGNOSIS — I27.20 PULMONARY HYPERTENSION (HCC): ICD-10-CM

## 2023-05-03 DIAGNOSIS — K46.9 NON-RECURRENT ABDOMINAL HERNIA WITHOUT OBSTRUCTION OR GANGRENE, UNSPECIFIED HERNIA TYPE: ICD-10-CM

## 2023-05-03 DIAGNOSIS — J43.1 PANLOBULAR EMPHYSEMA (HCC): ICD-10-CM

## 2023-05-03 DIAGNOSIS — L97.819 NON-PRESSURE CHRONIC ULCER OF OTHER PART OF RIGHT LOWER LEG WITH UNSPECIFIED SEVERITY (HCC): ICD-10-CM

## 2023-05-03 DIAGNOSIS — M46.1 SACROILIITIS, NOT ELSEWHERE CLASSIFIED (HCC): ICD-10-CM

## 2023-05-03 DIAGNOSIS — R80.9 TYPE 2 DIABETES MELLITUS WITH MICROALBUMINURIA, WITHOUT LONG-TERM CURRENT USE OF INSULIN (HCC): ICD-10-CM

## 2023-05-03 DIAGNOSIS — E11.29 TYPE 2 DIABETES MELLITUS WITH MICROALBUMINURIA, WITHOUT LONG-TERM CURRENT USE OF INSULIN (HCC): ICD-10-CM

## 2023-05-03 DIAGNOSIS — R10.12 LUQ PAIN: Primary | ICD-10-CM

## 2023-05-03 RX ORDER — FAMOTIDINE 20 MG/1
TABLET, FILM COATED ORAL
Qty: 180 TABLET | Refills: 0 | Status: SHIPPED | OUTPATIENT
Start: 2023-05-03

## 2023-05-03 RX ORDER — FAMOTIDINE 20 MG/1
20 TABLET, FILM COATED ORAL 2 TIMES DAILY
Qty: 60 TABLET | Refills: 0 | Status: SHIPPED | OUTPATIENT
Start: 2023-05-03 | End: 2023-05-03

## 2023-05-03 ASSESSMENT — ENCOUNTER SYMPTOMS
COUGH: 0
ABDOMINAL PAIN: 1
SINUS PRESSURE: 0
CHEST TIGHTNESS: 0
ABDOMINAL DISTENTION: 0
SINUS PAIN: 0
CONSTIPATION: 0
EYE DISCHARGE: 0
BACK PAIN: 0
COLOR CHANGE: 0
DIARRHEA: 0
NAUSEA: 0
SHORTNESS OF BREATH: 1

## 2023-05-03 NOTE — PATIENT INSTRUCTIONS

## 2023-05-03 NOTE — PROGRESS NOTES
Date of Service:  5/3/2023    Clemente Packer (:  1940) is a [de-identified] y.o. male, here for evaluation of the following medical concerns:    Chief Complaint   Patient presents with    Abdominal Pain     LUQ pain, tenderness, burning x2-3 wks        HPI    Abdominal Pain  Patient complains of abdominal pain. The pain is described as burning and tenderness, and is 4/10 in intensity. The patient is experiencing LUQ pain without radiation. Onset was 3 weeks ago. Symptoms have been gradually worsening. Aggravating factors: palpation. Alleviating factors: none. Associated symptoms: none. The patient denies anorexia, arthralagias, belching, chills, constipation, diarrhea, dysuria, fever, flatus, frequency, headache, hematochezia, hematuria, melena, myalgias, nausea, sweats, and vomiting. BM 3 times a week, pt says this is normal for him. Had appendix removed in 2014. Reviewed 2014 CT scan abd. Pt feels like it is his skin that hurts. One small yellowish bruise noted on mid abd in epigastric area. CT abd 2014. IMPRESSION:  Findings suggestive, but equivocal for acute appendicitis. Right renal cyst is noted. Fatty infiltration of the liver. Review of Systems   Constitutional:  Negative for activity change, appetite change, fatigue, fever and unexpected weight change. HENT:  Negative for congestion, ear pain, sinus pressure and sinus pain. Eyes:  Negative for discharge and visual disturbance. Respiratory:  Positive for shortness of breath. Negative for cough and chest tightness. Cardiovascular:  Negative for chest pain, palpitations and leg swelling. Gastrointestinal:  Positive for abdominal pain (LUQ). Negative for abdominal distention, constipation, diarrhea and nausea. Endocrine: Negative for cold intolerance, heat intolerance, polydipsia, polyphagia and polyuria. Genitourinary:  Negative for decreased urine volume, difficulty urinating, dysuria, flank pain, frequency and urgency.

## 2023-05-08 ENCOUNTER — OFFICE VISIT (OUTPATIENT)
Dept: FAMILY MEDICINE CLINIC | Age: 83
End: 2023-05-08

## 2023-05-08 ENCOUNTER — TELEPHONE (OUTPATIENT)
Dept: FAMILY MEDICINE CLINIC | Age: 83
End: 2023-05-08

## 2023-05-08 VITALS
DIASTOLIC BLOOD PRESSURE: 80 MMHG | BODY MASS INDEX: 35.2 KG/M2 | SYSTOLIC BLOOD PRESSURE: 136 MMHG | WEIGHT: 219 LBS | HEART RATE: 70 BPM | HEIGHT: 66 IN | OXYGEN SATURATION: 97 %

## 2023-05-08 DIAGNOSIS — E11.29 TYPE 2 DIABETES MELLITUS WITH MICROALBUMINURIA, WITHOUT LONG-TERM CURRENT USE OF INSULIN (HCC): Primary | ICD-10-CM

## 2023-05-08 DIAGNOSIS — E79.0 ABNORMAL BLOOD LEVEL OF URIC ACID: ICD-10-CM

## 2023-05-08 DIAGNOSIS — R20.8: ICD-10-CM

## 2023-05-08 DIAGNOSIS — E87.5 HYPERKALEMIA: Primary | ICD-10-CM

## 2023-05-08 DIAGNOSIS — R80.9 TYPE 2 DIABETES MELLITUS WITH MICROALBUMINURIA, WITHOUT LONG-TERM CURRENT USE OF INSULIN (HCC): Primary | ICD-10-CM

## 2023-05-08 DIAGNOSIS — I48.19 PERSISTENT ATRIAL FIBRILLATION (HCC): ICD-10-CM

## 2023-05-08 DIAGNOSIS — I10 ESSENTIAL HYPERTENSION, BENIGN: ICD-10-CM

## 2023-05-08 DIAGNOSIS — J43.1 PANLOBULAR EMPHYSEMA (HCC): ICD-10-CM

## 2023-05-08 DIAGNOSIS — I50.42 CHRONIC COMBINED SYSTOLIC AND DIASTOLIC CONGESTIVE HEART FAILURE (HCC): ICD-10-CM

## 2023-05-08 LAB
ANION GAP SERPL CALCULATED.3IONS-SCNC: 8 MMOL/L (ref 3–16)
BUN SERPL-MCNC: 18 MG/DL (ref 7–20)
CALCIUM SERPL-MCNC: 9.4 MG/DL (ref 8.3–10.6)
CHLORIDE SERPL-SCNC: 105 MMOL/L (ref 99–110)
CO2 SERPL-SCNC: 28 MMOL/L (ref 21–32)
CREAT SERPL-MCNC: 1.2 MG/DL (ref 0.8–1.3)
GFR SERPLBLD CREATININE-BSD FMLA CKD-EPI: >60 ML/MIN/{1.73_M2}
GLUCOSE SERPL-MCNC: 169 MG/DL (ref 70–99)
HBA1C MFR BLD: 8 %
POTASSIUM SERPL-SCNC: 6 MMOL/L (ref 3.5–5.1)
SODIUM SERPL-SCNC: 141 MMOL/L (ref 136–145)
URATE SERPL-MCNC: 6.9 MG/DL (ref 3.5–7.2)

## 2023-05-08 ASSESSMENT — ENCOUNTER SYMPTOMS
SHORTNESS OF BREATH: 1
WHEEZING: 0
CHEST TIGHTNESS: 0

## 2023-05-08 NOTE — PROGRESS NOTES
Mara Gilmore (:  1940) is a [de-identified] y.o. male,Established patient, here for evaluation of the following chief complaint(s):  Hypertension (FOLLOW UP ON HTN ) and Hyperlipidemia (FOLLOW UP ON CHOLESTEROL)       ASSESSMENT/PLAN:  843    Patient Instructions     Agusto Noriega was seen today for hypertension and hyperlipidemia. Diagnoses and all orders for this visit:    Type 2 diabetes mellitus with microalbuminuria, without long-term current use of insulin (HCC)  -     POCT glycosylated hemoglobin (Hb A1C)  -     Basic Metabolic Panel; Future  -     Blood sugar goal is  before a meal.  Less than 180 1-2 hours after a meal.  100-140 at bedtime. The Hemoglobin A1c goal for good control to avoid diabetic complications like stroke, heart attacks, amputations, kidney dialysis is an A1c of 6.4 or less. Up to 6.9 is considered fair control. Poor control.  -     Continue meds and lifestyle control. Essential hypertension, benign  -     Basic Metabolic Panel; Future  Fair BP control.  -     Continue meds and lifestyle control. Chronic combined systolic and diastolic congestive heart failure (HCC)  -     Basic Metabolic Panel; Future  Stable. Use Furosemide as needed. Persistent atrial fibrillation (HCC)  -     Basic Metabolic Panel; Future  Stable. Follow up with hear doctor. Panlobular emphysema ([address]  Wheezing. Use inhalers. Localized tenderness of skin - LUQ  Watch for blisters and scabs of shingles. Call ASAP if occur. Must start medicine in 72 hrs. Abnormal blood level of uric acid  -     Uric Acid; Future  Avoid High Purine Foods  Because uric acid is formed from the breakdown of purines, high-purine foods can trigger attacks. It is strongly encouraged to avoid:  Beer and grain liquors  Red meat, lamb and pork - eat small portions. Organ meats, such as liver, kidneys and sweetbreads  Seafood, especially shellfish, like shrimp, lobster, mussels, anchovies and sardines  .  Limit dietary

## 2023-05-08 NOTE — PATIENT INSTRUCTIONS
You may receive a survey regarding the care you received during your visit. Your input is valuable to us. We encourage you to complete and return your survey. We hope you will choose us in the future for your healthcare needs. GENERAL OFFICE POLICIES      Telephone Calls: Messages will be answered within 1-2 business days, unless the provider is out of the office. If it is urgent a covering provider will answer. (this does not include Medication refills). MyChart: We recommend all patients sign up for [employer]. Through this portal you can see your lab results, request refills, schedule appointments, pay your bill and send messages to the office. [employer] messages will be answered within 1-2 business days unless the provider is out of the office. For urgent matters, please call the office. Appointments:  All appointments must be scheduled. We ask all patients to schedule their next follow up appointment before they leave the office to make sure you will be able to be seen before you run out of medications. 24 hours notice is required to cancel or reschedule an appointment to avoid being marked as a no show. You may be dismissed from the practice after 3 no shows. LATE for Appointment: If you are 15 or more minutes late for your appointment, you may be asked to reschedule. MA/LAB APPTS: Must be scheduled, cannot accept walk in lab visits. We only draw labs for patients established in our office. We only do injections for medications ordered by our office. Acute Sick Visits:  Nothing other than acute complaint will be addressed at this visit. TRADITIONAL MEDICARE  DOES NOT COVER PHYSICALS  MEDICARE WELLNESS VISITS: These are NOT physicals but the free annual visit offered by Medicare to discuss wellness issues. Medication refills, checkups, etc. will not be addressed during this visit.   Medication Refills: Refills are handled electronically so please contact your pharmacy for medication

## 2023-05-08 NOTE — TELEPHONE ENCOUNTER
He is looking little fruit juice once or twice a week. He has a piece of fruit there 3 times a week. He has not been using a potassium based salt substitute like \"No Salt\" or Chanel's lite salt. They do not cook anymore. He is eating fast food mostly or something they can microwave. He has not been taking his Furosemide. Latest Reference Range & Units 06/28/16 11:15 08/14/17 10:57 10/29/18 09:20 12/03/18 10:27 09/27/19 08:25 10/25/19 14:21 11/10/20 08:59 01/06/21 09:32 11/15/21 09:06 02/26/22 05:30 04/12/22 17:19 12/12/22 08:31 01/27/23 13:45 05/08/23 08:49   Sodium 136 - 145 mmol/L 142 143 149 (H) 141 141 139 138 142 143 140 142 140 141 141   Potassium 3.5 - 5.1 mmol/L 5.6 (H) 5.4 (H) 5.8 (H) 5.5 (H) 6.6 (HH) 5.2 (H) 4.7 5.3 (H) 5.9 (H) 5.7 (H) 4.7 5.7 (H) 4.5 6.0 (HH)   Chloride 99 - 110 mmol/L 101 101 106 101 104 98 (L) 102 104 102 102 100 98 (L) 101 105   CO2 21 - 32 mmol/L 25 26 26 26 27 28 28 30 28 28 27 32 28 28   BUN,BUNPL 7 - 20 mg/dL 16 16 11 19 10 19 17 17 16 18 19 25 (H) 31 (H) 18   Creatinine 0.8 - 1.3 mg/dL 0.9 0.8 0.8 0.9 0.8 0.9 0.8 0.8 1.1 0.9 1.1 1.3 1.1 1.2   Anion Gap 3 - 16  16 16 17 (H) 14 10 13 8 8 13 10 15 10 12 8   Est, Glom Filt Rate >60             55 ! >60 >60   GFR Non-African American >60  >60 >60 >60 >60 >60 >60 >60 >60 >60 >60 >60      GFR  >60  >60 >60 >60 >60 >60 >60 >60 >60 >60 >60 >60        A/P hyperkalemia  Take furosemide 40 mg 1/2 pill every other day. Also increase water intake by 12 ounces daily. Recheck basic metabolic profile in 2 weeks. Patient understands directions and agrees to schedule. Kidney function does not explain high potassium. The past the patient was using a potassium containing salt substitute that his wife put on the table for him. I had encouraged her not to do that. His wife is now having memory problems. Encouraged the patient to double check while continuing his salt is coming from.

## 2023-05-18 ENCOUNTER — OFFICE VISIT (OUTPATIENT)
Dept: FAMILY MEDICINE CLINIC | Age: 83
End: 2023-05-18

## 2023-05-18 VITALS
WEIGHT: 216 LBS | HEART RATE: 90 BPM | BODY MASS INDEX: 34.72 KG/M2 | DIASTOLIC BLOOD PRESSURE: 72 MMHG | HEIGHT: 66 IN | SYSTOLIC BLOOD PRESSURE: 136 MMHG | OXYGEN SATURATION: 94 %

## 2023-05-18 DIAGNOSIS — R10.12 CONTINUOUS LEFT UPPER QUADRANT PAIN: Primary | ICD-10-CM

## 2023-05-18 RX ORDER — TRIAMCINOLONE ACETONIDE 0.25 MG/G
CREAM TOPICAL
Qty: 1 EACH | Refills: 1 | Status: SHIPPED | OUTPATIENT
Start: 2023-05-18

## 2023-05-18 ASSESSMENT — ENCOUNTER SYMPTOMS
SHORTNESS OF BREATH: 0
ABDOMINAL PAIN: 1
BLOOD IN STOOL: 0
VOMITING: 0
BACK PAIN: 0
ANAL BLEEDING: 0
PHOTOPHOBIA: 0
COLOR CHANGE: 0
SORE THROAT: 0
NAUSEA: 0
CHEST TIGHTNESS: 0
WHEEZING: 0
DIARRHEA: 0
COUGH: 0
TROUBLE SWALLOWING: 0
EYE ITCHING: 0
ABDOMINAL DISTENTION: 0
EYE PAIN: 0
CONSTIPATION: 0
RECTAL PAIN: 0

## 2023-05-18 NOTE — PATIENT INSTRUCTIONS

## 2023-05-18 NOTE — PROGRESS NOTES
shortness of breath and wheezing. Cardiovascular:  Negative for chest pain and palpitations. Gastrointestinal:  Positive for abdominal pain (LUQ). Negative for abdominal distention, anal bleeding, blood in stool, constipation, diarrhea, nausea, rectal pain and vomiting. Endocrine: Negative for cold intolerance, heat intolerance, polydipsia, polyphagia and polyuria. Genitourinary:  Negative for decreased urine volume, difficulty urinating, dysuria, flank pain, frequency, hematuria and urgency. Musculoskeletal:  Negative for arthralgias, back pain, myalgias and neck stiffness. Skin:  Negative for color change, pallor, rash and wound. Allergic/Immunologic: Negative for environmental allergies and immunocompromised state. Neurological:  Negative for dizziness, syncope, weakness, light-headedness and headaches. Psychiatric/Behavioral:  Negative for confusion, dysphoric mood and sleep disturbance. The patient is not nervous/anxious. Objective   Physical Exam  Vitals and nursing note reviewed. Constitutional:       General: He is not in acute distress. Appearance: Normal appearance. He is obese. He is not ill-appearing. HENT:      Head: Normocephalic and atraumatic. Nose: Nose normal. No congestion. Cardiovascular:      Rate and Rhythm: Normal rate and regular rhythm. Pulses: Normal pulses. Heart sounds: Normal heart sounds. No murmur heard. No gallop. Pulmonary:      Effort: Pulmonary effort is normal. No respiratory distress. Breath sounds: Normal breath sounds. No wheezing. Abdominal:      General: Bowel sounds are normal. There is no distension. Palpations: Abdomen is soft. There is no mass. Tenderness: There is no abdominal tenderness. There is no right CVA tenderness, left CVA tenderness, guarding or rebound. Hernia: No hernia is present. Musculoskeletal:         General: Tenderness (Over LUQ abdomen) present.  No swelling, deformity or

## 2023-05-25 ENCOUNTER — NURSE ONLY (OUTPATIENT)
Dept: FAMILY MEDICINE CLINIC | Age: 83
End: 2023-05-25
Payer: MEDICARE

## 2023-05-25 DIAGNOSIS — E87.5 HYPERKALEMIA: Primary | ICD-10-CM

## 2023-05-25 DIAGNOSIS — I50.42 CHRONIC COMBINED SYSTOLIC AND DIASTOLIC CONGESTIVE HEART FAILURE (HCC): ICD-10-CM

## 2023-05-25 DIAGNOSIS — I48.19 PERSISTENT ATRIAL FIBRILLATION (HCC): ICD-10-CM

## 2023-05-25 LAB
ANION GAP SERPL CALCULATED.3IONS-SCNC: 10 MMOL/L (ref 3–16)
BUN SERPL-MCNC: 16 MG/DL (ref 7–20)
CALCIUM SERPL-MCNC: 9.3 MG/DL (ref 8.3–10.6)
CHLORIDE SERPL-SCNC: 102 MMOL/L (ref 99–110)
CO2 SERPL-SCNC: 29 MMOL/L (ref 21–32)
CREAT SERPL-MCNC: 1 MG/DL (ref 0.8–1.3)
GFR SERPLBLD CREATININE-BSD FMLA CKD-EPI: >60 ML/MIN/{1.73_M2}
GLUCOSE SERPL-MCNC: 123 MG/DL (ref 70–99)
POTASSIUM SERPL-SCNC: 4.9 MMOL/L (ref 3.5–5.1)
SODIUM SERPL-SCNC: 141 MMOL/L (ref 136–145)

## 2023-05-25 PROCEDURE — 36415 COLL VENOUS BLD VENIPUNCTURE: CPT

## 2023-06-23 ENCOUNTER — TELEPHONE (OUTPATIENT)
Dept: FAMILY MEDICINE CLINIC | Age: 83
End: 2023-06-23

## 2023-06-23 DIAGNOSIS — M25.572 ACUTE LEFT ANKLE PAIN: Primary | ICD-10-CM

## 2023-06-23 NOTE — TELEPHONE ENCOUNTER
PT CALLED, STATED HIS FOOT HURTS AND ANKLE IS A LITTLE SWOLLEN. FEELS LIKE ITS BRUISED. POSS TWISTED IT GETTING OUT OF BED, CAN WE ORDER AN XRAY? I HAVE HIM COMING IN Monday . Surya Chavis

## 2023-06-24 ENCOUNTER — HOSPITAL ENCOUNTER (OUTPATIENT)
Dept: GENERAL RADIOLOGY | Age: 83
Discharge: HOME | End: 2023-06-24
Payer: MEDICARE

## 2023-06-24 ENCOUNTER — HOSPITAL ENCOUNTER (OUTPATIENT)
Age: 83
Discharge: HOME | End: 2023-06-24
Payer: MEDICARE

## 2023-06-24 DIAGNOSIS — M25.572 ACUTE LEFT ANKLE PAIN: ICD-10-CM

## 2023-06-24 PROCEDURE — 73620 X-RAY EXAM OF FOOT: CPT

## 2023-06-26 ENCOUNTER — OFFICE VISIT (OUTPATIENT)
Dept: FAMILY MEDICINE CLINIC | Age: 83
End: 2023-06-26

## 2023-06-26 ENCOUNTER — TELEPHONE (OUTPATIENT)
Dept: FAMILY MEDICINE CLINIC | Age: 83
End: 2023-06-26

## 2023-06-26 VITALS
HEART RATE: 62 BPM | OXYGEN SATURATION: 92 % | SYSTOLIC BLOOD PRESSURE: 124 MMHG | WEIGHT: 221 LBS | BODY MASS INDEX: 35.52 KG/M2 | DIASTOLIC BLOOD PRESSURE: 82 MMHG | HEIGHT: 66 IN

## 2023-06-26 DIAGNOSIS — S92.252A CLOSED AVULSION FRACTURE OF NAVICULAR BONE OF LEFT FOOT, INITIAL ENCOUNTER: Primary | ICD-10-CM

## 2023-06-26 ASSESSMENT — ENCOUNTER SYMPTOMS
EYE DISCHARGE: 0
NAUSEA: 0
COUGH: 0
CHEST TIGHTNESS: 0
COLOR CHANGE: 1
ABDOMINAL DISTENTION: 0
SINUS PAIN: 0
SINUS PRESSURE: 0
SHORTNESS OF BREATH: 0
BACK PAIN: 0
DIARRHEA: 0
CONSTIPATION: 0
ABDOMINAL PAIN: 0

## 2023-07-05 ENCOUNTER — OFFICE VISIT (OUTPATIENT)
Dept: FAMILY MEDICINE CLINIC | Age: 83
End: 2023-07-05

## 2023-07-05 VITALS
OXYGEN SATURATION: 96 % | BODY MASS INDEX: 36 KG/M2 | DIASTOLIC BLOOD PRESSURE: 70 MMHG | HEIGHT: 66 IN | SYSTOLIC BLOOD PRESSURE: 132 MMHG | WEIGHT: 224 LBS | TEMPERATURE: 98.6 F | HEART RATE: 71 BPM | RESPIRATION RATE: 20 BRPM

## 2023-07-05 DIAGNOSIS — E11.29 TYPE 2 DIABETES MELLITUS WITH MICROALBUMINURIA, WITHOUT LONG-TERM CURRENT USE OF INSULIN (HCC): ICD-10-CM

## 2023-07-05 DIAGNOSIS — R09.89 CHEST CRACKLES: ICD-10-CM

## 2023-07-05 DIAGNOSIS — I50.42 CHRONIC COMBINED SYSTOLIC AND DIASTOLIC CONGESTIVE HEART FAILURE (HCC): Primary | ICD-10-CM

## 2023-07-05 DIAGNOSIS — I27.20 PULMONARY HYPERTENSION (HCC): ICD-10-CM

## 2023-07-05 DIAGNOSIS — I25.83 CORONARY ARTERY DISEASE DUE TO LIPID RICH PLAQUE: Chronic | ICD-10-CM

## 2023-07-05 DIAGNOSIS — R09.89 PULMONARY VASCULAR CONGESTION: ICD-10-CM

## 2023-07-05 DIAGNOSIS — R80.9 TYPE 2 DIABETES MELLITUS WITH MICROALBUMINURIA, WITHOUT LONG-TERM CURRENT USE OF INSULIN (HCC): ICD-10-CM

## 2023-07-05 DIAGNOSIS — E78.00 PURE HYPERCHOLESTEROLEMIA: Chronic | ICD-10-CM

## 2023-07-05 DIAGNOSIS — E87.5 HYPERKALEMIA: ICD-10-CM

## 2023-07-05 DIAGNOSIS — R06.02 SHORTNESS OF BREATH: ICD-10-CM

## 2023-07-05 DIAGNOSIS — I25.10 CORONARY ARTERY DISEASE DUE TO LIPID RICH PLAQUE: Chronic | ICD-10-CM

## 2023-07-05 DIAGNOSIS — I10 ESSENTIAL HYPERTENSION, BENIGN: ICD-10-CM

## 2023-07-05 LAB
ALBUMIN SERPL-MCNC: 3.9 G/DL (ref 3.4–5)
ALBUMIN/GLOB SERPL: 1.9 {RATIO} (ref 1.1–2.2)
ALP SERPL-CCNC: 58 U/L (ref 40–129)
ALT SERPL-CCNC: 11 U/L (ref 10–40)
ANION GAP SERPL CALCULATED.3IONS-SCNC: 10 MMOL/L (ref 3–16)
AST SERPL-CCNC: 11 U/L (ref 15–37)
BASOPHILS # BLD: 0 K/UL (ref 0–0.2)
BASOPHILS NFR BLD: 0.5 %
BILIRUB SERPL-MCNC: 0.3 MG/DL (ref 0–1)
BUN SERPL-MCNC: 16 MG/DL (ref 7–20)
CALCIUM SERPL-MCNC: 9.1 MG/DL (ref 8.3–10.6)
CHLORIDE SERPL-SCNC: 100 MMOL/L (ref 99–110)
CO2 SERPL-SCNC: 29 MMOL/L (ref 21–32)
CREAT SERPL-MCNC: 1 MG/DL (ref 0.8–1.3)
DEPRECATED RDW RBC AUTO: 14.3 % (ref 12.4–15.4)
EOSINOPHIL # BLD: 0.1 K/UL (ref 0–0.6)
EOSINOPHIL NFR BLD: 1.6 %
GFR SERPLBLD CREATININE-BSD FMLA CKD-EPI: >60 ML/MIN/{1.73_M2}
GLUCOSE SERPL-MCNC: 211 MG/DL (ref 70–99)
HCT VFR BLD AUTO: 36 % (ref 40.5–52.5)
HGB BLD-MCNC: 12.3 G/DL (ref 13.5–17.5)
LYMPHOCYTES # BLD: 2 K/UL (ref 1–5.1)
LYMPHOCYTES NFR BLD: 26.4 %
MCH RBC QN AUTO: 33 PG (ref 26–34)
MCHC RBC AUTO-ENTMCNC: 34.3 G/DL (ref 31–36)
MCV RBC AUTO: 96.4 FL (ref 80–100)
MONOCYTES # BLD: 0.4 K/UL (ref 0–1.3)
MONOCYTES NFR BLD: 5.9 %
NEUTROPHILS # BLD: 4.9 K/UL (ref 1.7–7.7)
NEUTROPHILS NFR BLD: 65.6 %
NT-PROBNP SERPL-MCNC: 696 PG/ML (ref 0–449)
PLATELET # BLD AUTO: 247 K/UL (ref 135–450)
PMV BLD AUTO: 7.7 FL (ref 5–10.5)
POTASSIUM SERPL-SCNC: 4.9 MMOL/L (ref 3.5–5.1)
PROT SERPL-MCNC: 6 G/DL (ref 6.4–8.2)
RBC # BLD AUTO: 3.74 M/UL (ref 4.2–5.9)
SODIUM SERPL-SCNC: 139 MMOL/L (ref 136–145)
WBC # BLD AUTO: 7.5 K/UL (ref 4–11)

## 2023-07-05 RX ORDER — FUROSEMIDE 40 MG/1
TABLET ORAL
Qty: 90 TABLET | Refills: 1 | Status: SHIPPED | OUTPATIENT
Start: 2023-07-05

## 2023-07-05 RX ORDER — AMLODIPINE BESYLATE 2.5 MG/1
2.5 TABLET ORAL DAILY
Qty: 90 TABLET | Refills: 1 | Status: SHIPPED | OUTPATIENT
Start: 2023-07-05

## 2023-07-05 RX ORDER — ATORVASTATIN CALCIUM 40 MG/1
TABLET, FILM COATED ORAL
Qty: 90 TABLET | Refills: 1 | Status: SHIPPED | OUTPATIENT
Start: 2023-07-05

## 2023-07-05 RX ORDER — FUROSEMIDE 40 MG/1
40 TABLET ORAL DAILY
Qty: 5 TABLET | Refills: 0 | Status: SHIPPED | OUTPATIENT
Start: 2023-07-05

## 2023-07-05 ASSESSMENT — ENCOUNTER SYMPTOMS
SINUS PAIN: 0
ABDOMINAL PAIN: 0
CHEST TIGHTNESS: 0
EYE DISCHARGE: 0
NAUSEA: 0
SHORTNESS OF BREATH: 1
DIARRHEA: 0
SINUS PRESSURE: 0
ABDOMINAL DISTENTION: 0
BACK PAIN: 0
COUGH: 1
COLOR CHANGE: 0
CONSTIPATION: 0

## 2023-07-05 NOTE — PROGRESS NOTES
electronic signature was used to authenticate this note.     --Zahida Smith, APRN - CNP on 7/5/2023 at 10:09 AM

## 2023-07-06 ENCOUNTER — HOSPITAL ENCOUNTER (OUTPATIENT)
Dept: GENERAL RADIOLOGY | Age: 83
Discharge: HOME | End: 2023-07-06
Payer: MEDICARE

## 2023-07-06 ENCOUNTER — HOSPITAL ENCOUNTER (OUTPATIENT)
Age: 83
Discharge: HOME | End: 2023-07-06
Payer: MEDICARE

## 2023-07-06 DIAGNOSIS — R09.89 CHEST CRACKLES: ICD-10-CM

## 2023-07-06 DIAGNOSIS — I50.42 CHRONIC COMBINED SYSTOLIC AND DIASTOLIC CONGESTIVE HEART FAILURE (HCC): ICD-10-CM

## 2023-07-06 PROCEDURE — 71046 X-RAY EXAM CHEST 2 VIEWS: CPT

## 2023-07-27 ENCOUNTER — TELEPHONE (OUTPATIENT)
Dept: FAMILY MEDICINE CLINIC | Age: 83
End: 2023-07-27

## 2023-07-27 DIAGNOSIS — J44.1 CHRONIC OBSTRUCTIVE PULMONARY DISEASE WITH ACUTE EXACERBATION (HCC): Primary | Chronic | ICD-10-CM

## 2023-07-27 DIAGNOSIS — I50.42 CHRONIC COMBINED SYSTOLIC AND DIASTOLIC CONGESTIVE HEART FAILURE (HCC): ICD-10-CM

## 2023-07-27 NOTE — TELEPHONE ENCOUNTER
CALLED AND SPOKE TO PATIENT AND ADVISED ON LUKE NOTE. PATIENT WAS NOT EXCITED ABOUT THE CHEST X RAY, AND DOES NOT THINK HE WANTS TO DO THIS RIGHT NOW, STATES HE JUST HAD ONE DONE A COUPLE WEEKS AGO. I DID TELL HIM THAT I UNDERSTOOD THAT BUT THAT WE WANTED TO ORDER A NEW TEST TO MAKE SURE SOMETHING NEW HAS NOT POPPED UP. PATIENT TOLD ME THAT NOTHING NEW HAS CHANGED, HE IS COUGHING UP MUCUS, BUT HE TOLD ME THIS IS NOT A NEW PROBLEM, IT HAS BEEN GOING ON FOR YEARS. PATIENT STATES HE WILL CALL BACK IF HE NEED ANYTHING FURTHER.  SC

## 2023-07-27 NOTE — TELEPHONE ENCOUNTER
Lets get a chest x-ray to see if there is any issue with heart failure or COPD exacerbation. Order placed. Is he wheezing? Is he coughing anything up? Is he using rescue inhalers? Is he using scheduled inhalers as prescribed? --MERCED Diaz - CNP    Is it possible he can be moved up up to be seen sooner?

## 2023-07-27 NOTE — TELEPHONE ENCOUNTER
PT SCHEDULED AN APPT FOR 08/28 -- HE WOULD LIKE TO BE SOONER BY [facility] IF POSSIBLE FOR HIS COPD -  HE IS HAVING TROUBLE BREATHING.     PT @  [phone]

## 2023-08-25 NOTE — PATIENT INSTRUCTIONS
at time of . Instead of the fee, you can choose to have the paperwork filled out during a separate office visit that is for filling out the paperwork only. Medication Samples: This office does not carry medication samples. If you need assistance in getting your medications, then please let the medical assistant know so they can help you sign up for a drug assistance program that can help get medications at a reduced cost or even free (if you qualify). Workman's Comp Claims: We do not handle workman's comp cases or claims. You will need to go to an urgent care to be seen or to whomever your employer uses. General - Any abusive/rude behavior toward staff/providers may be cause for dismissal.    Mel Cuadra may receive a survey regarding the care you received during your visit. Your input is valuable to us. We encourage you to complete and return your survey. We hope you will choose us in the future for your healthcare needs.

## 2023-08-28 ENCOUNTER — OFFICE VISIT (OUTPATIENT)
Dept: FAMILY MEDICINE CLINIC | Age: 83
End: 2023-08-28

## 2023-08-28 VITALS
HEART RATE: 66 BPM | WEIGHT: 218 LBS | DIASTOLIC BLOOD PRESSURE: 80 MMHG | BODY MASS INDEX: 35.03 KG/M2 | OXYGEN SATURATION: 96 % | RESPIRATION RATE: 30 BRPM | SYSTOLIC BLOOD PRESSURE: 118 MMHG | HEIGHT: 66 IN

## 2023-08-28 DIAGNOSIS — E11.29 TYPE 2 DIABETES MELLITUS WITH MICROALBUMINURIA, WITHOUT LONG-TERM CURRENT USE OF INSULIN (HCC): ICD-10-CM

## 2023-08-28 DIAGNOSIS — R80.9 TYPE 2 DIABETES MELLITUS WITH MICROALBUMINURIA, WITHOUT LONG-TERM CURRENT USE OF INSULIN (HCC): ICD-10-CM

## 2023-08-28 DIAGNOSIS — J43.1 PANLOBULAR EMPHYSEMA (HCC): Primary | Chronic | ICD-10-CM

## 2023-08-28 RX ORDER — AZITHROMYCIN 250 MG/1
250 TABLET, FILM COATED ORAL SEE ADMIN INSTRUCTIONS
Qty: 6 TABLET | Refills: 0 | Status: SHIPPED | OUTPATIENT
Start: 2023-08-28 | End: 2023-09-02

## 2023-08-28 RX ORDER — GLIPIZIDE 5 MG/1
TABLET ORAL
Qty: 180 TABLET | Refills: 0 | Status: SHIPPED | OUTPATIENT
Start: 2023-08-28

## 2023-08-28 RX ORDER — BLOOD-GLUCOSE METER
KIT MISCELLANEOUS
Qty: 200 STRIP | Refills: 3 | Status: SHIPPED | OUTPATIENT
Start: 2023-08-28

## 2023-08-28 RX ORDER — LANCETS 28 GAUGE
EACH MISCELLANEOUS
Qty: 200 EACH | Refills: 3 | Status: SHIPPED | OUTPATIENT
Start: 2023-08-28

## 2023-08-28 RX ORDER — ALBUTEROL SULFATE 90 UG/1
2 AEROSOL, METERED RESPIRATORY (INHALATION) 4 TIMES DAILY PRN
Qty: 18 G | Refills: 0 | Status: SHIPPED | OUTPATIENT
Start: 2023-08-28

## 2023-08-28 ASSESSMENT — ENCOUNTER SYMPTOMS
STRIDOR: 0
SINUS PAIN: 0
SORE THROAT: 0
CHEST TIGHTNESS: 1
SHORTNESS OF BREATH: 1
APNEA: 0
CHOKING: 0
NAUSEA: 0
COLOR CHANGE: 0
VOMITING: 0
COUGH: 1
RHINORRHEA: 0
ABDOMINAL PAIN: 0
SINUS PRESSURE: 0
WHEEZING: 0

## 2023-08-28 NOTE — PROGRESS NOTES
Charissa Wynn (:  1940) is a 82 y.o. male,Established patient, here for evaluation of the following chief complaint(s):  COPD (COPD PROBLEMS )         ASSESSMENT/PLAN:  1. Panlobular emphysema ([address])  -Uncontrolled emphysema without inhaler use. -Recommend smoking cessation. Patient is not ready at this time.  -Treatment options discussed. Breztri, Z-Martin, and albuterol inhaler are being sent to the pharmacy. The medication uses and side effects were discussed with the patient. Patient verbalized understanding and agrees to the plan. -If no improvement, consider CXR. -Follow-up as needed. -     Budeson-Glycopyrrol-Formoterol 160-9-4.8 MCG/ACT AERO; Inhale 1 puff into the lungs daily, Disp-1 each, R-5Normal  -     azithromycin (ZITHROMAX) 250 MG tablet; Take 1 tablet by mouth See Admin Instructions for 5 days 500mg on day 1 followed by 250mg on days 2 - 5, Disp-6 tablet, R-0Normal  -     albuterol sulfate HFA (VENTOLIN HFA) 108 (90 Base) MCG/ACT inhaler; Inhale 2 puffs into the lungs 4 times daily as needed for Wheezing, Disp-18 g, R-0Normal  2. Type 2 diabetes mellitus with microalbuminuria, without long-term current use of insulin ([facility])  -Refills sent to the pharmacy. -     glipiZIDE (GLUCOTROL) 5 MG tablet; TAKE 1/2 TO 1 TABLET TWO TIMES A DAY BEFORE MEALS, Disp-180 tablet, R-0Normal  -     blood glucose test strips (FREESTYLE LITE) strip; TEST TWICE DAILY, Disp-200 strip, R-3Normal  -     FreeStyle Lancets MISC; Disp-200 each, R-3, NormalUSE TO TEST TWICE DAILY      Return if symptoms worsen or fail to improve. Subjective   SUBJECTIVE/OBJECTIVE:  HPI  He has not been doing well with COPD. He has not been using her daily inhaler, and he does not have an albuterol. He also feels congestion, thick clear mucous. He has some head fullness. He feels worn down. He went to a pulmonologist and was prescribed trellegy, but it was too expensive.   He has not been using any inhalers or any
Carried

## 2023-09-20 ENCOUNTER — HOSPITAL ENCOUNTER (OUTPATIENT)
Dept: CT IMAGING | Age: 83
Discharge: HOME | End: 2023-09-20
Payer: MEDICARE

## 2023-09-20 DIAGNOSIS — M48.062 PSEUDOCLAUDICATION SYNDROME: ICD-10-CM

## 2023-09-20 DIAGNOSIS — M96.1 POSTLAMINECTOMY SYNDROME, CERVICAL REGION: ICD-10-CM

## 2023-09-20 PROCEDURE — 72131 CT LUMBAR SPINE W/O DYE: CPT

## 2023-10-25 ENCOUNTER — TELEPHONE (OUTPATIENT)
Dept: ENT CLINIC | Age: 83
End: 2023-10-25

## 2023-10-25 NOTE — TELEPHONE ENCOUNTER
Pt called stating he went to the ER at [facility] last night for nose bleeding, they put a rhino rocket in, please advise when you would like to squeeze pt in

## 2023-10-25 NOTE — TELEPHONE ENCOUNTER
The packing needs to stay in 72 hours before it removed. I can see him on Friday afternoon at 2 pm, double book.

## 2023-10-27 ENCOUNTER — OFFICE VISIT (OUTPATIENT)
Dept: ENT CLINIC | Age: 83
End: 2023-10-27
Payer: MEDICARE

## 2023-10-27 VITALS
WEIGHT: 214 LBS | DIASTOLIC BLOOD PRESSURE: 58 MMHG | TEMPERATURE: 97.6 F | HEIGHT: 66 IN | BODY MASS INDEX: 34.39 KG/M2 | SYSTOLIC BLOOD PRESSURE: 116 MMHG | HEART RATE: 63 BPM

## 2023-10-27 DIAGNOSIS — R04.0 LEFT-SIDED EPISTAXIS: Primary | ICD-10-CM

## 2023-10-27 PROCEDURE — G8417 CALC BMI ABV UP PARAM F/U: HCPCS | Performed by: OTOLARYNGOLOGY

## 2023-10-27 PROCEDURE — 99213 OFFICE O/P EST LOW 20 MIN: CPT | Performed by: OTOLARYNGOLOGY

## 2023-10-27 PROCEDURE — 1123F ACP DISCUSS/DSCN MKR DOCD: CPT | Performed by: OTOLARYNGOLOGY

## 2023-10-27 PROCEDURE — G8427 DOCREV CUR MEDS BY ELIG CLIN: HCPCS | Performed by: OTOLARYNGOLOGY

## 2023-10-27 PROCEDURE — 4004F PT TOBACCO SCREEN RCVD TLK: CPT | Performed by: OTOLARYNGOLOGY

## 2023-10-27 PROCEDURE — G8484 FLU IMMUNIZE NO ADMIN: HCPCS | Performed by: OTOLARYNGOLOGY

## 2023-10-27 PROCEDURE — 3078F DIAST BP <80 MM HG: CPT | Performed by: OTOLARYNGOLOGY

## 2023-10-27 PROCEDURE — 3074F SYST BP LT 130 MM HG: CPT | Performed by: OTOLARYNGOLOGY

## 2023-10-27 NOTE — PROGRESS NOTES
Chief Complaint   Patient presents with    Epistaxis       HISTORY OF PRESENT ILLNESS    Clarice Monae is a 82 y.o. male. Had nosebkeed Tuesday went to ER and put in RapidRhino about 8 PM and no bleeding since left ER. Is on Plavix and 81 mg aspirin       REVIEW OF SYSTEMS  Constitutional:  Denied fever and chills. ENT/sinus:  Denied otalgia, otorrhea, nasal pain, rhinorrhea, and sore throat. EXAMINATION    WDWN, NAD  Voice:  Normal.  Nose:  RR epistat removed with no difficulty and no bleeding. Otherwise, normal with no lesions. Sinuses:  NT x 4   Throat,  OC/ OP:  Normal with no lesions. IMPRESSION / Marsa Certain / Tamiko Cr:   Letty Palencia was seen today for epistaxis. Diagnoses and all orders for this visit:    Left-sided epistaxis         RECOMMENDATIONS / PLAN:   See Patient Instructions on file for this visit, which were discussed with the patient. Return in about 6 weeks (around 12/8/2023) for recheck/follow-up, and sooner if condition worsens.

## 2023-10-28 ENCOUNTER — APPOINTMENT (OUTPATIENT)
Dept: GENERAL RADIOLOGY | Age: 83
DRG: 151 | End: 2023-10-28
Payer: MEDICARE

## 2023-10-28 ENCOUNTER — HOSPITAL ENCOUNTER (INPATIENT)
Age: 83
LOS: 1 days | Discharge: HOME | DRG: 151 | End: 2023-10-30
Attending: EMERGENCY MEDICINE | Admitting: STUDENT IN AN ORGANIZED HEALTH CARE EDUCATION/TRAINING PROGRAM
Payer: MEDICARE

## 2023-10-28 DIAGNOSIS — Z71.89 GOALS OF CARE, COUNSELING/DISCUSSION: ICD-10-CM

## 2023-10-28 DIAGNOSIS — R69 MULTIPLE COMORBID CONDITIONS: ICD-10-CM

## 2023-10-28 DIAGNOSIS — R04.0 RECURRENT EPISTAXIS: Primary | ICD-10-CM

## 2023-10-28 DIAGNOSIS — Z72.0 TOBACCO ABUSE: ICD-10-CM

## 2023-10-28 LAB
ABO + RH BLD: NORMAL
ANION GAP SERPL CALCULATED.3IONS-SCNC: 11 MMOL/L (ref 3–16)
BASOPHILS # BLD: 0.1 K/UL (ref 0–0.2)
BASOPHILS NFR BLD: 1 %
BLD GP AB SCN SERPL QL: NORMAL
BUN SERPL-MCNC: 32 MG/DL (ref 7–20)
CALCIUM SERPL-MCNC: 9.1 MG/DL (ref 8.3–10.6)
CHLORIDE SERPL-SCNC: 102 MMOL/L (ref 99–110)
CO2 SERPL-SCNC: 29 MMOL/L (ref 21–32)
CREAT SERPL-MCNC: 1.1 MG/DL (ref 0.8–1.3)
DEPRECATED RDW RBC AUTO: 15.3 % (ref 12.4–15.4)
EOSINOPHIL # BLD: 0.1 K/UL (ref 0–0.6)
EOSINOPHIL NFR BLD: 1.4 %
GFR SERPLBLD CREATININE-BSD FMLA CKD-EPI: >60 ML/MIN/{1.73_M2}
GLUCOSE BLD-MCNC: 157 MG/DL (ref 70–99)
GLUCOSE SERPL-MCNC: 154 MG/DL (ref 70–99)
HCT VFR BLD AUTO: 37.4 % (ref 40.5–52.5)
HCT VFR BLD AUTO: 38.8 % (ref 40.5–52.5)
HGB BLD-MCNC: 12.7 G/DL (ref 13.5–17.5)
HGB BLD-MCNC: 12.9 G/DL (ref 13.5–17.5)
LYMPHOCYTES # BLD: 1.6 K/UL (ref 1–5.1)
LYMPHOCYTES NFR BLD: 19.6 %
MCH RBC QN AUTO: 31.6 PG (ref 26–34)
MCHC RBC AUTO-ENTMCNC: 33.9 G/DL (ref 31–36)
MCV RBC AUTO: 93.4 FL (ref 80–100)
MONOCYTES # BLD: 0.5 K/UL (ref 0–1.3)
MONOCYTES NFR BLD: 6.5 %
NEUTROPHILS # BLD: 5.9 K/UL (ref 1.7–7.7)
NEUTROPHILS NFR BLD: 71.5 %
NT-PROBNP SERPL-MCNC: 857 PG/ML (ref 0–449)
PERFORMED ON: ABNORMAL
PLATELET # BLD AUTO: 268 K/UL (ref 135–450)
PMV BLD AUTO: 7.5 FL (ref 5–10.5)
POTASSIUM SERPL-SCNC: 4.7 MMOL/L (ref 3.5–5.1)
RBC # BLD AUTO: 4 M/UL (ref 4.2–5.9)
SODIUM SERPL-SCNC: 142 MMOL/L (ref 136–145)
TROPONIN, HIGH SENSITIVITY: 35 NG/L (ref 0–22)
TROPONIN, HIGH SENSITIVITY: 36 NG/L (ref 0–22)
WBC # BLD AUTO: 8.2 K/UL (ref 4–11)

## 2023-10-28 PROCEDURE — 99285 EMERGENCY DEPT VISIT HI MDM: CPT

## 2023-10-28 PROCEDURE — G0378 HOSPITAL OBSERVATION PER HR: HCPCS

## 2023-10-28 PROCEDURE — 94760 N-INVAS EAR/PLS OXIMETRY 1: CPT

## 2023-10-28 PROCEDURE — 6370000000 HC RX 637 (ALT 250 FOR IP): Performed by: EMERGENCY MEDICINE

## 2023-10-28 PROCEDURE — 6370000000 HC RX 637 (ALT 250 FOR IP): Performed by: INTERNAL MEDICINE

## 2023-10-28 PROCEDURE — 83880 ASSAY OF NATRIURETIC PEPTIDE: CPT

## 2023-10-28 PROCEDURE — 6370000000 HC RX 637 (ALT 250 FOR IP): Performed by: GENERAL ACUTE CARE HOSPITAL

## 2023-10-28 PROCEDURE — 86850 RBC ANTIBODY SCREEN: CPT

## 2023-10-28 PROCEDURE — 80048 BASIC METABOLIC PNL TOTAL CA: CPT

## 2023-10-28 PROCEDURE — 85018 HEMOGLOBIN: CPT

## 2023-10-28 PROCEDURE — 84484 ASSAY OF TROPONIN QUANT: CPT

## 2023-10-28 PROCEDURE — 93005 ELECTROCARDIOGRAM TRACING: CPT | Performed by: EMERGENCY MEDICINE

## 2023-10-28 PROCEDURE — 71045 X-RAY EXAM CHEST 1 VIEW: CPT

## 2023-10-28 PROCEDURE — 94640 AIRWAY INHALATION TREATMENT: CPT

## 2023-10-28 PROCEDURE — 83036 HEMOGLOBIN GLYCOSYLATED A1C: CPT

## 2023-10-28 PROCEDURE — 85025 COMPLETE CBC W/AUTO DIFF WBC: CPT

## 2023-10-28 PROCEDURE — 2Y41X5Z PACKING OF NASAL REGION USING PACKING MATERIAL: ICD-10-PCS | Performed by: EMERGENCY MEDICINE

## 2023-10-28 PROCEDURE — 86900 BLOOD TYPING SEROLOGIC ABO: CPT

## 2023-10-28 PROCEDURE — 2580000003 HC RX 258: Performed by: INTERNAL MEDICINE

## 2023-10-28 PROCEDURE — 85014 HEMATOCRIT: CPT

## 2023-10-28 PROCEDURE — 86901 BLOOD TYPING SEROLOGIC RH(D): CPT

## 2023-10-28 PROCEDURE — 36415 COLL VENOUS BLD VENIPUNCTURE: CPT

## 2023-10-28 RX ORDER — ATORVASTATIN CALCIUM 40 MG/1
40 TABLET, FILM COATED ORAL NIGHTLY
Status: DISCONTINUED | OUTPATIENT
Start: 2023-10-28 | End: 2023-10-30 | Stop reason: HOSPADM

## 2023-10-28 RX ORDER — SODIUM CHLORIDE 0.9 % (FLUSH) 0.9 %
10 SYRINGE (ML) INJECTION PRN
Status: DISCONTINUED | OUTPATIENT
Start: 2023-10-28 | End: 2023-10-30 | Stop reason: HOSPADM

## 2023-10-28 RX ORDER — CLOPIDOGREL BISULFATE 75 MG/1
75 TABLET ORAL DAILY
Status: DISCONTINUED | OUTPATIENT
Start: 2023-10-29 | End: 2023-10-30 | Stop reason: HOSPADM

## 2023-10-28 RX ORDER — DOCUSATE SODIUM 100 MG/1
200 CAPSULE, LIQUID FILLED ORAL DAILY
Status: DISCONTINUED | OUTPATIENT
Start: 2023-10-29 | End: 2023-10-30 | Stop reason: HOSPADM

## 2023-10-28 RX ORDER — OXYCODONE HYDROCHLORIDE 5 MG/1
5 TABLET ORAL EVERY 6 HOURS PRN
Status: DISCONTINUED | OUTPATIENT
Start: 2023-10-28 | End: 2023-10-30 | Stop reason: HOSPADM

## 2023-10-28 RX ORDER — INSULIN LISPRO 100 [IU]/ML
0-4 INJECTION, SOLUTION INTRAVENOUS; SUBCUTANEOUS
Status: DISCONTINUED | OUTPATIENT
Start: 2023-10-29 | End: 2023-10-30 | Stop reason: HOSPADM

## 2023-10-28 RX ORDER — DEXTROSE MONOHYDRATE 100 MG/ML
INJECTION, SOLUTION INTRAVENOUS CONTINUOUS PRN
Status: DISCONTINUED | OUTPATIENT
Start: 2023-10-28 | End: 2023-10-30 | Stop reason: HOSPADM

## 2023-10-28 RX ORDER — ONDANSETRON 4 MG/1
4 TABLET, ORALLY DISINTEGRATING ORAL EVERY 8 HOURS PRN
Status: DISCONTINUED | OUTPATIENT
Start: 2023-10-28 | End: 2023-10-30 | Stop reason: HOSPADM

## 2023-10-28 RX ORDER — OXYMETAZOLINE HYDROCHLORIDE 0.05 G/100ML
2 SPRAY NASAL ONCE
Status: COMPLETED | OUTPATIENT
Start: 2023-10-28 | End: 2023-10-28

## 2023-10-28 RX ORDER — ACETAMINOPHEN 650 MG/1
650 SUPPOSITORY RECTAL EVERY 6 HOURS PRN
Status: DISCONTINUED | OUTPATIENT
Start: 2023-10-28 | End: 2023-10-30 | Stop reason: HOSPADM

## 2023-10-28 RX ORDER — SODIUM CHLORIDE 9 MG/ML
INJECTION, SOLUTION INTRAVENOUS PRN
Status: DISCONTINUED | OUTPATIENT
Start: 2023-10-28 | End: 2023-10-30 | Stop reason: HOSPADM

## 2023-10-28 RX ORDER — ONDANSETRON 2 MG/ML
4 INJECTION INTRAMUSCULAR; INTRAVENOUS EVERY 6 HOURS PRN
Status: DISCONTINUED | OUTPATIENT
Start: 2023-10-28 | End: 2023-10-30 | Stop reason: HOSPADM

## 2023-10-28 RX ORDER — INSULIN LISPRO 100 [IU]/ML
0-4 INJECTION, SOLUTION INTRAVENOUS; SUBCUTANEOUS EVERY 4 HOURS
Status: DISCONTINUED | OUTPATIENT
Start: 2023-10-28 | End: 2023-10-28 | Stop reason: ALTCHOICE

## 2023-10-28 RX ORDER — ACETAMINOPHEN 325 MG/1
650 TABLET ORAL EVERY 6 HOURS PRN
Status: DISCONTINUED | OUTPATIENT
Start: 2023-10-28 | End: 2023-10-30 | Stop reason: HOSPADM

## 2023-10-28 RX ORDER — SODIUM CHLORIDE 0.9 % (FLUSH) 0.9 %
5-40 SYRINGE (ML) INJECTION EVERY 12 HOURS SCHEDULED
Status: DISCONTINUED | OUTPATIENT
Start: 2023-10-28 | End: 2023-10-30 | Stop reason: HOSPADM

## 2023-10-28 RX ORDER — ASPIRIN 81 MG/1
81 TABLET, CHEWABLE ORAL DAILY
Status: DISCONTINUED | OUTPATIENT
Start: 2023-10-29 | End: 2023-10-30 | Stop reason: HOSPADM

## 2023-10-28 RX ORDER — IPRATROPIUM BROMIDE AND ALBUTEROL SULFATE 2.5; .5 MG/3ML; MG/3ML
1 SOLUTION RESPIRATORY (INHALATION) ONCE
Status: COMPLETED | OUTPATIENT
Start: 2023-10-28 | End: 2023-10-28

## 2023-10-28 RX ORDER — ALBUTEROL SULFATE 90 UG/1
2 AEROSOL, METERED RESPIRATORY (INHALATION) 4 TIMES DAILY PRN
Status: DISCONTINUED | OUTPATIENT
Start: 2023-10-28 | End: 2023-10-30 | Stop reason: HOSPADM

## 2023-10-28 RX ORDER — INSULIN LISPRO 100 [IU]/ML
0-4 INJECTION, SOLUTION INTRAVENOUS; SUBCUTANEOUS NIGHTLY
Status: DISCONTINUED | OUTPATIENT
Start: 2023-10-28 | End: 2023-10-30 | Stop reason: HOSPADM

## 2023-10-28 RX ORDER — OXYMETAZOLINE HYDROCHLORIDE 0.05 G/100ML
3 SPRAY NASAL 2 TIMES DAILY
Status: DISCONTINUED | OUTPATIENT
Start: 2023-10-28 | End: 2023-10-30 | Stop reason: HOSPADM

## 2023-10-28 RX ORDER — ALBUTEROL SULFATE 90 UG/1
2 AEROSOL, METERED RESPIRATORY (INHALATION) 4 TIMES DAILY PRN
Status: DISCONTINUED | OUTPATIENT
Start: 2023-10-28 | End: 2023-10-28 | Stop reason: SDUPTHER

## 2023-10-28 RX ORDER — AMLODIPINE BESYLATE 5 MG/1
2.5 TABLET ORAL DAILY
Status: DISCONTINUED | OUTPATIENT
Start: 2023-10-28 | End: 2023-10-30 | Stop reason: HOSPADM

## 2023-10-28 RX ORDER — TAMSULOSIN HYDROCHLORIDE 0.4 MG/1
0.4 CAPSULE ORAL DAILY
Status: DISCONTINUED | OUTPATIENT
Start: 2023-10-29 | End: 2023-10-30 | Stop reason: HOSPADM

## 2023-10-28 RX ORDER — POLYETHYLENE GLYCOL 3350 17 G/17G
17 POWDER, FOR SOLUTION ORAL DAILY PRN
Status: DISCONTINUED | OUTPATIENT
Start: 2023-10-28 | End: 2023-10-30 | Stop reason: HOSPADM

## 2023-10-28 RX ADMIN — SODIUM CHLORIDE, PRESERVATIVE FREE 10 ML: 5 INJECTION INTRAVENOUS at 21:41

## 2023-10-28 RX ADMIN — IPRATROPIUM BROMIDE AND ALBUTEROL SULFATE 1 DOSE: .5; 3 SOLUTION RESPIRATORY (INHALATION) at 18:17

## 2023-10-28 RX ADMIN — METOPROLOL TARTRATE 12.5 MG: 25 TABLET, FILM COATED ORAL at 21:34

## 2023-10-28 RX ADMIN — AMLODIPINE BESYLATE 2.5 MG: 5 TABLET ORAL at 21:34

## 2023-10-28 RX ADMIN — OXYMETAZOLINE HCL 2 SPRAY: 0.05 SPRAY NASAL at 14:45

## 2023-10-28 RX ADMIN — ATORVASTATIN CALCIUM 40 MG: 40 TABLET, FILM COATED ORAL at 21:34

## 2023-10-28 RX ADMIN — OXYMETAZOLINE HCL 3 SPRAY: 0.05 SPRAY NASAL at 21:34

## 2023-10-28 ASSESSMENT — ENCOUNTER SYMPTOMS
SORE THROAT: 0
CHEST TIGHTNESS: 0
NAUSEA: 0
VOICE CHANGE: 0
ABDOMINAL PAIN: 0
WHEEZING: 0
COUGH: 0
SHORTNESS OF BREATH: 0
BACK PAIN: 0
VOMITING: 0

## 2023-10-28 ASSESSMENT — PAIN SCALES - GENERAL: PAINLEVEL_OUTOF10: 7

## 2023-10-28 NOTE — H&P
mention of complication, not stated as uncontrolled 2003    Vitamin D deficiency        Past Surgical History:      Procedure Laterality Date    AORTIC VALVE REPLACEMENT  11/12/2013    BLEPHAROPLASTY  07/2007    UPPER LID    BONY PELVIS SURGERY  01/12/2021    SI joint    CARDIAC CATHETERIZATION  11/08/2013    dx    CARDIAC SURGERY  11/12/2013    reincised for bleeding    CATARACT REMOVAL WITH IMPLANT Right 01/2019    CATARACT REMOVAL WITH IMPLANT Left 02/2019    CERVICAL DISC SURGERY Right 2015    epidural steroids. CORONARY ARTERY BYPASS GRAFT  11/12/2013    4 V    EYE SURGERY Bilateral     Laser peripheral iridotomy     FINGER TRIGGER RELEASE Right 08/19/2014    Release of trigger thumb (stenosing tenosynovitis)    HEMORRHOID SURGERY  [id number]    LAPAROSCOPIC APPENDECTOMY  11/14/2014    LUMBAR LAMINECTOMY  1998 & 2003    LUMBAR LAMINECTOMY  05/07/2014    L2-L3 & Repair dural tear x2. LUMBAR SPINE SURGERY  08/04/2009    SPINaL CORD STIMULATOR FOR PAIN    LUMBAR SPINE SURGERY  08/2014    epidural steroids. 3/2017 NEAL, 10/21/19    LUMBAR SPINE SURGERY Right 04/23/2019    SPINAL CORD STIMULATOR BATTERY REPLACEMENT WITH POCKET REVISION     MOHS SURGERY Left 02/11/2020    Dorsum of hand: type? MOHS SURGERY Right 01/01/2020    right upper inner pinna with skin graft    NERVE SURGERY  ~12/2012    nerve block     ROTATOR CUFF REPAIR  2001    SKIN CANCER EXCISION Right 07/2017    forehead THEN TOP OF THE HEAD. SKIN CANCER EXCISION  01/2019    nose    SOFT TISSUE BIOPSY      needle bx right neck mass -benign    TEAR DUCT SURGERY Bilateral 01/01/2015    for dry    TESTICLE REMOVAL  1947    R    TONSILLECTOMY      CHILD    UMBILICAL HERNIA REPAIR  11/14/2014    Laparoscopic       Medications (prior to admission):  Prior to Admission medications    Medication Sig Start Date End Date Taking?  Authorizing Provider   Budeson-Glycopyrrol-Formoterol 160-9-4.8 MCG/ACT AERO Inhale 1 puff into the lungs daily 8/28/23

## 2023-10-29 LAB
ALBUMIN SERPL-MCNC: 3.6 G/DL (ref 3.4–5)
ALBUMIN/GLOB SERPL: 1.6 {RATIO} (ref 1.1–2.2)
ALP SERPL-CCNC: 49 U/L (ref 40–129)
ALT SERPL-CCNC: 8 U/L (ref 10–40)
ANION GAP SERPL CALCULATED.3IONS-SCNC: 6 MMOL/L (ref 3–16)
AST SERPL-CCNC: 12 U/L (ref 15–37)
BASOPHILS # BLD: 0.1 K/UL (ref 0–0.2)
BASOPHILS NFR BLD: 0.7 %
BILIRUB SERPL-MCNC: 0.4 MG/DL (ref 0–1)
BUN SERPL-MCNC: 31 MG/DL (ref 7–20)
CALCIUM SERPL-MCNC: 8.9 MG/DL (ref 8.3–10.6)
CHLORIDE SERPL-SCNC: 104 MMOL/L (ref 99–110)
CO2 SERPL-SCNC: 31 MMOL/L (ref 21–32)
CREAT SERPL-MCNC: 1.1 MG/DL (ref 0.8–1.3)
DEPRECATED RDW RBC AUTO: 15.2 % (ref 12.4–15.4)
EOSINOPHIL # BLD: 0.1 K/UL (ref 0–0.6)
EOSINOPHIL NFR BLD: 1.3 %
EST. AVERAGE GLUCOSE BLD GHB EST-MCNC: 171.4 MG/DL
GFR SERPLBLD CREATININE-BSD FMLA CKD-EPI: >60 ML/MIN/{1.73_M2}
GLUCOSE BLD-MCNC: 166 MG/DL (ref 70–99)
GLUCOSE BLD-MCNC: 174 MG/DL (ref 70–99)
GLUCOSE BLD-MCNC: 187 MG/DL (ref 70–99)
GLUCOSE BLD-MCNC: 228 MG/DL (ref 70–99)
GLUCOSE SERPL-MCNC: 158 MG/DL (ref 70–99)
HBA1C MFR BLD: 7.6 %
HCT VFR BLD AUTO: 34.2 % (ref 40.5–52.5)
HGB BLD-MCNC: 11.5 G/DL (ref 13.5–17.5)
LYMPHOCYTES # BLD: 1.5 K/UL (ref 1–5.1)
LYMPHOCYTES NFR BLD: 19.5 %
MAGNESIUM SERPL-MCNC: 2.2 MG/DL (ref 1.8–2.4)
MCH RBC QN AUTO: 31.5 PG (ref 26–34)
MCHC RBC AUTO-ENTMCNC: 33.6 G/DL (ref 31–36)
MCV RBC AUTO: 93.6 FL (ref 80–100)
MONOCYTES # BLD: 0.5 K/UL (ref 0–1.3)
MONOCYTES NFR BLD: 6.8 %
NEUTROPHILS # BLD: 5.6 K/UL (ref 1.7–7.7)
NEUTROPHILS NFR BLD: 71.7 %
PERFORMED ON: ABNORMAL
PHOSPHATE SERPL-MCNC: 3.8 MG/DL (ref 2.5–4.9)
PLATELET # BLD AUTO: 253 K/UL (ref 135–450)
PMV BLD AUTO: 7.7 FL (ref 5–10.5)
POTASSIUM SERPL-SCNC: 4.6 MMOL/L (ref 3.5–5.1)
PROT SERPL-MCNC: 5.9 G/DL (ref 6.4–8.2)
RBC # BLD AUTO: 3.65 M/UL (ref 4.2–5.9)
SODIUM SERPL-SCNC: 141 MMOL/L (ref 136–145)
WBC # BLD AUTO: 7.8 K/UL (ref 4–11)

## 2023-10-29 PROCEDURE — 6370000000 HC RX 637 (ALT 250 FOR IP): Performed by: INTERNAL MEDICINE

## 2023-10-29 PROCEDURE — 99221 1ST HOSP IP/OBS SF/LOW 40: CPT | Performed by: OTOLARYNGOLOGY

## 2023-10-29 PROCEDURE — 84100 ASSAY OF PHOSPHORUS: CPT

## 2023-10-29 PROCEDURE — 80053 COMPREHEN METABOLIC PANEL: CPT

## 2023-10-29 PROCEDURE — 36415 COLL VENOUS BLD VENIPUNCTURE: CPT

## 2023-10-29 PROCEDURE — 2580000003 HC RX 258: Performed by: INTERNAL MEDICINE

## 2023-10-29 PROCEDURE — G0378 HOSPITAL OBSERVATION PER HR: HCPCS

## 2023-10-29 PROCEDURE — 85025 COMPLETE CBC W/AUTO DIFF WBC: CPT

## 2023-10-29 PROCEDURE — 94760 N-INVAS EAR/PLS OXIMETRY 1: CPT

## 2023-10-29 PROCEDURE — 83735 ASSAY OF MAGNESIUM: CPT

## 2023-10-29 RX ADMIN — OXYCODONE HYDROCHLORIDE 5 MG: 5 TABLET ORAL at 13:41

## 2023-10-29 RX ADMIN — OXYCODONE HYDROCHLORIDE 5 MG: 5 TABLET ORAL at 22:31

## 2023-10-29 RX ADMIN — OXYCODONE HYDROCHLORIDE 5 MG: 5 TABLET ORAL at 02:45

## 2023-10-29 RX ADMIN — Medication 5000 UNITS: at 08:44

## 2023-10-29 RX ADMIN — AMLODIPINE BESYLATE 2.5 MG: 5 TABLET ORAL at 08:44

## 2023-10-29 RX ADMIN — SODIUM CHLORIDE, PRESERVATIVE FREE 10 ML: 5 INJECTION INTRAVENOUS at 20:24

## 2023-10-29 RX ADMIN — TAMSULOSIN HYDROCHLORIDE 0.4 MG: 0.4 CAPSULE ORAL at 08:43

## 2023-10-29 RX ADMIN — ATORVASTATIN CALCIUM 40 MG: 40 TABLET, FILM COATED ORAL at 20:25

## 2023-10-29 RX ADMIN — METOPROLOL TARTRATE 12.5 MG: 25 TABLET, FILM COATED ORAL at 20:24

## 2023-10-29 RX ADMIN — METOPROLOL TARTRATE 12.5 MG: 25 TABLET, FILM COATED ORAL at 08:43

## 2023-10-29 RX ADMIN — OXYMETAZOLINE HCL 3 SPRAY: 0.05 SPRAY NASAL at 20:25

## 2023-10-29 RX ADMIN — DOCUSATE SODIUM 200 MG: 100 CAPSULE, LIQUID FILLED ORAL at 08:44

## 2023-10-29 RX ADMIN — SODIUM CHLORIDE, PRESERVATIVE FREE 10 ML: 5 INJECTION INTRAVENOUS at 08:51

## 2023-10-29 ASSESSMENT — PAIN DESCRIPTION - LOCATION
LOCATION: BACK

## 2023-10-29 ASSESSMENT — PAIN DESCRIPTION - ONSET: ONSET: SUDDEN

## 2023-10-29 ASSESSMENT — PAIN - FUNCTIONAL ASSESSMENT: PAIN_FUNCTIONAL_ASSESSMENT: PREVENTS OR INTERFERES SOME ACTIVE ACTIVITIES AND ADLS

## 2023-10-29 ASSESSMENT — PAIN DESCRIPTION - PAIN TYPE: TYPE: ACUTE PAIN

## 2023-10-29 ASSESSMENT — PAIN SCALES - GENERAL
PAINLEVEL_OUTOF10: 7
PAINLEVEL_OUTOF10: 0
PAINLEVEL_OUTOF10: 6
PAINLEVEL_OUTOF10: 6

## 2023-10-29 ASSESSMENT — PAIN DESCRIPTION - ORIENTATION
ORIENTATION: MID
ORIENTATION: MID;LOWER

## 2023-10-29 ASSESSMENT — PAIN DESCRIPTION - DESCRIPTORS
DESCRIPTORS: ACHING
DESCRIPTORS: ACHING
DESCRIPTORS: ACHING;DISCOMFORT

## 2023-10-29 ASSESSMENT — PAIN DESCRIPTION - FREQUENCY: FREQUENCY: INTERMITTENT

## 2023-10-29 NOTE — CONSULTS
MCG/ACT AERO Inhale 1 puff into the lungs daily 8/28/23   Glischinski, Nellene Quin, APRN - CNP   albuterol sulfate HFA (VENTOLIN HFA) 108 (90 Base) MCG/ACT inhaler Inhale 2 puffs into the lungs 4 times daily as needed for Wheezing 8/28/23   Glischinski, Nellene Quin, APRN - CNP   glipiZIDE (GLUCOTROL) 5 MG tablet TAKE 1/2 TO 1 TABLET TWO TIMES A DAY BEFORE MEALS 8/28/23   Glischinski, Nellene Quin, APRN - CNP   blood glucose test strips (FREESTYLE LITE) strip TEST TWICE DAILY 8/28/23   Glischinski, Nellene Quin, APRN - CNP   FreeStyle Lancets MISC USE TO TEST TWICE DAILY 8/28/23   Gerardine Cost A, APRN - CNP   furosemide (LASIX) 40 MG tablet Take 1 tablet by mouth daily 7/5/23   MERCED Villar CNP   furosemide (LASIX) 40 MG tablet TAKE 1/2 to 1 TABLET ONCE A DAY for swelling and shortness of breath 7/5/23   MERCED Villar CNP   metFORMIN (GLUCOPHAGE) 1000 MG tablet Take 1 tablet 2 times daily with meals 7/5/23   MERCED Villar CNP   atorvastatin (LIPITOR) 40 MG tablet Take 1 tablet daily 7/5/23   MERCED Villar CNP   metoprolol tartrate (LOPRESSOR) 25 MG tablet TAKE 1/2 TABLET TWICE A DAY 7/5/23   MERCED Villar CNP   amLODIPine (NORVASC) 2.5 MG tablet Take 1 tablet by mouth daily AT DINNER 7/5/23   MERCED Villar CNP   triamcinolone (KENALOG) 0.025 % cream Apply topically 2 times daily.  5/18/23   Glischinski, Nellene Quin, APRN - CNP   famotidine (PEPCID) 20 MG tablet TAKE 1 TABLET BY MOUTH TWICE DAILY 5/3/23   MERCED Villar CNP   clopidogrel (PLAVIX) 75 MG tablet Take 1 tablet by mouth daily 1/13/23   Provider, MD Flo   mupirocin (BACTROBAN) 2 % ointment APPLY TOPICALLY TO THE AFFECTED AREA THREE TIMES DAILY 3/7/23   MERCED Villar CNP   tamsulosin [facility]) 0.4 MG capsule Take 1 capsule by mouth daily 12/12/22   MERCED Villar CNP   vitamin D (CHOLECALCIFEROL) 125 MCG

## 2023-10-29 NOTE — FLOWSHEET NOTE
pt arrived to room 4127 from ER. pt alert and oriented x4, VSS. Bilat nasal packing intact . Oriented to room and call light. Pt  denies recent falls, ambulate with walker at baseline. plan of care and order reviewed with pt. all questions answered. Care plan ongoing.

## 2023-10-29 NOTE — PLAN OF CARE
Problem: Discharge Planning  Goal: Discharge to home or other facility with appropriate resources  10/29/2023 0216 by Fatimah Lewis RN  Outcome: Progressing  10/29/2023 0216 by Fatimah Lewis RN  Outcome: Progressing     Problem: Pain  Goal: Verbalizes/displays adequate comfort level or baseline comfort level  10/29/2023 0216 by Fatimah Lewis RN  Outcome: Progressing  10/29/2023 0216 by Fatimah Lewis RN  Outcome: Progressing     Problem: Safety - Adult  Goal: Free from fall injury  10/29/2023 0216 by Fatimah Lewis RN  Outcome: Progressing  10/29/2023 0216 by Fatimah Lewis RN  Outcome: Progressing     Problem: ABCDS Injury Assessment  Goal: Absence of physical injury  10/29/2023 0216 by Fatimah Lewis RN  Outcome: Progressing  10/29/2023 0216 by Fatimah Lewis RN  Outcome: Progressing     Problem: Hematologic - Adult  Goal: Maintains hematologic stability  10/29/2023 0216 by Fatimah Lewis RN  Outcome: Progressing  10/29/2023 0216 by Fatimah Lewis RN  Outcome: Progressing

## 2023-10-29 NOTE — PLAN OF CARE
Problem: Discharge Planning  Goal: Discharge to home or other facility with appropriate resources  10/29/2023 1141 by Carmelina Ganser, RN  Outcome: Progressing  10/29/2023 0216 by Olivia Mcclain RN  Outcome: Progressing  10/29/2023 0216 by Olivia Mcclain RN  Outcome: Progressing     Problem: Pain  Goal: Verbalizes/displays adequate comfort level or baseline comfort level  10/29/2023 1141 by Carmelina Ganser, RN  Outcome: Progressing  10/29/2023 0216 by Olivia Mcclain RN  Outcome: Progressing  10/29/2023 0216 by Olivia Mcclain RN  Outcome: Progressing     Problem: Safety - Adult  Goal: Free from fall injury  10/29/2023 1141 by Carmelina Ganser, RN  Outcome: Progressing  10/29/2023 0216 by Olivia Mcclain RN  Outcome: Progressing  10/29/2023 0216 by Olivia Mcclain RN  Outcome: Progressing     Problem: ABCDS Injury Assessment  Goal: Absence of physical injury  10/29/2023 1141 by Carmelina Ganser, RN  Outcome: Progressing  10/29/2023 0216 by Olivia Mcclain RN  Outcome: Progressing  10/29/2023 0216 by Olivia Mcclain RN  Outcome: Progressing     Problem: Hematologic - Adult  Goal: Maintains hematologic stability  10/29/2023 1141 by Carmelina Ganser, RN  Outcome: Progressing  10/29/2023 0216 by Olivia Mcclain RN  Outcome: Progressing  10/29/2023 0216 by Olivia Mcclain RN  Outcome: Progressing

## 2023-10-29 NOTE — FLOWSHEET NOTE
4 Eyes Skin Assessment     NAME:  Castillo Ny  YOB: 1940  MEDICAL RECORD NUMBER:  [id number]    The patient is being assessed for  Admission    I agree that at least one RN has performed a thorough Head to Toe Skin Assessment on the patient. ALL assessment sites listed below have been assessed. Areas assessed by both nurses:    Head, Face, Ears, Shoulders, Back, Chest, Arms, Elbows, Hands, Sacrum. Buttock, Coccyx, Ischium, Legs. Feet and Heels, and Under Medical Devices         Does the Patient have a Wound?  No noted wound(s)       Kishor Prevention initiated by RN: No  Wound Care Orders initiated by RN: No    Pressure Injury (Stage 3,4, Unstageable, DTI, NWPT, and Complex wounds) if present, place Wound referral order by RN under : No    New Ostomies, if present place, Ostomy referral order under : No     Nurse 1 eSignature: Electronically signed by Jamaica Salgado RN on 10/29/23 at 2:02 AM EDT    **SHARE this note so that the co-signing nurse can place an eSignature**    Nurse 2 eSignature: Electronically signed by Darlene Lucas RN on 10/29/23 at 6:42 AM EDT

## 2023-10-30 ENCOUNTER — TELEPHONE (OUTPATIENT)
Dept: ENT CLINIC | Age: 83
End: 2023-10-30

## 2023-10-30 VITALS
WEIGHT: 208.56 LBS | HEART RATE: 53 BPM | DIASTOLIC BLOOD PRESSURE: 56 MMHG | BODY MASS INDEX: 33.52 KG/M2 | SYSTOLIC BLOOD PRESSURE: 105 MMHG | OXYGEN SATURATION: 96 % | HEIGHT: 66 IN | TEMPERATURE: 97.5 F | RESPIRATION RATE: 20 BRPM

## 2023-10-30 LAB
ALBUMIN SERPL-MCNC: 3.7 G/DL (ref 3.4–5)
ALBUMIN/GLOB SERPL: 1.6 {RATIO} (ref 1.1–2.2)
ALP SERPL-CCNC: 50 U/L (ref 40–129)
ALT SERPL-CCNC: 9 U/L (ref 10–40)
ANION GAP SERPL CALCULATED.3IONS-SCNC: 6 MMOL/L (ref 3–16)
AST SERPL-CCNC: 12 U/L (ref 15–37)
BASOPHILS # BLD: 0 K/UL (ref 0–0.2)
BASOPHILS NFR BLD: 0.6 %
BILIRUB SERPL-MCNC: 0.3 MG/DL (ref 0–1)
BUN SERPL-MCNC: 24 MG/DL (ref 7–20)
CALCIUM SERPL-MCNC: 8.9 MG/DL (ref 8.3–10.6)
CHLORIDE SERPL-SCNC: 104 MMOL/L (ref 99–110)
CO2 SERPL-SCNC: 30 MMOL/L (ref 21–32)
CREAT SERPL-MCNC: 1.1 MG/DL (ref 0.8–1.3)
DEPRECATED RDW RBC AUTO: 14.9 % (ref 12.4–15.4)
EKG DIAGNOSIS: NORMAL
EKG Q-T INTERVAL: 446 MS
EKG QRS DURATION: 140 MS
EKG QTC CALCULATION (BAZETT): 477 MS
EKG R AXIS: -66 DEGREES
EKG T AXIS: 58 DEGREES
EKG VENTRICULAR RATE: 69 BPM
EOSINOPHIL # BLD: 0.1 K/UL (ref 0–0.6)
EOSINOPHIL NFR BLD: 1.2 %
GFR SERPLBLD CREATININE-BSD FMLA CKD-EPI: >60 ML/MIN/{1.73_M2}
GLUCOSE BLD-MCNC: 170 MG/DL (ref 70–99)
GLUCOSE BLD-MCNC: 186 MG/DL (ref 70–99)
GLUCOSE BLD-MCNC: 198 MG/DL (ref 70–99)
GLUCOSE SERPL-MCNC: 171 MG/DL (ref 70–99)
HCT VFR BLD AUTO: 34.6 % (ref 40.5–52.5)
HGB BLD-MCNC: 11.5 G/DL (ref 13.5–17.5)
LYMPHOCYTES # BLD: 1.7 K/UL (ref 1–5.1)
LYMPHOCYTES NFR BLD: 21.6 %
MAGNESIUM SERPL-MCNC: 2.2 MG/DL (ref 1.8–2.4)
MCH RBC QN AUTO: 31.1 PG (ref 26–34)
MCHC RBC AUTO-ENTMCNC: 33.1 G/DL (ref 31–36)
MCV RBC AUTO: 93.9 FL (ref 80–100)
MONOCYTES # BLD: 0.5 K/UL (ref 0–1.3)
MONOCYTES NFR BLD: 6.9 %
NEUTROPHILS # BLD: 5.4 K/UL (ref 1.7–7.7)
NEUTROPHILS NFR BLD: 69.7 %
PERFORMED ON: ABNORMAL
PHOSPHATE SERPL-MCNC: 3.7 MG/DL (ref 2.5–4.9)
PLATELET # BLD AUTO: 265 K/UL (ref 135–450)
PMV BLD AUTO: 7.5 FL (ref 5–10.5)
POTASSIUM SERPL-SCNC: 4.7 MMOL/L (ref 3.5–5.1)
PROT SERPL-MCNC: 6 G/DL (ref 6.4–8.2)
RBC # BLD AUTO: 3.69 M/UL (ref 4.2–5.9)
SODIUM SERPL-SCNC: 140 MMOL/L (ref 136–145)
WBC # BLD AUTO: 7.7 K/UL (ref 4–11)

## 2023-10-30 PROCEDURE — 94760 N-INVAS EAR/PLS OXIMETRY 1: CPT

## 2023-10-30 PROCEDURE — 93010 ELECTROCARDIOGRAM REPORT: CPT | Performed by: INTERNAL MEDICINE

## 2023-10-30 PROCEDURE — 36415 COLL VENOUS BLD VENIPUNCTURE: CPT

## 2023-10-30 PROCEDURE — 1200000000 HC SEMI PRIVATE

## 2023-10-30 PROCEDURE — 94640 AIRWAY INHALATION TREATMENT: CPT

## 2023-10-30 PROCEDURE — 6370000000 HC RX 637 (ALT 250 FOR IP): Performed by: INTERNAL MEDICINE

## 2023-10-30 PROCEDURE — 80053 COMPREHEN METABOLIC PANEL: CPT

## 2023-10-30 PROCEDURE — 83735 ASSAY OF MAGNESIUM: CPT

## 2023-10-30 PROCEDURE — 85025 COMPLETE CBC W/AUTO DIFF WBC: CPT

## 2023-10-30 PROCEDURE — 99231 SBSQ HOSP IP/OBS SF/LOW 25: CPT | Performed by: OTOLARYNGOLOGY

## 2023-10-30 PROCEDURE — 84100 ASSAY OF PHOSPHORUS: CPT

## 2023-10-30 PROCEDURE — 2580000003 HC RX 258: Performed by: INTERNAL MEDICINE

## 2023-10-30 RX ORDER — AMOXICILLIN AND CLAVULANATE POTASSIUM 500; 125 MG/1; MG/1
1 TABLET, FILM COATED ORAL 3 TIMES DAILY
Qty: 15 TABLET | Refills: 0 | Status: SHIPPED | OUTPATIENT
Start: 2023-10-30 | End: 2023-11-02 | Stop reason: SDUPTHER

## 2023-10-30 RX ADMIN — OXYCODONE HYDROCHLORIDE 5 MG: 5 TABLET ORAL at 14:09

## 2023-10-30 RX ADMIN — TAMSULOSIN HYDROCHLORIDE 0.4 MG: 0.4 CAPSULE ORAL at 08:48

## 2023-10-30 RX ADMIN — SODIUM CHLORIDE, PRESERVATIVE FREE 10 ML: 5 INJECTION INTRAVENOUS at 08:51

## 2023-10-30 RX ADMIN — MOMETASONE FUROATE AND FORMOTEROL FUMARATE DIHYDRATE 2 PUFF: 200; 5 AEROSOL RESPIRATORY (INHALATION) at 08:40

## 2023-10-30 RX ADMIN — OXYCODONE HYDROCHLORIDE 5 MG: 5 TABLET ORAL at 08:48

## 2023-10-30 RX ADMIN — TIOTROPIUM BROMIDE INHALATION SPRAY 2 PUFF: 3.12 SPRAY, METERED RESPIRATORY (INHALATION) at 08:40

## 2023-10-30 RX ADMIN — Medication 5000 UNITS: at 08:48

## 2023-10-30 RX ADMIN — AMLODIPINE BESYLATE 2.5 MG: 5 TABLET ORAL at 08:47

## 2023-10-30 RX ADMIN — DOCUSATE SODIUM 200 MG: 100 CAPSULE, LIQUID FILLED ORAL at 08:48

## 2023-10-30 RX ADMIN — METOPROLOL TARTRATE 12.5 MG: 25 TABLET, FILM COATED ORAL at 08:49

## 2023-10-30 ASSESSMENT — PAIN DESCRIPTION - ORIENTATION
ORIENTATION: LOWER
ORIENTATION: LOWER

## 2023-10-30 ASSESSMENT — PAIN DESCRIPTION - DESCRIPTORS
DESCRIPTORS: ACHING
DESCRIPTORS: ACHING

## 2023-10-30 ASSESSMENT — PAIN SCALES - GENERAL
PAINLEVEL_OUTOF10: 7
PAINLEVEL_OUTOF10: 6

## 2023-10-30 ASSESSMENT — PAIN DESCRIPTION - LOCATION
LOCATION: BACK
LOCATION: BACK

## 2023-10-30 NOTE — PLAN OF CARE
Problem: Discharge Planning  Goal: Discharge to home or other facility with appropriate resources  10/30/2023 1350 by Zuri Dodd RN  Outcome: Completed  10/30/2023 1000 by Zuri Dodd RN  Outcome: Progressing     Problem: Pain  Goal: Verbalizes/displays adequate comfort level or baseline comfort level  10/30/2023 1350 by Zuri Dodd RN  Outcome: Completed  10/30/2023 1000 by Zuri Dodd RN  Outcome: Progressing     Problem: Safety - Adult  Goal: Free from fall injury  10/30/2023 1350 by Zuri Dodd RN  Outcome: Completed  10/30/2023 1000 by Zuri Dodd RN  Outcome: Progressing     Problem: ABCDS Injury Assessment  Goal: Absence of physical injury  10/30/2023 1350 by Zuri Dodd RN  Outcome: Completed  10/30/2023 1000 by Zuri Dodd RN  Outcome: Progressing     Problem: Hematologic - Adult  Goal: Maintains hematologic stability  10/30/2023 1350 by Zuri Dodd RN  Outcome: Completed  10/30/2023 1000 by Zuri Dodd RN  Outcome: Progressing     Problem: Chronic Conditions and Co-morbidities  Goal: Patient's chronic conditions and co-morbidity symptoms are monitored and maintained or improved  10/30/2023 1350 by Zuri Dodd RN  Outcome: Completed  10/30/2023 1000 by Zuri Dodd RN  Outcome: Progressing

## 2023-10-30 NOTE — TELEPHONE ENCOUNTER
Pt was back at the ER at [facility] on Sat 10/28 and was admitted due to nose bleeds. They placed a new Rhinorocket and Dr. Arsalan Fontaine seen him as a consult.  [id number] needs to be seen for removal tomorrow 10/31 or Wednesday 11/1 for removal. Dr. Arsalan Fontaine states if Dr. Scott Bowman is un able to get him in he will be happy to see him for removal.

## 2023-10-30 NOTE — PLAN OF CARE
Problem: Discharge Planning  Goal: Discharge to home or other facility with appropriate resources  10/29/2023 2032 by Mis Douglas RN  Outcome: Progressing     Problem: Pain  Goal: Verbalizes/displays adequate comfort level or baseline comfort level  10/29/2023 2032 by Mis Douglas RN  Outcome: Progressing     Problem: Safety - Adult  Goal: Free from fall injury  10/29/2023 2032 by Mis Douglas RN  Outcome: Progressing     Problem: ABCDS Injury Assessment  Goal: Absence of physical injury  10/29/2023 2032 by Mis Douglas RN  Outcome: Progressing

## 2023-10-30 NOTE — TELEPHONE ENCOUNTER
----- Message from Rosa Muhammad MD sent at 10/30/2023 12:19 PM EDT -----  Regarding: follow up  This patient is to be seen either tomorrow or Wednesday to remove [employer]. He normally sees Dr. Kate Dennison, but if he cannot get in there then I would see him Wednesday.

## 2023-10-30 NOTE — DISCHARGE SUMMARY
Hospital Medicine Discharge Summary    Patient ID: Charan Cason      Patient's PCP: MERCED Hightower - CNP    Admit Date: 10/28/2023     Discharge Date:   10/30/23      Admitting Provider: Ronen Thapa MD     Discharge Provider: Ronen Thapa MD     Discharge Diagnoses: Active Hospital Problems    Diagnosis     Epistaxis [R04.0]        The patient was seen and examined on day of discharge and this discharge summary is in conjunction with any daily progress note from day of discharge. Hospital Course:   82 y.o. male who presents with history of diabetes type 2, hypertension associated with diabetes, hyperlipidemia associated with diabetes, coronary artery disease (on antiplatelet therapy with aspirin and Plavix), COPD, degenerative disc disease, paroxysmal atrial fibrillation (not on chronic anticoagulation), pulmonary hypertension, who has had intermittent bilateral (initially started as left, now right) epistaxis that has been ongoing for the past week, evaluated by at least 2 different ENT specialists recently. Presents to the emergency room today with continued epistaxis, status post nasal pack in the emergency room. He does not have chest pain or shortness of breath. Hemoglobin is stable at 12.6. Emergency room physician discussed with ENT, Dr. Tim Wayne, will recommends admission with plan for further evaluation by ENT while in the hospital.      Bilateral epistaxis. Status post nasal packing in the emergency room. Continue Afrin. Hold antiplatelet therapies (aspirin and Plavix). Monitor H&H closely.  ( Stable ), seen by ENT this morning, right rhinorocket deflated and subsequently removed 10/30, patient to be discharged with left Rhino rocket in place, will be seen by Dr. Zulma Dunaway from ENT tomorrow the day after, started on prophylactic Augmentin  did not have hypoxia in the emergency room; lowest documented oxygen saturation of 91% not consistent with hypoxia in patient

## 2023-10-30 NOTE — PLAN OF CARE
Problem: Discharge Planning  Goal: Discharge to home or other facility with appropriate resources  10/30/2023 1000 by Cely Diop RN  Outcome: Progressing  10/29/2023 2032 by Dona Rosen RN  Outcome: Progressing     Problem: Pain  Goal: Verbalizes/displays adequate comfort level or baseline comfort level  10/30/2023 1000 by Cely Diop RN  Outcome: Progressing  10/29/2023 2032 by Dona Rosen RN  Outcome: Progressing     Problem: Safety - Adult  Goal: Free from fall injury  10/30/2023 1000 by Cely Diop RN  Outcome: Progressing  10/29/2023 2032 by Dona Rosen RN  Outcome: Progressing     Problem: ABCDS Injury Assessment  Goal: Absence of physical injury  10/30/2023 1000 by Cely Diop RN  Outcome: Progressing  10/29/2023 2032 by Dona Rosen RN  Outcome: Progressing     Problem: Hematologic - Adult  Goal: Maintains hematologic stability  10/30/2023 1000 by Cely Diop RN  Outcome: Progressing  10/29/2023 2032 by Dona Rosen RN  Outcome: Progressing     Problem: Chronic Conditions and Co-morbidities  Goal: Patient's chronic conditions and co-morbidity symptoms are monitored and maintained or improved  Outcome: Progressing

## 2023-10-30 NOTE — PROGRESS NOTES
[facility]- HEAD & NECK SURGERY  Progress note    Patient Name: [facility] Record Number:  [id number]  Primary Care Physician:  MERCED Mccarthy CNP  Date of Consultation: 10/30/2023    Interval History    Patient has not had any bleeding overnight. PHYSICAL EXAM  Bilateral Rhino Rocket's in place  Right Rhino Rocket was removed. There was no evidence of bleeding. Left Rhino Rocket was left        ASSESSMENT/PLAN  Epistaxis  -The right Rhino Rocket was removed. The left one will stay in place for 24-48 more hours. He is okay for discharge, but needs to follow-up either tomorrow or Wednesday to have the other 1 removed in clinic. Continue prophylactic antibiotics.  -If he has another nosebleed would have to consider seeing if we can hold his blood thinners. I have performed a head and neck physical exam personally or was physically present during the key or critical portions of the service. This note was generated completely or in part utilizing Dragon dictation speech recognition software. Occasionally, words are mistranscribed and despite editing, the text may contain inaccuracies due to incorrect word recognition. If further clarification is needed please contact the office at [phone].
Patient on chair, denies any pain or discomfort. Noted with bilateral nasal packing with no active bleeding. Encouraged patient to call nurse for assistance especially when transferring to his bed. Fall preventive measures promoted.     Electronically signed by Lupe Elam RN on 10/29/2023 at 9:05 PM
Patient sitting up in chair. Alert oriented x4. Denies any discomfort. Denies any sob. Is in no distress. Nose packing in place. On room air. Holding plavix and ASA due to bleeding.
Pt's IV removed without complications, Pt given discharge instructions along with follow up appointment instructions. Pt waiting on daughter to arrive for transportation home. Pt being taken down by our transport department via wheelchair. Electronically signed by Pebbles Rodrigues RN on 10/30/2023 at 4:44 PM
01:40 PM    UROBILINOGEN 0.2 01/27/2023 01:40 PM    BILIRUBINUR Negative 01/27/2023 01:40 PM    BILIRUBINUR negative 07/22/2022 03:38 PM    BLOODU Negative 01/27/2023 01:40 PM    GLUCOSEU Negative 01/27/2023 01:40 PM    KETUA Negative 01/27/2023 01:40 PM     Urine Cultures:   Lab Results   Component Value Date/Time    LABURIN 50MG 04/14/2021 02:43 PM     Blood Cultures: No results found for: \"BC\"  No results found for: \"BLOODCULT2\"  Organism: No results found for: \"ORG\"      Assessment and Recommendations   Radha Salter is a 82 y.o. male who presents with epistasis     Bilateral epistaxis. Status post nasal packing in the emergency room. Continue Afrin. Hold antiplatelet therapies (aspirin and Plavix). Monitor H&H closely. ( Stable ), seen by ENT this morning, right rhinorocket deflated but left in place, plan for removal tomorrow if no bleeding recurs. Patient did not have hypoxia in the emergency room; lowest documented oxygen saturation of 91% not consistent with hypoxia in patient with history of COPD (with goal oxygen saturation of 88 to 92%). Nonzero troponin of 36. Patient did not have chest pain; unclear indication as to why troponin was checked. However, will obtain a repeat set x1. Patient does not have ACS. Other comorbidities: history of diabetes type 2, hypertension associated with diabetes, hyperlipidemia associated with diabetes, coronary artery disease (on antiplatelet therapy with aspirin and Plavix), COPD, degenerative disc disease, paroxysmal atrial fibrillation (not on chronic anticoagulation), pulmonary hypertension. Diet ADULT DIET;  Regular; 4 carb choices (60 gm/meal)     DVT Prophylaxis [] Lovenox, []  Heparin, [x] SCDs, [] Ambulation,  [] Eliquis, [] Xarelto  [] Coumadin   Code Status Full Code   Disposition From: home   Expected Disposition: home   Estimated Date of Discharge: possibly tomorrow   Patient requires continued admission due to epistaxsis    Surrogate

## 2023-10-30 NOTE — CARE COORDINATION
10/30 ANR. No needs anticipated, at this time. CM team to follow. Staff to inform CM if additional discharge needs arise. Probable d/c today.  Electronically signed by Neeta Yun RN on 10/30/2023 at 9:49 AM

## 2023-10-31 ENCOUNTER — CLINICAL DOCUMENTATION ONLY (OUTPATIENT)
Facility: CLINIC | Age: 83
End: 2023-10-31

## 2023-10-31 ENCOUNTER — OFFICE VISIT (OUTPATIENT)
Dept: ENT CLINIC | Age: 83
End: 2023-10-31

## 2023-10-31 VITALS
DIASTOLIC BLOOD PRESSURE: 64 MMHG | HEIGHT: 66 IN | SYSTOLIC BLOOD PRESSURE: 129 MMHG | OXYGEN SATURATION: 95 % | BODY MASS INDEX: 34.23 KG/M2 | TEMPERATURE: 97.3 F | WEIGHT: 213 LBS | HEART RATE: 76 BPM

## 2023-10-31 DIAGNOSIS — R04.0 LEFT-SIDED EPISTAXIS: Primary | ICD-10-CM

## 2023-10-31 NOTE — PROGRESS NOTES
Chief Complaint   Patient presents with    Epistaxis       HISTORY OF PRESENT ILLNESS    Isaias Storm is a 82 y.o. male. Had nosebleed on Saturday morning, onset about 6 AM.  [facility]. They put in left Rapid Rhino and sent home. Started bleeding again about noon to 1 PM on the right side. Went to [facility] ER. Put in epistat on right side. Was admitted to hospital for observation. No further bleeding after admission. The right pack was removed yesterday and patent was discharged yesterday afternoon and went home. Had no further bleeding after went home. REVIEW OF SYSTEMS  Constitutional:  Denied fever. ENT/sinus:  Denied otalgia, otorrhea, nasal pain, rhinorrhea, sore throat, and sinus/facial pain. EXAMINATION    WDWN, NAD  Voice:  Normal.  Ears:  TMs, EACs, mastoids and pinnae were normal.    Nose:  Rapid Rhino epistat in left nasal cavity with no evidence of active bleeding. It was deflated with no bleeding and then removed. Then there was a mildly bleeding point in the middle anterior septum just inferior and posterior to an indented spot on the septum with no actual ulceration. Otherwise, the nasal cavity was normal with no lesions. Throat,  OC/ OP:  Normal with no lesions. OFFICE PROCEDURE:  Control nasal hemorrhage, anterior, simple for left anterior septal epistaxis. INDICATION:    Recurrent epistaxis, with several episodes     ANESTHESIA:    The nasal cavity was anesthetized and decongested with a mixture of equal parts of 4% topical lidocaine and 0.05% oxymetazoline solution by nasal sprayer. Topical 4% lidocaine on a cotton pledget was inserted for additional anesthesia and removed after 10 minutes. COUNSELING:  Risks, benefits, and alternatives of the procedure of cauterization and control of epistaxis were explained to the patient.   Specific mention was made of the risk of nosebleed, drainage, throat numbness with difficulty swallowing, Patient/EMS

## 2023-11-01 ENCOUNTER — OFFICE VISIT (OUTPATIENT)
Dept: FAMILY MEDICINE CLINIC | Age: 83
End: 2023-11-01

## 2023-11-01 VITALS
SYSTOLIC BLOOD PRESSURE: 136 MMHG | HEIGHT: 66 IN | WEIGHT: 212 LBS | BODY MASS INDEX: 34.07 KG/M2 | DIASTOLIC BLOOD PRESSURE: 74 MMHG | HEART RATE: 60 BPM

## 2023-11-01 DIAGNOSIS — Z09 HOSPITAL DISCHARGE FOLLOW-UP: Primary | ICD-10-CM

## 2023-11-01 DIAGNOSIS — Z09 FOLLOW-UP EXAM: ICD-10-CM

## 2023-11-01 RX ORDER — AMOXICILLIN AND CLAVULANATE POTASSIUM 875; 125 MG/1; MG/1
1 TABLET, FILM COATED ORAL 2 TIMES DAILY
COMMUNITY
Start: 2023-10-28

## 2023-11-01 NOTE — PROGRESS NOTES
of breath     glipiZIDE 5 MG tablet  Commonly known as: GLUCOTROL  TAKE 1/2 TO 1 TABLET TWO TIMES A DAY BEFORE MEALS     HYDROcodone-acetaminophen  MG per tablet  Commonly known as: NORCO     metFORMIN 1000 MG tablet  Commonly known as: GLUCOPHAGE  Take 1 tablet 2 times daily with meals     metoprolol tartrate 25 MG tablet  Commonly known as: LOPRESSOR  TAKE 1/2 TABLET TWICE A DAY     mupirocin 2 % ointment  Commonly known as: BACTROBAN  APPLY TOPICALLY TO THE AFFECTED AREA THREE TIMES DAILY     Spacer/Aero-Holding Lee Moles  With all spray inhalers. tamsulosin 0.4 MG capsule  Commonly known as: FLOMAX  Take 1 capsule by mouth daily     triamcinolone 0.025 % cream  Commonly known as: KENALOG  Apply topically 2 times daily. vitamin D 125 MCG (5000 UT) Caps capsule  Commonly known as: CHOLECALCIFEROL           * This list has 4 medication(s) that are the same as other medications prescribed for you. Read the directions carefully, and ask your doctor or other care provider to review them with you.                     Medications marked \"taking\" at this time  Outpatient Medications Marked as Taking for the 11/1/23 encounter (Office Visit) with MERCED Ko CNP   Medication Sig Dispense Refill    amoxicillin-clavulanate (AUGMENTIN) 875-125 MG per tablet Take 1 tablet by mouth in the morning and at bedtime      amoxicillin-clavulanate (AUGMENTIN) 500-125 MG per tablet Take 1 tablet by mouth 3 times daily for 5 days 15 tablet 0    Budeson-Glycopyrrol-Formoterol 160-9-4.8 MCG/ACT AERO Inhale 1 puff into the lungs daily 1 each 5    albuterol sulfate HFA (VENTOLIN HFA) 108 (90 Base) MCG/ACT inhaler Inhale 2 puffs into the lungs 4 times daily as needed for Wheezing 18 g 0    glipiZIDE (GLUCOTROL) 5 MG tablet TAKE 1/2 TO 1 TABLET TWO TIMES A DAY BEFORE MEALS 180 tablet 0    blood glucose test strips (FREESTYLE LITE) strip TEST TWICE DAILY 200 strip 3    FreeStyle Lancets MISC USE TO TEST TWICE DAILY

## 2023-11-20 ENCOUNTER — TELEPHONE (OUTPATIENT)
Dept: FAMILY MEDICINE CLINIC | Age: 83
End: 2023-11-20

## 2023-11-20 ENCOUNTER — OFFICE VISIT (OUTPATIENT)
Dept: ENT CLINIC | Age: 83
End: 2023-11-20
Payer: MEDICARE

## 2023-11-20 ENCOUNTER — TELEPHONE (OUTPATIENT)
Dept: ENT CLINIC | Age: 83
End: 2023-11-20

## 2023-11-20 VITALS
BODY MASS INDEX: 34.07 KG/M2 | SYSTOLIC BLOOD PRESSURE: 135 MMHG | DIASTOLIC BLOOD PRESSURE: 61 MMHG | HEIGHT: 66 IN | HEART RATE: 52 BPM | WEIGHT: 212 LBS | TEMPERATURE: 97.3 F

## 2023-11-20 DIAGNOSIS — I10 ESSENTIAL HYPERTENSION, BENIGN: ICD-10-CM

## 2023-11-20 DIAGNOSIS — I25.10 CORONARY ARTERY DISEASE DUE TO LIPID RICH PLAQUE: Chronic | ICD-10-CM

## 2023-11-20 DIAGNOSIS — R04.0 LEFT-SIDED EPISTAXIS: Primary | ICD-10-CM

## 2023-11-20 DIAGNOSIS — I25.83 CORONARY ARTERY DISEASE DUE TO LIPID RICH PLAQUE: Chronic | ICD-10-CM

## 2023-11-20 PROCEDURE — 30903 CONTROL OF NOSEBLEED: CPT | Performed by: OTOLARYNGOLOGY

## 2023-11-20 RX ORDER — AMLODIPINE BESYLATE 2.5 MG/1
2.5 TABLET ORAL DAILY
Qty: 90 TABLET | Refills: 0 | Status: SHIPPED | OUTPATIENT
Start: 2023-11-20

## 2023-11-20 NOTE — PROGRESS NOTES
CHIEF COMPLAINT  Chief Complaint   Patient presents with    Epistaxis     Left side. HISTORY OF PRESENT ILLNESS    Darwin Connor is a 82 y.o. male who presented today for evaluation and management for epistaxis. Went to ER yesterday morning for nosebleed. Packed with a cotton and some medicine. Stopped and sent home. Bled again this morning couple times , most recent was 1230 hours. Rhinorrhea off and on. On clopidogrel/Plavix and daily 81 mg aspirin. REVIEW OF SYSTEMS  Constitutional:  Denied fever. ENT/sinus:  Denied otalgia, otorrhea, nasal pain, sore throat, and sinus/facial pain. EXAMINATION    WDWN, NAD  Voice:  Normal.  Ears:  TMs, EACs, mastoids and pinnae were normal.    Nose:  Normal with no lesions. Sinuses:  NT x 4   Throat,  OC/OP:  Normal with no lesions. Neck:  NT, No masses. Trachea midline. Nodes:  No lymphadenopathy. Thyroid:  Normal with no goiter, nodules or tenderness. OFFICE PROCEDURE:  Control nasal hemorrhage, anterior, complex (extensive cautery), for left anterior septal epistaxis. INDICATION:    Recurrent epistaxis, with several episodes     ANESTHESIA:    The nasal cavity was anesthetized and decongested with a mixture of equal parts of 4% topical lidocaine and 0.05% oxymetazoline solution by nasal sprayer. Topical 4% lidocaine on a cotton pledget was inserted for additional anesthesia and removed after 10 minutes. COUNSELING:  Risks, benefits, and alternatives of the procedure of cauterization and control of epistaxis were explained to the patient. Specific mention was made of the risk of nosebleed, drainage, throat numbness with difficulty swallowing, and pain from the procedure. The patient gave oral consent to proceed. FINDINGS:  Several bleeding sites on the left anterior septum were identified by rubbing gently with cotton tipped applicator.           DESCRIPTION OF PROCEDURE:    The left nasal cavity was sprayed with a

## 2023-11-20 NOTE — TELEPHONE ENCOUNTER
Pt is calling to see if he can be seen sooner than 3:20pm today. He states he is having nosebleeds again and is having a real bad one right now.

## 2023-11-27 ENCOUNTER — OFFICE VISIT (OUTPATIENT)
Dept: ENT CLINIC | Age: 83
End: 2023-11-27
Payer: MEDICARE

## 2023-11-27 VITALS
DIASTOLIC BLOOD PRESSURE: 74 MMHG | TEMPERATURE: 96.8 F | HEIGHT: 66 IN | WEIGHT: 212 LBS | HEART RATE: 42 BPM | SYSTOLIC BLOOD PRESSURE: 137 MMHG | BODY MASS INDEX: 34.07 KG/M2

## 2023-11-27 DIAGNOSIS — J30.9 ALLERGIC RHINITIS, UNSPECIFIED SEASONALITY, UNSPECIFIED TRIGGER: Chronic | ICD-10-CM

## 2023-11-27 DIAGNOSIS — R04.0 LEFT-SIDED EPISTAXIS: Primary | ICD-10-CM

## 2023-11-27 DIAGNOSIS — J30.0 NON-ALLERGIC VASOMOTOR RHINITIS: Chronic | ICD-10-CM

## 2023-11-27 DIAGNOSIS — J34.2 NASAL SEPTAL DEVIATION: Chronic | ICD-10-CM

## 2023-11-27 PROCEDURE — 1123F ACP DISCUSS/DSCN MKR DOCD: CPT | Performed by: OTOLARYNGOLOGY

## 2023-11-27 PROCEDURE — G8417 CALC BMI ABV UP PARAM F/U: HCPCS | Performed by: OTOLARYNGOLOGY

## 2023-11-27 PROCEDURE — 3075F SYST BP GE 130 - 139MM HG: CPT | Performed by: OTOLARYNGOLOGY

## 2023-11-27 PROCEDURE — 3078F DIAST BP <80 MM HG: CPT | Performed by: OTOLARYNGOLOGY

## 2023-11-27 PROCEDURE — 99213 OFFICE O/P EST LOW 20 MIN: CPT | Performed by: OTOLARYNGOLOGY

## 2023-11-27 PROCEDURE — 1111F DSCHRG MED/CURRENT MED MERGE: CPT | Performed by: OTOLARYNGOLOGY

## 2023-11-27 PROCEDURE — G8427 DOCREV CUR MEDS BY ELIG CLIN: HCPCS | Performed by: OTOLARYNGOLOGY

## 2023-11-27 PROCEDURE — G8484 FLU IMMUNIZE NO ADMIN: HCPCS | Performed by: OTOLARYNGOLOGY

## 2023-11-27 PROCEDURE — 4004F PT TOBACCO SCREEN RCVD TLK: CPT | Performed by: OTOLARYNGOLOGY

## 2023-11-27 NOTE — PROGRESS NOTES
Chief Complaint   Patient presents with    Epistaxis     Left side of nose. HISTORY OF PRESENT ILLNESS    Alli Montes is a 82 y.o. male who presented today for evaluation and management for another episode of epistaxis, treated with a Rhino Rocket. Patient stated that his \"Nosebleed started again Tuesday morning at 7 AM.  I got it stopped with a cotton ball with Afrin. Then it started again about 12 hours later at about 7 PM.  I tried to get it stopped and it bothered me all night. At about 6 AM Wednesday morning, 11/22/23, I called 911 and they took me to [address] [place] [address]. They put in the nose pack. \"  He has had no nose bleeding since the pack was placed. REVIEW OF SYSTEMS  Constitutional:  Denied fever and chills. ENT/sinus:  Denied otalgia, otorrhea, nasal pain, rhinorrhea, sore throat, and sinus/facial pain. EXAMINATION    WDWN, NAD  Voice:  Normal.  Nose:  Nose with Rhino rocket epistat in place with no evidence of active bleeding. Rhino Rocket was removed with no difficulty and no bleeding. The the previous cautery sites on the nasal septum appeared to be healing. There was no active bleeding no intranasal lesions. Otherwise, the nasal cavity was normal.  Throat,  OC/OP:  No evidence of bleeding into the OP. Normal with no lesions. IMPRESSION / Jensen Paige / Ida Guan:   Tim Arana was seen today for epistaxis. Diagnoses and all orders for this visit:    Left-sided epistaxis  Comments:  Septal.    Non-allergic vasomotor rhinitis    Allergic rhinitis, unspecified seasonality, unspecified trigger    Nasal septal deviation         RECOMMENDATIONS / PLAN:   See Patient Instructions on file for this visit. Patient was advised to review and follow my instructions in the AVS, which we reviewed together, and to call and speak to the MA or LPN with any questions regarding these instructions.   Patient stated he is due to see his cardiologist today and will discuss

## 2023-12-26 ENCOUNTER — TELEPHONE (OUTPATIENT)
Dept: FAMILY MEDICINE CLINIC | Age: 83
End: 2023-12-26

## 2023-12-26 DIAGNOSIS — J43.1 PANLOBULAR EMPHYSEMA (HCC): Primary | Chronic | ICD-10-CM

## 2023-12-26 NOTE — TELEPHONE ENCOUNTER
Rosanne Miner from [facility] is calling:    Needs verbal orders for skill nursing for 4 weeks  Wound on his right arm - they are putting neosporin & dry cloth - it is like a skin tear  We sent in a order for a inhaler-albuterol, but they needs a nebulizer. Please give Rosanne Miner a call back.      [facility] - phone no. [phone]

## 2023-12-26 NOTE — TELEPHONE ENCOUNTER
Okay for any skilled nursing care. Neosporin is good, mildly clean the wound as more skin tear and can occur. Be careful with the dry dressing as it can stick to his skin and pull off more once Neosporin dries. If the wound gets worse, he will need to be seen somewhere. I will resend albuterol.

## 2023-12-28 RX ORDER — ATORVASTATIN CALCIUM 40 MG/1
40 TABLET, FILM COATED ORAL DAILY
COMMUNITY
End: 2023-12-28 | Stop reason: SDUPTHER

## 2023-12-29 RX ORDER — ATORVASTATIN CALCIUM 40 MG/1
40 TABLET, FILM COATED ORAL DAILY
Qty: 90 TABLET | Refills: 0 | Status: SHIPPED | OUTPATIENT
Start: 2023-12-29

## 2024-01-04 ENCOUNTER — TELEPHONE (OUTPATIENT)
Dept: ADMINISTRATIVE | Age: 84
End: 2024-01-04

## 2024-01-04 DIAGNOSIS — J43.1 PANLOBULAR EMPHYSEMA (HCC): Chronic | ICD-10-CM

## 2024-01-04 NOTE — TELEPHONE ENCOUNTER
ALBUTEROL NEBULIZER SOLUTION NEEDS TO BE SENT TO DME. IT DOESN'T GOT THROUGH PHARMACY BENEFIT.    If this requires a response please respond to the pool. ([facility] MEDICINE Pre-Auth).    Please advise patient thank you.

## 2024-01-04 NOTE — TELEPHONE ENCOUNTER
ATTEMPTED TO CONTACT TO FIND OUT ABOUT A DME PROVIDER, PHONE JUST [employer], IF PATIENT CALLS BACK SEE IF CHAY WILL WORK FOR THIS. SC

## 2024-01-09 ENCOUNTER — OFFICE VISIT (OUTPATIENT)
Dept: FAMILY MEDICINE CLINIC | Age: 84
End: 2024-01-09
Payer: MEDICARE

## 2024-01-09 VITALS
HEART RATE: 57 BPM | DIASTOLIC BLOOD PRESSURE: 60 MMHG | WEIGHT: 201.2 LBS | OXYGEN SATURATION: 95 % | SYSTOLIC BLOOD PRESSURE: 108 MMHG | HEIGHT: 66 IN | BODY MASS INDEX: 32.33 KG/M2

## 2024-01-09 DIAGNOSIS — Z09 HOSPITAL DISCHARGE FOLLOW-UP: Primary | ICD-10-CM

## 2024-01-09 DIAGNOSIS — E11.29 TYPE 2 DIABETES MELLITUS WITH MICROALBUMINURIA, WITHOUT LONG-TERM CURRENT USE OF INSULIN (HCC): ICD-10-CM

## 2024-01-09 DIAGNOSIS — R80.9 TYPE 2 DIABETES MELLITUS WITH MICROALBUMINURIA, WITHOUT LONG-TERM CURRENT USE OF INSULIN (HCC): ICD-10-CM

## 2024-01-09 DIAGNOSIS — J43.1 PANLOBULAR EMPHYSEMA (HCC): Chronic | ICD-10-CM

## 2024-01-09 DIAGNOSIS — I63.211 CEREBROVASCULAR ACCIDENT (CVA) DUE TO OCCLUSION OF RIGHT VERTEBRAL ARTERY (HCC): ICD-10-CM

## 2024-01-09 PROBLEM — I63.20 CEREBROVASCULAR ACCIDENT (CVA) DUE TO OCCLUSION OF PRECEREBRAL ARTERY (HCC): Status: ACTIVE | Noted: 2024-01-09

## 2024-01-09 PROCEDURE — 3078F DIAST BP <80 MM HG: CPT

## 2024-01-09 PROCEDURE — 1123F ACP DISCUSS/DSCN MKR DOCD: CPT

## 2024-01-09 PROCEDURE — 99214 OFFICE O/P EST MOD 30 MIN: CPT

## 2024-01-09 PROCEDURE — G8427 DOCREV CUR MEDS BY ELIG CLIN: HCPCS

## 2024-01-09 PROCEDURE — 1111F DSCHRG MED/CURRENT MED MERGE: CPT

## 2024-01-09 PROCEDURE — G8417 CALC BMI ABV UP PARAM F/U: HCPCS

## 2024-01-09 PROCEDURE — 4004F PT TOBACCO SCREEN RCVD TLK: CPT

## 2024-01-09 PROCEDURE — 3023F SPIROM DOC REV: CPT

## 2024-01-09 PROCEDURE — G8484 FLU IMMUNIZE NO ADMIN: HCPCS

## 2024-01-09 PROCEDURE — 3074F SYST BP LT 130 MM HG: CPT

## 2024-01-09 RX ORDER — GLIPIZIDE 5 MG/1
TABLET ORAL
Qty: 180 TABLET | Refills: 1 | Status: SHIPPED | OUTPATIENT
Start: 2024-01-09

## 2024-01-09 RX ORDER — RIVAROXABAN 15 MG/1
TABLET, FILM COATED ORAL
COMMUNITY
Start: 2024-01-08

## 2024-01-09 RX ORDER — TORSEMIDE 20 MG/1
40 TABLET ORAL DAILY
COMMUNITY
Start: 2023-12-19

## 2024-01-09 ASSESSMENT — PATIENT HEALTH QUESTIONNAIRE - PHQ9
SUM OF ALL RESPONSES TO PHQ QUESTIONS 1-9: 1
SUM OF ALL RESPONSES TO PHQ QUESTIONS 1-9: 1
1. LITTLE INTEREST OR PLEASURE IN DOING THINGS: 1
SUM OF ALL RESPONSES TO PHQ QUESTIONS 1-9: 1
2. FEELING DOWN, DEPRESSED OR HOPELESS: 0
SUM OF ALL RESPONSES TO PHQ QUESTIONS 1-9: 1
SUM OF ALL RESPONSES TO PHQ9 QUESTIONS 1 & 2: 1

## 2024-01-09 NOTE — PATIENT INSTRUCTIONS
You may receive a survey regarding the care you received during your visit.  Your input is valuable to us.  We encourage you to complete and return your survey.  We hope you will choose us in the future for your healthcare needs. GENERAL OFFICE POLICIES      Telephone Calls: Messages will be answered within 1-2 business days, unless the provider is out of the office.  If it is urgent a covering provider will answer. (this does not include Medication refills).    MyChart:  We recommend all patients sign up for [employer].  Through this portal you can see your lab results, request refills, schedule appointments, pay your bill and send messages to the office.   [employer] messages will be answered within 1-2 business days unless the provider is out of the office.  For urgent matters, please call the office.  Appointments:  All appointments must be scheduled.  We ask all patients to schedule their next follow up appointment before they leave the office to make sure you will be able to be seen before you run out of medications.  24 hours notice is required to cancel or reschedule an appointment to avoid being marked as a no show.  You may be dismissed from the practice after 3 no shows.    LATE for Appointment: If you are 15 or more minutes late for your appointment, you may be asked to reschedule.  MA/LAB APPTS: Must be scheduled, cannot accept walk in lab visits.  We only draw labs for patients established in our office.  We only do injections for medications ordered by our office.  Acute Sick Visits:  Nothing other than acute complaint will be addressed at this visit.  TRADITIONAL MEDICARE  DOES NOT COVER PHYSICALS  MEDICARE WELLNESS VISITS: These are NOT physicals but the free annual visit offered by Medicare to discuss wellness issues. Medication refills, checkups, etc. will not be addressed during this visit.  Medication Refills: Refills are handled electronically so please contact your pharmacy for medication refills

## 2024-01-09 NOTE — PROGRESS NOTES
Post-Discharge Transitional Care Management Progress Note      Ernesto Ny   YOB: 1940    Date of Office Visit:  1/9/2024  Date of Hospital Admission: 12/05/2023  Date of Hospital Discharge: 12/19/2023    Care management risk score Rising risk (score 2-5) and Complex Care (Scores >=6): No Risk Score On File     Non face to face  following discharge, date last encounter closed (first attempt may have been earlier): *No documented post hospital discharge outreach found in the last 14 days *No documented post hospital discharge outreach found in the last 14 days    Call initiated 2 business days of discharge: Yes    ASSESSMENT/PLAN:   Hospital discharge follow-up  -Inpatient stay reviewed including provider notes, imaging, procedures, blood work, medications, vitals, and discharge note.  -Follow-up in 4 months with diabetes.  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  -Patient was given aggressive anticoagulation inpatient, intubated for airway protection, extubated and given PT, OT, SLP.  -Encouraged to stay active with therapies at home, practice on his own.  -Continue PT, OT, SLP therapies.  Continue metoprolol, amlodipine, diabetes control, Lipitor, Xarelto.  -Continue plan to follow-up with neurology and cardiology by the end of this month.  -Follow-up in 4 months.  Panlobular emphysema (HCC)  -Refill of the pharmacy  -Follow-up in 4 months.  -     albuterol (PROVENTIL) (5 MG/ML) 0.5% nebulizer solution; Take 1 mL by nebulization 4 times daily as needed for Wheezing, Disp-120 each, R-3Normal  Type 2 diabetes mellitus with microalbuminuria, without long-term current use of insulin (HCC)  -Refill sent to the pharmacy.  Follow-up in 4 months.  -     glipiZIDE

## 2024-01-10 ENCOUNTER — TELEPHONE (OUTPATIENT)
Dept: FAMILY MEDICINE CLINIC | Age: 84
End: 2024-01-10

## 2024-01-10 NOTE — TELEPHONE ENCOUNTER
CALLED AND SPOKE TO PHARMACIST SONAL AT [facility] AND CHANGED TO PREMIXED SOLUTION. SC

## 2024-01-10 NOTE — TELEPHONE ENCOUNTER
Pharmacy called stating that the insurance will not pay for the medication that is not premixed.  Can you prescribe the already mixed, they do not have the saline.    albuterol (PROVENTIL) (5 MG/ML) 0.5% nebulizer solution     [facility] [place] - [address] - P [phone] - F [phone] [address] [place] [address]  Phone: [phone]  Fax: [phone]

## 2024-01-11 ENCOUNTER — TELEPHONE (OUTPATIENT)
Dept: FAMILY MEDICINE CLINIC | Age: 84
End: 2024-01-11

## 2024-01-17 ENCOUNTER — TELEPHONE (OUTPATIENT)
Dept: FAMILY MEDICINE CLINIC | Age: 84
End: 2024-01-17

## 2024-01-17 NOTE — TELEPHONE ENCOUNTER
Stephanie from [facility] is calling to let us know that she has discharge him from PT as of today. JUST ELEAZAR

## 2024-01-18 ENCOUNTER — TELEPHONE (OUTPATIENT)
Dept: FAMILY MEDICINE CLINIC | Age: 84
End: 2024-01-18

## 2024-02-19 ENCOUNTER — TELEPHONE (OUTPATIENT)
Dept: FAMILY MEDICINE CLINIC | Age: 84
End: 2024-02-19

## 2024-02-19 NOTE — TELEPHONE ENCOUNTER
Begin taking just a half a tablet daily, continue monitoring blood sugar.  If less than 80 or greater than 300, call back

## 2024-02-19 NOTE — TELEPHONE ENCOUNTER
Darby beasley [facility] called and states welsh called the patient on 2/16/24. They were asking patient about his glipizide 5mg tablets, patient informed them that he is not taking this medication as prescribed, he is only taking this every other day due to blood sugar dropping, she wanted Quinton to be aware and we can call patient with any other instructions on how to proceed from here. If you have further questions please call Darby. Sc

## 2024-03-12 ENCOUNTER — TELEPHONE (OUTPATIENT)
Dept: FAMILY MEDICINE CLINIC | Age: 84
End: 2024-03-12

## 2024-03-12 NOTE — TELEPHONE ENCOUNTER
Patient is not taking his medication GLIPIZIDE 5 MG TABLET - he feels like he is taking to much medication.  JUST ELEAZAR

## 2024-03-27 DIAGNOSIS — I10 ESSENTIAL HYPERTENSION, BENIGN: ICD-10-CM

## 2024-03-27 DIAGNOSIS — I25.10 CORONARY ARTERY DISEASE DUE TO LIPID RICH PLAQUE: Chronic | ICD-10-CM

## 2024-03-27 DIAGNOSIS — I25.83 CORONARY ARTERY DISEASE DUE TO LIPID RICH PLAQUE: Chronic | ICD-10-CM

## 2024-03-27 NOTE — TELEPHONE ENCOUNTER
Patients son stopped in to get patients medication refilled       Metoprolol 25 MG, 1/2 1x daily, 90 days         [facility]   Phone number

## 2024-03-28 RX ORDER — ATORVASTATIN CALCIUM 40 MG/1
40 TABLET, FILM COATED ORAL DAILY
Qty: 90 TABLET | Refills: 0 | Status: SHIPPED | OUTPATIENT
Start: 2024-03-28

## 2024-04-24 ENCOUNTER — TELEPHONE (OUTPATIENT)
Dept: FAMILY MEDICINE CLINIC | Age: 84
End: 2024-04-24

## 2024-04-24 NOTE — TELEPHONE ENCOUNTER
CALLED AND SPOKE TO PATIENT SON AND ADVISED HIM TO TAKE PATIENT TO THE ER PER MAURILIO RECOMMENDATION. SON ALSO ADVISED ME THAT PATIENT HAS BEEN GOING TO [facility] AND GETTING INJECTIONS IN HIS EYE AS WELL. ADVISED SON TO CALL [facility] AND SEE IF THIS IS A SIDE EFFECT OF THE INJECTIONS. MAURILIO GONZALEZ AWARE OF THE MESSAGE. SC

## 2024-04-24 NOTE — TELEPHONE ENCOUNTER
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 He had a stroke back in December. What does he need to do? Please give him a call back.     [facility] [address] - phone no.[phone]

## 2024-04-30 NOTE — PATIENT INSTRUCTIONS
at time of . Instead of the fee, you can choose to have the paperwork filled out during a separate office visit that is for filling out the paperwork only.  Medication Samples:  This office does not carry medication samples.  If you need assistance in getting your medications, then please let the medical assistant know so they can help you sign up for a drug assistance program that can help get medications at a reduced cost or even free (if you qualify).  Workman's Comp Claims: We do not handle workman's comp cases or claims. You will need to go to an urgent care to be seen or to whomever your employer uses.  General - Any abusive/rude behavior toward staff/providers may be cause for dismissal.      WE NOW OFFER [facility] SELF-SCHEDULING   IN 3 EASY STEPS    SCHEDULE AN APPT AT YOUR CONVENIENCE WITH NO HOLD/WAIT TIME  IN THE [facility] ALEXA SELECT 'SCHEDULE AN APPOINTMENT' FROM THE MENU  CHOOSE DATE/TIME THAT WORKS FOR YOU    If you don't find an appointment time that works for your schedule, you can also submit an appointment request thru [employer].    CONVENIENT QUALITY CARE AT YOUR FINGERTIPS    NOT ON [employer]?  PLEASE ASK ANY STAFF MEMBER You may receive a survey regarding the care you received during your visit.  Your input is valuable to us.  We encourage you to complete and return your survey.  We hope you will choose us in the future for your healthcare needs.

## 2024-05-02 ENCOUNTER — OFFICE VISIT (OUTPATIENT)
Dept: FAMILY MEDICINE CLINIC | Age: 84
End: 2024-05-02

## 2024-05-02 VITALS
SYSTOLIC BLOOD PRESSURE: 126 MMHG | WEIGHT: 193.6 LBS | HEART RATE: 48 BPM | DIASTOLIC BLOOD PRESSURE: 80 MMHG | BODY MASS INDEX: 32.26 KG/M2 | OXYGEN SATURATION: 99 % | HEIGHT: 65 IN

## 2024-05-02 DIAGNOSIS — E11.29 TYPE 2 DIABETES MELLITUS WITH MICROALBUMINURIA, WITHOUT LONG-TERM CURRENT USE OF INSULIN (HCC): Primary | ICD-10-CM

## 2024-05-02 DIAGNOSIS — R80.9 TYPE 2 DIABETES MELLITUS WITH MICROALBUMINURIA, WITHOUT LONG-TERM CURRENT USE OF INSULIN (HCC): Primary | ICD-10-CM

## 2024-05-02 LAB
CREATININE URINE POCT: 10
HBA1C MFR BLD: 7.1 %
MICROALBUMIN/CREAT 24H UR: 10 MG/G{CREAT}
MICROALBUMIN/CREAT UR-RTO: ABNORMAL

## 2024-05-02 RX ORDER — BLOOD-GLUCOSE METER
KIT MISCELLANEOUS
Qty: 200 STRIP | Refills: 3 | Status: SHIPPED | OUTPATIENT
Start: 2024-05-02

## 2024-05-02 RX ORDER — LANCETS 28 GAUGE
EACH MISCELLANEOUS
Qty: 200 EACH | Refills: 3 | Status: SHIPPED | OUTPATIENT
Start: 2024-05-02

## 2024-05-02 SDOH — ECONOMIC STABILITY: FOOD INSECURITY: WITHIN THE PAST 12 MONTHS, THE FOOD YOU BOUGHT JUST DIDN'T LAST AND YOU DIDN'T HAVE MONEY TO GET MORE.: NEVER TRUE

## 2024-05-02 SDOH — ECONOMIC STABILITY: HOUSING INSECURITY
IN THE LAST 12 MONTHS, WAS THERE A TIME WHEN YOU DID NOT HAVE A STEADY PLACE TO SLEEP OR SLEPT IN A SHELTER (INCLUDING NOW)?: NO

## 2024-05-02 SDOH — ECONOMIC STABILITY: FOOD INSECURITY: WITHIN THE PAST 12 MONTHS, YOU WORRIED THAT YOUR FOOD WOULD RUN OUT BEFORE YOU GOT MONEY TO BUY MORE.: NEVER TRUE

## 2024-05-02 SDOH — ECONOMIC STABILITY: INCOME INSECURITY: HOW HARD IS IT FOR YOU TO PAY FOR THE VERY BASICS LIKE FOOD, HOUSING, MEDICAL CARE, AND HEATING?: NOT HARD AT ALL

## 2024-05-02 ASSESSMENT — ENCOUNTER SYMPTOMS
PHOTOPHOBIA: 0
NAUSEA: 0
ABDOMINAL DISTENTION: 0
DIARRHEA: 0
WHEEZING: 0
VOMITING: 0
CHEST TIGHTNESS: 0
SHORTNESS OF BREATH: 0
ABDOMINAL PAIN: 0
COUGH: 0
CONSTIPATION: 0
COLOR CHANGE: 0

## 2024-05-02 NOTE — PROGRESS NOTES
Ernesto Ny (:  1940) is a 83 y.o. male,Established patient, here for evaluation of the following chief complaint(s):  Diabetes (Routine follow up for DM, A1C &  MICRO DUE )      Assessment & Plan   1. Type 2 diabetes mellitus with microalbuminuria, without long-term current use of insulin ([facility])  -A1c 7.1% today.   -Continue current medication regimen.  Increase physical activity as tolerated, more walking.  Decrease simple carbohydrates in the diet, increase protein.  -Refills being sent to the pharmacy.  -Continue glipizide daily, metformin twice daily.  -Follow-up in 4 months for diabetes and AWV.  -     POCT glycosylated hemoglobin (Hb A1C)  -     POCT microalbumin  -     blood glucose test strips (FREESTYLE LITE) strip; TEST TWICE DAILY, Disp-200 strip, R-3Normal  -     FreeStyle Lancets MISC; Disp-200 each, R-3, NormalUSE TO TEST TWICE DAILY      Return in about 4 months (around 2024) for Diabetes Follow-up, Annual Wellness Visit.       Subjective   HPI  He is doing okay, getting by. He has neuropathy bilaterally, can be waist down. It is not as bad when laying down at night. It starts up when he sits up. He can't feel his back much. He can get around slowly, no falls. Will use a walker when doing more. He reports his ears have a lot of brown nasty bits from his ears. His breathing is not always great, no allergy concerns. Bread has been tasting sweet, so he has been eating less bread. He is looking forward to fishing. He is not sleeping to best, not going to the bathroom as much since less water pill. He wakes up 130-2a, and hard to fall back asleep. About 4 hours a night. He is using his CPAP. His appetite is not the best, eating 1-2 meals a day, not always the healthiest. He does drink enough liquids. Likes coffee and juice as well. He has lost a few pounds.       Review of Systems   Constitutional:  Negative for activity change, appetite change, chills, diaphoresis, fatigue, fever and

## 2024-05-16 ENCOUNTER — TELEPHONE (OUTPATIENT)
Dept: FAMILY MEDICINE CLINIC | Age: 84
End: 2024-05-16

## 2024-06-25 DIAGNOSIS — I10 ESSENTIAL HYPERTENSION, BENIGN: ICD-10-CM

## 2024-06-25 DIAGNOSIS — I25.10 CORONARY ARTERY DISEASE DUE TO LIPID RICH PLAQUE: Chronic | ICD-10-CM

## 2024-06-25 DIAGNOSIS — I25.83 CORONARY ARTERY DISEASE DUE TO LIPID RICH PLAQUE: Chronic | ICD-10-CM

## 2024-06-25 RX ORDER — ATORVASTATIN CALCIUM 40 MG/1
40 TABLET, FILM COATED ORAL DAILY
Qty: 90 TABLET | Refills: 0 | Status: SHIPPED | OUTPATIENT
Start: 2024-06-25

## 2024-06-25 RX ORDER — AMLODIPINE BESYLATE 2.5 MG/1
2.5 TABLET ORAL DAILY
Qty: 90 TABLET | Refills: 0 | Status: SHIPPED | OUTPATIENT
Start: 2024-06-25

## 2024-06-25 NOTE — TELEPHONE ENCOUNTER
Patient was asking if we can get his medication refilled     Metoprolol 25 MG, 90 Days     [facility]   Phone number [phone]

## 2024-08-09 NOTE — PATIENT INSTRUCTIONS

## 2024-08-12 ENCOUNTER — OFFICE VISIT (OUTPATIENT)
Dept: FAMILY MEDICINE CLINIC | Age: 84
End: 2024-08-12

## 2024-08-12 VITALS
DIASTOLIC BLOOD PRESSURE: 74 MMHG | WEIGHT: 195.4 LBS | RESPIRATION RATE: 18 BRPM | BODY MASS INDEX: 32.55 KG/M2 | SYSTOLIC BLOOD PRESSURE: 124 MMHG | HEART RATE: 58 BPM | HEIGHT: 65 IN

## 2024-08-12 DIAGNOSIS — B96.89 ACUTE BACTERIAL SINUSITIS: ICD-10-CM

## 2024-08-12 DIAGNOSIS — L97.819 NON-PRESSURE CHRONIC ULCER OF OTHER PART OF RIGHT LOWER LEG WITH UNSPECIFIED SEVERITY (HCC): ICD-10-CM

## 2024-08-12 DIAGNOSIS — M46.1 SACROILIITIS, NOT ELSEWHERE CLASSIFIED (HCC): ICD-10-CM

## 2024-08-12 DIAGNOSIS — I27.20 PULMONARY HYPERTENSION (HCC): ICD-10-CM

## 2024-08-12 DIAGNOSIS — E78.00 PURE HYPERCHOLESTEROLEMIA: ICD-10-CM

## 2024-08-12 DIAGNOSIS — E11.42 DIABETIC POLYNEUROPATHY ASSOCIATED WITH TYPE 2 DIABETES MELLITUS (HCC): ICD-10-CM

## 2024-08-12 DIAGNOSIS — I10 ESSENTIAL HYPERTENSION, BENIGN: ICD-10-CM

## 2024-08-12 DIAGNOSIS — F11.20 NARCOTIC DEPENDENCY, CONTINUOUS (HCC): ICD-10-CM

## 2024-08-12 DIAGNOSIS — J81.0 ACUTE PULMONARY EDEMA (HCC): ICD-10-CM

## 2024-08-12 DIAGNOSIS — I48.19 PERSISTENT ATRIAL FIBRILLATION (HCC): ICD-10-CM

## 2024-08-12 DIAGNOSIS — Z00.00 INITIAL MEDICARE ANNUAL WELLNESS VISIT: Primary | ICD-10-CM

## 2024-08-12 DIAGNOSIS — E11.29 TYPE 2 DIABETES MELLITUS WITH MICROALBUMINURIA, WITHOUT LONG-TERM CURRENT USE OF INSULIN (HCC): ICD-10-CM

## 2024-08-12 DIAGNOSIS — R80.9 TYPE 2 DIABETES MELLITUS WITH MICROALBUMINURIA, WITHOUT LONG-TERM CURRENT USE OF INSULIN (HCC): ICD-10-CM

## 2024-08-12 DIAGNOSIS — J01.90 ACUTE BACTERIAL SINUSITIS: ICD-10-CM

## 2024-08-12 DIAGNOSIS — J31.0 CHRONIC RHINITIS: ICD-10-CM

## 2024-08-12 DIAGNOSIS — Z12.5 SCREENING FOR MALIGNANT NEOPLASM OF PROSTATE: ICD-10-CM

## 2024-08-12 LAB
CHOLEST SERPL-MCNC: 98 MG/DL (ref 0–199)
HBA1C MFR BLD: 7.5 %
HDLC SERPL-MCNC: 43 MG/DL (ref 40–60)
LDLC SERPL CALC-MCNC: 41 MG/DL
PSA SERPL DL<=0.01 NG/ML-MCNC: 2.52 NG/ML (ref 0–4)
TRIGL SERPL-MCNC: 72 MG/DL (ref 0–150)
VLDLC SERPL CALC-MCNC: 14 MG/DL

## 2024-08-12 RX ORDER — PREGABALIN 75 MG/1
75 CAPSULE ORAL 2 TIMES DAILY
Qty: 180 CAPSULE | Refills: 1 | Status: SHIPPED | OUTPATIENT
Start: 2024-08-12 | End: 2025-02-08

## 2024-08-12 RX ORDER — LANCETS 28 GAUGE
EACH MISCELLANEOUS
Qty: 200 EACH | Refills: 3 | Status: SHIPPED | OUTPATIENT
Start: 2024-08-12

## 2024-08-12 RX ORDER — AMOXICILLIN AND CLAVULANATE POTASSIUM 875; 125 MG/1; MG/1
1 TABLET, FILM COATED ORAL 2 TIMES DAILY
Qty: 14 TABLET | Refills: 0 | Status: SHIPPED | OUTPATIENT
Start: 2024-08-12 | End: 2024-08-19

## 2024-08-12 RX ORDER — BLOOD-GLUCOSE METER
KIT MISCELLANEOUS
Qty: 200 STRIP | Refills: 3 | Status: SHIPPED | OUTPATIENT
Start: 2024-08-12

## 2024-08-12 RX ORDER — MONTELUKAST SODIUM 10 MG/1
10 TABLET ORAL DAILY
Qty: 90 TABLET | Refills: 3 | Status: SHIPPED | OUTPATIENT
Start: 2024-08-12

## 2024-08-12 ASSESSMENT — PATIENT HEALTH QUESTIONNAIRE - PHQ9
1. LITTLE INTEREST OR PLEASURE IN DOING THINGS: NEARLY EVERY DAY
SUM OF ALL RESPONSES TO PHQ QUESTIONS 1-9: 12
6. FEELING BAD ABOUT YOURSELF - OR THAT YOU ARE A FAILURE OR HAVE LET YOURSELF OR YOUR FAMILY DOWN: NOT AT ALL
5. POOR APPETITE OR OVEREATING: NOT AT ALL
8. MOVING OR SPEAKING SO SLOWLY THAT OTHER PEOPLE COULD HAVE NOTICED. OR THE OPPOSITE, BEING SO FIGETY OR RESTLESS THAT YOU HAVE BEEN MOVING AROUND A LOT MORE THAN USUAL: NOT AT ALL
SUM OF ALL RESPONSES TO PHQ QUESTIONS 1-9: 12
7. TROUBLE CONCENTRATING ON THINGS, SUCH AS READING THE NEWSPAPER OR WATCHING TELEVISION: SEVERAL DAYS
4. FEELING TIRED OR HAVING LITTLE ENERGY: NEARLY EVERY DAY
SUM OF ALL RESPONSES TO PHQ QUESTIONS 1-9: 12
SUM OF ALL RESPONSES TO PHQ9 QUESTIONS 1 & 2: 5
9. THOUGHTS THAT YOU WOULD BE BETTER OFF DEAD, OR OF HURTING YOURSELF: NOT AT ALL
SUM OF ALL RESPONSES TO PHQ QUESTIONS 1-9: 12
3. TROUBLE FALLING OR STAYING ASLEEP: NEARLY EVERY DAY
2. FEELING DOWN, DEPRESSED OR HOPELESS: MORE THAN HALF THE DAYS
10. IF YOU CHECKED OFF ANY PROBLEMS, HOW DIFFICULT HAVE THESE PROBLEMS MADE IT FOR YOU TO DO YOUR WORK, TAKE CARE OF THINGS AT HOME, OR GET ALONG WITH OTHER PEOPLE: SOMEWHAT DIFFICULT

## 2024-08-12 ASSESSMENT — LIFESTYLE VARIABLES
HOW OFTEN DO YOU HAVE A DRINK CONTAINING ALCOHOL: MONTHLY OR LESS
HOW MANY STANDARD DRINKS CONTAINING ALCOHOL DO YOU HAVE ON A TYPICAL DAY: 1 OR 2

## 2024-08-12 NOTE — PROGRESS NOTES
to follow-up in this office for further evaluation and treatment    Depression:  PHQ-2 Score: 5  PHQ-9 Total Score: 12  Total Score Interpretation: 10-14 = moderate depression  Interventions:  Patient comments: he has been doing okay, some stress  Patient declines any further evaluation or treatment      Controlled Medication Review:      Today's Pain Level: No data recorded     Opioid Risk: (High risk score ?55) Opioid risk score: 90      Last PDMP Michael as Reviewed:  Review User Review Instant Review Result   DREA LONDON 12/12/2022  8:19 AM @   Reviewed PDMP [1]     Last Controlled Substance Monitoring Documentation      Flowsheet Row Office Visit from 3/15/2018 in [facility]   Attestation The Prescription Monitoring Report for this patient was reviewed today. filed at 03/15/2018 1209   Periodic Controlled Substance Monitoring No signs of potential drug abuse or diversion identified. filed at 03/15/2018 1209              Self-assessment of health:  In general, how would you say your health is?: (!) Poor    Interventions:  Patient comments: stroke is concerning  Patient declines any further evaluation or treatment    General HRA Questions:  Select all that apply: (!) New or Increased Pain, New or Increased Fatigue  Interventions - Pain:  See above  Interventions Fatigue:  See above      Inactivity:  On average, how many days per week do you engage in moderate to strenuous exercise (like a brisk walk)?: 0 days (!) Abnormal  On average, how many minutes do you engage in exercise at this level?: 0 min  Interventions:  Patient comments: hard to get around, does try to stay active in the house  Patient declined any further interventions or treatment    Poor Eating Habits/Diet:  Do you eat balanced/healthy meals regularly?: (!) No  Interventions:  Patient comments: likes crackle barrel, drinks 1-2 bottles of boost a day.   Patient declines any

## 2024-08-24 ENCOUNTER — APPOINTMENT (OUTPATIENT)
Dept: GENERAL RADIOLOGY | Age: 84
End: 2024-08-24
Payer: MEDICARE

## 2024-08-24 ENCOUNTER — HOSPITAL ENCOUNTER (INPATIENT)
Age: 84
LOS: 6 days | Discharge: HOME | End: 2024-08-30
Attending: STUDENT IN AN ORGANIZED HEALTH CARE EDUCATION/TRAINING PROGRAM | Admitting: STUDENT IN AN ORGANIZED HEALTH CARE EDUCATION/TRAINING PROGRAM
Payer: MEDICARE

## 2024-08-24 DIAGNOSIS — J96.02 ACUTE RESPIRATORY FAILURE WITH HYPOXIA AND HYPERCAPNIA (HCC): Primary | ICD-10-CM

## 2024-08-24 DIAGNOSIS — N17.9 AKI (ACUTE KIDNEY INJURY) (HCC): ICD-10-CM

## 2024-08-24 DIAGNOSIS — I50.9 ACUTE ON CHRONIC CONGESTIVE HEART FAILURE, UNSPECIFIED HEART FAILURE TYPE (HCC): ICD-10-CM

## 2024-08-24 DIAGNOSIS — I50.33 ACUTE ON CHRONIC DIASTOLIC CONGESTIVE HEART FAILURE (HCC): ICD-10-CM

## 2024-08-24 DIAGNOSIS — J90 PLEURAL EFFUSION ON RIGHT: ICD-10-CM

## 2024-08-24 DIAGNOSIS — J96.01 ACUTE RESPIRATORY FAILURE WITH HYPOXIA AND HYPERCAPNIA (HCC): Primary | ICD-10-CM

## 2024-08-24 PROBLEM — I50.31 ACUTE HEART FAILURE WITH PRESERVED EJECTION FRACTION (HCC): Status: ACTIVE | Noted: 2024-08-24

## 2024-08-24 LAB
ALBUMIN SERPL-MCNC: 3.7 G/DL (ref 3.4–5)
ALBUMIN/GLOB SERPL: 1.3 {RATIO} (ref 1.1–2.2)
ALP SERPL-CCNC: 64 U/L (ref 40–129)
ALT SERPL-CCNC: 17 U/L (ref 10–40)
ANION GAP SERPL CALCULATED.3IONS-SCNC: 12 MMOL/L (ref 3–16)
AST SERPL-CCNC: 17 U/L (ref 15–37)
BASE EXCESS BLDV CALC-SCNC: -0.7 MMOL/L
BASE EXCESS BLDV CALC-SCNC: -1.5 MMOL/L
BASE EXCESS BLDV CALC-SCNC: -1.8 MMOL/L
BASE EXCESS BLDV CALC-SCNC: 0.8 MMOL/L
BASOPHILS # BLD: 0 K/UL (ref 0–0.2)
BASOPHILS NFR BLD: 0.3 %
BILIRUB SERPL-MCNC: 0.3 MG/DL (ref 0–1)
BUN SERPL-MCNC: 104 MG/DL (ref 7–20)
CALCIUM SERPL-MCNC: 8.6 MG/DL (ref 8.3–10.6)
CHLORIDE SERPL-SCNC: 103 MMOL/L (ref 99–110)
CO2 BLDV-SCNC: 28 MMOL/L
CO2 BLDV-SCNC: 29 MMOL/L
CO2 BLDV-SCNC: 29 MMOL/L
CO2 BLDV-SCNC: 31 MMOL/L
CO2 SERPL-SCNC: 23 MMOL/L (ref 21–32)
COHGB MFR BLDV: 2 %
COHGB MFR BLDV: 2 %
COHGB MFR BLDV: 3.1 %
COHGB MFR BLDV: 3.6 %
CREAT SERPL-MCNC: 2.2 MG/DL (ref 0.8–1.3)
DEPRECATED RDW RBC AUTO: 16.4 % (ref 12.4–15.4)
EOSINOPHIL # BLD: 0 K/UL (ref 0–0.6)
EOSINOPHIL NFR BLD: 0.3 %
GFR SERPLBLD CREATININE-BSD FMLA CKD-EPI: 29 ML/MIN/{1.73_M2}
GLUCOSE BLD-MCNC: 168 MG/DL (ref 70–99)
GLUCOSE BLD-MCNC: 285 MG/DL (ref 70–99)
GLUCOSE SERPL-MCNC: 254 MG/DL (ref 70–99)
HCO3 BLDV-SCNC: 26 MMOL/L (ref 23–29)
HCO3 BLDV-SCNC: 27 MMOL/L (ref 23–29)
HCO3 BLDV-SCNC: 27 MMOL/L (ref 23–29)
HCO3 BLDV-SCNC: 29 MMOL/L (ref 23–29)
HCT VFR BLD AUTO: 29.9 % (ref 40.5–52.5)
HGB BLD-MCNC: 9.7 G/DL (ref 13.5–17.5)
IRON SATN MFR SERPL: 9 % (ref 20–50)
IRON SERPL-MCNC: 30 UG/DL (ref 59–158)
LYMPHOCYTES # BLD: 1 K/UL (ref 1–5.1)
LYMPHOCYTES NFR BLD: 13.3 %
MCH RBC QN AUTO: 30.9 PG (ref 26–34)
MCHC RBC AUTO-ENTMCNC: 32.5 G/DL (ref 31–36)
MCV RBC AUTO: 95.1 FL (ref 80–100)
METHGB MFR BLDV: 0.4 %
METHGB MFR BLDV: 0.6 %
METHGB MFR BLDV: 0.7 %
METHGB MFR BLDV: 0.7 %
MONOCYTES # BLD: 0.4 K/UL (ref 0–1.3)
MONOCYTES NFR BLD: 5 %
NEUTROPHILS # BLD: 6.3 K/UL (ref 1.7–7.7)
NEUTROPHILS NFR BLD: 81.1 %
NT-PROBNP SERPL-MCNC: 7999 PG/ML (ref 0–449)
O2 THERAPY: ABNORMAL
ORGANISM: ABNORMAL
PCO2 BLDV: 57.8 MMHG (ref 40–50)
PCO2 BLDV: 59.4 MMHG (ref 40–50)
PCO2 BLDV: 66.9 MMHG (ref 40–50)
PCO2 BLDV: 67 MMHG (ref 40–50)
PERFORMED ON: ABNORMAL
PERFORMED ON: ABNORMAL
PH BLDV: 7.21 [PH] (ref 7.35–7.45)
PH BLDV: 7.25 [PH] (ref 7.35–7.45)
PH BLDV: 7.25 [PH] (ref 7.35–7.45)
PH BLDV: 7.27 [PH] (ref 7.35–7.45)
PLATELET # BLD AUTO: 288 K/UL (ref 135–450)
PMV BLD AUTO: 7.8 FL (ref 5–10.5)
PO2 BLDV: 38 MMHG
PO2 BLDV: <30 MMHG
POTASSIUM SERPL-SCNC: 5.4 MMOL/L (ref 3.5–5.1)
POTASSIUM SERPL-SCNC: 5.8 MMOL/L (ref 3.5–5.1)
PROT SERPL-MCNC: 6.6 G/DL (ref 6.4–8.2)
RBC # BLD AUTO: 3.15 M/UL (ref 4.2–5.9)
REASON FOR REJECTION: NORMAL
REJECTED TEST: NORMAL
REPORT: NORMAL
RESP PATH DNA+RNA PNL L RESP NAA+NON-PRB: ABNORMAL
SAO2 % BLDV: 17 %
SAO2 % BLDV: 38 %
SAO2 % BLDV: 48 %
SAO2 % BLDV: 68 %
SARS-COV-2 RDRP RESP QL NAA+PROBE: NOT DETECTED
SODIUM SERPL-SCNC: 138 MMOL/L (ref 136–145)
TIBC SERPL-MCNC: 321 UG/DL (ref 260–445)
TROPONIN, HIGH SENSITIVITY: 63 NG/L (ref 0–22)
TROPONIN, HIGH SENSITIVITY: 66 NG/L (ref 0–22)
WBC # BLD AUTO: 7.8 K/UL (ref 4–11)

## 2024-08-24 PROCEDURE — 87633 RESP VIRUS 12-25 TARGETS: CPT

## 2024-08-24 PROCEDURE — 6370000000 HC RX 637 (ALT 250 FOR IP): Performed by: STUDENT IN AN ORGANIZED HEALTH CARE EDUCATION/TRAINING PROGRAM

## 2024-08-24 PROCEDURE — 84132 ASSAY OF SERUM POTASSIUM: CPT

## 2024-08-24 PROCEDURE — 94761 N-INVAS EAR/PLS OXIMETRY MLT: CPT

## 2024-08-24 PROCEDURE — 87070 CULTURE OTHR SPECIMN AEROBIC: CPT

## 2024-08-24 PROCEDURE — 82803 BLOOD GASES ANY COMBINATION: CPT

## 2024-08-24 PROCEDURE — 36415 COLL VENOUS BLD VENIPUNCTURE: CPT

## 2024-08-24 PROCEDURE — 2580000003 HC RX 258: Performed by: STUDENT IN AN ORGANIZED HEALTH CARE EDUCATION/TRAINING PROGRAM

## 2024-08-24 PROCEDURE — 83880 ASSAY OF NATRIURETIC PEPTIDE: CPT

## 2024-08-24 PROCEDURE — 83550 IRON BINDING TEST: CPT

## 2024-08-24 PROCEDURE — 80053 COMPREHEN METABOLIC PANEL: CPT

## 2024-08-24 PROCEDURE — 6360000002 HC RX W HCPCS: Performed by: PHYSICIAN ASSISTANT

## 2024-08-24 PROCEDURE — 2700000000 HC OXYGEN THERAPY PER DAY

## 2024-08-24 PROCEDURE — 93005 ELECTROCARDIOGRAM TRACING: CPT | Performed by: STUDENT IN AN ORGANIZED HEALTH CARE EDUCATION/TRAINING PROGRAM

## 2024-08-24 PROCEDURE — 96374 THER/PROPH/DIAG INJ IV PUSH: CPT

## 2024-08-24 PROCEDURE — 85025 COMPLETE CBC W/AUTO DIFF WBC: CPT

## 2024-08-24 PROCEDURE — 84484 ASSAY OF TROPONIN QUANT: CPT

## 2024-08-24 PROCEDURE — 2000000000 HC ICU R&B

## 2024-08-24 PROCEDURE — 83540 ASSAY OF IRON: CPT

## 2024-08-24 PROCEDURE — 99285 EMERGENCY DEPT VISIT HI MDM: CPT

## 2024-08-24 PROCEDURE — 94640 AIRWAY INHALATION TREATMENT: CPT

## 2024-08-24 PROCEDURE — 87635 SARS-COV-2 COVID-19 AMP PRB: CPT

## 2024-08-24 PROCEDURE — 87077 CULTURE AEROBIC IDENTIFY: CPT

## 2024-08-24 PROCEDURE — 71045 X-RAY EXAM CHEST 1 VIEW: CPT

## 2024-08-24 PROCEDURE — 87205 SMEAR GRAM STAIN: CPT

## 2024-08-24 PROCEDURE — 6370000000 HC RX 637 (ALT 250 FOR IP): Performed by: PHYSICIAN ASSISTANT

## 2024-08-24 PROCEDURE — 6360000002 HC RX W HCPCS: Performed by: STUDENT IN AN ORGANIZED HEALTH CARE EDUCATION/TRAINING PROGRAM

## 2024-08-24 PROCEDURE — 87186 SC STD MICRODIL/AGAR DIL: CPT

## 2024-08-24 RX ORDER — ATORVASTATIN CALCIUM 40 MG/1
40 TABLET, FILM COATED ORAL DAILY
Status: DISCONTINUED | OUTPATIENT
Start: 2024-08-24 | End: 2024-08-30 | Stop reason: HOSPADM

## 2024-08-24 RX ORDER — DEXTROSE MONOHYDRATE 100 MG/ML
INJECTION, SOLUTION INTRAVENOUS CONTINUOUS PRN
Status: DISCONTINUED | OUTPATIENT
Start: 2024-08-24 | End: 2024-08-30 | Stop reason: HOSPADM

## 2024-08-24 RX ORDER — GLUCAGON 1 MG/ML
1 KIT INJECTION PRN
Status: DISCONTINUED | OUTPATIENT
Start: 2024-08-24 | End: 2024-08-30 | Stop reason: HOSPADM

## 2024-08-24 RX ORDER — INSULIN LISPRO 100 [IU]/ML
0-4 INJECTION, SOLUTION INTRAVENOUS; SUBCUTANEOUS
Status: DISCONTINUED | OUTPATIENT
Start: 2024-08-24 | End: 2024-08-27

## 2024-08-24 RX ORDER — MONTELUKAST SODIUM 10 MG/1
10 TABLET ORAL NIGHTLY
Status: DISCONTINUED | OUTPATIENT
Start: 2024-08-24 | End: 2024-08-30 | Stop reason: HOSPADM

## 2024-08-24 RX ORDER — ACETAMINOPHEN 650 MG/1
650 SUPPOSITORY RECTAL EVERY 6 HOURS PRN
Status: DISCONTINUED | OUTPATIENT
Start: 2024-08-24 | End: 2024-08-30 | Stop reason: HOSPADM

## 2024-08-24 RX ORDER — PREDNISONE 20 MG/1
40 TABLET ORAL DAILY
Status: DISCONTINUED | OUTPATIENT
Start: 2024-08-24 | End: 2024-08-30 | Stop reason: HOSPADM

## 2024-08-24 RX ORDER — TAMSULOSIN HYDROCHLORIDE 0.4 MG/1
0.4 CAPSULE ORAL DAILY
Status: DISCONTINUED | OUTPATIENT
Start: 2024-08-24 | End: 2024-08-24

## 2024-08-24 RX ORDER — INSULIN LISPRO 100 [IU]/ML
0-4 INJECTION, SOLUTION INTRAVENOUS; SUBCUTANEOUS NIGHTLY
Status: DISCONTINUED | OUTPATIENT
Start: 2024-08-24 | End: 2024-08-27

## 2024-08-24 RX ORDER — POLYETHYLENE GLYCOL 3350 17 G/17G
17 POWDER, FOR SOLUTION ORAL DAILY PRN
Status: DISCONTINUED | OUTPATIENT
Start: 2024-08-24 | End: 2024-08-30 | Stop reason: HOSPADM

## 2024-08-24 RX ORDER — FUROSEMIDE 10 MG/ML
40 INJECTION INTRAMUSCULAR; INTRAVENOUS 2 TIMES DAILY
Status: DISCONTINUED | OUTPATIENT
Start: 2024-08-24 | End: 2024-08-30 | Stop reason: HOSPADM

## 2024-08-24 RX ORDER — METOPROLOL TARTRATE 25 MG/1
12.5 TABLET, FILM COATED ORAL 2 TIMES DAILY
Status: DISCONTINUED | OUTPATIENT
Start: 2024-08-24 | End: 2024-08-26

## 2024-08-24 RX ORDER — FUROSEMIDE 10 MG/ML
40 INJECTION INTRAMUSCULAR; INTRAVENOUS ONCE
Status: COMPLETED | OUTPATIENT
Start: 2024-08-24 | End: 2024-08-24

## 2024-08-24 RX ORDER — SODIUM CHLORIDE 0.9 % (FLUSH) 0.9 %
5-40 SYRINGE (ML) INJECTION EVERY 12 HOURS SCHEDULED
Status: DISCONTINUED | OUTPATIENT
Start: 2024-08-24 | End: 2024-08-30 | Stop reason: HOSPADM

## 2024-08-24 RX ORDER — IPRATROPIUM BROMIDE AND ALBUTEROL SULFATE 2.5; .5 MG/3ML; MG/3ML
1 SOLUTION RESPIRATORY (INHALATION)
Status: DISCONTINUED | OUTPATIENT
Start: 2024-08-24 | End: 2024-08-30 | Stop reason: HOSPADM

## 2024-08-24 RX ORDER — IPRATROPIUM BROMIDE AND ALBUTEROL SULFATE 2.5; .5 MG/3ML; MG/3ML
1 SOLUTION RESPIRATORY (INHALATION) EVERY 4 HOURS PRN
COMMUNITY

## 2024-08-24 RX ORDER — SODIUM CHLORIDE 9 MG/ML
INJECTION, SOLUTION INTRAVENOUS PRN
Status: DISCONTINUED | OUTPATIENT
Start: 2024-08-24 | End: 2024-08-30 | Stop reason: HOSPADM

## 2024-08-24 RX ORDER — IPRATROPIUM BROMIDE AND ALBUTEROL SULFATE 2.5; .5 MG/3ML; MG/3ML
1 SOLUTION RESPIRATORY (INHALATION) ONCE
Status: COMPLETED | OUTPATIENT
Start: 2024-08-24 | End: 2024-08-24

## 2024-08-24 RX ORDER — AMLODIPINE BESYLATE 5 MG/1
2.5 TABLET ORAL DAILY
Status: DISCONTINUED | OUTPATIENT
Start: 2024-08-24 | End: 2024-08-30 | Stop reason: HOSPADM

## 2024-08-24 RX ORDER — ONDANSETRON 4 MG/1
4 TABLET, ORALLY DISINTEGRATING ORAL EVERY 8 HOURS PRN
Status: DISCONTINUED | OUTPATIENT
Start: 2024-08-24 | End: 2024-08-30 | Stop reason: HOSPADM

## 2024-08-24 RX ORDER — SODIUM CHLORIDE 0.9 % (FLUSH) 0.9 %
5-40 SYRINGE (ML) INJECTION PRN
Status: DISCONTINUED | OUTPATIENT
Start: 2024-08-24 | End: 2024-08-30 | Stop reason: HOSPADM

## 2024-08-24 RX ORDER — CLOPIDOGREL BISULFATE 75 MG/1
75 TABLET ORAL DAILY
Status: DISCONTINUED | OUTPATIENT
Start: 2024-08-24 | End: 2024-08-24

## 2024-08-24 RX ORDER — PREGABALIN 75 MG/1
75 CAPSULE ORAL 2 TIMES DAILY
Status: DISCONTINUED | OUTPATIENT
Start: 2024-08-24 | End: 2024-08-30 | Stop reason: HOSPADM

## 2024-08-24 RX ORDER — HYDROCODONE BITARTRATE AND ACETAMINOPHEN 10; 325 MG/1; MG/1
1 TABLET ORAL EVERY 6 HOURS PRN
Status: DISCONTINUED | OUTPATIENT
Start: 2024-08-24 | End: 2024-08-30 | Stop reason: HOSPADM

## 2024-08-24 RX ORDER — ONDANSETRON 2 MG/ML
4 INJECTION INTRAMUSCULAR; INTRAVENOUS EVERY 6 HOURS PRN
Status: DISCONTINUED | OUTPATIENT
Start: 2024-08-24 | End: 2024-08-30 | Stop reason: HOSPADM

## 2024-08-24 RX ORDER — ACETAMINOPHEN 325 MG/1
650 TABLET ORAL EVERY 6 HOURS PRN
Status: DISCONTINUED | OUTPATIENT
Start: 2024-08-24 | End: 2024-08-30 | Stop reason: HOSPADM

## 2024-08-24 RX ORDER — FAMOTIDINE 20 MG/1
20 TABLET, FILM COATED ORAL DAILY
Status: DISCONTINUED | OUTPATIENT
Start: 2024-08-24 | End: 2024-08-24

## 2024-08-24 RX ADMIN — SODIUM CHLORIDE, PRESERVATIVE FREE 10 ML: 5 INJECTION INTRAVENOUS at 21:31

## 2024-08-24 RX ADMIN — MONTELUKAST 10 MG: 10 TABLET, FILM COATED ORAL at 20:04

## 2024-08-24 RX ADMIN — METOPROLOL TARTRATE 12.5 MG: 25 TABLET, FILM COATED ORAL at 20:04

## 2024-08-24 RX ADMIN — FUROSEMIDE 40 MG: 10 INJECTION, SOLUTION INTRAMUSCULAR; INTRAVENOUS at 17:40

## 2024-08-24 RX ADMIN — PREDNISONE 40 MG: 20 TABLET ORAL at 16:12

## 2024-08-24 RX ADMIN — SODIUM CHLORIDE, PRESERVATIVE FREE 10 ML: 5 INJECTION INTRAVENOUS at 20:04

## 2024-08-24 RX ADMIN — PREGABALIN 75 MG: 75 CAPSULE ORAL at 20:04

## 2024-08-24 RX ADMIN — AMLODIPINE BESYLATE 2.5 MG: 5 TABLET ORAL at 16:51

## 2024-08-24 RX ADMIN — SODIUM ZIRCONIUM CYCLOSILICATE 5 G: 5 POWDER, FOR SUSPENSION ORAL at 13:27

## 2024-08-24 RX ADMIN — FUROSEMIDE 40 MG: 10 INJECTION, SOLUTION INTRAMUSCULAR; INTRAVENOUS at 11:49

## 2024-08-24 RX ADMIN — IPRATROPIUM BROMIDE AND ALBUTEROL SULFATE 1 DOSE: 2.5; .5 SOLUTION RESPIRATORY (INHALATION) at 18:20

## 2024-08-24 RX ADMIN — RIVAROXABAN 15 MG: 15 TABLET, FILM COATED ORAL at 16:51

## 2024-08-24 RX ADMIN — IPRATROPIUM BROMIDE AND ALBUTEROL SULFATE 1 DOSE: .5; 2.5 SOLUTION RESPIRATORY (INHALATION) at 13:09

## 2024-08-24 RX ADMIN — ATORVASTATIN CALCIUM 40 MG: 40 TABLET, FILM COATED ORAL at 16:51

## 2024-08-24 ASSESSMENT — PAIN DESCRIPTION - DESCRIPTORS: DESCRIPTORS: ACHING

## 2024-08-24 ASSESSMENT — PAIN DESCRIPTION - LOCATION
LOCATION: BACK
LOCATION: BACK

## 2024-08-24 ASSESSMENT — PAIN DESCRIPTION - PAIN TYPE
TYPE: CHRONIC PAIN
TYPE: ACUTE PAIN

## 2024-08-24 ASSESSMENT — PAIN DESCRIPTION - ORIENTATION: ORIENTATION: LOWER

## 2024-08-24 ASSESSMENT — PAIN SCALES - GENERAL
PAINLEVEL_OUTOF10: 4
PAINLEVEL_OUTOF10: 6
PAINLEVEL_OUTOF10: 0

## 2024-08-24 ASSESSMENT — LIFESTYLE VARIABLES
HOW MANY STANDARD DRINKS CONTAINING ALCOHOL DO YOU HAVE ON A TYPICAL DAY: PATIENT DOES NOT DRINK
HOW OFTEN DO YOU HAVE A DRINK CONTAINING ALCOHOL: NEVER

## 2024-08-24 ASSESSMENT — PAIN - FUNCTIONAL ASSESSMENT
PAIN_FUNCTIONAL_ASSESSMENT: 0-10
PAIN_FUNCTIONAL_ASSESSMENT: PREVENTS OR INTERFERES SOME ACTIVE ACTIVITIES AND ADLS

## 2024-08-24 ASSESSMENT — PAIN DESCRIPTION - FREQUENCY: FREQUENCY: CONTINUOUS

## 2024-08-24 ASSESSMENT — PAIN DESCRIPTION - ONSET: ONSET: ON-GOING

## 2024-08-24 NOTE — PROGRESS NOTES
4 Eyes Skin Assessment     NAME:  Ernesto Ny  YOB: 1940  MEDICAL RECORD NUMBER:  [id number]    The patient is being assessed for  Admission    I agree that at least one RN has performed a thorough Head to Toe Skin Assessment on the patient. ALL assessment sites listed below have been assessed.      Areas assessed by both nurses:    Head, Face, Ears, Shoulders, Back, Chest, Arms, Elbows, Hands, Sacrum. Buttock, Coccyx, Ischium, Legs. Feet and Heels, and Under Medical Devices         Does the Patient have a Wound? Yes wound(s) were present on assessment. LDA wound assessment was Initiated and completed by RN       Kishor Prevention initiated by RN: Yes  Wound Care Orders initiated by RN: Yes    Pressure Injury (Stage 3,4, Unstageable, DTI, NWPT, and Complex wounds) if present, place Wound referral order by RN under : No    New Ostomies, if present place, Ostomy referral order under : No     Nurse 1 eSignature: Electronically signed by Katiuska Callejas RN on 8/24/24 at 4:41 PM EDT    **SHARE this note so that the co-signing nurse can place an eSignature**    Nurse 2 eSignature: Electronically signed by Rizwan Renteria RN on 8/24/24 at 5:28 PM EDT

## 2024-08-24 NOTE — PROGRESS NOTES
NAME:  Ernesto Ny  YOB: 1940  MEDICAL RECORD NUMBER:  [id number]    Shift Summary: Admitted to ICU due to pH 7.25. Pt fluid overloaded, receiving lasix    Family updated: Yes:  Aiden    Rhythm: Atrial Fibrillation     Most recent vitals:   Visit Vitals  BP (!) 142/58   Pulse 60   Temp 97.5 °F (36.4 °C) (Oral)   Resp 23   Ht 1.676 m (5' 6\")   Wt 94.3 kg (207 lb 14.3 oz)   SpO2 92%   BMI 33.55 kg/m²           No data found.    No data found.      Respiratory support needed (if any):  - O2 - NC - 4 lpm    Admission weight Weight - Scale: 97.4 kg (214 lb 11.7 oz) (08/24/24 1116)    Today's weight    Wt Readings from Last 1 Encounters:   08/24/24 94.3 kg (207 lb 14.3 oz)        Sandoval need assessed each shift: N/A - no sandoval present  UOP >30ml/hr: YES  Last documented BM (in last 48 hrs):  No data found.             Restraints (in use currently or dc'd in last 12 hrs): No    Order current and documentation up to date? No    Lines/Drains reviewed @ bedside.  Peripheral IV 08/24/24 Left Forearm (Active)   Number of days: 0       Peripheral IV 08/24/24 Distal;Right Forearm (Active)   Number of days: 0         Drip rates at handoff:    sodium chloride      dextrose         Lab Data:   CBC:   Recent Labs     08/24/24  1147   WBC 7.8   HGB 9.7*   HCT 29.9*   MCV 95.1        BMP:    Recent Labs     08/24/24  1147      K 5.8*   CO2 23   *   CREATININE 2.2*     LIVR:   Recent Labs     08/24/24  1147   AST 17   ALT 17     PT/INR: No results for input(s): \"INR\" in the last 72 hours.    Invalid input(s): \"PROT\"  APTT: No results for input(s): \"APTT\" in the last 72 hours.  ABG: No results for input(s): \"PHART\", \"FXI5HXG\", \"PO2ART\" in the last 72 hours.    Any consults during the shift? No    Any signed and held orders to be released?  No        4 Eyes Skin Assessment       The patient is being assessed for  Shift Handoff    I agree that at least one RN has performed a thorough Head to Toe Skin

## 2024-08-24 NOTE — H&P
V2.0  History and Physical      Name:  Ernesto Ny /Age/Sex: 1940  (84 y.o. male)   MRN & CSN:  [id number] & [id number] Encounter Date/Time: 2024 3:41 PM EDT   Location:  [id number] PCP: Glischinski, Luke A, APRN - CNP       Hospital Day: 1    Assessment and Plan:   Ernesto Ny is a 84 y.o. male with a pmh of heart failure, COPD, CKD who presents with Acute heart failure with preserved ejection fraction (HCC)    Hospital Problems             Last Modified POA    * (Principal) Acute heart failure with preserved ejection fraction (HCC) 2024 Yes       Plan:  Acute heart failure preserved ejection fraction  -proBNP elevated 8000, patient with significant lower extremity edema, pulmonary edema on x-ray  -Started on IV Lasix 40 mg BID  -Daily weights, strict I's/O's, low-sodium diet  -Consider fluid restriction  COPD exacerbation  -Exacerbated by heart failure  -Steroids, DuoNebs  -Consider BiPAP if blood gas failing to improve  Acute hypoxic and hypercapnic respiratory failure  -Evidenced by oxygen saturation of 70% on room air.  Denies use of home oxygen in the past  -Satting well on 4 L NC  -VBG showing respiratory acidosis  -Due to above  -Treatment as above  -Recheck blood gas  ADWOA on CKD  -Patient creatinine 2.2 from baseline 1.1  -Suspect prerenal due to heart failure  -Monitor on diuretics  Atrial fibrillation  -On AC with Xarelto  -Per patient's son patient scheduled for pacemaker placement due to A-fib in next 2 weeks  History of CVA  -Patient with residual dysarthria  -Continue Plavix and statin  Type 2 diabetes mellitus  -Low-dose sliding scale  -Monitor sugars while on steroids  Hypertension  -Continue home meds: Amlodipine metoprolol.  Holding home torsemide in favor of IV diuretics  Aortic valve stenosis s/p bioprosthetic AVR    Disposition:   Current Living situation: Home  Expected Disposition: Home  Estimated D/C: 3 to 4 days    Diet ADULT DIET; Regular; No Added Salt  tobacco: Never    Tobacco comments:     Started 13 y/o.  Quit 11/8/13. 1 PPD. 1025/19. 2/18/20.5/18/20.7/12/21.11/15/21.2/28/22.4/12/22.7/22/22.5/8/23.   Vaping Use    Vaping status: Never Used   Substance and Sexual Activity    Alcohol use: No     Alcohol/week: 1.0 standard drink of alcohol     Types: 1 Standard drinks or equivalent per week     Comment: occ    Drug use: No     Comment: Never tried MJ.   Social History Narrative    No exercise. 12/21/16.  4/6/17, 6/19/17, 8/14/17. 9/27/27. 12/4/17. 8/27/18. 10/22/18. 4/24/19. 7/30/19. 1025/19.2/18/20. 7/12/21.11/15/21.2/28/22.4/12/22.7/22/22. WIFE MORE CONFUSED.5/8/23.     Social Determinants of Health     Financial Resource Strain: Low Risk  (5/2/2024)    Overall Financial Resource Strain (CARDIA)     Difficulty of Paying Living Expenses: Not hard at all   Food Insecurity: No Food Insecurity (5/2/2024)    Hunger Vital Sign     Worried About Running Out of Food in the Last Year: Never true     Ran Out of Food in the Last Year: Never true   Transportation Needs: Unknown (5/2/2024)    PRAPARE - Transportation     Lack of Transportation (Non-Medical): No   Physical Activity: Inactive (8/12/2024)    Exercise Vital Sign     Days of Exercise per Week: 0 days     Minutes of Exercise per Session: 0 min    [facility] from [facility]     Interpersonal Safety   Housing Stability: Unknown (5/2/2024)    Housing Stability Vital Sign     Unstable Housing in the Last Year: No       Medications:   Medications:    Infusions:   PRN Meds:     Labs      CBC:   Recent Labs     08/24/24  1147   WBC 7.8   HGB 9.7*        BMP:    Recent Labs     08/24/24  1147      K 5.8*      CO2 23   *   CREATININE 2.2*   GLUCOSE 254*     Hepatic:   Recent Labs     08/24/24  1147   AST 17   ALT 17   BILITOT 0.3   ALKPHOS 64     Lipids:   Lab Results   Component Value Date/Time    CHOL 98 08/12/2024 08:15 AM    HDL 43 08/12/2024 08:15 AM    HDL 32 08/23/2011 11:44 AM

## 2024-08-24 NOTE — PROGRESS NOTES
Medication Reconciliation    List of medications patient is currently taking is complete.     Source of information: 1. Conversation with patient and family at bedside                                      2. EPIC records      Allergies  Patient has no known allergies.     Notes regarding home medications:   1. Patient received none of his home medications prior to arrival to the ED.     2. Patient is taking Xarelto 15mg 1T QD, last dose taken yesterday. 8/12/24 hospital encounter states patient's AFIB is managed with Plavix as well, but he states he does not take that medication.    3. Patient was prescribed Augmentin 875-125mg pm 8/12/24 and patient confirmed he completed the therapy.     4. Though patient is prescribed glipizide 5mg 0.5-1T BID, patient states he only takes that \"when needed\" and is not consistent with therapy.     Elena Kimball, Pharmacy Intern  8/24/2024 3:09 PM

## 2024-08-24 NOTE — ED TRIAGE NOTES
Pt from home to ED via EMS with shortness of breath that has been going on for the past couple of weeks. Pt satting 75% on room air. Pt states he takes torsemide 2x/day but states \"it has not been working. Pt has pitting edema in BLE and sounds congested. Pt placed on 4L NC. Pt is alert and oriented, other than O2, pt's VSS. Pt also complains of lower back pain that is \"chronic\" but worse today. Pt states he was unable to get off the couch today. Pt's son and mother at bedside.

## 2024-08-24 NOTE — ED PROVIDER NOTES
[facility] EMERGENCY DEPARTMENT  EMERGENCY DEPARTMENT ENCOUNTER        Pt Name: Ernesto Ny  MRN: [id number]  Birthdate 1940  Date of evaluation: 8/24/2024  Provider: Bonifacio Austin PA-C  PCP: Glischinski, Luke A, MERCED - CNP  Note Started: 11:34 AM EDT 8/24/24      ALEXA. I have evaluated this patient.        CHIEF COMPLAINT       Chief Complaint   Patient presents with    Shortness of Breath     Pt from home via EMS with shortness of breath that has been going on the past couple of weeks. Pt satting 75% on RA. Pt takes torsemide 2x/day but states \"it has not been working\". Pt has pitting edema in BLE and sounds congested.     Back Pain     Pt also c/o back pain which is \"chronic\" but stated he could not get off the couch today       HISTORY OF PRESENT ILLNESS: 1 or more Elements     History From: pt    Ernesto Ny is a 84 y.o. male with past medical history of hypertension, A-fib on Xarelto, diabetes, pulmonary hypertension, COPD who presents with worsening shortness of breath for the past few weeks, worsening lower extremity edema.  Patient reports taking torsemide 2 times daily but states it has not been helping his shortness of breath or lower extremity edema.  Has worsening dyspnea on exertion.  He presented with O2 sats in the 70s on room air, denies history of oxygen use at home in the past.  Denies any fever, cough, sick contacts, pain, dizziness, syncope.    Nursing Notes were all reviewed and agreed with or any disagreements were addressed in the HPI.    REVIEW OF SYSTEMS :      Review of Systems   All other systems reviewed and are negative.      Positives and Pertinent negatives as per HPI.       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

## 2024-08-25 LAB
ANION GAP SERPL CALCULATED.3IONS-SCNC: 9 MMOL/L (ref 3–16)
BASE EXCESS BLDV CALC-SCNC: 0.3 MMOL/L
BASE EXCESS BLDV CALC-SCNC: 1.1 MMOL/L
BASE EXCESS BLDV CALC-SCNC: 2 MMOL/L
BASE EXCESS BLDV CALC-SCNC: 2 MMOL/L
BUN SERPL-MCNC: 102 MG/DL (ref 7–20)
CALCIUM SERPL-MCNC: 8.5 MG/DL (ref 8.3–10.6)
CHLORIDE SERPL-SCNC: 106 MMOL/L (ref 99–110)
CHOLEST SERPL-MCNC: 94 MG/DL (ref 0–199)
CO2 BLDV-SCNC: 31 MMOL/L
CO2 SERPL-SCNC: 26 MMOL/L (ref 21–32)
COHGB MFR BLDV: 1.3 %
COHGB MFR BLDV: 1.5 %
COHGB MFR BLDV: 1.6 %
COHGB MFR BLDV: 1.6 %
CREAT SERPL-MCNC: 1.9 MG/DL (ref 0.8–1.3)
EKG ATRIAL RATE: 122 BPM
EKG ATRIAL RATE: 52 BPM
EKG DIAGNOSIS: NORMAL
EKG DIAGNOSIS: NORMAL
EKG Q-T INTERVAL: 468 MS
EKG Q-T INTERVAL: 496 MS
EKG QRS DURATION: 144 MS
EKG QRS DURATION: 146 MS
EKG QTC CALCULATION (BAZETT): 424 MS
EKG QTC CALCULATION (BAZETT): 447 MS
EKG R AXIS: -59 DEGREES
EKG R AXIS: -68 DEGREES
EKG T AXIS: 52 DEGREES
EKG T AXIS: 58 DEGREES
EKG VENTRICULAR RATE: 44 BPM
EKG VENTRICULAR RATE: 55 BPM
GFR SERPLBLD CREATININE-BSD FMLA CKD-EPI: 35 ML/MIN/{1.73_M2}
GLUCOSE BLD-MCNC: 199 MG/DL (ref 70–99)
GLUCOSE BLD-MCNC: 217 MG/DL (ref 70–99)
GLUCOSE BLD-MCNC: 266 MG/DL (ref 70–99)
GLUCOSE BLD-MCNC: 285 MG/DL (ref 70–99)
GLUCOSE SERPL-MCNC: 212 MG/DL (ref 70–99)
HCO3 BLDV-SCNC: 29 MMOL/L (ref 23–29)
HDLC SERPL-MCNC: 32 MG/DL (ref 40–60)
LDLC SERPL CALC-MCNC: 47 MG/DL
MAGNESIUM SERPL-MCNC: 2.9 MG/DL (ref 1.8–2.4)
METHGB MFR BLDV: 0.2 %
METHGB MFR BLDV: 0.4 %
O2 THERAPY: ABNORMAL
PCO2 BLDV: 59.7 MMHG (ref 40–50)
PCO2 BLDV: 60.3 MMHG (ref 40–50)
PCO2 BLDV: 63.9 MMHG (ref 40–50)
PCO2 BLDV: 67.3 MMHG (ref 40–50)
PERFORMED ON: ABNORMAL
PH BLDV: 7.25 [PH] (ref 7.35–7.45)
PH BLDV: 7.27 [PH] (ref 7.35–7.45)
PH BLDV: 7.29 [PH] (ref 7.35–7.45)
PH BLDV: 7.3 [PH] (ref 7.35–7.45)
PO2 BLDV: <30 MMHG
POTASSIUM SERPL-SCNC: 5.3 MMOL/L (ref 3.5–5.1)
POTASSIUM SERPL-SCNC: 5.9 MMOL/L (ref 3.5–5.1)
SAO2 % BLDV: 14 %
SAO2 % BLDV: 33 %
SAO2 % BLDV: 41 %
SAO2 % BLDV: 43 %
SODIUM SERPL-SCNC: 141 MMOL/L (ref 136–145)
TRIGL SERPL-MCNC: 76 MG/DL (ref 0–150)
VLDLC SERPL CALC-MCNC: 15 MG/DL

## 2024-08-25 PROCEDURE — 6370000000 HC RX 637 (ALT 250 FOR IP): Performed by: STUDENT IN AN ORGANIZED HEALTH CARE EDUCATION/TRAINING PROGRAM

## 2024-08-25 PROCEDURE — 2580000003 HC RX 258: Performed by: STUDENT IN AN ORGANIZED HEALTH CARE EDUCATION/TRAINING PROGRAM

## 2024-08-25 PROCEDURE — 80061 LIPID PANEL: CPT

## 2024-08-25 PROCEDURE — 94760 N-INVAS EAR/PLS OXIMETRY 1: CPT

## 2024-08-25 PROCEDURE — 2000000000 HC ICU R&B

## 2024-08-25 PROCEDURE — 2700000000 HC OXYGEN THERAPY PER DAY

## 2024-08-25 PROCEDURE — 93010 ELECTROCARDIOGRAM REPORT: CPT | Performed by: INTERNAL MEDICINE

## 2024-08-25 PROCEDURE — 80048 BASIC METABOLIC PNL TOTAL CA: CPT

## 2024-08-25 PROCEDURE — 5A09457 ASSISTANCE WITH RESPIRATORY VENTILATION, 24-96 CONSECUTIVE HOURS, CONTINUOUS POSITIVE AIRWAY PRESSURE: ICD-10-PCS

## 2024-08-25 PROCEDURE — 84132 ASSAY OF SERUM POTASSIUM: CPT

## 2024-08-25 PROCEDURE — 82803 BLOOD GASES ANY COMBINATION: CPT

## 2024-08-25 PROCEDURE — 83735 ASSAY OF MAGNESIUM: CPT

## 2024-08-25 PROCEDURE — 6360000002 HC RX W HCPCS: Performed by: STUDENT IN AN ORGANIZED HEALTH CARE EDUCATION/TRAINING PROGRAM

## 2024-08-25 PROCEDURE — 36415 COLL VENOUS BLD VENIPUNCTURE: CPT

## 2024-08-25 PROCEDURE — 94640 AIRWAY INHALATION TREATMENT: CPT

## 2024-08-25 RX ADMIN — ATORVASTATIN CALCIUM 40 MG: 40 TABLET, FILM COATED ORAL at 09:57

## 2024-08-25 RX ADMIN — INSULIN LISPRO 2 UNITS: 100 INJECTION, SOLUTION INTRAVENOUS; SUBCUTANEOUS at 17:15

## 2024-08-25 RX ADMIN — FUROSEMIDE 40 MG: 10 INJECTION, SOLUTION INTRAMUSCULAR; INTRAVENOUS at 09:57

## 2024-08-25 RX ADMIN — PREDNISONE 40 MG: 20 TABLET ORAL at 09:56

## 2024-08-25 RX ADMIN — IPRATROPIUM BROMIDE AND ALBUTEROL SULFATE 1 DOSE: 2.5; .5 SOLUTION RESPIRATORY (INHALATION) at 20:39

## 2024-08-25 RX ADMIN — HYDROCODONE BITARTRATE AND ACETAMINOPHEN 1 TABLET: 10; 325 TABLET ORAL at 20:58

## 2024-08-25 RX ADMIN — CEFEPIME 2000 MG: 2 INJECTION, POWDER, FOR SOLUTION INTRAVENOUS at 09:23

## 2024-08-25 RX ADMIN — METOPROLOL TARTRATE 12.5 MG: 25 TABLET, FILM COATED ORAL at 09:56

## 2024-08-25 RX ADMIN — SODIUM CHLORIDE, PRESERVATIVE FREE 10 ML: 5 INJECTION INTRAVENOUS at 20:34

## 2024-08-25 RX ADMIN — IPRATROPIUM BROMIDE AND ALBUTEROL SULFATE 1 DOSE: 2.5; .5 SOLUTION RESPIRATORY (INHALATION) at 07:18

## 2024-08-25 RX ADMIN — FUROSEMIDE 40 MG: 10 INJECTION, SOLUTION INTRAMUSCULAR; INTRAVENOUS at 18:29

## 2024-08-25 RX ADMIN — MONTELUKAST 10 MG: 10 TABLET, FILM COATED ORAL at 20:34

## 2024-08-25 RX ADMIN — SODIUM ZIRCONIUM CYCLOSILICATE 10 G: 10 POWDER, FOR SUSPENSION ORAL at 06:09

## 2024-08-25 RX ADMIN — SODIUM CHLORIDE, PRESERVATIVE FREE 10 ML: 5 INJECTION INTRAVENOUS at 09:19

## 2024-08-25 RX ADMIN — IPRATROPIUM BROMIDE AND ALBUTEROL SULFATE 1 DOSE: 2.5; .5 SOLUTION RESPIRATORY (INHALATION) at 11:47

## 2024-08-25 RX ADMIN — METOPROLOL TARTRATE 12.5 MG: 25 TABLET, FILM COATED ORAL at 20:34

## 2024-08-25 RX ADMIN — SODIUM CHLORIDE: 9 INJECTION, SOLUTION INTRAVENOUS at 09:18

## 2024-08-25 RX ADMIN — RIVAROXABAN 15 MG: 15 TABLET, FILM COATED ORAL at 09:57

## 2024-08-25 RX ADMIN — AMLODIPINE BESYLATE 2.5 MG: 5 TABLET ORAL at 09:56

## 2024-08-25 RX ADMIN — PREGABALIN 75 MG: 75 CAPSULE ORAL at 09:57

## 2024-08-25 RX ADMIN — CEFEPIME 2000 MG: 2 INJECTION, POWDER, FOR SOLUTION INTRAVENOUS at 20:39

## 2024-08-25 RX ADMIN — PREGABALIN 75 MG: 75 CAPSULE ORAL at 20:34

## 2024-08-25 RX ADMIN — INSULIN LISPRO 1 UNITS: 100 INJECTION, SOLUTION INTRAVENOUS; SUBCUTANEOUS at 12:05

## 2024-08-25 RX ADMIN — IPRATROPIUM BROMIDE AND ALBUTEROL SULFATE 1 DOSE: 2.5; .5 SOLUTION RESPIRATORY (INHALATION) at 15:46

## 2024-08-25 ASSESSMENT — PAIN DESCRIPTION - DESCRIPTORS
DESCRIPTORS: ACHING
DESCRIPTORS: ACHING

## 2024-08-25 ASSESSMENT — PAIN DESCRIPTION - PAIN TYPE
TYPE: CHRONIC PAIN
TYPE: CHRONIC PAIN

## 2024-08-25 ASSESSMENT — PAIN SCALES - GENERAL
PAINLEVEL_OUTOF10: 4
PAINLEVEL_OUTOF10: 5
PAINLEVEL_OUTOF10: 6
PAINLEVEL_OUTOF10: 0
PAINLEVEL_OUTOF10: 3
PAINLEVEL_OUTOF10: 0

## 2024-08-25 ASSESSMENT — PAIN DESCRIPTION - ORIENTATION
ORIENTATION: MID;LOWER
ORIENTATION: LOWER

## 2024-08-25 ASSESSMENT — PAIN - FUNCTIONAL ASSESSMENT
PAIN_FUNCTIONAL_ASSESSMENT: ACTIVITIES ARE NOT PREVENTED

## 2024-08-25 ASSESSMENT — PAIN DESCRIPTION - LOCATION
LOCATION: BACK
LOCATION: BACK

## 2024-08-25 NOTE — PROGRESS NOTES
Pt's HR 40s-60s. Occasionally drops to mid 30s when resting. 12-lead EKG complete. Beba Kovacs NP aware.

## 2024-08-25 NOTE — PROGRESS NOTES
NAME:  Ernesto Ny  YOB: 1940  MEDICAL RECORD NUMBER:  [id number]    Shift Summary: Pt remains on NC. HR dropping into 30s at times. Lokelma given for K 5.9.    Family updated: No    Rhythm: Atrial Fibrillation     Most recent vitals:   Visit Vitals  BP (!) 129/53   Pulse 58   Temp 97.5 °F (36.4 °C) (Axillary)   Resp 14   Ht 1.676 m (5' 6\")   Wt 92 kg (202 lb 13.2 oz)   SpO2 98%   BMI 32.74 kg/m²           No data found.    No data found.      Respiratory support needed (if any):  - O2 - NC - 4 lpm    Admission weight Weight - Scale: 97.4 kg (214 lb 11.7 oz) (08/24/24 1116)    Today's weight    Wt Readings from Last 1 Encounters:   08/25/24 92 kg (202 lb 13.2 oz)        Sandoval need assessed each shift: N/A - no sandoval present  UOP >30ml/hr: YES  Last documented BM (in last 48 hrs):  No data found.             Restraints (in use currently or dc'd in last 12 hrs): No    Order current and documentation up to date? No    Lines/Drains reviewed @ bedside.  Peripheral IV 08/24/24 Left Forearm (Active)   Number of days: 0       Peripheral IV 08/24/24 Distal;Right Forearm (Active)   Number of days: 0         Drip rates at handoff:    sodium chloride      dextrose         Lab Data:   CBC:   Recent Labs     08/24/24  1147   WBC 7.8   HGB 9.7*   HCT 29.9*   MCV 95.1        BMP:    Recent Labs     08/24/24  1147 08/24/24  1755 08/25/24  0428     --  141   K 5.8* 5.4* 5.9*   CO2 23  --  26   *  --  102*   CREATININE 2.2*  --  1.9*     LIVR:   Recent Labs     08/24/24  1147   AST 17   ALT 17     PT/INR: No results for input(s): \"INR\" in the last 72 hours.    Invalid input(s): \"PROT\"  APTT: No results for input(s): \"APTT\" in the last 72 hours.  ABG: No results for input(s): \"PHART\", \"ZUU7CTE\", \"PO2ART\" in the last 72 hours.    Any consults during the shift? No    Any signed and held orders to be released?  No        4 Eyes Skin Assessment       The patient is being assessed for  Shift  Handoff    I agree that at least one RN has performed a thorough Head to Toe Skin Assessment on the patient. ALL assessment sites listed below have been assessed.      Areas assessed by both nurses: Head, Face, Ears, Shoulders, Back, Chest, Arms, Elbows, Hands, Sacrum. Buttock, Coccyx, Ischium, Legs. Feet and Heels, and Under Medical Devices         Does the Patient have a Wound? No noted wound(s)    Wound Care Orders initiated by RN: No       Kishor Prevention initiated by RN: Yes    Pressure Injury (Stage 3,4, Unstageable, DTI, NWPT, and Complex wounds) if present, place Wound referral order by RN under : No    New Ostomies, if present place, Ostomy referral order under : No     Nurse 1 eSignature: Electronically signed by Brittani Woodall RN on 8/25/24 at 6:17 AM EDT    **SHARE this note so that the co-signing nurse can place an eSignature**    Nurse 2 eSignature: Electronically signed by Aliyah Goldman RN on 8/25/24 at 7:35 AM EDT

## 2024-08-25 NOTE — PLAN OF CARE
Problem: Discharge Planning  Goal: Discharge to home or other facility with appropriate resources  Outcome: Progressing  Flowsheets (Taken 8/24/2024 2000)  Discharge to home or other facility with appropriate resources:   Identify barriers to discharge with patient and caregiver   Identify discharge learning needs (meds, wound care, etc)     Problem: Pain  Goal: Verbalizes/displays adequate comfort level or baseline comfort level  Outcome: Progressing  Flowsheets (Taken 8/24/2024 2000)  Verbalizes/displays adequate comfort level or baseline comfort level:   Encourage patient to monitor pain and request assistance   Assess pain using appropriate pain scale   Administer analgesics based on type and severity of pain and evaluate response   Implement non-pharmacological measures as appropriate and evaluate response     Problem: Safety - Adult  Goal: Free from fall injury  Outcome: Progressing  Flowsheets (Taken 8/24/2024 2103)  Free From Fall Injury: Instruct family/caregiver on patient safety     Problem: Skin/Tissue Integrity  Goal: Absence of new skin breakdown  Description: 1.  Monitor for areas of redness and/or skin breakdown  2.  Assess vascular access sites hourly  3.  Every 4-6 hours minimum:  Change oxygen saturation probe site  4.  Every 4-6 hours:  If on nasal continuous positive airway pressure, respiratory therapy assess nares and determine need for appliance change or resting period.  Outcome: Progressing

## 2024-08-25 NOTE — PROGRESS NOTES
NAME:  Ernesto Ny  YOB: 1940  MEDICAL RECORD NUMBER:  [id number]    Shift Summary: Uneventful shift, pt wore bipap on and off. VBG improved. Antibiotics started. Up to chair.    Family updated: Yes:  wife and son at bedside    Rhythm: Atrial Fibrillation     Most recent vitals:   Visit Vitals  BP (!) 128/51   Pulse 66   Temp 98.1 °F (36.7 °C) (Oral)   Resp 26   Ht 1.676 m (5' 6\")   Wt 92 kg (202 lb 13.2 oz)   SpO2 90%   BMI 32.74 kg/m²           No data found.    No data found.      Respiratory support needed (if any):  - O2 - NC - 2 lpm  -BIPAP at night    Admission weight Weight - Scale: 97.4 kg (214 lb 11.7 oz) (08/24/24 1116)    Today's weight    Wt Readings from Last 1 Encounters:   08/25/24 92 kg (202 lb 13.2 oz)        Sandoval need assessed each shift: N/A - no sandoval present  UOP >30ml/hr: YES  Last documented BM (in last 48 hrs):  No data found.             Restraints (in use currently or dc'd in last 12 hrs): No    Order current and documentation up to date? Yes    Lines/Drains reviewed @ bedside.  Peripheral IV 08/24/24 Left Forearm (Active)   Number of days: 1       Peripheral IV 08/24/24 Distal;Right Forearm (Active)   Number of days: 1         Drip rates at handoff:    sodium chloride Stopped (08/25/24 1027)    dextrose         Lab Data:   CBC:   Recent Labs     08/24/24  1147   WBC 7.8   HGB 9.7*   HCT 29.9*   MCV 95.1        BMP:    Recent Labs     08/24/24  1147 08/24/24  1755 08/25/24  0428 08/25/24  1146     --  141  --    K 5.8*   < > 5.9* 5.3*   CO2 23  --  26  --    *  --  102*  --    CREATININE 2.2*  --  1.9*  --     < > = values in this interval not displayed.     LIVR:   Recent Labs     08/24/24  1147   AST 17   ALT 17     PT/INR: No results for input(s): \"INR\" in the last 72 hours.    Invalid input(s): \"PROT\"  APTT: No results for input(s): \"APTT\" in the last 72 hours.  ABG: No results for input(s): \"PHART\", \"[id number]\", \"PO2ART\" in the last 72

## 2024-08-25 NOTE — PLAN OF CARE
Problem: Discharge Planning  Goal: Discharge to home or other facility with appropriate resources  Outcome: Progressing  Flowsheets (Taken 8/25/2024 0800)  Discharge to home or other facility with appropriate resources:   Identify barriers to discharge with patient and caregiver   Identify discharge learning needs (meds, wound care, etc)   Arrange for needed discharge resources and transportation as appropriate   Arrange for interpreters to assist at discharge as needed   Refer to discharge planning if patient needs post-hospital services based on physician order or complex needs related to functional status, cognitive ability or social support system     Problem: Pain  Goal: Verbalizes/displays adequate comfort level or baseline comfort level  Outcome: Progressing  Flowsheets (Taken 8/25/2024 0800)  Verbalizes/displays adequate comfort level or baseline comfort level:   Assess pain using appropriate pain scale   Encourage patient to monitor pain and request assistance   Implement non-pharmacological measures as appropriate and evaluate response   Administer analgesics based on type and severity of pain and evaluate response   Consider cultural and social influences on pain and pain management   Notify Licensed Independent Practitioner if interventions unsuccessful or patient reports new pain     Problem: Safety - Adult  Goal: Free from fall injury  Outcome: Progressing  Flowsheets (Taken 8/25/2024 1102)  Free From Fall Injury: Instruct family/caregiver on patient safety     Problem: Skin/Tissue Integrity  Goal: Absence of new skin breakdown  Description: 1.  Monitor for areas of redness and/or skin breakdown  2.  Assess vascular access sites hourly  3.  Every 4-6 hours minimum:  Change oxygen saturation probe site  4.  Every 4-6 hours:  If on nasal continuous positive airway pressure, respiratory therapy assess nares and determine need for appliance change or resting period.  Outcome: Progressing     Problem:

## 2024-08-25 NOTE — PROGRESS NOTES
V2.0  [facility] Hospitalist Progress Note      Name:  Ernesto Ny /Age/Sex: 1940  (84 y.o. male)   MRN & CSN:  [id number] & [id number] Encounter Date/Time: 2024 8:35 AM EDT    Location:  [id number] PCP: Glischinski, Luke A, APRN - CNP       Hospital Day: 2    Assessment and Plan:   Ernesto Ny is a 84 y.o. male with PMH of HFpEF, COPD, CKD p/w acute heart failure      Plan:  Acute heart failure preserved ejection fraction  -proBNP elevated 8000, patient with significant lower extremity edema, pulmonary edema on x-ray  -Started on IV Lasix 40 mg BID  -Daily weights, strict I's/O's, low-sodium diet  -Consider fluid restriction  COPD exacerbation  -Exacerbated by heart failure  -Steroids, DuoNebs  -Starting BiPAP today monitor  Gram-negative pneumonia  -molecular pneumonia panel ordered due to productive cough  -PCR positive for pseudomonas, klebsiella, serratia; culture positive for Pseudomonas  -started on cefepime  Acute hypoxic and hypercapnic respiratory failure  -Evidenced by oxygen saturation of 70% on room air.  Denies use of home oxygen in the past  -Satting well on 4 L NC  -VBG showing respiratory acidosis  -start patient on BiPAP, recheck blood gas  -Due to above  -Treatment as above  ADWOA on CKD  -Initial creatinine 2.2 from baseline 1.1; down to 1.9 today  -Suspect prerenal due to heart failure  -Monitor on diuretics  Hyperkalemia  -Patient received a second dose of Lokelma this morning for potassium 5.9  -Receiving IV Lasix  -5.3 on recheck around noon  -Continue to monitor  Atrial fibrillation  -On AC with Xarelto  -Per patient's son patient scheduled for pacemaker placement due to A-fib on 9/3  History of CVA  -Patient with residual dysarthria, appears baseline;   -Continue xarelto + statin  Type 2 diabetes mellitus  -Low-dose sliding scale  -Monitor sugars while on steroids  Hypertension  -Continue home meds: Amlodipine metoprolol.  Holding home torsemide in favor of IV  additional report for complete Pneumonia panel.      Organism Serratia marcescens DNA Detected (A)     Pneumonia Panel Molecular       10,000 copies/mL  See additional report for complete Pneumonia panel.     PNEUMONIA PANEL FILM ARRAY REPORT    Collection Time: 08/24/24  6:00 PM   Result Value Ref Range    Report SEE IMAGE    POCT Glucose    Collection Time: 08/24/24  7:57 PM   Result Value Ref Range    POC Glucose 285 (H) 70 - 99 mg/dl    Performed on ACCU-CHEK    SPECIMEN REJECTION    Collection Time: 08/24/24  9:46 PM   Result Value Ref Range    Rejected Test VBG     Reason for Rejection see below    EKG 12 Lead    Collection Time: 08/24/24 10:11 PM   Result Value Ref Range    Ventricular Rate 44 BPM    Atrial Rate 122 BPM    QRS Duration 146 ms    Q-T Interval 496 ms    QTc Calculation (Bazett) 424 ms    R Axis -68 degrees    T Axis 58 degrees    Diagnosis       Atrial fibrillation with slow ventricular responseLeft axis deviationRight bundle branch blockAbnormal ECGWhen compared with ECG of 24-AUG-2024 11:29, (unconfirmed)Previous ECG has undetermined rhythm, needs review   Blood Gas, Venous    Collection Time: 08/24/24 10:28 PM   Result Value Ref Range    pH, Royce 7.273 (L) 7.350 - 7.450    pCO2, Royce 57.8 (H) 40.0 - 50.0 mmHg    PO2, Royce 38 Not Established mmHg    HCO3, Venous 27 23 - 29 mmol/L    Base Excess, Royce -0.7 Not Established mmol/L    O2 Sat, Royce 68 Not Established %    Carboxyhemoglobin 2.0 %    MetHgb, Royce 0.6 <1.5 %    TC02 (Calc), Royce 29 Not Established mmol/L    O2 Therapy Unknown    Basic Metabolic Panel    Collection Time: 08/25/24  4:28 AM   Result Value Ref Range    Sodium 141 136 - 145 mmol/L    Potassium 5.9 (H) 3.5 - 5.1 mmol/L    Chloride 106 99 - 110 mmol/L    CO2 26 21 - 32 mmol/L    Anion Gap 9 3 - 16    Glucose 212 (H) 70 - 99 mg/dL     (HH) 7 - 20 mg/dL    Creatinine 1.9 (H) 0.8 - 1.3 mg/dL    Est, Glom Filt Rate 35 (A) >60    Calcium 8.5 8.3 - 10.6 mg/dL   Magnesium

## 2024-08-26 LAB
ANION GAP SERPL CALCULATED.3IONS-SCNC: 10 MMOL/L (ref 3–16)
BACTERIA SPEC RESP CULT: ABNORMAL
BACTERIA SPEC RESP CULT: ABNORMAL
BASE EXCESS BLDV CALC-SCNC: -1.3 MMOL/L
BASE EXCESS BLDV CALC-SCNC: 2.2 MMOL/L
BASE EXCESS BLDV CALC-SCNC: 2.6 MMOL/L
BUN SERPL-MCNC: 98 MG/DL (ref 7–20)
CALCIUM SERPL-MCNC: 8.3 MG/DL (ref 8.3–10.6)
CHLORIDE SERPL-SCNC: 107 MMOL/L (ref 99–110)
CO2 BLDV-SCNC: 31 MMOL/L
CO2 BLDV-SCNC: 31 MMOL/L
CO2 BLDV-SCNC: 32 MMOL/L
CO2 SERPL-SCNC: 25 MMOL/L (ref 21–32)
COHGB MFR BLDV: 1.4 %
COHGB MFR BLDV: 1.6 %
COHGB MFR BLDV: 1.9 %
CREAT SERPL-MCNC: 1.8 MG/DL (ref 0.8–1.3)
GFR SERPLBLD CREATININE-BSD FMLA CKD-EPI: 37 ML/MIN/{1.73_M2}
GLUCOSE BLD-MCNC: 158 MG/DL (ref 70–99)
GLUCOSE BLD-MCNC: 287 MG/DL (ref 70–99)
GLUCOSE BLD-MCNC: 290 MG/DL (ref 70–99)
GLUCOSE BLD-MCNC: 360 MG/DL (ref 70–99)
GLUCOSE BLD-MCNC: 401 MG/DL (ref 70–99)
GLUCOSE BLD-MCNC: 532 MG/DL (ref 70–99)
GLUCOSE SERPL-MCNC: 169 MG/DL (ref 70–99)
GRAM STN SPEC: ABNORMAL
HCO3 BLDV-SCNC: 29 MMOL/L (ref 23–29)
HCO3 BLDV-SCNC: 30 MMOL/L (ref 23–29)
HCO3 BLDV-SCNC: 30 MMOL/L (ref 23–29)
MAGNESIUM SERPL-MCNC: 2.74 MG/DL (ref 1.8–2.4)
METHGB MFR BLDV: 0.3 %
METHGB MFR BLDV: 0.5 %
METHGB MFR BLDV: 0.8 %
O2 THERAPY: ABNORMAL
ORGANISM: ABNORMAL
PCO2 BLDV: 59.2 MMHG (ref 40–50)
PCO2 BLDV: 61.4 MMHG (ref 40–50)
PCO2 BLDV: 80.4 MMHG (ref 40–50)
PERFORMED ON: ABNORMAL
PH BLDV: 7.16 [PH] (ref 7.35–7.45)
PH BLDV: 7.3 [PH] (ref 7.35–7.45)
PH BLDV: 7.31 [PH] (ref 7.35–7.45)
PO2 BLDV: <30 MMHG
POTASSIUM SERPL-SCNC: 5.6 MMOL/L (ref 3.5–5.1)
SAO2 % BLDV: 25 %
SAO2 % BLDV: 32 %
SAO2 % BLDV: 48 %
SODIUM SERPL-SCNC: 142 MMOL/L (ref 136–145)

## 2024-08-26 PROCEDURE — 97166 OT EVAL MOD COMPLEX 45 MIN: CPT

## 2024-08-26 PROCEDURE — 83735 ASSAY OF MAGNESIUM: CPT

## 2024-08-26 PROCEDURE — 94640 AIRWAY INHALATION TREATMENT: CPT

## 2024-08-26 PROCEDURE — 6370000000 HC RX 637 (ALT 250 FOR IP): Performed by: NURSE PRACTITIONER

## 2024-08-26 PROCEDURE — 80048 BASIC METABOLIC PNL TOTAL CA: CPT

## 2024-08-26 PROCEDURE — 94761 N-INVAS EAR/PLS OXIMETRY MLT: CPT

## 2024-08-26 PROCEDURE — 2700000000 HC OXYGEN THERAPY PER DAY

## 2024-08-26 PROCEDURE — 6370000000 HC RX 637 (ALT 250 FOR IP): Performed by: STUDENT IN AN ORGANIZED HEALTH CARE EDUCATION/TRAINING PROGRAM

## 2024-08-26 PROCEDURE — 9990000010 HC NO CHARGE VISIT

## 2024-08-26 PROCEDURE — 82803 BLOOD GASES ANY COMBINATION: CPT

## 2024-08-26 PROCEDURE — 99222 1ST HOSP IP/OBS MODERATE 55: CPT | Performed by: INTERNAL MEDICINE

## 2024-08-26 PROCEDURE — 2060000000 HC ICU INTERMEDIATE R&B

## 2024-08-26 PROCEDURE — 6360000002 HC RX W HCPCS: Performed by: STUDENT IN AN ORGANIZED HEALTH CARE EDUCATION/TRAINING PROGRAM

## 2024-08-26 PROCEDURE — 97530 THERAPEUTIC ACTIVITIES: CPT

## 2024-08-26 PROCEDURE — 36415 COLL VENOUS BLD VENIPUNCTURE: CPT

## 2024-08-26 PROCEDURE — 2580000003 HC RX 258: Performed by: STUDENT IN AN ORGANIZED HEALTH CARE EDUCATION/TRAINING PROGRAM

## 2024-08-26 PROCEDURE — 94660 CPAP INITIATION&MGMT: CPT

## 2024-08-26 RX ORDER — INSULIN LISPRO 100 [IU]/ML
4 INJECTION, SOLUTION INTRAVENOUS; SUBCUTANEOUS ONCE
Status: COMPLETED | OUTPATIENT
Start: 2024-08-26 | End: 2024-08-26

## 2024-08-26 RX ADMIN — RIVAROXABAN 15 MG: 15 TABLET, FILM COATED ORAL at 09:52

## 2024-08-26 RX ADMIN — IPRATROPIUM BROMIDE AND ALBUTEROL SULFATE 1 DOSE: 2.5; .5 SOLUTION RESPIRATORY (INHALATION) at 08:44

## 2024-08-26 RX ADMIN — INSULIN LISPRO 4 UNITS: 100 INJECTION, SOLUTION INTRAVENOUS; SUBCUTANEOUS at 18:16

## 2024-08-26 RX ADMIN — FUROSEMIDE 40 MG: 10 INJECTION, SOLUTION INTRAMUSCULAR; INTRAVENOUS at 18:16

## 2024-08-26 RX ADMIN — INSULIN LISPRO 4 UNITS: 100 INJECTION, SOLUTION INTRAVENOUS; SUBCUTANEOUS at 20:33

## 2024-08-26 RX ADMIN — MONTELUKAST 10 MG: 10 TABLET, FILM COATED ORAL at 20:32

## 2024-08-26 RX ADMIN — PREGABALIN 75 MG: 75 CAPSULE ORAL at 09:52

## 2024-08-26 RX ADMIN — PREGABALIN 75 MG: 75 CAPSULE ORAL at 20:32

## 2024-08-26 RX ADMIN — IPRATROPIUM BROMIDE AND ALBUTEROL SULFATE 1 DOSE: 2.5; .5 SOLUTION RESPIRATORY (INHALATION) at 20:28

## 2024-08-26 RX ADMIN — PREDNISONE 40 MG: 20 TABLET ORAL at 09:52

## 2024-08-26 RX ADMIN — ATORVASTATIN CALCIUM 40 MG: 40 TABLET, FILM COATED ORAL at 09:52

## 2024-08-26 RX ADMIN — SODIUM CHLORIDE, PRESERVATIVE FREE 10 ML: 5 INJECTION INTRAVENOUS at 09:53

## 2024-08-26 RX ADMIN — FUROSEMIDE 40 MG: 10 INJECTION, SOLUTION INTRAMUSCULAR; INTRAVENOUS at 09:52

## 2024-08-26 RX ADMIN — CEFEPIME 2000 MG: 2 INJECTION, POWDER, FOR SOLUTION INTRAVENOUS at 20:48

## 2024-08-26 RX ADMIN — IPRATROPIUM BROMIDE AND ALBUTEROL SULFATE 1 DOSE: 2.5; .5 SOLUTION RESPIRATORY (INHALATION) at 12:31

## 2024-08-26 RX ADMIN — IPRATROPIUM BROMIDE AND ALBUTEROL SULFATE 1 DOSE: 2.5; .5 SOLUTION RESPIRATORY (INHALATION) at 15:54

## 2024-08-26 RX ADMIN — CEFEPIME 2000 MG: 2 INJECTION, POWDER, FOR SOLUTION INTRAVENOUS at 09:52

## 2024-08-26 RX ADMIN — INSULIN LISPRO 2 UNITS: 100 INJECTION, SOLUTION INTRAVENOUS; SUBCUTANEOUS at 14:00

## 2024-08-26 ASSESSMENT — PAIN SCALES - GENERAL
PAINLEVEL_OUTOF10: 0

## 2024-08-26 NOTE — CARE COORDINATION
Advance Care Planning     Advance Care Planning Activator (Inpatient)  Conversation Note      Date of ACP Conversation: 8/26/2024     Conversation Conducted with: Patient with Decision Making Capacity    ACP Activator: Jimi Moon RN    Health Care Decision Maker:     Current Designated Health Care Decision Maker:     Primary Decision Maker: Aiden Ny - Child - [phone]    Primary Decision Maker: Jackie Ny Flores - Spouse - [phone]    Care Preferences    Ventilation:  \"If you were in your present state of health and suddenly became very ill and were unable to breathe on your own, what would your preference be about the use of a ventilator (breathing machine) if it were available to you?\"      Would the patient desire the use of ventilator (breathing machine)?: no    \"If your health worsens and it becomes clear that your chance of recovery is unlikely, what would your preference be about the use of a ventilator (breathing machine) if it were available to you?\"     Would the patient desire the use of ventilator (breathing machine)?: No      Resuscitation  \"CPR works best to restart the heart when there is a sudden event, like a heart attack, in someone who is otherwise healthy. Unfortunately, CPR does not typically restart the heart for people who have serious health conditions or who are very sick.\"    \"In the event your heart stopped as a result of an underlying serious health condition, would you want attempts to be made to restart your heart (answer \"yes\" for attempt to resuscitate) or would you prefer a natural death (answer \"no\" for do not attempt to resuscitate)?\" no       [] Yes   [x] No   Educated Patient / Decision Maker regarding differences between Advance Directives and portable DNR orders.    Length of ACP Conversation in minutes:  5    Conversation Outcomes:  ACP discussion completed    Follow-up plan:    [] Schedule follow-up conversation to continue planning  [] Referred individual

## 2024-08-26 NOTE — PROGRESS NOTES
V2.0  [facility] Hospitalist Progress Note      Name:  Ernesto Ny /Age/Sex: 1940  (84 y.o. male)   MRN & CSN:  [id number] & [id number] Encounter Date/Time: 2024 8:35 AM EDT    Location:  [id number]-1901 PCP: Glischinski, Luke A, APRN - CNP       Hospital Day: 3    Assessment and Plan:   Ernesto Ny is a 84 y.o. male with PMH of HFpEF, COPD, CKD p/w acute heart failure      Plan:  Acute heart failure preserved ejection fraction  -proBNP elevated 8000, patient with significant lower extremity edema, pulmonary edema on x-ray  -Good UOP, but taking in a good amount of fluid  -Continue on IV Lasix 40 mg BID  -Daily weights, strict I's/O's, low-sodium diet  -1.5L fluid restriction  COPD exacerbation  -Exacerbated by heart failure  -Steroids, DuoNebs  -Starting BiPAP today monitor  Gram-negative pneumonia  -molecular pneumonia panel ordered due to productive cough  -PCR positive for pseudomonas, klebsiella, serratia  -Resp. culture positive for Pseudomonas, pansensitive  -Continue on cefepime day 2.  Acute hypoxic and hypercapnic respiratory failure  -Evidenced by oxygen saturation of 70% on room air.  Denies use of home oxygen in the past  -Patient given break from BiPAP.  Satting well on 2 L  -Continue intermittent.BiPAP  -Due to above  -Treatment as above  -Wean O2 as tolerated  ADWOA on CKD  -Initial creatinine 2.2 from baseline 1.1; down to 1.8 today  -Suspect prerenal due to heart failure  -Monitor on diuretics  Hyperkalemia  -Potassium 5.6 this a.m.  -Receiving IV Lasix  -Continue to monitor  Atrial fibrillation  -On AC with Xarelto  -Per patient's son patient scheduled for pacemaker placement due to A-fib on 9/3  History of CVA  -Patient with residual dysarthria, appears baseline;   -Continue xarelto + statin  Type 2 diabetes mellitus  -Low-dose sliding scale  -Monitor sugars while on steroids  Hypertension  -Continue home meds: Amlodipine metoprolol.  Holding home torsemide in favor of IV

## 2024-08-26 NOTE — CARE COORDINATION
Case Management Assessment  Initial Evaluation    Date/Time of Evaluation: 8/26/2024 3:40 PM  Assessment Completed by: Jimi Moon RN    If patient is discharged prior to next notation, then this note serves as note for discharge by case management.    Patient Name: Ernesto Ny                   YOB: 1940  Diagnosis: Pleural effusion on right [J90]  ADWOA (acute kidney injury) (HCC) [N17.9]  Acute respiratory failure with hypoxia and hypercapnia (HCC) [J96.01, J96.02]  Acute on chronic congestive heart failure, unspecified heart failure type (HCC) [I50.9]  Acute heart failure with preserved ejection fraction (HCC) [I50.31]                   Date / Time: 8/24/2024 11:14 AM    Patient Admission Status: Inpatient   Readmission Risk (Low < 19, Mod (19-27), High > 27): Readmission Risk Score: 22    Current PCP: Glischinski, Luke A, APRN - CNP  PCP verified by CM? Yes    Chart Reviewed: Yes      History Provided by: Patient  Patient Orientation: Person, Place, Situation, Alert and Oriented    Patient Cognition: Alert    Hospitalization in the last 30 days (Readmission):  No    If yes, Readmission Assessment in CM Navigator will be completed.    Advance Directives:      Code Status: Limited   Patient's Primary Decision Maker is: Legal Next of Kin    Primary Decision Maker: Aiden Ny - Child - [phone]    Primary Decision Maker: Jackie Ny Flores - Spouse - [phone]    Discharge Planning:    Patient lives with: Spouse/Significant Other Type of Home: House  Primary Care Giver: Self  Patient Support Systems include: Spouse/Significant Other   Current Financial resources: Medicare  Current community resources: None  Current services prior to admission: C-pap, Durable Medical Equipment            Current DME: Walker            Type of Home Care services:  None    ADLS  Prior functional level: Assistance with the following:, Cooking, Housework, Shopping  Current functional level: Cooking,

## 2024-08-26 NOTE — DISCHARGE INSTR - COC
Continuity of Care Form    Patient Name: Ernesto Ny   :  1940  MRN:  [id number]    Admit date:  2024  Discharge date:  ***    Code Status Order: Limited   Advance Directives:   Advance Care Flowsheet Documentation             Admitting Physician:  Anshul Sethi MD  PCP: Glischinski, Luke A, MERCED - CNP    Discharging Nurse: ***  Discharging Hospital Unit/Room#: L7T-9727/2104-01  Discharging Unit Phone Number: ***    Emergency Contact:   Extended Emergency Contact Information  Primary Emergency Contact: Aiden Ny  Home Phone: [phone]  Relation: Child  Secondary Emergency Contact: Eilerman,Sharon  Home Phone: [phone]  Relation: Child    Past Surgical History:  Past Surgical History:   Procedure Laterality Date    AORTIC VALVE REPLACEMENT  2013    BLEPHAROPLASTY  2007    UPPER LID    BONY PELVIS SURGERY  2021    SI joint    CARDIAC CATHETERIZATION  2013    dx    CARDIAC SURGERY  2013    reincised for bleeding    CATARACT REMOVAL WITH IMPLANT Right 2019    CATARACT REMOVAL WITH IMPLANT Left 2019    CERVICAL DISC SURGERY Right     epidural steroids.    CORONARY ARTERY BYPASS GRAFT  2013    4 V    EYE SURGERY Bilateral     Laser peripheral iridotomy     FINGER TRIGGER RELEASE Right 2014    Release of trigger thumb (stenosing tenosynovitis)    HEMORRHOID SURGERY  [id number]    LAPAROSCOPIC APPENDECTOMY  2014    LUMBAR LAMINECTOMY   &     LUMBAR LAMINECTOMY  2014    L2-L3 & Repair dural tear x2.    LUMBAR SPINE SURGERY  2009    SPINaL CORD STIMULATOR FOR PAIN    LUMBAR SPINE SURGERY  2014    epidural steroids. 3/2017 NEAL, 10/21/19    LUMBAR SPINE SURGERY Right 2019    SPINAL CORD STIMULATOR BATTERY REPLACEMENT WITH POCKET REVISION     MOHS SURGERY Left 2020    Dorsum of hand: type?    MOHS SURGERY Right 2020    right upper inner pinna with skin graft    NERVE SURGERY  ~2012    nerve block      ROTATOR CUFF REPAIR  2001    SKIN CANCER EXCISION Right 07/2017    forehead THEN TOP OF THE HEAD.    SKIN CANCER EXCISION  01/2019    nose    SOFT TISSUE BIOPSY      needle bx right neck mass -benign    TEAR DUCT SURGERY Bilateral 01/01/2015    for dry    TESTICLE REMOVAL  1947    R    TONSILLECTOMY      CHILD    UMBILICAL HERNIA REPAIR  11/14/2014    Laparoscopic       Immunization History:   Immunization History   Administered Date(s) Administered    COVID-19, MODERNA BLUE border, Primary or Immunocompromised, (age 12y+), IM, 100 mcg/0.5mL 02/04/2021, 03/04/2021    Influenza Vaccine, unspecified formulation 10/01/2015    Influenza Virus Vaccine 11/04/2008, 11/02/2009, 09/29/2010    Influenza Whole 09/29/2010    Influenza, FLUAD, (age 65 y+), IM, Quadv, 0.5mL 10/26/2021, 10/06/2022, 09/27/2023    Influenza, FLUAD, (age 65 y+), IM, Trivalent PF, 0.5mL 09/27/2019, 09/23/2020    Influenza, FLUZONE High Dose, (age 65 y+), IM, Trivalent PF, 0.5mL 10/19/2011, 10/26/2012, 10/15/2013, 09/22/2014, 10/05/2016, 09/26/2017, 10/22/2018    Pneumococcal, PCV-13, PREVNAR 13, (age 6w+), IM, 0.5mL 08/14/2017    Pneumococcal, PPSV23, PNEUMOVAX 23, (age 2y+), SC/IM, 0.5mL 10/15/2013    TDaP, ADACEL (age 10y-64y), BOOSTRIX (age 10y+), IM, 0.5mL 10/15/2013, 11/07/2018       Active Problems:  Patient Active Problem List   Diagnosis Code    Vitamin D deficiency E55.9    Pure hypercholesterolemia E78.00    Alcohol abuse F10.10    External hemorrhoids K64.4    Recurrent BCC (basal cell carcinoma) C44.91    Impotence of organic origin N52.9    ANNMARIE on CPAP G47.33    Tobacco use disorder F17.200    Essential hypertension, benign I10    Type 2 diabetes mellitus with microalbuminuria, without long-term current use of insulin (HCC) E11.29, R80.9    Degeneration of lumbar or lumbosacral intervertebral disc M51.37    Leg swelling M79.89    Diverticulosis of colon K57.30    Colon polyps K63.5    Numbness in left leg R20.0    Sacroiliitis, not

## 2024-08-26 NOTE — PROGRESS NOTES
NAME:  Ernesto Ny  YOB: 1940  MEDICAL RECORD NUMBER:  [id number]    Shift Summary: Bipap for 4 hours overnight. Worsening VBG. Remains A&O x4, denies resp distress. HR 30s-50s, asymptomatic.     Family updated: No    Rhythm: Atrial Fibrillation     Most recent vitals:   Visit Vitals  BP (!) 106/51   Pulse (!) 37   Temp 97.4 °F (36.3 °C) (Axillary)   Resp 16   Ht 1.676 m (5' 6\")   Wt 92.9 kg (204 lb 12.9 oz)   SpO2 93%   BMI 33.06 kg/m²           No data found.    No data found.      Respiratory support needed (if any):  - BiPAP   IPAP: (S) 20 cmH20, CPAP/EPAP: (S) 6 cmH2O continuous    Admission weight Weight - Scale: 97.4 kg (214 lb 11.7 oz) (08/24/24 1116)    Today's weight    Wt Readings from Last 1 Encounters:   08/26/24 92.9 kg (204 lb 12.9 oz)        Sandoval need assessed each shift: N/A - no sandoval present  UOP >30ml/hr: YES  Last documented BM (in last 48 hrs):  No data found.             Restraints (in use currently or dc'd in last 12 hrs): No    Order current and documentation up to date? No    Lines/Drains reviewed @ bedside.  Peripheral IV 08/24/24 Left Forearm (Active)   Number of days: 1       Peripheral IV 08/24/24 Distal;Right Forearm (Active)   Number of days: 1         Drip rates at handoff:    sodium chloride Stopped (08/25/24 1027)    dextrose         Lab Data:   CBC:   Recent Labs     08/24/24  1147   WBC 7.8   HGB 9.7*   HCT 29.9*   MCV 95.1        BMP:    Recent Labs     08/25/24  0428 08/25/24  1146 08/26/24  0401     --  142   K 5.9* 5.3* 5.6*   CO2 26  --  25   *  --  98*   CREATININE 1.9*  --  1.8*     LIVR:   Recent Labs     08/24/24  1147   AST 17   ALT 17     PT/INR: No results for input(s): \"INR\" in the last 72 hours.    Invalid input(s): \"PROT\"  APTT: No results for input(s): \"APTT\" in the last 72 hours.  ABG: No results for input(s): \"PHART\", \"LNF3ILV\", \"PO2ART\" in the last 72 hours.    Any consults during the shift? No    Any signed and held

## 2024-08-26 NOTE — DISCHARGE INSTRUCTIONS
Extra Heart Failure Education/ Tools/ Resources:     https://NanoLumens.com/publication/?l=023916   --- this is American Heart Association interactive Healthier Living with Heart Failure guidebook.  Please click hyperlink or copy / paste link into search bar. The QR Code is also available below. Use your mouse to scroll through the pages.  Lots of information about weight monitoring, diet tips, activity, meds, etc    Heart Failure Tools and Resources QR Code is below. It includes multiple resources to include symptom tracker, med tracker, further HF info, and access to a HF Support Network online Community    HF [employer] Harika  -- this is a free smart phone harika available for iPhone and Android download.  Use your phone to track sodium / fluid intake, zone tool symptom tracking, weights, medications, etc. Click on this hyperlink  HF [place] Harika   for QR code for easy download or the link is also found in the below HF Tools and Resources.      DASH (Dietary Approach to Stop Hypertension) diet --  https://www.nhlbi.nih.gov/education/dash-eating-plan -- this diet is a flexible eating plan that promotes heart healthy eating style.  Click on hyperlink or copy / paste link into search bar.  Lots of low sodium recipes and tips.    https://www.[employer]olograms.Mark[employer] Munch/recipes  -- more free recipes

## 2024-08-26 NOTE — PROGRESS NOTES
Pt tolerated bipap intermittently for about 4 hours overnight. AM VBG critical 7.159/ 80.4/ 29. Pt placed back on bipap. MD Danilo made aware. Repeat VBG ordered for 0700.

## 2024-08-26 NOTE — PROGRESS NOTES
Physical Therapy  PT referral received and chart reviewed.  Pt currently requiring Bipap.  Will follow-up with PT Leonel as approp.  Wendy Villasenor, PT

## 2024-08-26 NOTE — CONSULTS
[facility]   Electrophysiology Consultation     Date: 8/26/2024  Reason for Consultation: Atrial fibrillation, bradycardia  Consult Requesting Physician: Anshul Sethi MD     CC: Shortness of breath    HPI:   Ernesto Ny is a 84 y.o. male history of diabetes, hyperlipidemia, hypertension, aortic stenosis with bioprosthetic aortic valve placement in December 2023, obstructive sleep apnea, persistent atrial fibrillation, coronary artery disease with CABG in 2013, prior smoker, follows with [facility] cardiology, diagnosed with tachycardia-bradycardia syndrome entheses last seen 8/12/2024), plan for dual-chamber pacemaker followed by sotalol and cardioversion.  Patient presents to the emergency department via EMS for shortness of breath that has been progressive over the past couple weeks, chronic orthopnea, increased lower extremity swelling over the past couple months, found to be hypoxic with sats in the 70s.  Treated for acute hypoxic respiratory failure, on BiPAP, admitted to the ICU. He denies palpitations or racing heart. Denies dizziness or lightheadedness.     Review of System:  Complete 10 point ROS performed and negative unless noted in above HPI or below    Prior to Admission medications    Medication Sig Start Date End Date Taking? Authorizing Provider   sodium chloride (OCEAN, BABY AYR) 0.65 % nasal spray 1 spray by Nasal route as needed for Congestion   Yes ProviderFlo MD   ipratropium 0.5 mg-albuterol 2.5 mg (DUONEB) 0.5-2.5 (3) MG/3ML SOLN nebulizer solution Inhale 3 mLs into the lungs every 4 hours as needed for Shortness of Breath   Yes ProviderFlo MD   metFORMIN (GLUCOPHAGE) 1000 MG tablet Take 1 tablet by mouth 2 times daily (with meals) 8/12/24  Yes Glischinski, Luke A, APRN - CNP   pregabalin (LYRICA) 75 MG capsule Take 1 capsule by mouth 2 times daily for 180 days. Max Daily Amount: 150 mg 8/12/24 2/8/25 Yes Glischinski, Luke A, APRN - CNP   atorvastatin  NUMBNESS W/ TRAUMA    Hypercholesterolemia     Impotence     Metabolic syndrome     Mild diastolic dysfunction 10/21/2013    LVEF 55-60%    Obstructive sleep apnea     Obstructive sleep apnea     C-PAP    Osteoarthritis of shoulder     left  @ ac jt/impingement.    Paroxysmal atrial fibrillation (HCC) 02/26/2022    On EKG with RBBB, inferior infarct age undetermined, QT interval long for rate    Pulmonary hypertension (HCC) 10/21/2013    37    Recurrent BCC (basal cell carcinoma)     Right bundle branch block 05/15/2018    S/P aortic valve replacement 11/12/2013    Submandibular duct obstruction - right 10/02/2017    12 x 14 x 18 mm right submandibular gland stone causing submandibular duct dilatation and gland prominence    Tobacco use disorder 1957    Type II or unspecified type diabetes mellitus without mention of complication, not stated as uncontrolled 2003    Vitamin D deficiency         Past Surgical History:   Procedure Laterality Date    AORTIC VALVE REPLACEMENT  11/12/2013    BLEPHAROPLASTY  07/2007    UPPER LID    BONY PELVIS SURGERY  01/12/2021    SI joint    CARDIAC CATHETERIZATION  11/08/2013    dx    CARDIAC SURGERY  11/12/2013    reincised for bleeding    CATARACT REMOVAL WITH IMPLANT Right 01/2019    CATARACT REMOVAL WITH IMPLANT Left 02/2019    CERVICAL DISC SURGERY Right 2015    epidural steroids.    CORONARY ARTERY BYPASS GRAFT  11/12/2013    4 V    EYE SURGERY Bilateral     Laser peripheral iridotomy     FINGER TRIGGER RELEASE Right 08/19/2014    Release of trigger thumb (stenosing tenosynovitis)    HEMORRHOID SURGERY  [id number]    LAPAROSCOPIC APPENDECTOMY  11/14/2014    LUMBAR LAMINECTOMY  1998 & 2003    LUMBAR LAMINECTOMY  05/07/2014    L2-L3 & Repair dural tear x2.    LUMBAR SPINE SURGERY  08/04/2009    SPINaL CORD STIMULATOR FOR PAIN    LUMBAR SPINE SURGERY  08/2014    epidural steroids. 3/2017 NEAL, 10/21/19    LUMBAR SPINE SURGERY Right 04/23/2019    SPINAL CORD STIMULATOR BATTERY

## 2024-08-26 NOTE — PLAN OF CARE
Problem: Discharge Planning  Goal: Discharge to home or other facility with appropriate resources  8/25/2024 2028 by Brittani Woodall, RN  Outcome: Progressing  Flowsheets (Taken 8/25/2024 2000)  Discharge to home or other facility with appropriate resources:   Identify barriers to discharge with patient and caregiver   Identify discharge learning needs (meds, wound care, etc)     Problem: Pain  Goal: Verbalizes/displays adequate comfort level or baseline comfort level  8/25/2024 2028 by Brittani Woodall, RN  Outcome: Progressing     Problem: Safety - Adult  Goal: Free from fall injury  8/25/2024 2028 by Brittani Woodall, RN  Outcome: Progressing  Flowsheets (Taken 8/25/2024 2027)  Free From Fall Injury: Instruct family/caregiver on patient safety     Problem: Skin/Tissue Integrity  Goal: Absence of new skin breakdown  Description: 1.  Monitor for areas of redness and/or skin breakdown  2.  Assess vascular access sites hourly  3.  Every 4-6 hours minimum:  Change oxygen saturation probe site  4.  Every 4-6 hours:  If on nasal continuous positive airway pressure, respiratory therapy assess nares and determine need for appliance change or resting period.  8/25/2024 2028 by Brittani Woodall, RN  Outcome: Progressing     Problem: ABCDS Injury Assessment  Goal: Absence of physical injury  8/25/2024 2028 by Brittani Woodall, RN  Outcome: Progressing  Flowsheets (Taken 8/25/2024 2027)  Absence of Physical Injury: Implement safety measures based on patient assessment

## 2024-08-26 NOTE — PROGRESS NOTES
Physician Progress Note      PATIENT:               TAMMY WHEELER  CSN #:                  [id number]  :                       1940  ADMIT DATE:       2024 11:14 AM  DISCH DATE:  RESPONDING  PROVIDER #:        Anshlu Sethi MD          QUERY TEXT:    Patient admitted with acute diastolic CHF, noted to have CKD. If possible,   please document in progress notes and discharge summary if you are evaluating   and/or treating any of the following:    The medical record reflects the following:  Risk Factors: Age 83yo...Hx- HTN, COPD, AF, DM, pulm htn, CHF, CKD  Clinical Indicators: GFR 29, 35, 37....CRT 2.2, 1.9, 1.8...........Per H&P on   24-ADWOA on CKD  -Patient creatinine 2.2 from baseline 1.1  Treatment: Monitor    Thank You,  Di Torres RN BSN CDS CRCR  [email or web address]  Options provided:  -- CKD Stage 1 GFR>90  -- CKD Stage 2 GFR 60-90  -- CKD Stage 3a GFR 45-59  -- CKD Stage 3b GFR 30-44  -- CKD Stage 4 GFR 15-29  -- CKD Stage 5 GFR<15  -- ESRD  -- Other - I will add my own diagnosis  -- Disagree - Not applicable / Not valid  -- Disagree - Clinically unable to determine / Unknown  -- Refer to Clinical Documentation Reviewer    PROVIDER RESPONSE TEXT:    This patient has CKD Stage 3a.    Query created by: Di Torres on 2024 11:41 AM      QUERY TEXT:    Pt admitted with acute diastolic CHF. Pt noted to also have hypertension, ***.   If possible, please document in progress notes and discharge summary the   etiology of CHF, if able to be determined.    The medical record reflects the following:  Risk Factors: Age 83yo...Hx- HTN, COPD, CAD, AF, DM, pulm htn, CHF, CKD,   aortic valve stenosis s/p bioprosthetic valve replacement  Clinical Indicators: VS- 98.3, 48, 26, 132/61    96% 7L.....BNP-   7999.....CXR-Mild prominence of the pulmonary vasculature with small right   pleural effusion.  Findings may represent underlying pulmonary edema.  Treatment: Lasix IV, Daily  weights, strict I's/O's, low-sodium diet      Thank You,  Di Torres RN BSN CDS CRCR  [email or web address]  Options provided:  -- CHF due to Hypertensive Heart Disease  -- CHF due to Hypertensive Heart Disease and CAD  -- CHF not due to Hypertension but due to CAD  -- CHF due to Hypertensive Heart Disease and Valvular Heart Disease  -- CHF not due to Hypertension but due to valvular heart disease  -- CHF due to HTN, CAD and valvular disease  -- Other - I will add my own diagnosis  -- Disagree - Not applicable / Not valid  -- Disagree - Clinically unable to determine / Unknown  -- Refer to Clinical Documentation Reviewer    PROVIDER RESPONSE TEXT:    This patient has CHF due to hypertensive heart disease.    Query created by: Di Torres on 8/26/2024 11:49 AM      Electronically signed by:  Anshul Sethi MD 8/26/2024 1:00 PM

## 2024-08-26 NOTE — PROGRESS NOTES
Occupational Therapy  Facility/Department: [address] ICU  Occupational Therapy Initial Assessment and Tentative D/C      Name: Ernesto Ny  : 1940  MRN: [id number]  Date of Service: 2024    Discharge Recommendations: Ernesto Ny scored a 17/24 on the AM-PAC ADL Inpatient form. Current research shows that an AM-PAC score of 18 or greater is typically associated with a discharge to the patient's home setting. Based on the patient's AM-PAC score, and their current ADL deficits, it is recommended that the patient have 2-3 sessions per week of Occupational Therapy at d/c to increase the patient's independence.  At this time, this patient demonstrates the endurance and safety to discharge home with HHOT and a follow up treatment frequency of 2-3x/wk.   Please see assessment section for further patient specific details.    If patient discharges prior to next session this note will serve as a discharge summary.  Please see below for the latest assessment towards goals.     Home with assist PRN, 2-3 sessions per week  OT Equipment Recommendations  Equipment Needed: No       Patient Diagnosis(es): The primary encounter diagnosis was Acute respiratory failure with hypoxia and hypercapnia (HCC). Diagnoses of Acute on chronic congestive heart failure, unspecified heart failure type (HCC), ADWOA (acute kidney injury) (HCC), and Pleural effusion on right were also pertinent to this visit.  Past Medical History:  has a past medical history of Alcohol use, AMD (age related macular degeneration) bilateral, Benign essential hypertension, Blind right eye, CAD (coronary artery disease), CNVM (choroidal neovascular membrane), right, Colon polyps, COPD (chronic obstructive pulmonary disease) (HCC), DDD (degenerative disc disease), lumbar, Degenerative disc disease, cervical, Diverticulosis of colon, Dry eye syndrome of bilateral lacrimal glands, Hip problem, Hypercholesterolemia, Impotence, Metabolic syndrome, Mild  Impaired  Vision Exceptions: Wears glasses at all times  Hearing  Hearing: Exceptions to [facility]  Hearing Exceptions: Hard of hearing/hearing concerns  Cognition  Overall Cognitive Status: [facility]  Cognition Comment: difficult to understand at times  Orientation  Overall Orientation Status: Within Functional Limits  Orientation Level: Oriented to place;Oriented to time;Oriented to person                  Education Given To: Patient  Education Provided: Role of Therapy;Plan of Care;Transfer Training;ADL Adaptive Strategies  Education Method: Demonstration;Verbal  Barriers to Learning: None  Education Outcome: Demonstrated understanding;Verbalized understanding;Continued education needed        AM-PAC - ADL  AM-PAC Daily Activity - Inpatient   How much help is needed for putting on and taking off regular lower body clothing?: A Lot  How much help is needed for bathing (which includes washing, rinsing, drying)?: A Lot  How much help is needed for toileting (which includes using toilet, bedpan, or urinal)?: A Little  How much help is needed for putting on and taking off regular upper body clothing?: A Little  How much help is needed for taking care of personal grooming?: A Little  How much help for eating meals?: None  AM-PAC Inpatient Daily Activity Raw Score: 17  AM-PAC Inpatient ADL T-Scale Score : 37.26  ADL Inpatient CMS 0-100% Score: 50.11  ADL Inpatient CMS G-Code Modifier : CK    Tinneti Score       Goals  Short Term Goals  Time Frame for Short Term Goals: prior to D/C  Short Term Goal 1: complete functional mobility and transfers Mod Ind  Short Term Goal 2: complete bathing and dressing Mod Ind  Short Term Goal 3: complete toileting Mod Ind  Short Term Goal 4: complete grooming in stance at sink Mod Ind  Long Term Goals  Time Frame for Long Term Goals : STG=LTG  Patient Goals   Patient goals : return home      Therapy Time   Individual Concurrent Group Co-treatment   Time In 1255         Time Out 1333         Minutes

## 2024-08-27 LAB
ANION GAP SERPL CALCULATED.3IONS-SCNC: 8 MMOL/L (ref 3–16)
BASE EXCESS BLDV CALC-SCNC: 3.3 MMOL/L
BUN SERPL-MCNC: 89 MG/DL (ref 7–20)
CALCIUM SERPL-MCNC: 8.9 MG/DL (ref 8.3–10.6)
CHLORIDE SERPL-SCNC: 108 MMOL/L (ref 99–110)
CO2 BLDV-SCNC: 33 MMOL/L
CO2 SERPL-SCNC: 28 MMOL/L (ref 21–32)
COHGB MFR BLDV: 1.9 %
CREAT SERPL-MCNC: 1.8 MG/DL (ref 0.8–1.3)
GFR SERPLBLD CREATININE-BSD FMLA CKD-EPI: 37 ML/MIN/{1.73_M2}
GLUCOSE BLD-MCNC: 176 MG/DL (ref 70–99)
GLUCOSE BLD-MCNC: 299 MG/DL (ref 70–99)
GLUCOSE BLD-MCNC: 442 MG/DL (ref 70–99)
GLUCOSE BLD-MCNC: 456 MG/DL (ref 70–99)
GLUCOSE SERPL-MCNC: 182 MG/DL (ref 70–99)
HCO3 BLDV-SCNC: 31 MMOL/L (ref 23–29)
MAGNESIUM SERPL-MCNC: 2.6 MG/DL (ref 1.8–2.4)
METHGB MFR BLDV: 0.6 %
NT-PROBNP SERPL-MCNC: 5118 PG/ML (ref 0–449)
O2 THERAPY: ABNORMAL
PCO2 BLDV: 63.9 MMHG (ref 40–50)
PERFORMED ON: ABNORMAL
PH BLDV: 7.29 [PH] (ref 7.35–7.45)
PO2 BLDV: 34 MMHG
POTASSIUM SERPL-SCNC: 4.8 MMOL/L (ref 3.5–5.1)
SAO2 % BLDV: 60 %
SODIUM SERPL-SCNC: 144 MMOL/L (ref 136–145)

## 2024-08-27 PROCEDURE — 2700000000 HC OXYGEN THERAPY PER DAY

## 2024-08-27 PROCEDURE — 83735 ASSAY OF MAGNESIUM: CPT

## 2024-08-27 PROCEDURE — 6360000002 HC RX W HCPCS: Performed by: STUDENT IN AN ORGANIZED HEALTH CARE EDUCATION/TRAINING PROGRAM

## 2024-08-27 PROCEDURE — 94660 CPAP INITIATION&MGMT: CPT

## 2024-08-27 PROCEDURE — 36415 COLL VENOUS BLD VENIPUNCTURE: CPT

## 2024-08-27 PROCEDURE — 94640 AIRWAY INHALATION TREATMENT: CPT

## 2024-08-27 PROCEDURE — 2580000003 HC RX 258: Performed by: STUDENT IN AN ORGANIZED HEALTH CARE EDUCATION/TRAINING PROGRAM

## 2024-08-27 PROCEDURE — 97162 PT EVAL MOD COMPLEX 30 MIN: CPT

## 2024-08-27 PROCEDURE — 2060000000 HC ICU INTERMEDIATE R&B

## 2024-08-27 PROCEDURE — 6370000000 HC RX 637 (ALT 250 FOR IP): Performed by: NURSE PRACTITIONER

## 2024-08-27 PROCEDURE — 82803 BLOOD GASES ANY COMBINATION: CPT

## 2024-08-27 PROCEDURE — 97116 GAIT TRAINING THERAPY: CPT

## 2024-08-27 PROCEDURE — 94761 N-INVAS EAR/PLS OXIMETRY MLT: CPT

## 2024-08-27 PROCEDURE — 80048 BASIC METABOLIC PNL TOTAL CA: CPT

## 2024-08-27 PROCEDURE — 6370000000 HC RX 637 (ALT 250 FOR IP): Performed by: HOSPITALIST

## 2024-08-27 PROCEDURE — 6370000000 HC RX 637 (ALT 250 FOR IP): Performed by: STUDENT IN AN ORGANIZED HEALTH CARE EDUCATION/TRAINING PROGRAM

## 2024-08-27 PROCEDURE — 97530 THERAPEUTIC ACTIVITIES: CPT

## 2024-08-27 PROCEDURE — 83880 ASSAY OF NATRIURETIC PEPTIDE: CPT

## 2024-08-27 RX ORDER — INSULIN LISPRO 100 [IU]/ML
4 INJECTION, SOLUTION INTRAVENOUS; SUBCUTANEOUS ONCE
Status: COMPLETED | OUTPATIENT
Start: 2024-08-27 | End: 2024-08-27

## 2024-08-27 RX ORDER — INSULIN LISPRO 100 [IU]/ML
0-4 INJECTION, SOLUTION INTRAVENOUS; SUBCUTANEOUS NIGHTLY
Status: DISCONTINUED | OUTPATIENT
Start: 2024-08-27 | End: 2024-08-30 | Stop reason: HOSPADM

## 2024-08-27 RX ORDER — INSULIN LISPRO 100 [IU]/ML
0-16 INJECTION, SOLUTION INTRAVENOUS; SUBCUTANEOUS
Status: DISCONTINUED | OUTPATIENT
Start: 2024-08-27 | End: 2024-08-30 | Stop reason: HOSPADM

## 2024-08-27 RX ORDER — INSULIN GLARGINE 100 [IU]/ML
10 INJECTION, SOLUTION SUBCUTANEOUS NIGHTLY
Status: DISCONTINUED | OUTPATIENT
Start: 2024-08-27 | End: 2024-08-30 | Stop reason: HOSPADM

## 2024-08-27 RX ORDER — INSULIN LISPRO 100 [IU]/ML
8 INJECTION, SOLUTION INTRAVENOUS; SUBCUTANEOUS ONCE
Status: COMPLETED | OUTPATIENT
Start: 2024-08-27 | End: 2024-08-27

## 2024-08-27 RX ADMIN — AMLODIPINE BESYLATE 2.5 MG: 5 TABLET ORAL at 10:04

## 2024-08-27 RX ADMIN — CEFEPIME 2000 MG: 2 INJECTION, POWDER, FOR SOLUTION INTRAVENOUS at 20:58

## 2024-08-27 RX ADMIN — INSULIN GLARGINE 10 UNITS: 100 INJECTION, SOLUTION SUBCUTANEOUS at 20:56

## 2024-08-27 RX ADMIN — PREDNISONE 40 MG: 20 TABLET ORAL at 10:04

## 2024-08-27 RX ADMIN — FUROSEMIDE 40 MG: 10 INJECTION, SOLUTION INTRAMUSCULAR; INTRAVENOUS at 17:56

## 2024-08-27 RX ADMIN — MONTELUKAST 10 MG: 10 TABLET, FILM COATED ORAL at 20:56

## 2024-08-27 RX ADMIN — CEFEPIME 2000 MG: 2 INJECTION, POWDER, FOR SOLUTION INTRAVENOUS at 09:26

## 2024-08-27 RX ADMIN — INSULIN LISPRO 4 UNITS: 100 INJECTION, SOLUTION INTRAVENOUS; SUBCUTANEOUS at 20:56

## 2024-08-27 RX ADMIN — IPRATROPIUM BROMIDE AND ALBUTEROL SULFATE 1 DOSE: 2.5; .5 SOLUTION RESPIRATORY (INHALATION) at 11:41

## 2024-08-27 RX ADMIN — INSULIN LISPRO 16 UNITS: 100 INJECTION, SOLUTION INTRAVENOUS; SUBCUTANEOUS at 18:57

## 2024-08-27 RX ADMIN — INSULIN LISPRO 4 UNITS: 100 INJECTION, SOLUTION INTRAVENOUS; SUBCUTANEOUS at 20:57

## 2024-08-27 RX ADMIN — PREGABALIN 75 MG: 75 CAPSULE ORAL at 10:04

## 2024-08-27 RX ADMIN — PREGABALIN 75 MG: 75 CAPSULE ORAL at 20:56

## 2024-08-27 RX ADMIN — SODIUM CHLORIDE, PRESERVATIVE FREE 10 ML: 5 INJECTION INTRAVENOUS at 09:23

## 2024-08-27 RX ADMIN — RIVAROXABAN 15 MG: 15 TABLET, FILM COATED ORAL at 10:04

## 2024-08-27 RX ADMIN — IPRATROPIUM BROMIDE AND ALBUTEROL SULFATE 1 DOSE: 2.5; .5 SOLUTION RESPIRATORY (INHALATION) at 20:30

## 2024-08-27 RX ADMIN — IPRATROPIUM BROMIDE AND ALBUTEROL SULFATE 1 DOSE: 2.5; .5 SOLUTION RESPIRATORY (INHALATION) at 15:55

## 2024-08-27 RX ADMIN — FUROSEMIDE 40 MG: 10 INJECTION, SOLUTION INTRAMUSCULAR; INTRAVENOUS at 09:23

## 2024-08-27 RX ADMIN — INSULIN LISPRO 8 UNITS: 100 INJECTION, SOLUTION INTRAVENOUS; SUBCUTANEOUS at 14:14

## 2024-08-27 RX ADMIN — IPRATROPIUM BROMIDE AND ALBUTEROL SULFATE 1 DOSE: 2.5; .5 SOLUTION RESPIRATORY (INHALATION) at 08:11

## 2024-08-27 RX ADMIN — ATORVASTATIN CALCIUM 40 MG: 40 TABLET, FILM COATED ORAL at 10:04

## 2024-08-27 NOTE — PLAN OF CARE
Problem: Metabolic/Fluid and Electrolytes - Adult  Goal: Glucose maintained within prescribed range  Outcome: Not Progressing  Flowsheets (Taken 8/26/2024 2000)  Glucose maintained within prescribed range:   Monitor blood glucose as ordered   Assess for signs and symptoms of hyperglycemia and hypoglycemia   Administer ordered medications to maintain glucose within target range     Problem: Pain  Goal: Verbalizes/displays adequate comfort level or baseline comfort level  Outcome: Progressing  Flowsheets (Taken 8/26/2024 2000)  Verbalizes/displays adequate comfort level or baseline comfort level:   Encourage patient to monitor pain and request assistance   Assess pain using appropriate pain scale   Administer analgesics based on type and severity of pain and evaluate response   Implement non-pharmacological measures as appropriate and evaluate response   Consider cultural and social influences on pain and pain management     Problem: Safety - Adult  Goal: Free from fall injury  Outcome: Progressing     Problem: Skin/Tissue Integrity  Goal: Absence of new skin breakdown  Description: 1.  Monitor for areas of redness and/or skin breakdown  2.  Assess vascular access sites hourly  3.  Every 4-6 hours minimum:  Change oxygen saturation probe site  4.  Every 4-6 hours:  If on nasal continuous positive airway pressure, respiratory therapy assess nares and determine need for appliance change or resting period.  Outcome: Progressing     Problem: ABCDS Injury Assessment  Goal: Absence of physical injury  Outcome: Progressing     Problem: Chronic Conditions and Co-morbidities  Goal: Patient's chronic conditions and co-morbidity symptoms are monitored and maintained or improved  Outcome: Progressing  Flowsheets (Taken 8/26/2024 2000)  Care Plan - Patient's Chronic Conditions and Co-Morbidity Symptoms are Monitored and Maintained or Improved: Monitor and assess patient's chronic conditions and comorbid symptoms for stability,  and nutrition restrictions as appropriate  Goal: Hemodynamic stability and optimal renal function maintained  Outcome: Progressing  Flowsheets (Taken 8/26/2024 2000)  Hemodynamic stability and optimal renal function maintained:   Monitor labs and assess for signs and symptoms of volume excess or deficit   Monitor intake, output and patient weight   Monitor response to interventions for patient's volume status, including labs, urine output, blood pressure (other measures as available)   Instruct patient on fluid and nutrition restrictions as appropriate     Problem: Hematologic - Adult  Goal: Maintains hematologic stability  Outcome: Progressing  Flowsheets (Taken 8/26/2024 2000)  Maintains hematologic stability: Assess for signs and symptoms of bleeding or hemorrhage     Problem: Metabolic/Fluid and Electrolytes - Adult  Goal: Glucose maintained within prescribed range  Outcome: Not Progressing  Flowsheets (Taken 8/26/2024 2000)  Glucose maintained within prescribed range:   Monitor blood glucose as ordered   Assess for signs and symptoms of hyperglycemia and hypoglycemia   Administer ordered medications to maintain glucose within target range

## 2024-08-27 NOTE — PROGRESS NOTES
V2.0    [facility] [place]      Name:  Ernesto Ny /Age/Sex: 1940  (84 y.o. male)   MRN & CSN:  [id number] & [id number] Encounter Date/Time: 2024 11:37 AM EDT   Location:  R2G-8603 PCP: Glischinski, Luke A, APRN - CNP     Attending:Андрей Marin MD       Hospital Day: 4    Assessment and Recommendations         Ernesto Ny is a 84 y.o. male with PMH of HFpEF, COPD, CKD p/w acute heart failure        Plan:  1. Acute heart failure preserved ejection fraction  -proBNP elevated 8000, patient with significant lower extremity edema, pulmonary edema on x-ray  -Good UOP, but taking in a good amount of fluid  -Continue on IV Lasix 40 mg BID  -Daily weights, strict I's/O's, low-sodium diet  -1.5L fluid restriction  2. COPD exacerbation  -Exacerbated by heart failure  -Steroids, DuoNebs  -Starting BiPAP today monitor  3. Gram-negative pneumonia  -molecular pneumonia panel ordered due to productive cough  -PCR positive for pseudomonas, klebsiella, serratia  -Resp. culture positive for Pseudomonas, pansensitive  -Continue on cefepime day 3.  4. Acute hypoxic and hypercapnic respiratory failure  -Evidenced by oxygen saturation of 70% on room air.  Denies use of home oxygen in the past  -Patient given break from BiPAP.  Satting well on RA   -Continue intermittent.BiPAP  -Due to above  -Treatment as above  -Wean O2 as tolerated  5. ADWOA on CKD  -Initial creatinine 2.2 from baseline 1.1; down to 1.8 today  -Suspect prerenal due to heart failure  -Monitor on diuretics  6. Hyperkalemia  -Potassium 5.6 this a.m.  -Receiving IV Lasix  -Continue to monitor  7. Atrial fibrillation  -On AC with Xarelto  -Per patient's son patient scheduled for pacemaker placement due to A-fib on 9/3  8. History of CVA  -Patient with residual dysarthria, appears baseline;   -Continue xarelto + statin  9. Type 2 diabetes mellitus  -Low-dose sliding scale  -Monitor sugars while on steroids  10. Hypertension  -Continue home

## 2024-08-27 NOTE — PROGRESS NOTES
NAME:  Ernesto Ny  YOB: 1940  MEDICAL RECORD NUMBER:  [id number]    Shift Summary: Patient wore Bipap from 2100 on, tolerated well.  Blood glucose elevated at bedtime, extra insulin ordered by NP.      Family updated: No    Rhythm: Atrial Fibrillation     Most recent vitals:   Visit Vitals  BP (!) 126/46   Pulse (!) 47   Temp 97.4 °F (36.3 °C) (Oral)   Resp 19   Ht 1.676 m (5' 6\")   Wt 93.8 kg (206 lb 12.7 oz)   SpO2 100%   BMI 33.38 kg/m²           No data found.    No data found.      Respiratory support needed (if any):  - BiPAP   IPAP: 12 cmH20, CPAP/EPAP: 5 cmH2O only when sleeping    Admission weight Weight - Scale: 97.4 kg (214 lb 11.7 oz) (08/24/24 1116)    Today's weight    Wt Readings from Last 1 Encounters:   08/27/24 93.8 kg (206 lb 12.7 oz)        Sandoval need assessed each shift: N/A - no sandoval present  UOP >30ml/hr: YES  Last documented BM (in last 48 hrs):  Patient Vitals for the past 48 hrs:   Last BM (including prior to admit)   08/26/24 2000 08/25/24                Restraints (in use currently or dc'd in last 12 hrs): No    Order current and documentation up to date? No    Lines/Drains reviewed @ bedside.  Peripheral IV 08/24/24 Left Forearm (Active)   Number of days: 2       Peripheral IV 08/24/24 Distal;Right Forearm (Active)   Number of days: 2         Drip rates at handoff:    sodium chloride Stopped (08/25/24 1027)    dextrose         Lab Data:   CBC:   Recent Labs     08/24/24  1147   WBC 7.8   HGB 9.7*   HCT 29.9*   MCV 95.1        BMP:    Recent Labs     08/26/24  0401 08/27/24  0403    144   K 5.6* 4.8   CO2 25 28   BUN 98* 89*   CREATININE 1.8* 1.8*     LIVR:   Recent Labs     08/24/24  1147   AST 17   ALT 17     PT/INR: No results for input(s): \"INR\" in the last 72 hours.    Invalid input(s): \"PROT\"  APTT: No results for input(s): \"APTT\" in the last 72 hours.  ABG: No results for input(s): \"PHART\", \"IOT3YMQ\", \"PO2ART\" in the last 72 hours.    Any consults  during the shift? No    Any signed and held orders to be released?  No        4 Eyes Skin Assessment       The patient is being assessed for  Shift Handoff    I agree that at least one RN has performed a thorough Head to Toe Skin Assessment on the patient. ALL assessment sites listed below have been assessed.      Areas assessed by both nurses: Head, Face, Ears, Shoulders, Back, Chest, Arms, Elbows, Hands, Sacrum. Buttock, Coccyx, Ischium, Legs. Feet and Heels, and Under Medical Devices         Does the Patient have a Wound? No noted wound(s)    Wound Care Orders initiated by RN: No       Kishor Prevention initiated by RN: Yes    Pressure Injury (Stage 3,4, Unstageable, DTI, NWPT, and Complex wounds) if present, place Wound referral order by RN under : No    New Ostomies, if present place, Ostomy referral order under : No     Nurse 1 eSignature: Electronically signed by Alison Stewart RN on 8/27/24 at 5:48 AM EDT    **SHARE this note so that the co-signing nurse can place an eSignature**    Nurse 2 eSignature: Electronically signed by Hussein Burgos RN on 8/28/24 at 6:30 PM EDT

## 2024-08-27 NOTE — PROGRESS NOTES
Physical Therapy  Facility/Department: [address] ICU  Physical Therapy Initial Assessment    Name: Ernesto Ny  : 1940  MRN: [id number]  Date of Service: 2024    Discharge Recommendations:  Continue to assess pending progress (Anticipate adequate progress for d/c home.)   PT Equipment Recommendations  Other: Will monitor for potential equipt needs.      Ernesto Ny scored a 18/ on the AM-PAC short mobility form. Current research shows that an AM-PAC score of 18 or greater is typically associated with a discharge to the patient's home setting. Based on the patient's AM-PAC score and their current functional mobility deficits, it is recommended that the patient have 2-3 sessions per week of Physical Therapy at d/c to increase the patient's independence.  At this time, this patient demonstrates the endurance and safety to discharge home with Home PT Evaluation  and a follow up treatment frequency of 2-3x/wk.  Please see assessment section for further patient specific details.    If patient discharges prior to next session this note will serve as a discharge summary.  Please see below for the latest assessment towards goals.       Assessment  Body Structures, Functions, Activity Limitations Requiring Skilled Therapeutic Intervention: Decreased functional mobility ;Decreased strength;Decreased safe awareness;Decreased endurance;Decreased balance  Assessment: 83 y/o male admit 2024 with Acute Respiratory, COPD Exc, ADWOA on CKD. PMH as noted including CAD, PAF, AVR/CABG, CVA (2023), Spinal Stenosis, Back Surg.  PTA pt living with wife in 1 story home with level entry; independent daily care and functional mobility (no device in home; walker when going out).  Currently, appreciate dysarthria from previous CVA, difficult to understand at times.  Pt requiring O2 2L (desat room air when moving bed to chair).  Pt able to move to eob SBA, transfer/amb short distances within hospital room setting with

## 2024-08-27 NOTE — NURSE NAVIGATOR
I reached out to bedside RN Di to please let me know if wife comes to bedside so I can have her in attendance for HF education. I also tried to call wife at home. No answer and no option to leave message. Appropriate HF orders are in place. Will continue to follow and try again tomorrow.

## 2024-08-28 ENCOUNTER — TELEPHONE (OUTPATIENT)
Dept: FAMILY MEDICINE CLINIC | Age: 84
End: 2024-08-28

## 2024-08-28 LAB
ANION GAP SERPL CALCULATED.3IONS-SCNC: 11 MMOL/L (ref 3–16)
BUN SERPL-MCNC: 79 MG/DL (ref 7–20)
CALCIUM SERPL-MCNC: 8.8 MG/DL (ref 8.3–10.6)
CHLORIDE SERPL-SCNC: 108 MMOL/L (ref 99–110)
CO2 SERPL-SCNC: 26 MMOL/L (ref 21–32)
CREAT SERPL-MCNC: 1.4 MG/DL (ref 0.8–1.3)
GFR SERPLBLD CREATININE-BSD FMLA CKD-EPI: 50 ML/MIN/{1.73_M2}
GLUCOSE BLD-MCNC: 181 MG/DL (ref 70–99)
GLUCOSE BLD-MCNC: 253 MG/DL (ref 70–99)
GLUCOSE BLD-MCNC: 290 MG/DL (ref 70–99)
GLUCOSE BLD-MCNC: 340 MG/DL (ref 70–99)
GLUCOSE BLD-MCNC: 392 MG/DL (ref 70–99)
GLUCOSE BLD-MCNC: 401 MG/DL (ref 70–99)
GLUCOSE SERPL-MCNC: 162 MG/DL (ref 70–99)
MAGNESIUM SERPL-MCNC: 2.41 MG/DL (ref 1.8–2.4)
PERFORMED ON: ABNORMAL
POTASSIUM SERPL-SCNC: 5 MMOL/L (ref 3.5–5.1)
SODIUM SERPL-SCNC: 145 MMOL/L (ref 136–145)

## 2024-08-28 PROCEDURE — 80048 BASIC METABOLIC PNL TOTAL CA: CPT

## 2024-08-28 PROCEDURE — 6360000002 HC RX W HCPCS: Performed by: STUDENT IN AN ORGANIZED HEALTH CARE EDUCATION/TRAINING PROGRAM

## 2024-08-28 PROCEDURE — 94640 AIRWAY INHALATION TREATMENT: CPT

## 2024-08-28 PROCEDURE — 6370000000 HC RX 637 (ALT 250 FOR IP): Performed by: STUDENT IN AN ORGANIZED HEALTH CARE EDUCATION/TRAINING PROGRAM

## 2024-08-28 PROCEDURE — 97535 SELF CARE MNGMENT TRAINING: CPT

## 2024-08-28 PROCEDURE — 83735 ASSAY OF MAGNESIUM: CPT

## 2024-08-28 PROCEDURE — 6370000000 HC RX 637 (ALT 250 FOR IP): Performed by: HOSPITALIST

## 2024-08-28 PROCEDURE — 97530 THERAPEUTIC ACTIVITIES: CPT

## 2024-08-28 PROCEDURE — 2700000000 HC OXYGEN THERAPY PER DAY

## 2024-08-28 PROCEDURE — 94761 N-INVAS EAR/PLS OXIMETRY MLT: CPT

## 2024-08-28 PROCEDURE — 94660 CPAP INITIATION&MGMT: CPT

## 2024-08-28 PROCEDURE — 36415 COLL VENOUS BLD VENIPUNCTURE: CPT

## 2024-08-28 PROCEDURE — 2580000003 HC RX 258: Performed by: STUDENT IN AN ORGANIZED HEALTH CARE EDUCATION/TRAINING PROGRAM

## 2024-08-28 PROCEDURE — 2060000000 HC ICU INTERMEDIATE R&B

## 2024-08-28 RX ADMIN — INSULIN LISPRO 8 UNITS: 100 INJECTION, SOLUTION INTRAVENOUS; SUBCUTANEOUS at 12:44

## 2024-08-28 RX ADMIN — HYDROCODONE BITARTRATE AND ACETAMINOPHEN 1 TABLET: 10; 325 TABLET ORAL at 11:42

## 2024-08-28 RX ADMIN — IPRATROPIUM BROMIDE AND ALBUTEROL SULFATE 1 DOSE: 2.5; .5 SOLUTION RESPIRATORY (INHALATION) at 16:05

## 2024-08-28 RX ADMIN — INSULIN LISPRO 16 UNITS: 100 INJECTION, SOLUTION INTRAVENOUS; SUBCUTANEOUS at 17:07

## 2024-08-28 RX ADMIN — IPRATROPIUM BROMIDE AND ALBUTEROL SULFATE 1 DOSE: 2.5; .5 SOLUTION RESPIRATORY (INHALATION) at 07:34

## 2024-08-28 RX ADMIN — SODIUM CHLORIDE, PRESERVATIVE FREE 10 ML: 5 INJECTION INTRAVENOUS at 08:59

## 2024-08-28 RX ADMIN — CEFEPIME 2000 MG: 2 INJECTION, POWDER, FOR SOLUTION INTRAVENOUS at 09:04

## 2024-08-28 RX ADMIN — AMLODIPINE BESYLATE 2.5 MG: 5 TABLET ORAL at 08:59

## 2024-08-28 RX ADMIN — PREDNISONE 40 MG: 20 TABLET ORAL at 08:59

## 2024-08-28 RX ADMIN — RIVAROXABAN 15 MG: 15 TABLET, FILM COATED ORAL at 08:59

## 2024-08-28 RX ADMIN — MONTELUKAST 10 MG: 10 TABLET, FILM COATED ORAL at 21:53

## 2024-08-28 RX ADMIN — FUROSEMIDE 40 MG: 10 INJECTION, SOLUTION INTRAMUSCULAR; INTRAVENOUS at 08:59

## 2024-08-28 RX ADMIN — ATORVASTATIN CALCIUM 40 MG: 40 TABLET, FILM COATED ORAL at 08:59

## 2024-08-28 RX ADMIN — FUROSEMIDE 40 MG: 10 INJECTION, SOLUTION INTRAMUSCULAR; INTRAVENOUS at 18:13

## 2024-08-28 RX ADMIN — PREGABALIN 75 MG: 75 CAPSULE ORAL at 08:59

## 2024-08-28 RX ADMIN — IPRATROPIUM BROMIDE AND ALBUTEROL SULFATE 1 DOSE: 2.5; .5 SOLUTION RESPIRATORY (INHALATION) at 12:10

## 2024-08-28 RX ADMIN — IPRATROPIUM BROMIDE AND ALBUTEROL SULFATE 1 DOSE: 2.5; .5 SOLUTION RESPIRATORY (INHALATION) at 20:38

## 2024-08-28 RX ADMIN — PREGABALIN 75 MG: 75 CAPSULE ORAL at 21:54

## 2024-08-28 RX ADMIN — INSULIN LISPRO 4 UNITS: 100 INJECTION, SOLUTION INTRAVENOUS; SUBCUTANEOUS at 21:53

## 2024-08-28 RX ADMIN — HYDROCODONE BITARTRATE AND ACETAMINOPHEN 1 TABLET: 10; 325 TABLET ORAL at 18:13

## 2024-08-28 RX ADMIN — CEFEPIME 2000 MG: 2 INJECTION, POWDER, FOR SOLUTION INTRAVENOUS at 22:08

## 2024-08-28 RX ADMIN — INSULIN GLARGINE 10 UNITS: 100 INJECTION, SOLUTION SUBCUTANEOUS at 21:53

## 2024-08-28 RX ADMIN — SODIUM CHLORIDE, PRESERVATIVE FREE 10 ML: 5 INJECTION INTRAVENOUS at 22:08

## 2024-08-28 ASSESSMENT — PAIN SCALES - GENERAL
PAINLEVEL_OUTOF10: 5
PAINLEVEL_OUTOF10: 0
PAINLEVEL_OUTOF10: 7
PAINLEVEL_OUTOF10: 7

## 2024-08-28 ASSESSMENT — PAIN DESCRIPTION - DESCRIPTORS: DESCRIPTORS: ACHING

## 2024-08-28 ASSESSMENT — PAIN DESCRIPTION - LOCATION
LOCATION: BACK
LOCATION: BACK

## 2024-08-28 ASSESSMENT — PAIN - FUNCTIONAL ASSESSMENT: PAIN_FUNCTIONAL_ASSESSMENT: ACTIVITIES ARE NOT PREVENTED

## 2024-08-28 ASSESSMENT — PAIN DESCRIPTION - ORIENTATION
ORIENTATION: LOWER;MID
ORIENTATION: RIGHT;LEFT;MID

## 2024-08-28 NOTE — DISCHARGE INSTR - DIET
Good nutrition is important when healing from an illness, injury, or surgery.  Follow any nutrition recommendations given to you during your hospital stay.   If you were given an oral nutrition supplement while in the hospital, continue to take this supplement at home.  You can take it with meals, in-between meals, and/or before bedtime. These supplements can be purchased at most local grocery stores, pharmacies, and chain super-stores.   If you have any questions about your diet or nutrition, call the hospital and ask for the dietitian.  Sodium  Salt (sodium) makes your body hold water. You may feel shortness of breath and experience swelling when your body is holding water. You can help to prevent these symptoms by eating less salt. You may need to limit the sodium you get from food and drinks to less than 2,300 milligrams per day.  Read the nutrition label to find out how much sodium is in 1 serving of a food. Select foods with 140 milligrams of sodium or less per serving.  Foods with more than 300 milligrams of sodium per serving may not fit into a reduced-sodium meal plan depending on your other food selections. Your RDN can help you determine which foods fit into your eating plan.    Check serving sizes. If you eat more than 1 serving, you will get more sodium than the amount listed.  Tips for Limiting Sodium in Your Diet   How to limit sodium Strategies   Avoid processed foods    Choose fresh and frozen fruits and vegetables without added juices or sauces. These foods are naturally low in sodium.     Choose fresh meats. These foods are lower in sodium than processed meats, such as garcia, sausage, and hot dogs. Read the nutrition label to help you find fresh meat that is low in sodium.   Use less salt at the table and when cooking Leave the salt out of recipes for pasta, casseroles, and soups. Just 1 teaspoon of table salt has 2,300 milligrams of sodium.  Ask your RDN how to cook your favorite recipes without  butter and margarine Salted butter or margarine  Olives      Condiments Fresh or dried herbs  Low-sodium ketchup  Vinegar  Lemon or lime juice  Pepper  Salt-free seasoning mixes and marinades (salt-free seasoning blend)  Salt-free sauces Salt, sea salt, kosher salt  Onion and/or garlic salt  Seasoning mixes containing salt (lemon pepper or bouillon cubes)  Catsup or ketchup  BBQ sauce  [facility] and soy sauce  Salsa  Pickles  Reli  Salad dressings: ranch, blue cheese, Italian, and Slovak    Other Homemade soups (salt free or low sodium)  Frozen meals that have less than 600 milligrams sodium  Unsalted chips  Sodium-free bouillon cubes Canned or dried soups (that are not salt free or low sodium)  Frozen meals that have more than 600 milligrams sodium  Regular potato chips and other salty snack foods  Regular bouillon cubes

## 2024-08-28 NOTE — CARE COORDINATION
Per chart review, on RA, IV antibiotics thru 9/1/24, recommendations for home healthcare at discharge. Lives with his spouse.     Jimi MALDONADO RN  Case Management  [phone]    Electronically signed by Jimi Moon RN on 8/28/2024 at 6:23 PM

## 2024-08-28 NOTE — NURSE NAVIGATOR
Attempted again today to reach wife for review of HF education (if able to retain). No answer and no option to leave a voicemail. Will continue to follow along. Please continue to do HF education at bedside as appropriate. I will meet with pt later today for 1 on 1 HF ed.

## 2024-08-28 NOTE — PROGRESS NOTES
dysfunction, Obstructive sleep apnea, Obstructive sleep apnea, Osteoarthritis of shoulder, Paroxysmal atrial fibrillation (HCC), Pulmonary hypertension (HCC), Recurrent BCC (basal cell carcinoma), Right bundle branch block, S/P aortic valve replacement, Submandibular duct obstruction - right, Tobacco use disorder, Type II or unspecified type diabetes mellitus without mention of complication, not stated as uncontrolled, and Vitamin D deficiency.  Past Surgical History:  has a past surgical history that includes Tonsillectomy; blepharoplasty (07/2007); Hemorrhoid surgery (7553-0079); Rotator cuff repair (2001); Testicle removal (1947); lumbar laminectomy (1998 & 2003); Lumbar spine surgery (08/04/2009); Nerve Surgery (~12/2012); Soft Tissue Biopsy; Coronary artery bypass graft (11/12/2013); Aortic valve replacement (11/12/2013); Cardiac surgery (11/12/2013); Cardiac catheterization (11/08/2013); lumbar laminectomy (05/07/2014); Finger trigger release (Right, 08/19/2014); Lumbar spine surgery (08/2014); Umbilical hernia repair (11/14/2014); Cervical disc surgery (Right, 2015); laparoscopic appendectomy (11/14/2014); Skin cancer excision (Right, 07/2017); Tear duct surgery (Bilateral, 01/01/2015); Skin cancer excision (01/2019); Lumbar spine surgery (Right, 04/23/2019); Cataract removal with implant (Right, 01/2019); Cataract removal with implant (Left, 02/2019); Mohs surgery (Left, 02/11/2020); Mohs surgery (Right, 01/01/2020); Eye surgery (Bilateral); and Bony pelvis surgery (01/12/2021).           Assessment  Performance deficits / Impairments: Decreased functional mobility ;Decreased strength;Decreased endurance;Decreased ADL status;Decreased balance;Decreased high-level IADLs  Assessment: Pt tolerated session well. Pt completes bed mobility with SBA. Pt completes functional mobility and transfers with SBA and use of RW. Pt with no overt LOB. Pt's SpO2 decreasing briefly into 80s and returns >90% with seated rest  Ind  Long Term Goals  Time Frame for Long Term Goals : STG=LTG  Patient Goals   Patient goals : return home      Therapy Time   Individual Concurrent Group Co-treatment   Time In 1034         Time Out 1059         Minutes 25         Timed Code Treatment Minutes: 25 Minutes       Darshan Miguel OTR/L

## 2024-08-28 NOTE — PROGRESS NOTES
Patient arrived to room 5274 via wheelchair. Patient is alert and oriented on RA. Patient is on bedside monitor, rhythm verified with CMU. Family is at bedside. Patient denies needs at this time.   Electronically signed by Hussein Burgos RN on 8/28/2024 at 6:33 PM

## 2024-08-28 NOTE — CONSULTS
[facility]  HEART FAILURE PROGRAM      NAME:  Ernesto Ny  MEDICAL RECORD NUMBER:  [id number]  AGE: 84 y.o.   GENDER: male  : 1940  ADMIT DATE: 2024  TODAY'S DATE:  2024    Subjective:     VISIT TYPE: evaluation     ADMITTING PHYSICIAN:  Anshul Sethi MD    Code Status: Limited    PAST MEDICAL HISTORY        Diagnosis Date    Alcohol use     excess in past prior to CABG    AMD (age related macular degeneration) bilateral     Drusen and subretinal fluid on right, drusen on left, with poorly controlled diabetes and patient smoking    Benign essential hypertension     Mod concentric LVH    Blind right eye 2015    ~ date    CAD (coronary artery disease) 2013    s/p 4V CABG 13    CNVM (choroidal neovascular membrane), right 2019    With AMD    Colon polyps 10/30/2012    COPD (chronic obstructive pulmonary disease) (HCC)     DDD (degenerative disc disease), lumbar     Degenerative disc disease, cervical     fell w/ radicular sx into his R thumb. Ruptured disc    Diverticulosis of colon 10/30/2012    Dry eye syndrome of bilateral lacrimal glands     Hip problem     NUMBNESS W/ TRAUMA    Hypercholesterolemia     Impotence     Metabolic syndrome     Mild diastolic dysfunction 10/21/2013    LVEF 55-60%    Obstructive sleep apnea     Obstructive sleep apnea     C-PAP    Osteoarthritis of shoulder     left  @ ac jt/impingement.    Paroxysmal atrial fibrillation (HCC) 2022    On EKG with RBBB, inferior infarct age undetermined, QT interval long for rate    Pulmonary hypertension (HCC) 10/21/2013    37    Recurrent BCC (basal cell carcinoma)     Right bundle branch block 05/15/2018    S/P aortic valve replacement 2013    Submandibular duct obstruction - right 10/02/2017    12 x 14 x 18 mm right submandibular gland stone causing submandibular duct dilatation and gland prominence    Tobacco use disorder 7    Type II or unspecified type  Diabetic Oral Supplement  ADULT DIET; Regular; 4 carb choices (60 gm/meal); No Added Salt (3-4 gm); Low Potassium (Less than 3000 mg/day); 1500 ml    EDUCATIONAL PACKETS PROVIDED:   Titles and material given:  Yes   [x]  [facility] HF Pt Education Booklet given and reviewed  [x]  HF Zones & Weight log provided   []  Additional dietary material  []  Smoking Cessation  []  Other:    PATIENT/CAREGIVER TEACHING:    Level of patient/caregiver understanding:   [] Verbalized understanding   [] Could provide teach back       [] Needs reinforcement      [] Other:         TEACHING TIME:  30 minutes       Plan:       DISCHARGE PLAN:  Placement for patient upon discharge: home with support of wife and family (house/ ranch).  Discharge appointment scheduled: Yes- 9/3 PM insertion with Dr Corea & 9/5 hospital f/u appt with PCP Dr Glischinski at 1:40 pm.      RECOMMENDATIONS:   [x]  Encourage to call Heart Failure Resource Line with any further questions or concerns  [x]  Continue to reiterate HF education   []  If EF </=40%, ensure GDMT has been met at time of dc (if not on GDMT ensure contraindication is documented)  []  Wellness Center Referral placed  []  Cardiac Rehab Phase 1 referral placed and/or contact info given  [x]  Continue to support pt's present goal of: \"getting to go home\". I shared I understand him wanting to get home but I also explained we want to ensure he is euvolemic before going home so won't need to return. Pt verbalized understanding.           Electronically signed by Ella Vogel RN, BSN CHFN  on 8/28/2024 at 4:24 PM

## 2024-08-28 NOTE — PROGRESS NOTES
NAME:  Ernesto Ny  YOB: 1940  MEDICAL RECORD NUMBER:  [id number]    Shift Summary: Uneventful shift, VSS, wore bipap overnight    Family updated: No    Rhythm: Atrial Fibrillation     Most recent vitals:   Visit Vitals  BP (!) 105/58   Pulse 53   Temp 98 °F (36.7 °C) (Axillary)   Resp 12   Ht 1.676 m (5' 6\")   Wt 94.6 kg (208 lb 8.9 oz)   SpO2 100%   BMI 33.66 kg/m²           No data found.    No data found.      Respiratory support needed (if any):  - BiPAP   IPAP: 12 cmH20, CPAP/EPAP: 5 cmH2O only when sleeping    Admission weight Weight - Scale: 97.4 kg (214 lb 11.7 oz) (08/24/24 1116)    Today's weight    Wt Readings from Last 1 Encounters:   08/28/24 94.6 kg (208 lb 8.9 oz)        Sandoval need assessed each shift: N/A - no sandoval present  UOP >30ml/hr: YES  Last documented BM (in last 48 hrs):  Patient Vitals for the past 48 hrs:   Last BM (including prior to admit)   08/26/24 2000 08/25/24                Restraints (in use currently or dc'd in last 12 hrs): No    Order current and documentation up to date? No    Lines/Drains reviewed @ bedside.  Peripheral IV 08/24/24 Left Forearm (Active)   Number of days: 3       Peripheral IV 08/24/24 Distal;Right Forearm (Active)   Number of days: 3         Drip rates at handoff:    sodium chloride Stopped (08/25/24 1027)    dextrose         Lab Data:   CBC: No results for input(s): \"WBC\", \"HGB\", \"HCT\", \"MCV\", \"PLT\" in the last 72 hours.  BMP:    Recent Labs     08/27/24  0403 08/28/24  0412    145   K 4.8 5.0   CO2 28 26   BUN 89* 79*   CREATININE 1.8* 1.4*     LIVR: No results for input(s): \"AST\", \"ALT\" in the last 72 hours.  PT/INR: No results for input(s): \"INR\" in the last 72 hours.    Invalid input(s): \"PROT\"  APTT: No results for input(s): \"APTT\" in the last 72 hours.  ABG: No results for input(s): \"PHART\", \"OQI6FIJ\", \"PO2ART\" in the last 72 hours.    Any consults during the shift? No    Any signed and held orders to be released?  No        4

## 2024-08-28 NOTE — PROGRESS NOTES
V2.0  [facility] [place]      Name:  Ernesto Ny /Age/Sex: 1940  (84 y.o. male)   MRN & CSN:  [id number] & [id number] Encounter Date/Time: 2024 11:52 AM EDT    Location:  Y1D-6424 PCP: Glischinski, Luke A, MERCED Murray CNP       Hospital Day: 5    Assessment and Plan:   Ernesto Ny is a 84 y.o. male with medical history significant for heart failure with preserved ejection fraction,COPD, CKD who was admitted with acute heart failure    Assessment/Plan :   1.  Acute heart failure with preserved ejection fraction.  Chest x-ray with pulmonary edema and proBNP markedly elevated.  Continue IV diuretics and monitor intake output and daily body weight.  Will consider to change to oral soon.    2.  COPD exacerbation.  Continue systemic steroid, nebs and as needed BiPAP.  Still on 2 L oxygen via nasal cannula.    3.  Gram-negative pneumonia.  Pneumonia panel positive for Pseudomonas, Klebsiella, Serratia.  Respiratory culture positive for Pseudomonas which is pansensitive.  Continue cefepime.  Keep on contact isolation.    4.  Acute respiratory failure with hypoxia currently on 2 L oxygen with nasal cannula.  Continue intermittent BiPAP.  Wean off oxygen as tolerated.    5.  ADWOA on CKD.  Creatinine 2.2 on admission.  Trending downward.  Monitor while on diuretic.    6.  Chronic A-fib.  Per family patient is scheduled for pacemaker placement on 9/3/2023.  Continue Xarelto.    7.  History of CVA.  Continue Xarelto and statin.    8.  Type 2 diabetes mellitus.  Continue sliding scale insulin.  Monitor blood sugar while on steroid.    9.  Essential hypertension.  Continue home medication.  10.  ANNMARIE on CPAP at home.    11.  Status post aortic valve replacement    DVT prophylaxis: On Xarelto    Disposition: 2 more days pending clinical improvement.    Personally reviewed Lab Studies and Imaging      Medical Decision Making:  The following items were considered in medical decision  making:  Discussion of patient care with other providers  Reviewed clinical lab tests  Reviewed radiology tests  Reviewed other diagnostic tests/interventions  Independent review of radiologic images  Microbiology cultures and other micro tests reviewed        Subjective:     Chief Complaint: Shortness of breath    HPI:Ernesto Ny is a 84 y.o. male who presents with shortness of breath.  He reports improvement in his shortness of breath and lower extremity swelling.  Currently on 2 L oxygen via nasal cannula.  Denies any chest pain.         Review of Systems:    As above     Objective:     Intake/Output Summary (Last 24 hours) at 8/28/2024 1152  Last data filed at 8/28/2024 0928  Gross per 24 hour   Intake 1499.36 ml   Output 2100 ml   Net -600.64 ml        Vitals:   Vitals:    08/28/24 0859   BP: (!) 123/56   Pulse: 63   Resp: 16   Temp:    SpO2: 99%       Physical Exam:     General: NAD  Eyes: EOMI  ENT: neck supple  Cardiovascular: Regular rate.  Respiratory: Basal crackles  Gastrointestinal: Soft, non tender  Genitourinary: no suprapubic tenderness  Musculoskeletal: Lower extremity edema  Skin: warm, dry, scattered bruising  Neuro: Alert,moves all four extremities .  Psych: Mood appropriate.     Medications:   Medications:    insulin lispro  0-16 Units SubCUTAneous TID WC    insulin lispro  0-4 Units SubCUTAneous Nightly    insulin glargine  10 Units SubCUTAneous Nightly    cefepime  2,000 mg IntraVENous Q12H    atorvastatin  40 mg Oral Daily    amLODIPine  2.5 mg Oral Daily    montelukast  10 mg Oral Nightly    pregabalin  75 mg Oral BID    rivaroxaban  15 mg Oral Daily with breakfast    sodium chloride flush  5-40 mL IntraVENous 2 times per day    furosemide  40 mg IntraVENous BID    predniSONE  40 mg Oral Daily    ipratropium 0.5 mg-albuterol 2.5 mg  1 Dose Inhalation Q4H WA RT      Infusions:    sodium chloride Stopped (08/25/24 1027)    dextrose       PRN Meds: HYDROcodone-acetaminophen, 1 tablet, Q6H

## 2024-08-28 NOTE — PLAN OF CARE
Problem: Discharge Planning  Goal: Discharge to home or other facility with appropriate resources  Outcome: Not Progressing  Flowsheets (Taken 8/27/2024 2000)  Discharge to home or other facility with appropriate resources: Identify barriers to discharge with patient and caregiver     Problem: Metabolic/Fluid and Electrolytes - Adult  Goal: Glucose maintained within prescribed range  Outcome: Not Progressing  Flowsheets (Taken 8/27/2024 2000)  Glucose maintained within prescribed range:   Monitor blood glucose as ordered   Assess for signs and symptoms of hyperglycemia and hypoglycemia   Administer ordered medications to maintain glucose within target range   Assess barriers to adequate nutritional intake and initiate nutrition consult as needed     Problem: Pain  Goal: Verbalizes/displays adequate comfort level or baseline comfort level  Outcome: Progressing     Problem: Safety - Adult  Goal: Free from fall injury  Outcome: Progressing     Problem: Skin/Tissue Integrity  Goal: Absence of new skin breakdown  Description: 1.  Monitor for areas of redness and/or skin breakdown  2.  Assess vascular access sites hourly  3.  Every 4-6 hours minimum:  Change oxygen saturation probe site  4.  Every 4-6 hours:  If on nasal continuous positive airway pressure, respiratory therapy assess nares and determine need for appliance change or resting period.  Outcome: Progressing     Problem: ABCDS Injury Assessment  Goal: Absence of physical injury  Outcome: Progressing     Problem: Chronic Conditions and Co-morbidities  Goal: Patient's chronic conditions and co-morbidity symptoms are monitored and maintained or improved  Outcome: Progressing  Flowsheets (Taken 8/27/2024 2000)  Care Plan - Patient's Chronic Conditions and Co-Morbidity Symptoms are Monitored and Maintained or Improved:   Monitor and assess patient's chronic conditions and comorbid symptoms for stability, deterioration, or improvement   Update acute care plan with  appropriate goals if chronic or comorbid symptoms are exacerbated and prevent overall improvement and discharge     Problem: Respiratory - Adult  Goal: Achieves optimal ventilation and oxygenation  Outcome: Progressing  Flowsheets (Taken 8/27/2024 2000)  Achieves optimal ventilation and oxygenation:   Assess for changes in respiratory status   Assess for changes in mentation and behavior   Position to facilitate oxygenation and minimize respiratory effort   Oxygen supplementation based on oxygen saturation or arterial blood gases   Encourage broncho-pulmonary hygiene including cough, deep breathe, incentive spirometry   Assess the need for suctioning and aspirate as needed   Assess and instruct to report shortness of breath or any respiratory difficulty   Respiratory therapy support as indicated     Problem: Cardiovascular - Adult  Goal: Maintains optimal cardiac output and hemodynamic stability  Outcome: Progressing  Goal: Absence of cardiac dysrhythmias or at baseline  Outcome: Progressing     Problem: Musculoskeletal - Adult  Goal: Return mobility to safest level of function  Outcome: Progressing  Flowsheets (Taken 8/27/2024 2000)  Return Mobility to Safest Level of Function:   Assess patient stability and activity tolerance for standing, transferring and ambulating with or without assistive devices   Instruct patient/family in ordered activity level  Goal: Return ADL status to a safe level of function  Outcome: Progressing  Flowsheets (Taken 8/27/2024 2000)  Return ADL Status to a Safe Level of Function: Assist and instruct patient to increase activity and self care as tolerated     Problem: Metabolic/Fluid and Electrolytes - Adult  Goal: Electrolytes maintained within normal limits  Outcome: Progressing  Flowsheets (Taken 8/27/2024 2000)  Electrolytes maintained within normal limits:   Monitor labs and assess patient for signs and symptoms of electrolyte imbalances   Administer electrolyte replacement as

## 2024-08-28 NOTE — CONSULTS
CHF Nutrition Education    Consult received for heart failure diet education.  Defer for now while in ICU. Educational materials from nutrition care manual put in pt's dc instructions. Will follow up w/ pt for full education session. Pt will continue to be monitored per standards of care    Electronically signed by Vinh Reeves RD on 8/28/2024 at 2:57 PM   [id number]

## 2024-08-28 NOTE — PROGRESS NOTES
Report called to Teri MICHEL. RN will transport patient with personal belongings.  - pt transfer held at this time     Report called to Hussein MICHEL. RN will transport patient with personal belongings to room [facility].

## 2024-08-28 NOTE — PLAN OF CARE
Problem: Discharge Planning  Goal: Discharge to home or other facility with appropriate resources  Outcome: Progressing  Flowsheets (Taken 8/28/2024 0530)  Discharge to home or other facility with appropriate resources: Identify barriers to discharge with patient and caregiver     Problem: Pain  Goal: Verbalizes/displays adequate comfort level or baseline comfort level  Outcome: Progressing     Problem: Safety - Adult  Goal: Free from fall injury  Outcome: Progressing     Problem: Skin/Tissue Integrity  Goal: Absence of new skin breakdown  Description: 1.  Monitor for areas of redness and/or skin breakdown  2.  Assess vascular access sites hourly  3.  Every 4-6 hours minimum:  Change oxygen saturation probe site  4.  Every 4-6 hours:  If on nasal continuous positive airway pressure, respiratory therapy assess nares and determine need for appliance change or resting period.  Outcome: Progressing     Problem: ABCDS Injury Assessment  Goal: Absence of physical injury  Outcome: Progressing     Problem: Chronic Conditions and Co-morbidities  Goal: Patient's chronic conditions and co-morbidity symptoms are monitored and maintained or improved  Outcome: Progressing  Flowsheets (Taken 8/28/2024 0530)  Care Plan - Patient's Chronic Conditions and Co-Morbidity Symptoms are Monitored and Maintained or Improved: Monitor and assess patient's chronic conditions and comorbid symptoms for stability, deterioration, or improvement     Problem: Respiratory - Adult  Goal: Achieves optimal ventilation and oxygenation  Outcome: Progressing  Flowsheets (Taken 8/28/2024 0530)  Achieves optimal ventilation and oxygenation: Assess for changes in respiratory status     Problem: Cardiovascular - Adult  Goal: Maintains optimal cardiac output and hemodynamic stability  Outcome: Progressing  Flowsheets (Taken 8/28/2024 0530)  Maintains optimal cardiac output and hemodynamic stability: Monitor blood pressure and heart rate     Problem:

## 2024-08-28 NOTE — TELEPHONE ENCOUNTER
Transitional Care Management Service-  Initial Post-discharge Communication    Date of discharge: 8-29-24 or 8-30-24  Facility: [facility]  Non-face-to-face services provided:  Scheduled appointment with PCP-Sept 5, 2024

## 2024-08-29 ENCOUNTER — APPOINTMENT (OUTPATIENT)
Age: 84
End: 2024-08-29
Attending: INTERNAL MEDICINE
Payer: MEDICARE

## 2024-08-29 LAB
ANION GAP SERPL CALCULATED.3IONS-SCNC: 8 MMOL/L (ref 3–16)
BASOPHILS # BLD: 0 K/UL (ref 0–0.2)
BASOPHILS NFR BLD: 0.3 %
BUN SERPL-MCNC: 68 MG/DL (ref 7–20)
CALCIUM SERPL-MCNC: 8.7 MG/DL (ref 8.3–10.6)
CHLORIDE SERPL-SCNC: 105 MMOL/L (ref 99–110)
CO2 SERPL-SCNC: 27 MMOL/L (ref 21–32)
CREAT SERPL-MCNC: 1.4 MG/DL (ref 0.8–1.3)
DEPRECATED RDW RBC AUTO: 15.8 % (ref 12.4–15.4)
ECHO AV AREA PEAK VELOCITY: 1.5 CM2
ECHO AV AREA VTI: 1.8 CM2
ECHO AV AREA/BSA PEAK VELOCITY: 0.7 CM2/M2
ECHO AV AREA/BSA VTI: 0.9 CM2/M2
ECHO AV MEAN GRADIENT: 45 MMHG
ECHO AV MEAN VELOCITY: 3.1 M/S
ECHO AV PEAK GRADIENT: 85 MMHG
ECHO AV PEAK VELOCITY: 4.6 M/S
ECHO AV VELOCITY RATIO: 0.37
ECHO AV VTI: 77.1 CM
ECHO BSA: 2.1 M2
ECHO EST RA PRESSURE: 8 MMHG
ECHO LA AREA 2C: 25.2 CM2
ECHO LA AREA 4C: 21.4 CM2
ECHO LA DIAMETER INDEX: 1.87 CM/M2
ECHO LA DIAMETER: 3.8 CM
ECHO LA MAJOR AXIS: 7.6 CM
ECHO LA MINOR AXIS: 7.7 CM
ECHO LA VOL BP: 59 ML (ref 18–58)
ECHO LA VOL MOD A2C: 69 ML (ref 18–58)
ECHO LA VOL MOD A4C: 51 ML (ref 18–58)
ECHO LA VOL/BSA BIPLANE: 29 ML/M2 (ref 16–34)
ECHO LA VOLUME INDEX MOD A2C: 34 ML/M2 (ref 16–34)
ECHO LA VOLUME INDEX MOD A4C: 25 ML/M2 (ref 16–34)
ECHO LV E' LATERAL VELOCITY: 15 CM/S
ECHO LV E' SEPTAL VELOCITY: 9 CM/S
ECHO LV EDV A4C: 78 ML
ECHO LV EDV INDEX A4C: 38 ML/M2
ECHO LV EF PHYSICIAN: 70 %
ECHO LV EJECTION FRACTION A4C: 63 %
ECHO LV ESV A4C: 29 ML
ECHO LV ESV INDEX A4C: 14 ML/M2
ECHO LV FRACTIONAL SHORTENING: 51 % (ref 28–44)
ECHO LV INTERNAL DIMENSION DIASTOLE INDEX: 2.41 CM/M2
ECHO LV INTERNAL DIMENSION DIASTOLIC: 4.9 CM (ref 4.2–5.9)
ECHO LV INTERNAL DIMENSION SYSTOLIC INDEX: 1.18 CM/M2
ECHO LV INTERNAL DIMENSION SYSTOLIC: 2.4 CM
ECHO LV IVSD: 1.3 CM (ref 0.6–1)
ECHO LV MASS 2D: 253.7 G (ref 88–224)
ECHO LV MASS INDEX 2D: 125 G/M2 (ref 49–115)
ECHO LV POSTERIOR WALL DIASTOLIC: 1.3 CM (ref 0.6–1)
ECHO LV RELATIVE WALL THICKNESS RATIO: 0.53
ECHO LVOT AREA: 4.2 CM2
ECHO LVOT AV VTI INDEX: 0.43
ECHO LVOT DIAM: 2.3 CM
ECHO LVOT MEAN GRADIENT: 6 MMHG
ECHO LVOT PEAK GRADIENT: 11 MMHG
ECHO LVOT PEAK VELOCITY: 1.7 M/S
ECHO LVOT STROKE VOLUME INDEX: 67.9 ML/M2
ECHO LVOT SV: 137.9 ML
ECHO LVOT VTI: 33.2 CM
ECHO MV A VELOCITY: 1.14 M/S
ECHO MV AREA VTI: 2.4 CM2
ECHO MV E DECELERATION TIME (DT): 217 MS
ECHO MV E VELOCITY: 1.78 M/S
ECHO MV E/A RATIO: 1.56
ECHO MV E/E' LATERAL: 11.87
ECHO MV E/E' RATIO (AVERAGED): 15.82
ECHO MV E/E' SEPTAL: 19.78
ECHO MV LVOT VTI INDEX: 1.75
ECHO MV MAX VELOCITY: 2.2 M/S
ECHO MV MEAN GRADIENT: 6 MMHG
ECHO MV MEAN VELOCITY: 1.1 M/S
ECHO MV PEAK GRADIENT: 20 MMHG
ECHO MV REGURGITANT PEAK GRADIENT: 52 MMHG
ECHO MV REGURGITANT PEAK VELOCITY: 3.6 M/S
ECHO MV VTI: 58.1 CM
ECHO PV ACCELERATION TIME (AT): 85 MS
ECHO PV MAX VELOCITY: 1.6 M/S
ECHO PV MEAN GRADIENT: 3 MMHG
ECHO PV MEAN VELOCITY: 0.7 M/S
ECHO PV PEAK GRADIENT: 10 MMHG
ECHO PV VTI: 20 CM
ECHO RA AREA 4C: 20.6 CM2
ECHO RA END SYSTOLIC VOLUME APICAL 4 CHAMBER INDEX BSA: 27 ML/M2
ECHO RA VOLUME: 55 ML
ECHO RIGHT VENTRICULAR SYSTOLIC PRESSURE (RVSP): 49 MMHG
ECHO RV FREE WALL PEAK S': 15 CM/S
ECHO RV INTERNAL DIMENSION: 3 CM
ECHO RV TAPSE: 2.1 CM (ref 1.7–?)
ECHO TV E WAVE: 0.8 M/S
ECHO TV PEAK GRADIENT: 3 MMHG
ECHO TV REGURGITANT MAX VELOCITY: 3.21 M/S
ECHO TV REGURGITANT PEAK GRADIENT: 41 MMHG
ECHO TV REGURGITANT VTI: 106 CM
EOSINOPHIL # BLD: 0 K/UL (ref 0–0.6)
EOSINOPHIL NFR BLD: 0.4 %
GFR SERPLBLD CREATININE-BSD FMLA CKD-EPI: 50 ML/MIN/{1.73_M2}
GLUCOSE BLD-MCNC: 128 MG/DL (ref 70–99)
GLUCOSE BLD-MCNC: 290 MG/DL (ref 70–99)
GLUCOSE BLD-MCNC: 294 MG/DL (ref 70–99)
GLUCOSE BLD-MCNC: 352 MG/DL (ref 70–99)
GLUCOSE SERPL-MCNC: 122 MG/DL (ref 70–99)
HCT VFR BLD AUTO: 27.6 % (ref 40.5–52.5)
HGB BLD-MCNC: 9.3 G/DL (ref 13.5–17.5)
LYMPHOCYTES # BLD: 1.4 K/UL (ref 1–5.1)
LYMPHOCYTES NFR BLD: 15.4 %
MAGNESIUM SERPL-MCNC: 2.37 MG/DL (ref 1.8–2.4)
MCH RBC QN AUTO: 31 PG (ref 26–34)
MCHC RBC AUTO-ENTMCNC: 33.6 G/DL (ref 31–36)
MCV RBC AUTO: 92.5 FL (ref 80–100)
MONOCYTES # BLD: 0.4 K/UL (ref 0–1.3)
MONOCYTES NFR BLD: 4.8 %
NEUTROPHILS # BLD: 7 K/UL (ref 1.7–7.7)
NEUTROPHILS NFR BLD: 79.1 %
PERFORMED ON: ABNORMAL
PLATELET # BLD AUTO: 248 K/UL (ref 135–450)
PMV BLD AUTO: 7.8 FL (ref 5–10.5)
POTASSIUM SERPL-SCNC: 4.9 MMOL/L (ref 3.5–5.1)
RBC # BLD AUTO: 2.98 M/UL (ref 4.2–5.9)
SODIUM SERPL-SCNC: 140 MMOL/L (ref 136–145)
WBC # BLD AUTO: 8.8 K/UL (ref 4–11)

## 2024-08-29 PROCEDURE — 83735 ASSAY OF MAGNESIUM: CPT

## 2024-08-29 PROCEDURE — 6370000000 HC RX 637 (ALT 250 FOR IP): Performed by: STUDENT IN AN ORGANIZED HEALTH CARE EDUCATION/TRAINING PROGRAM

## 2024-08-29 PROCEDURE — 94640 AIRWAY INHALATION TREATMENT: CPT

## 2024-08-29 PROCEDURE — 6370000000 HC RX 637 (ALT 250 FOR IP): Performed by: HOSPITALIST

## 2024-08-29 PROCEDURE — 6360000002 HC RX W HCPCS: Performed by: STUDENT IN AN ORGANIZED HEALTH CARE EDUCATION/TRAINING PROGRAM

## 2024-08-29 PROCEDURE — 2060000000 HC ICU INTERMEDIATE R&B

## 2024-08-29 PROCEDURE — 2580000003 HC RX 258: Performed by: STUDENT IN AN ORGANIZED HEALTH CARE EDUCATION/TRAINING PROGRAM

## 2024-08-29 PROCEDURE — 94660 CPAP INITIATION&MGMT: CPT

## 2024-08-29 PROCEDURE — 93321 DOPPLER ECHO F-UP/LMTD STD: CPT | Performed by: INTERNAL MEDICINE

## 2024-08-29 PROCEDURE — 80048 BASIC METABOLIC PNL TOTAL CA: CPT

## 2024-08-29 PROCEDURE — 97530 THERAPEUTIC ACTIVITIES: CPT

## 2024-08-29 PROCEDURE — 93308 TTE F-UP OR LMTD: CPT

## 2024-08-29 PROCEDURE — 36415 COLL VENOUS BLD VENIPUNCTURE: CPT

## 2024-08-29 PROCEDURE — 93308 TTE F-UP OR LMTD: CPT | Performed by: INTERNAL MEDICINE

## 2024-08-29 PROCEDURE — 85025 COMPLETE CBC W/AUTO DIFF WBC: CPT

## 2024-08-29 PROCEDURE — 93325 DOPPLER ECHO COLOR FLOW MAPG: CPT | Performed by: INTERNAL MEDICINE

## 2024-08-29 PROCEDURE — 94760 N-INVAS EAR/PLS OXIMETRY 1: CPT

## 2024-08-29 PROCEDURE — 97535 SELF CARE MNGMENT TRAINING: CPT

## 2024-08-29 RX ADMIN — CEFEPIME 2000 MG: 2 INJECTION, POWDER, FOR SOLUTION INTRAVENOUS at 21:05

## 2024-08-29 RX ADMIN — MONTELUKAST 10 MG: 10 TABLET, FILM COATED ORAL at 21:02

## 2024-08-29 RX ADMIN — FUROSEMIDE 40 MG: 10 INJECTION, SOLUTION INTRAMUSCULAR; INTRAVENOUS at 18:09

## 2024-08-29 RX ADMIN — PREDNISONE 40 MG: 20 TABLET ORAL at 08:32

## 2024-08-29 RX ADMIN — AMLODIPINE BESYLATE 2.5 MG: 5 TABLET ORAL at 08:28

## 2024-08-29 RX ADMIN — SODIUM CHLORIDE, PRESERVATIVE FREE 10 ML: 5 INJECTION INTRAVENOUS at 18:10

## 2024-08-29 RX ADMIN — PREGABALIN 75 MG: 75 CAPSULE ORAL at 21:02

## 2024-08-29 RX ADMIN — SODIUM CHLORIDE, PRESERVATIVE FREE 20 ML: 5 INJECTION INTRAVENOUS at 08:57

## 2024-08-29 RX ADMIN — HYDROCODONE BITARTRATE AND ACETAMINOPHEN 1 TABLET: 10; 325 TABLET ORAL at 21:02

## 2024-08-29 RX ADMIN — INSULIN LISPRO 16 UNITS: 100 INJECTION, SOLUTION INTRAVENOUS; SUBCUTANEOUS at 18:09

## 2024-08-29 RX ADMIN — IPRATROPIUM BROMIDE AND ALBUTEROL SULFATE 1 DOSE: 2.5; .5 SOLUTION RESPIRATORY (INHALATION) at 07:39

## 2024-08-29 RX ADMIN — HYDROCODONE BITARTRATE AND ACETAMINOPHEN 1 TABLET: 10; 325 TABLET ORAL at 11:43

## 2024-08-29 RX ADMIN — SODIUM CHLORIDE, PRESERVATIVE FREE 10 ML: 5 INJECTION INTRAVENOUS at 21:03

## 2024-08-29 RX ADMIN — HYDROCODONE BITARTRATE AND ACETAMINOPHEN 1 TABLET: 10; 325 TABLET ORAL at 05:42

## 2024-08-29 RX ADMIN — PREGABALIN 75 MG: 75 CAPSULE ORAL at 08:27

## 2024-08-29 RX ADMIN — INSULIN LISPRO 8 UNITS: 100 INJECTION, SOLUTION INTRAVENOUS; SUBCUTANEOUS at 12:40

## 2024-08-29 RX ADMIN — IPRATROPIUM BROMIDE AND ALBUTEROL SULFATE 1 DOSE: 2.5; .5 SOLUTION RESPIRATORY (INHALATION) at 11:14

## 2024-08-29 RX ADMIN — INSULIN GLARGINE 10 UNITS: 100 INJECTION, SOLUTION SUBCUTANEOUS at 21:02

## 2024-08-29 RX ADMIN — IPRATROPIUM BROMIDE AND ALBUTEROL SULFATE 1 DOSE: 2.5; .5 SOLUTION RESPIRATORY (INHALATION) at 21:40

## 2024-08-29 RX ADMIN — ATORVASTATIN CALCIUM 40 MG: 40 TABLET, FILM COATED ORAL at 08:27

## 2024-08-29 RX ADMIN — CEFEPIME 2000 MG: 2 INJECTION, POWDER, FOR SOLUTION INTRAVENOUS at 09:04

## 2024-08-29 RX ADMIN — RIVAROXABAN 15 MG: 15 TABLET, FILM COATED ORAL at 08:27

## 2024-08-29 RX ADMIN — FUROSEMIDE 40 MG: 10 INJECTION, SOLUTION INTRAMUSCULAR; INTRAVENOUS at 08:56

## 2024-08-29 ASSESSMENT — PAIN DESCRIPTION - LOCATION
LOCATION: BACK

## 2024-08-29 ASSESSMENT — PAIN DESCRIPTION - FREQUENCY: FREQUENCY: CONTINUOUS

## 2024-08-29 ASSESSMENT — PAIN DESCRIPTION - DESCRIPTORS
DESCRIPTORS: ACHING;DISCOMFORT
DESCRIPTORS: ACHING
DESCRIPTORS: ACHING;DISCOMFORT
DESCRIPTORS: ACHING;DISCOMFORT

## 2024-08-29 ASSESSMENT — PAIN DESCRIPTION - ONSET: ONSET: ON-GOING

## 2024-08-29 ASSESSMENT — PAIN DESCRIPTION - ORIENTATION
ORIENTATION: POSTERIOR;LOWER
ORIENTATION: POSTERIOR
ORIENTATION: MID;POSTERIOR;LOWER
ORIENTATION: MID;POSTERIOR;LOWER
ORIENTATION: POSTERIOR;LOWER;MID

## 2024-08-29 ASSESSMENT — PAIN SCALES - GENERAL
PAINLEVEL_OUTOF10: 3
PAINLEVEL_OUTOF10: 6
PAINLEVEL_OUTOF10: 6
PAINLEVEL_OUTOF10: 4
PAINLEVEL_OUTOF10: 5
PAINLEVEL_OUTOF10: 5
PAINLEVEL_OUTOF10: 6

## 2024-08-29 ASSESSMENT — PAIN DESCRIPTION - PAIN TYPE: TYPE: CHRONIC PAIN

## 2024-08-29 ASSESSMENT — PAIN - FUNCTIONAL ASSESSMENT: PAIN_FUNCTIONAL_ASSESSMENT: ACTIVITIES ARE NOT PREVENTED

## 2024-08-29 NOTE — PLAN OF CARE
Problem: Discharge Planning  Goal: Discharge to home or other facility with appropriate resources  8/29/2024 0330 by Cary Recinos RN  Outcome: Progressing     Problem: Pain  Goal: Verbalizes/displays adequate comfort level or baseline comfort level  8/29/2024 0330 by Cary Recinos RN  Outcome: Progressing     Problem: Safety - Adult  Goal: Free from fall injury  8/29/2024 0330 by Cary Recinos RN  Outcome: Progressing     Problem: Skin/Tissue Integrity  Goal: Absence of new skin breakdown  Description: 1.  Monitor for areas of redness and/or skin breakdown  2.  Assess vascular access sites hourly  3.  Every 4-6 hours minimum:  Change oxygen saturation probe site  4.  Every 4-6 hours:  If on nasal continuous positive airway pressure, respiratory therapy assess nares and determine need for appliance change or resting period.  8/29/2024 0330 by Cary Recinos RN  Outcome: Progressing     Problem: ABCDS Injury Assessment  Goal: Absence of physical injury  8/29/2024 0330 by Cary Recinos RN  Outcome: Progressing     Problem: Chronic Conditions and Co-morbidities  Goal: Patient's chronic conditions and co-morbidity symptoms are monitored and maintained or improved  8/29/2024 0330 by Cary Recinos RN  Outcome: Progressing

## 2024-08-29 NOTE — PROGRESS NOTES
Occupational Therapy  [facility]  Occupational Therapy Daily Treatment Note  This note to serve as OT d/c summary if pt is d/c-ed from hospital prior to next OT session.      Name: Ernesto Ny  : 1940  MRN: [id number]  Date of Service: 2024    Discharge Recommendations:  Home with assist PRN, 2-3 sessions per week          Patient Diagnosis(es): The primary encounter diagnosis was Acute respiratory failure with hypoxia and hypercapnia (HCC). Diagnoses of Acute on chronic congestive heart failure, unspecified heart failure type (HCC), ADWOA (acute kidney injury) (HCC), Pleural effusion on right, and Acute on chronic diastolic congestive heart failure (HCC) were also pertinent to this visit.  Past Medical History:  has a past medical history of Alcohol use, AMD (age related macular degeneration) bilateral, Benign essential hypertension, Blind right eye, CAD (coronary artery disease), CNVM (choroidal neovascular membrane), right, Colon polyps, COPD (chronic obstructive pulmonary disease) (HCC), DDD (degenerative disc disease), lumbar, Degenerative disc disease, cervical, Diverticulosis of colon, Dry eye syndrome of bilateral lacrimal glands, Hip problem, Hypercholesterolemia, Impotence, Metabolic syndrome, Mild diastolic dysfunction, Obstructive sleep apnea, Obstructive sleep apnea, Osteoarthritis of shoulder, Paroxysmal atrial fibrillation (HCC), Pulmonary hypertension (HCC), Recurrent BCC (basal cell carcinoma), Right bundle branch block, S/P aortic valve replacement, Submandibular duct obstruction - right, Tobacco use disorder, Type II or unspecified type diabetes mellitus without mention of complication, not stated as uncontrolled, and Vitamin D deficiency.  Past Surgical History:  has a past surgical history that includes Tonsillectomy; blepharoplasty (2007); Hemorrhoid surgery ([id number]); Rotator cuff repair (); Testicle removal (); lumbar laminectomy

## 2024-08-29 NOTE — PROGRESS NOTES
V2.0  [facility] [place] Note      Name:  Ernesto Ny /Age/Sex: 1940  (84 y.o. male)   MRN & CSN:  [id number] & [id number] Encounter Date/Time: 2024 11:52 AM EDT    Location:  [id number] PCP: Glischinski, Luke A, MERCED Murray CNP       Hospital Day: 6    Assessment and Plan:   Ernesto Ny is a 84 y.o. male with medical history significant for heart failure with preserved ejection fraction,COPD, CKD who was admitted with acute heart failure    Assessment/Plan :   1.  Acute heart failure with preserved ejection fraction.  Chest x-ray with pulmonary edema and proBNP markedly elevated.  Last TTE in .  He also has a systolic murmur along the left heart border.  Will get transthoracic echo.  Continue IV diuretics and monitor intake output and daily body weight.  Will consider to change to oral tomorrow.    2.  COPD exacerbation.  Continue systemic steroid, nebs and as needed BiPAP.  Still on 2 L oxygen via nasal cannula.    3.  Gram-negative pneumonia.  Pneumonia panel positive for Pseudomonas, Klebsiella, Serratia.  Respiratory culture positive for Pseudomonas which is pansensitive.  Continue cefepime.  Keep on contact isolation.    4.  Acute respiratory failure with hypoxia currently on 2 L oxygen with nasal cannula.  Continue intermittent BiPAP.  Wean off oxygen as tolerated.    5.  ADWOA on CKD.  Creatinine 2.2 on admission.  Trending downward.  Monitor while on diuretic.    6.  Chronic A-fib.  Per family patient is scheduled for pacemaker placement on 9/3/2023.  Continue Xarelto.    7.  History of CVA.  Continue Xarelto and statin.    8.  Type 2 diabetes mellitus.  Continue sliding scale insulin.  Monitor blood sugar while on steroid.    9.  Essential hypertension.  Continue home medication.  10.  ANNMARIE on CPAP at home.    11.  Status post aortic valve replacement    DVT prophylaxis: On Xarelto    Disposition: Possible discharge home tomorrow with home health.  Follow-up transthoracic     predniSONE  40 mg Oral Daily    ipratropium 0.5 mg-albuterol 2.5 mg  1 Dose Inhalation Q4H WA RT      Infusions:    sodium chloride Stopped (08/25/24 1027)    dextrose       PRN Meds: HYDROcodone-acetaminophen, 1 tablet, Q6H PRN  sodium chloride flush, 5-40 mL, PRN  sodium chloride, , PRN  ondansetron, 4 mg, Q8H PRN   Or  ondansetron, 4 mg, Q6H PRN  polyethylene glycol, 17 g, Daily PRN  acetaminophen, 650 mg, Q6H PRN   Or  acetaminophen, 650 mg, Q6H PRN  glucose, 4 tablet, PRN  dextrose bolus, 125 mL, PRN   Or  dextrose bolus, 250 mL, PRN  glucagon (rDNA), 1 mg, PRN  dextrose, , Continuous PRN        Labs      Recent Results (from the past 24 hour(s))   POCT Glucose    Collection Time: 08/28/24  5:03 PM   Result Value Ref Range    POC Glucose 401 (H) 70 - 99 mg/dl    Performed on ACCU-CHEK    POCT Glucose    Collection Time: 08/28/24  5:52 PM   Result Value Ref Range    POC Glucose 392 (H) 70 - 99 mg/dl    Performed on ACCU-CHEK    POCT Glucose    Collection Time: 08/28/24  8:37 PM   Result Value Ref Range    POC Glucose 340 (H) 70 - 99 mg/dl    Performed on ACCU-CHEK    Basic Metabolic Panel    Collection Time: 08/29/24  4:32 AM   Result Value Ref Range    Sodium 140 136 - 145 mmol/L    Potassium 4.9 3.5 - 5.1 mmol/L    Chloride 105 99 - 110 mmol/L    CO2 27 21 - 32 mmol/L    Anion Gap 8 3 - 16    Glucose 122 (H) 70 - 99 mg/dL    BUN 68 (H) 7 - 20 mg/dL    Creatinine 1.4 (H) 0.8 - 1.3 mg/dL    Est, Glom Filt Rate 50 (A) >60    Calcium 8.7 8.3 - 10.6 mg/dL   Magnesium    Collection Time: 08/29/24  4:32 AM   Result Value Ref Range    Magnesium 2.37 1.80 - 2.40 mg/dL   CBC with Auto Differential    Collection Time: 08/29/24  4:32 AM   Result Value Ref Range    WBC 8.8 4.0 - 11.0 K/uL    RBC 2.98 (L) 4.20 - 5.90 M/uL    Hemoglobin 9.3 (L) 13.5 - 17.5 g/dL    Hematocrit 27.6 (L) 40.5 - 52.5 %    MCV 92.5 80.0 - 100.0 fL    MCH 31.0 26.0 - 34.0 pg    MCHC 33.6 31.0 - 36.0 g/dL    RDW 15.8 (H) 12.4 - 15.4 %     Propranolol Pregnancy And Lactation Text: This medication is Pregnancy Category C and it isn't known if it is safe during pregnancy. It is excreted in breast milk.

## 2024-08-29 NOTE — PLAN OF CARE
Problem: Discharge Planning  Goal: Discharge to home or other facility with appropriate resources  Outcome: Progressing     Problem: Pain  Goal: Verbalizes/displays adequate comfort level or baseline comfort level  Outcome: Progressing     Problem: Safety - Adult  Goal: Free from fall injury  Outcome: Progressing     Problem: Skin/Tissue Integrity  Goal: Absence of new skin breakdown  Description: 1.  Monitor for areas of redness and/or skin breakdown  2.  Assess vascular access sites hourly  3.  Every 4-6 hours minimum:  Change oxygen saturation probe site  4.  Every 4-6 hours:  If on nasal continuous positive airway pressure, respiratory therapy assess nares and determine need for appliance change or resting period.  Outcome: Progressing     Problem: ABCDS Injury Assessment  Goal: Absence of physical injury  Outcome: Progressing     Problem: Chronic Conditions and Co-morbidities  Goal: Patient's chronic conditions and co-morbidity symptoms are monitored and maintained or improved  Outcome: Progressing     Problem: Respiratory - Adult  Goal: Achieves optimal ventilation and oxygenation  Outcome: Progressing     Problem: Cardiovascular - Adult  Goal: Maintains optimal cardiac output and hemodynamic stability  Outcome: Progressing  Goal: Absence of cardiac dysrhythmias or at baseline  Outcome: Progressing     Problem: Musculoskeletal - Adult  Goal: Return mobility to safest level of function  Outcome: Progressing  Goal: Maintain proper alignment of affected body part  Outcome: Progressing  Goal: Return ADL status to a safe level of function  Outcome: Progressing     Problem: Metabolic/Fluid and Electrolytes - Adult  Goal: Electrolytes maintained within normal limits  Outcome: Progressing  Goal: Hemodynamic stability and optimal renal function maintained  Outcome: Progressing  Goal: Glucose maintained within prescribed range  Outcome: Progressing     Problem: Hematologic - Adult  Goal: Maintains hematologic

## 2024-08-29 NOTE — PROGRESS NOTES
Heart Failure Diet Education:    Per hospital protocol or consult, patient being seen for Heart Failure diet guidelines.    Learners: [x]Patient []Family []Caregiver [] Other: ______________  Methods: [x]Verbal Education  [x]Handouts  []Teachback     Patient's Lifestyle Questions:   Is HF a new Diagnosis? [x]Yes []No    Has patient had prior education? []Yes [x]No    Instructed the Patient on:   [x] High/Low Sodium foods    [x] Moderation/portion control  [x] Eating out  [x] Fluid Restriction    [x] Label reading  [] Carb Counting   [] Other:      Educational Materials Provided:   [x] Nutrition Care Manual (NCM) Heart Failure Nutrition Therapy     -Diet Recommendations: 2000 mg Sodium/day, 48 oz Fluids/day         Monitoring/Evaluation:   -Barriers: N/a  -Evaluation of education: Indicates understanding.  -Expected compliance: good.      Additional Comments: Met w/ pt to discuss heart failure diet guidelines. Pt difficult to understand, could not fully participate. We discussed measuring salt when cooking/preparing food. We discussed eating out and frozen meals, portion control and limiting frequency. Pt expressed good understanding. He also expressed good understanding of fluid restrictions.    Total time involved in patient education: 15 minutes        Electronically signed by Vinh Reeves RD on 8/29/2024 at 10:37 AM

## 2024-08-29 NOTE — PROGRESS NOTES
Pt BS reading was 352 @ 17:52. 16 units of Humalog administered per order. Attending notified. Electronically signed by Cornelia Richardson RN on 8/29/2024 at 6:01 PM

## 2024-08-30 VITALS
WEIGHT: 205.47 LBS | BODY MASS INDEX: 33.02 KG/M2 | HEART RATE: 73 BPM | TEMPERATURE: 98 F | HEIGHT: 66 IN | SYSTOLIC BLOOD PRESSURE: 126 MMHG | OXYGEN SATURATION: 100 % | RESPIRATION RATE: 17 BRPM | DIASTOLIC BLOOD PRESSURE: 71 MMHG

## 2024-08-30 LAB
GLUCOSE BLD-MCNC: 125 MG/DL (ref 70–99)
GLUCOSE BLD-MCNC: 183 MG/DL (ref 70–99)
NT-PROBNP SERPL-MCNC: 1767 PG/ML (ref 0–449)
PERFORMED ON: ABNORMAL
PERFORMED ON: ABNORMAL

## 2024-08-30 PROCEDURE — 94640 AIRWAY INHALATION TREATMENT: CPT

## 2024-08-30 PROCEDURE — 6370000000 HC RX 637 (ALT 250 FOR IP): Performed by: STUDENT IN AN ORGANIZED HEALTH CARE EDUCATION/TRAINING PROGRAM

## 2024-08-30 PROCEDURE — 2700000000 HC OXYGEN THERAPY PER DAY

## 2024-08-30 PROCEDURE — 97530 THERAPEUTIC ACTIVITIES: CPT

## 2024-08-30 PROCEDURE — 83880 ASSAY OF NATRIURETIC PEPTIDE: CPT

## 2024-08-30 PROCEDURE — 94660 CPAP INITIATION&MGMT: CPT

## 2024-08-30 PROCEDURE — 94761 N-INVAS EAR/PLS OXIMETRY MLT: CPT

## 2024-08-30 PROCEDURE — 6360000002 HC RX W HCPCS: Performed by: STUDENT IN AN ORGANIZED HEALTH CARE EDUCATION/TRAINING PROGRAM

## 2024-08-30 PROCEDURE — 2580000003 HC RX 258: Performed by: STUDENT IN AN ORGANIZED HEALTH CARE EDUCATION/TRAINING PROGRAM

## 2024-08-30 PROCEDURE — 97116 GAIT TRAINING THERAPY: CPT

## 2024-08-30 PROCEDURE — 36415 COLL VENOUS BLD VENIPUNCTURE: CPT

## 2024-08-30 RX ORDER — LEVOFLOXACIN 750 MG/1
750 TABLET, FILM COATED ORAL DAILY
Qty: 5 TABLET | Refills: 0 | Status: SHIPPED | OUTPATIENT
Start: 2024-08-30 | End: 2024-09-04

## 2024-08-30 RX ORDER — FUROSEMIDE 40 MG
40 TABLET ORAL DAILY
Qty: 15 TABLET | Refills: 0 | Status: SHIPPED | OUTPATIENT
Start: 2024-08-30

## 2024-08-30 RX ADMIN — CEFEPIME 2000 MG: 2 INJECTION, POWDER, FOR SOLUTION INTRAVENOUS at 09:33

## 2024-08-30 RX ADMIN — IPRATROPIUM BROMIDE AND ALBUTEROL SULFATE 1 DOSE: 2.5; .5 SOLUTION RESPIRATORY (INHALATION) at 12:16

## 2024-08-30 RX ADMIN — PREGABALIN 75 MG: 75 CAPSULE ORAL at 09:23

## 2024-08-30 RX ADMIN — PREDNISONE 40 MG: 20 TABLET ORAL at 09:23

## 2024-08-30 RX ADMIN — SODIUM CHLORIDE, PRESERVATIVE FREE 10 ML: 5 INJECTION INTRAVENOUS at 09:23

## 2024-08-30 RX ADMIN — AMLODIPINE BESYLATE 2.5 MG: 5 TABLET ORAL at 09:23

## 2024-08-30 RX ADMIN — IPRATROPIUM BROMIDE AND ALBUTEROL SULFATE 1 DOSE: 2.5; .5 SOLUTION RESPIRATORY (INHALATION) at 08:08

## 2024-08-30 RX ADMIN — FUROSEMIDE 40 MG: 10 INJECTION, SOLUTION INTRAMUSCULAR; INTRAVENOUS at 09:23

## 2024-08-30 RX ADMIN — HYDROCODONE BITARTRATE AND ACETAMINOPHEN 1 TABLET: 10; 325 TABLET ORAL at 12:17

## 2024-08-30 RX ADMIN — ATORVASTATIN CALCIUM 40 MG: 40 TABLET, FILM COATED ORAL at 09:23

## 2024-08-30 RX ADMIN — RIVAROXABAN 15 MG: 15 TABLET, FILM COATED ORAL at 09:23

## 2024-08-30 ASSESSMENT — PAIN SCALES - GENERAL
PAINLEVEL_OUTOF10: 7
PAINLEVEL_OUTOF10: 3

## 2024-08-30 ASSESSMENT — PAIN DESCRIPTION - ORIENTATION: ORIENTATION: MID;LOWER

## 2024-08-30 ASSESSMENT — PAIN DESCRIPTION - PAIN TYPE: TYPE: CHRONIC PAIN

## 2024-08-30 ASSESSMENT — PAIN DESCRIPTION - LOCATION: LOCATION: BACK;BUTTOCKS

## 2024-08-30 ASSESSMENT — PAIN DESCRIPTION - DESCRIPTORS: DESCRIPTORS: ACHING

## 2024-08-30 NOTE — PROGRESS NOTES
Physical Therapy  Facility/Department: [address] PROGRESSIVE CARE  Physical Therapy Treatment Note    Name: Ernesto Ny  : 1940  MRN: [id number]  Date of Service: 2024    Discharge Recommendations:  Continue to assess pending progress, Home with assist PRN   PT Equipment Recommendations  Equipment Needed: No      Ernesto Ny scored a 20/24 on the AM-PAC short mobility form.  At this time, no further PT is recommended upon discharge ; pt declines need for cont Home PT.   Assessment  Body Structures, Functions, Activity Limitations Requiring Skilled Therapeutic Intervention: Decreased functional mobility ;Decreased strength;Decreased safe awareness;Decreased endurance;Decreased balance  Assessment: 83 y/o male admit 2024 with Acute Respiratory, COPD Exc, ADWOA on CKD. PMH as noted including CAD, PAF, AVR/CABG, CVA (2023), Spinal Stenosis, Back Surg.  PTA pt living with wife in 1 story home with level entry; independent daily care and functional mobility (no device in home; walker when going out).  Currently, appreciate dysarthria from previous CVA, difficult to understand at times.  Room Air, sats remain at least 90%. Transfers/Amb functional distances with Walker SBA/Slight CGA; cues for safe transitional mvts although no specific LOB.   At this time, would anticipate adequate progress for d/c home.  Pt denies need for cont Home PT upon d/c; advised pt to use Walker more frequently upon d/c as strength/endurance cont to improve.  History: 83 y/o male admit 2024 with Acute Respiratory, COPD Exc, ADWOA on CKD. PMH as noted including CAD, PAF, AVR/CABG, CVA (2023), Spinal Stenosis, Back Surg.  Exam: See above.  Clinical Presentation: See above.  Requires PT Follow-Up: Yes  Activity Tolerance  Activity Tolerance: Patient tolerated treatment well  Activity Tolerance Comments: Room Air, sats remain at least 90%.  Transfers/Amb functional distances with Walker SBA/Slight CGA; cues for safe  drive alot; relies on family.)  Occupation: Retired  Type of Occupation: Retired : [employer]  Vision/Hearing  Vision  Vision: Impaired  Vision Exceptions: Wears glasses at all times  Hearing  Hearing: Exceptions to WFL  Hearing Exceptions: Hard of hearing/hearing concerns    Cognition   Orientation  Overall Orientation Status: Within Functional Limits  Cognition  Overall Cognitive Status: Exceptions  Arousal/Alertness: Appears intact  Following Commands: Follows one step commands consistently  Safety Judgement: Decreased awareness of need for safety  Cognition Comment: Speech difficult to understand at times.    Objective                           Bed mobility  Supine to Sit: Supervision  Transfers  Sit to Stand: Stand by assistance  Stand to Sit: Stand by assistance  Comment: Transfers completed with Walker : from eob, recliner, bedside chair.  Cues for safe hand placement.  Ambulation  Surface: Level tile  Device: Rolling Walker  Distance: Pt amb 3-4 steps bed to chair, additional 40', 120'  with Walker Slight CGA.  Diminished step length/clearance although no buckling/giving way.  Cues for safe transitional mvts.           AM-PAC - Mobility    AM-PAC Basic Mobility - Inpatient   How much help is needed turning from your back to your side while in a flat bed without using bedrails?: None  How much help is needed moving from lying on your back to sitting on the side of a flat bed without using bedrails?: None  How much help is needed moving to and from a bed to a chair?: A Little  How much help is needed standing up from a chair using your arms?: A Little  How much help is needed walking in hospital room?: A Little  How much help is needed climbing 3-5 steps with a railing?: A Little  AM-[facility] Inpatient Mobility Raw Score : 20  AM-PAC Inpatient T-Scale Score : 47.67  Mobility Inpatient CMS 0-100% Score: 35.83  Mobility Inpatient CMS G-Code Modifier : CJ       Goals  Short Term Goals  Time Frame for Short Term Goals: Upon

## 2024-08-30 NOTE — PLAN OF CARE
Problem: Discharge Planning  Goal: Discharge to home or other facility with appropriate resources  8/30/2024 0613 by Cary Recinos, RN  Outcome: Progressing     Problem: Pain  Goal: Verbalizes/displays adequate comfort level or baseline comfort level  8/30/2024 0613 by Cary Recinos RN  Outcome: Progressing     Problem: Safety - Adult  Goal: Free from fall injury  8/30/2024 0613 by Cary Recinos, RN  Outcome: Progressing     Problem: Skin/Tissue Integrity  Goal: Absence of new skin breakdown  Description: 1.  Monitor for areas of redness and/or skin breakdown  2.  Assess vascular access sites hourly  3.  Every 4-6 hours minimum:  Change oxygen saturation probe site  4.  Every 4-6 hours:  If on nasal continuous positive airway pressure, respiratory therapy assess nares and determine need for appliance change or resting period.  8/30/2024 0613 by Cary Recinos, RN  Outcome: Progressing     Problem: ABCDS Injury Assessment  Goal: Absence of physical injury  8/30/2024 0613 by Cary Recinos, RN  Outcome: Progressing     Problem: Hematologic - Adult  Goal: Maintains hematologic stability  8/30/2024 0613 by Cary Recinos, RN  Outcome: Progressing

## 2024-08-30 NOTE — PROGRESS NOTES
CLINICAL PHARMACY NOTE: MEDS TO BEDS    Total # of Prescriptions Filled: 2   The following medications were delivered to the patient:  Current Discharge Medication List        START taking these medications    Details   furosemide (LASIX) 40 MG tablet Take 1 tablet by mouth daily  Qty: 15 tablet, Refills: 0      levoFLOXacin (LEVAQUIN) 750 MG tablet Take 1 tablet by mouth daily for 5 days  Qty: 5 tablet, Refills: 0               Additional Documentation:  Medications delivered at bedside, explained to pt Levofloxacin directions are every other day not everyday due to kidney function

## 2024-08-30 NOTE — NURSE NAVIGATOR
Discharge order noted. There is a hospital follow up appointment scheduled with Primary Care MD   9/5/2024 1:40 PM Glischinski, Luke A, APRN - CNP [facility] wt 205lbs standing.  He is also scheduled for a Pacemaker on 9/3/24 @ [facility]

## 2024-08-30 NOTE — DISCHARGE SUMMARY
V2.0  Discharge Summary    Name:  Ernesto Ny /Age/Sex: 1940 (84 y.o. male)   Admit Date: 2024  Discharge Date: 24    MRN & CSN:  [id number] & [id number] Encounter Date and Time 24 1:44 PM EDT    Attending:  Tylor Ernandez MD Discharging Provider: Tylor Ernandez MD       Hospital Course:       Ernesto Ny is a 84 y.o. male with pmh of COPD, HFpEF, CVA, A-fib, T2DM C/B polyneuropathy, HTN, CKD, aortic valve stenosis s/p bioprosthetic valve replacement who presents with progressive shortness of breath over the last few weeks.  Lower extremity edema has also been worsening     He was being managed on the floors for :    HFpEP as CXR showed pulmonary edema with elevated proBNP. Echo was done which showed EF of 70%. Initially he received IV diuretics and was switched to po diuretics on discharge.     For his COPD he was continued on oxygen and breathing treatments. Sputum culture was positive for pseudomonas, klebsiella and serratia which was sensitive to levofloxacin so was discharged on oral antibiotics.     He also had ADWOA which trended downwards and Cr was back to baseline before discharge.      The patient expressed appropriate understanding of, and agreement with the discharge recommendations, medications, and plan.     Consults this admission:  IP CONSULT TO HEART FAILURE NURSE/COORDINATOR  IP CONSULT TO DIETITIAN  IP CONSULT TO CARDIOLOGY  IP CONSULT TO HOME CARE NEEDS    Discharge Diagnosis:   Acute heart failure with preserved ejection fraction (HCC)        Discharge Instruction:   Follow up appointments: PCP  Primary care physician: Glischinski, Luke A, APRN - CNP within 1 week  Diet: regular diet   Activity: activity as tolerated  Disposition: Discharged to:   [x]Home, []HHC, []SNF, []Acute Rehab, []Hospice   Condition on discharge: Stable  Labs and Tests to be Followed up as an outpatient by PCP or Specialist: PCP    Discharge Medications:        Medication List        START    Ht 1.676 m (5' 6\")   Wt 93.2 kg (205 lb 7.5 oz)   SpO2 100%   PF (!) 13 L/min   BMI 33.16 kg/m²       Physical Exam:     General: NAD  Eyes: EOMI  ENT: neck supple  Cardiovascular: Regular rate.  Respiratory: Basilar crackles  Gastrointestinal: Soft, non tender  Genitourinary: no suprapubic tenderness  Musculoskeletal: No edema  Skin: warm, dry  Neuro: Alert.  Psych: Mood appropriate.         Labs and Imaging   Echo (TTE) limited (PRN contrast/bubble/strain/3D)    Result Date: 8/29/2024    Left Ventricle: Hyperdynamic left ventricular systolic function. EF by visual approximation is 70%. Left ventricle size is normal. Mildly increased wall thickness. Findings consistent with mild concentric hypertrophy. Normal wall motion. Indeterminate diastolic function.   Right Ventricle: Right ventricle size is normal. Normal systolic function. TAPSE is normal.   Aortic Valve: Poorly visualized  bioprosthetic valve that is well-seated with a size of 23 mm. AV mean gradient is 45 mmHg. No regurgitation. No paravalvular regurgitation. Severe stenosis of the aortic valve.  AV peak velocity is 4.6 m/s.   Tricuspid Valve: Mild regurgitation with a centrally directed jet. The estimated RVSP is 49 mmHg.   Left Atrium: Left atrium size is normal.   Extracardiac: Small right pleural effusion.   Image quality is technically difficult. Technically difficult study.     XR CHEST PORTABLE    Result Date: 8/24/2024  EXAMINATION: ONE XRAY VIEW OF THE CHEST 8/24/2024 12:15 pm COMPARISON: 10/28/2023 HISTORY: ORDERING SYSTEM PROVIDED HISTORY: Shortness of Breath TECHNOLOGIST PROVIDED HISTORY: Reason for exam:->Shortness of Breath Reason for Exam: short of breath, gurgling sounds when breathing FINDINGS: Lungs: Mild prominence of the pulmonary vasculature.  No discrete consolidation. Pleura: Small right pleural effusion. Cardiomediastinal silhouette: Tortuosity of the thoracic aorta.  Increased heart size.  Postoperative changes of prior

## 2024-08-30 NOTE — PLAN OF CARE
Problem: Discharge Planning  Goal: Discharge to home or other facility with appropriate resources  8/30/2024 1504 by Deepali Faustin RN  Outcome: Adequate for Discharge     Problem: Pain  Goal: Verbalizes/displays adequate comfort level or baseline comfort level  8/30/2024 1504 by Deepali Faustin RN  Outcome: Adequate for Discharge     Problem: Safety - Adult  Goal: Free from fall injury  8/30/2024 1504 by Deepali Faustin RN  Outcome: Adequate for Discharge     Problem: Skin/Tissue Integrity  Goal: Absence of new skin breakdown  Description: 1.  Monitor for areas of redness and/or skin breakdown  2.  Assess vascular access sites hourly  3.  Every 4-6 hours minimum:  Change oxygen saturation probe site  4.  Every 4-6 hours:  If on nasal continuous positive airway pressure, respiratory therapy assess nares and determine need for appliance change or resting period.  8/30/2024 1504 by Deepali Faustin RN  Outcome: Adequate for Discharge     Problem: ABCDS Injury Assessment  Goal: Absence of physical injury  8/30/2024 1504 by Deepali Faustin RN  Outcome: Adequate for Discharge     Problem: Chronic Conditions and Co-morbidities  Goal: Patient's chronic conditions and co-morbidity symptoms are monitored and maintained or improved  8/30/2024 1504 by Deepali Faustin RN  Outcome: Adequate for Discharge     Problem: Respiratory - Adult  Goal: Achieves optimal ventilation and oxygenation  8/30/2024 1504 by Deepali Faustin RN  Outcome: Adequate for Discharge     Problem: Cardiovascular - Adult  Goal: Maintains optimal cardiac output and hemodynamic stability  8/30/2024 1504 by Deepali Faustin RN  Outcome: Adequate for Discharge     Problem: Cardiovascular - Adult  Goal: Absence of cardiac dysrhythmias or at baseline  8/30/2024 1504 by Deepali Faustin RN  Outcome: Adequate for Discharge     Problem: Musculoskeletal - Adult  Goal: Return mobility to safest level of function  8/30/2024 1504 by Deepali Faustin RN  Outcome: Adequate for Discharge

## 2024-08-30 NOTE — CARE COORDINATION
Case Management Discharge Note          Date / Time of Note: 8/30/2024 12:00 PM                  Patient Name: Ernesto Ny   YOB: 1940  Diagnosis: Pleural effusion on right [J90]  ADWOA (acute kidney injury) (HCC) [N17.9]  Acute respiratory failure with hypoxia and hypercapnia (HCC) [J96.01, J96.02]  Acute on chronic congestive heart failure, unspecified heart failure type (HCC) [I50.9]  Acute heart failure with preserved ejection fraction (HCC) [I50.31]   Date / Time: 8/24/2024 11:14 AM    Financial:  Payor: MEDICARE / Plan: MEDICARE PART A AND B / Product Type: *No Product type* /      Pharmacy:    [facility] [address] [facility] ([place]), [place] - [address] - P [phone] - F [phone] [address]  [place] ([place]) [place] [address]  Phone: [phone] Fax: [phone]    [id number] [place], [place] - [address] - P [phone] - F [phone]  [address]  [facility] [address]  Phone: [phone] Fax: [phone]      Assistance purchasing medications?: Potential Assistance Purchasing Medications: No  Assistance provided by Case Management: None at this time    DISCHARGE Disposition: Home- No Services Needed    LOC at discharge: Not Applicable  KAITLIN Completed: Not Indicated    Notification completed in HENS/PAS?:  Not Applicable    Home Care:  Home Care ordered at discharge: Patient declined home care. Stated he had it before and he does not think it will help him    Referrals made at DISCHARGE for outpatient continued care:  Not Applicable    Transportation:  Transportation PLAN for discharge: family   Mode of Transport: Private Car    Transport form completed: Not Indicated    IMM Completed:   Yes, Case management has presented and reviewed IMM letter #2.       IMM Letter date given:: 08/29/24   .   Patient and/or family/POA verbalized understanding of their medicare rights and appeal process if needed. Patient and/or family/POA has

## 2024-09-03 ENCOUNTER — COMMUNITY CARE MANAGEMENT (OUTPATIENT)
Facility: CLINIC | Age: 84
End: 2024-09-03

## 2024-09-03 ENCOUNTER — FOLLOWUP TELEPHONE ENCOUNTER (OUTPATIENT)
Dept: ADMINISTRATIVE | Age: 84
End: 2024-09-03

## 2024-09-03 NOTE — PROGRESS NOTES
Late entry:    HF RN attempted to reach pt x3 on 9/2 for a 72 hour heart failure hospital follow up call. I was unable to reach pt or leave a message. Pt does have a scheduled PM placement planned for 9/3 with Dr Corea and a hospital f/u appt with PCP Dr Glischinski.

## 2024-09-03 NOTE — PROGRESS NOTES
TCM Care Coordination Summary  09/03/24 Patient discharged from the hospital 08/30/24. Patient stated that he is doing okay. Patient stated that he has chronic pain in his back and legs. Patient stated that he has appt with PCP 09/05/24 to discuss. Patient stated that they have no immediate health concerns. Patient stated that they have all of their medications and understands how to take them. Patient stated that they understood their discharge instructions and declined speaking with TCM RN today regarding discharge. Patient stated that meals can sometimes be an issue. CC will send patient info on meal delivery programs in his area. Patient stated that he was unsure if he would use the info. CC explained that LCM RN will be reaching out to patient in a few weeks. Patient already has active LCM case. Provided patient with info on [facility] 24/7 nurse's line. CC advised patient to reach out if they have any questions or concerns. Patient stated that they understood.-LEYDI BROWN

## 2024-09-05 ENCOUNTER — OFFICE VISIT (OUTPATIENT)
Dept: FAMILY MEDICINE CLINIC | Age: 84
End: 2024-09-05

## 2024-09-05 VITALS
DIASTOLIC BLOOD PRESSURE: 46 MMHG | HEIGHT: 66 IN | OXYGEN SATURATION: 91 % | BODY MASS INDEX: 32.72 KG/M2 | SYSTOLIC BLOOD PRESSURE: 106 MMHG | WEIGHT: 203.6 LBS | HEART RATE: 56 BPM

## 2024-09-05 DIAGNOSIS — I50.31 ACUTE HEART FAILURE WITH PRESERVED EJECTION FRACTION (HCC): ICD-10-CM

## 2024-09-05 DIAGNOSIS — Z09 HOSPITAL DISCHARGE FOLLOW-UP: Primary | ICD-10-CM

## 2024-09-05 DIAGNOSIS — J18.9 PNEUMONIA DUE TO INFECTIOUS ORGANISM, UNSPECIFIED LATERALITY, UNSPECIFIED PART OF LUNG: ICD-10-CM

## 2024-09-05 DIAGNOSIS — N17.9 AKI (ACUTE KIDNEY INJURY) (HCC): ICD-10-CM

## 2024-09-05 DIAGNOSIS — R80.9 TYPE 2 DIABETES MELLITUS WITH MICROALBUMINURIA, WITHOUT LONG-TERM CURRENT USE OF INSULIN (HCC): ICD-10-CM

## 2024-09-05 DIAGNOSIS — I50.42 CHRONIC COMBINED SYSTOLIC AND DIASTOLIC CONGESTIVE HEART FAILURE (HCC): ICD-10-CM

## 2024-09-05 DIAGNOSIS — E11.29 TYPE 2 DIABETES MELLITUS WITH MICROALBUMINURIA, WITHOUT LONG-TERM CURRENT USE OF INSULIN (HCC): ICD-10-CM

## 2024-09-05 RX ORDER — DAPAGLIFLOZIN 10 MG/1
10 TABLET, FILM COATED ORAL EVERY MORNING
Qty: 90 TABLET | Refills: 3 | Status: SHIPPED | OUTPATIENT
Start: 2024-09-05

## 2024-09-05 RX ORDER — DAPAGLIFLOZIN 10 MG/1
10 TABLET, FILM COATED ORAL EVERY MORNING
Qty: 90 TABLET | Refills: 3 | Status: SHIPPED | OUTPATIENT
Start: 2024-09-05 | End: 2024-09-05

## 2024-09-05 NOTE — PROGRESS NOTES
Post-Discharge Transitional Care  Follow Up      Ernesto Ny   YOB: 1940    Date of Office Visit:  9/5/2024  Date of Hospital Admission: 8/24/24  Date of Hospital Discharge: 8/30/24  Risk of hospital readmission (high >=14%. Medium >=10%) :Readmission Risk Score: 20.4      Care management risk score Rising risk (score 2-5) and Complex Care (Scores >=6): No Risk Score On File     Non face to face  following discharge, date last encounter closed (first attempt may have been earlier): 09/03/2024    Call initiated 2 business days of discharge: Yes    ASSESSMENT/PLAN:   Hospital discharge follow-up  -Inpatient stay reviewed including provider notes, imaging, procedures, blood work, medications, vitals, and discharge note.  -Found to have CHF exacerbation with secondary pneumonia and ADWOA following fall  -  -     NM DISCHARGE MEDS RECONCILED W/ CURRENT OUTPATIENT MED LIST  Acute heart failure with preserved ejection fraction (HCC)  -Acute heart failure exacerbation, continue [facility] cardiology.  Plan for by chamber pacer.  -Patient has had hypotension with bilateral lower extremity edema.  Plan to discontinue 2.5 mg amlodipine.  -Treatment options discussed.  Farxiga is being sent to the pharmacy.   The medication uses and side effects were discussed with the patient.  Patient verbalized understanding and agrees to the plan.  -Continue plan to follow-up with cardiology next week.  -     FARXIGA 10 MG tablet; Take 1 tablet by mouth every morning, Disp-90 tablet, R-3, DAWNormal  Pneumonia due to infectious organism, unspecified laterality, unspecified part of lung  -Resolved pneumonia with cefepime IV inpatient, Levaquin p.o. outpatient.  -Recommending incentive spirometry use.  -Follow-up as needed.  ADWOA (acute kidney injury) (HCC)  -Secondary ADWOA following CHF exacerbation.  -Possibly underlying CKD.  -Initiating Farxiga today.  -Improved and patient with fluid resuscitation, cardiac support.  -Continue

## 2024-09-05 NOTE — PATIENT INSTRUCTIONS

## 2024-09-11 ENCOUNTER — TELEPHONE (OUTPATIENT)
Dept: FAMILY MEDICINE CLINIC | Age: 84
End: 2024-09-11

## 2024-09-11 DIAGNOSIS — I63.211 CEREBROVASCULAR ACCIDENT (CVA) DUE TO OCCLUSION OF RIGHT VERTEBRAL ARTERY (HCC): ICD-10-CM

## 2024-09-11 DIAGNOSIS — I50.42 CHRONIC COMBINED SYSTOLIC AND DIASTOLIC CONGESTIVE HEART FAILURE (HCC): ICD-10-CM

## 2024-09-11 DIAGNOSIS — I50.31 ACUTE HEART FAILURE WITH PRESERVED EJECTION FRACTION (HCC): Primary | ICD-10-CM

## 2024-09-11 DIAGNOSIS — E11.42 DIABETIC POLYNEUROPATHY ASSOCIATED WITH TYPE 2 DIABETES MELLITUS (HCC): ICD-10-CM

## 2024-09-11 DIAGNOSIS — J18.9 PNEUMONIA DUE TO INFECTIOUS ORGANISM, UNSPECIFIED LATERALITY, UNSPECIFIED PART OF LUNG: ICD-10-CM

## 2024-10-02 RX ORDER — ATORVASTATIN CALCIUM 40 MG/1
40 TABLET, FILM COATED ORAL DAILY
Qty: 90 TABLET | Refills: 0 | Status: SHIPPED | OUTPATIENT
Start: 2024-10-02

## 2024-10-18 ENCOUNTER — COMMUNITY CARE MANAGEMENT (OUTPATIENT)
Facility: CLINIC | Age: 84
End: 2024-10-18

## 2024-10-24 ENCOUNTER — TELEPHONE (OUTPATIENT)
Dept: FAMILY MEDICINE CLINIC | Age: 84
End: 2024-10-24

## 2024-10-24 DIAGNOSIS — I25.10 CORONARY ARTERY DISEASE DUE TO LIPID RICH PLAQUE: Chronic | ICD-10-CM

## 2024-10-24 DIAGNOSIS — I63.211 CEREBROVASCULAR ACCIDENT (CVA) DUE TO OCCLUSION OF RIGHT VERTEBRAL ARTERY (HCC): Primary | ICD-10-CM

## 2024-10-24 DIAGNOSIS — I48.0 PAROXYSMAL ATRIAL FIBRILLATION (HCC): Chronic | ICD-10-CM

## 2024-10-24 DIAGNOSIS — M46.1 SACROILIITIS, NOT ELSEWHERE CLASSIFIED (HCC): ICD-10-CM

## 2024-10-24 DIAGNOSIS — E11.42 DIABETIC POLYNEUROPATHY ASSOCIATED WITH TYPE 2 DIABETES MELLITUS (HCC): ICD-10-CM

## 2024-10-24 DIAGNOSIS — R80.9 TYPE 2 DIABETES MELLITUS WITH MICROALBUMINURIA, WITHOUT LONG-TERM CURRENT USE OF INSULIN (HCC): ICD-10-CM

## 2024-10-24 DIAGNOSIS — I25.83 CORONARY ARTERY DISEASE DUE TO LIPID RICH PLAQUE: Chronic | ICD-10-CM

## 2024-10-24 DIAGNOSIS — E11.29 TYPE 2 DIABETES MELLITUS WITH MICROALBUMINURIA, WITHOUT LONG-TERM CURRENT USE OF INSULIN (HCC): ICD-10-CM

## 2024-10-24 DIAGNOSIS — I50.42 CHRONIC COMBINED SYSTOLIC AND DIASTOLIC CONGESTIVE HEART FAILURE (HCC): ICD-10-CM

## 2024-10-24 NOTE — TELEPHONE ENCOUNTER
Pt  is wanting to know if you are willing to give orders for   Home care PT OT and Speech therapy--pt is hard to understand and pt says he feels weak.   Pt is at home .  The nurse is on the phone with pt.  Pt nurse is Julia Balderrama is  his Care Coordinator -  Case management

## 2024-11-11 NOTE — PATIENT INSTRUCTIONS
You may receive a survey regarding the care you received during your visit.  Your input is valuable to us.  We encourage you to complete and return your survey.  We hope you will choose us in the future for your healthcare needs.   Advance Care Planning     Advance Care Planning opens a door to talk about and write down your wishes before a sudden accident or illness.  Make your goals, values, and preferences known.     This puts you in the ’s seat and helps others know what matters most to you so they won’t have to guess.      Where can you learn more?    Go to [email or web address]   to learn how to:    Name someone you trust to make healthcare decisions for you, only if you can’t. (Healthcare Power of )    Document your wishes for care if you were seriously ill and not expected to recover or are approaching end of life. (Advance Directive or Living Will)    The same page can be found using the QR code below.

## 2024-11-12 ENCOUNTER — OFFICE VISIT (OUTPATIENT)
Dept: FAMILY MEDICINE CLINIC | Age: 84
End: 2024-11-12

## 2024-11-12 ENCOUNTER — TELEPHONE (OUTPATIENT)
Dept: FAMILY MEDICINE CLINIC | Age: 84
End: 2024-11-12

## 2024-11-12 VITALS
WEIGHT: 193 LBS | RESPIRATION RATE: 19 BRPM | OXYGEN SATURATION: 94 % | BODY MASS INDEX: 31.02 KG/M2 | HEART RATE: 62 BPM | SYSTOLIC BLOOD PRESSURE: 104 MMHG | HEIGHT: 66 IN | DIASTOLIC BLOOD PRESSURE: 68 MMHG

## 2024-11-12 DIAGNOSIS — I50.42 CHRONIC COMBINED SYSTOLIC AND DIASTOLIC CONGESTIVE HEART FAILURE (HCC): ICD-10-CM

## 2024-11-12 DIAGNOSIS — D64.9 LOW HEMOGLOBIN: ICD-10-CM

## 2024-11-12 DIAGNOSIS — Z09 HOSPITAL DISCHARGE FOLLOW-UP: Primary | ICD-10-CM

## 2024-11-12 DIAGNOSIS — R80.9 TYPE 2 DIABETES MELLITUS WITH MICROALBUMINURIA, WITHOUT LONG-TERM CURRENT USE OF INSULIN (HCC): ICD-10-CM

## 2024-11-12 DIAGNOSIS — Z71.89 ACP (ADVANCE CARE PLANNING): ICD-10-CM

## 2024-11-12 DIAGNOSIS — E55.9 VITAMIN D DEFICIENCY: ICD-10-CM

## 2024-11-12 DIAGNOSIS — E11.29 TYPE 2 DIABETES MELLITUS WITH MICROALBUMINURIA, WITHOUT LONG-TERM CURRENT USE OF INSULIN (HCC): ICD-10-CM

## 2024-11-12 DIAGNOSIS — J44.1 CHRONIC OBSTRUCTIVE PULMONARY DISEASE WITH ACUTE EXACERBATION (HCC): ICD-10-CM

## 2024-11-12 DIAGNOSIS — R09.89 RESPIRATORY CRACKLES OF BOTH LUNGS: ICD-10-CM

## 2024-11-12 DIAGNOSIS — L97.819 NON-PRESSURE CHRONIC ULCER OF OTHER PART OF RIGHT LOWER LEG WITH UNSPECIFIED SEVERITY (HCC): ICD-10-CM

## 2024-11-12 RX ORDER — TORSEMIDE 20 MG/1
20 TABLET ORAL DAILY
COMMUNITY
Start: 2024-10-29

## 2024-11-12 RX ORDER — DAPAGLIFLOZIN 10 MG/1
10 TABLET, FILM COATED ORAL EVERY MORNING
Qty: 90 TABLET | Refills: 3 | Status: SHIPPED | OUTPATIENT
Start: 2024-11-12

## 2024-11-12 RX ORDER — MONTELUKAST SODIUM 10 MG/1
10 TABLET ORAL NIGHTLY
COMMUNITY

## 2024-11-12 RX ORDER — ALBUTEROL SULFATE 5 MG/ML
5 SOLUTION RESPIRATORY (INHALATION) EVERY 4 HOURS PRN
Qty: 120 EACH | Status: CANCELLED | OUTPATIENT
Start: 2024-11-12

## 2024-11-12 RX ORDER — IPRATROPIUM BROMIDE AND ALBUTEROL SULFATE 2.5; .5 MG/3ML; MG/3ML
1 SOLUTION RESPIRATORY (INHALATION) EVERY 4 HOURS PRN
Qty: 360 ML | Refills: 3 | Status: SHIPPED | OUTPATIENT
Start: 2024-11-12

## 2024-11-12 RX ORDER — MUPIROCIN 20 MG/G
OINTMENT TOPICAL
Qty: 22 G | Refills: 1 | Status: SHIPPED | OUTPATIENT
Start: 2024-11-12

## 2024-11-12 RX ORDER — ALBUTEROL SULFATE 5 MG/ML
5 SOLUTION RESPIRATORY (INHALATION) EVERY 4 HOURS PRN
COMMUNITY
Start: 2024-10-09

## 2024-11-12 NOTE — PROGRESS NOTES
mupirocin 2 % ointment  Commonly known as: BACTROBAN  APPLY TOPICALLY TO THE AFFECTED AREA THREE TIMES DAILY  Started by: MERCED Abrams CNP            CHANGE how you take these medications      glipiZIDE 5 MG tablet  Commonly known as: GLUCOTROL  TAKE 1/2 TO 1 TABLET TWO TIMES A DAY BEFORE MEALS  What changed:   how much to take  how to take this  when to take this  reasons to take this     metFORMIN 1000 MG tablet  Commonly known as: GLUCOPHAGE  Take 1 tablet by mouth 2 times daily (with meals)  What changed: how much to take            CONTINUE taking these medications      albuterol (5 MG/ML) 0.5% nebulizer solution  Commonly known as: PROVENTIL     atorvastatin 40 MG tablet  Commonly known as: LIPITOR  TAKE 1 TABLET BY MOUTH DAILY     Farxiga 10 MG tablet  Generic drug: dapagliflozin  Take 1 tablet by mouth every morning     HYDROcodone-acetaminophen  MG per tablet  Commonly known as: NORCO     ipratropium 0.5 mg-albuterol 2.5 mg 0.5-2.5 (3) MG/3ML Soln nebulizer solution  Commonly known as: DUONEB  Inhale 3 mLs into the lungs every 4 hours as needed for Shortness of Breath     metoprolol tartrate 25 MG tablet  Commonly known as: LOPRESSOR  TAKE 1/2 TABLET TWICE A DAY     montelukast 10 MG tablet  Commonly known as: SINGULAIR     pregabalin 75 MG capsule  Commonly known as: LYRICA  Take 1 capsule by mouth 2 times daily for 180 days. Max Daily Amount: 150 mg     sodium chloride 0.65 % nasal spray  Commonly known as: OCEAN, BABY AYR     torsemide 20 MG tablet  Commonly known as: DEMADEX     vitamin D 125 MCG (5000 UT) Caps capsule  Commonly known as: CHOLECALCIFEROL     Xarelto 15 MG Tabs tablet  Generic drug: rivaroxaban            STOP taking these medications      furosemide 40 MG tablet  Commonly known as: Lasix  Stopped by: MERCED Abrams CNP               Where to Get Your Medications        These medications were sent to [facility] [address] [place] [address] ([place]), [place] -

## 2024-11-12 NOTE — TELEPHONE ENCOUNTER
Antionette from [facility] was calling to say    THE PATIENT IS REQUESTING THAT THEY DO NOT START HOME CARE VISITS UNTIL 11/14. SHE WAS MAKING SURE THAT WAS OK    Can we please give her a call and let her know. She said we can leave a Voicemail it is a secure line.

## 2024-11-13 LAB
25(OH)D3 SERPL-MCNC: 31.9 NG/ML
ALBUMIN SERPL-MCNC: 3.6 G/DL (ref 3.4–5)
ALBUMIN/GLOB SERPL: 1.6 {RATIO} (ref 1.1–2.2)
ALP SERPL-CCNC: 68 U/L (ref 40–129)
ALT SERPL-CCNC: 21 U/L (ref 10–40)
ANION GAP SERPL CALCULATED.3IONS-SCNC: 10 MMOL/L (ref 3–16)
AST SERPL-CCNC: 19 U/L (ref 15–37)
BASOPHILS # BLD: 0.1 K/UL (ref 0–0.2)
BASOPHILS NFR BLD: 1 %
BILIRUB SERPL-MCNC: <0.2 MG/DL (ref 0–1)
BUN SERPL-MCNC: 38 MG/DL (ref 7–20)
CALCIUM SERPL-MCNC: 8.7 MG/DL (ref 8.3–10.6)
CHLORIDE SERPL-SCNC: 105 MMOL/L (ref 99–110)
CO2 SERPL-SCNC: 27 MMOL/L (ref 21–32)
CREAT SERPL-MCNC: 1.6 MG/DL (ref 0.8–1.3)
DEPRECATED RDW RBC AUTO: 18.2 % (ref 12.4–15.4)
EOSINOPHIL # BLD: 0.1 K/UL (ref 0–0.6)
EOSINOPHIL NFR BLD: 1.2 %
GFR SERPLBLD CREATININE-BSD FMLA CKD-EPI: 43 ML/MIN/{1.73_M2}
GLUCOSE SERPL-MCNC: 171 MG/DL (ref 70–99)
HCT VFR BLD AUTO: 27.4 % (ref 40.5–52.5)
HGB BLD-MCNC: 8.7 G/DL (ref 13.5–17.5)
LYMPHOCYTES # BLD: 1.7 K/UL (ref 1–5.1)
LYMPHOCYTES NFR BLD: 20.9 %
MCH RBC QN AUTO: 29.7 PG (ref 26–34)
MCHC RBC AUTO-ENTMCNC: 31.7 G/DL (ref 31–36)
MCV RBC AUTO: 93.7 FL (ref 80–100)
MONOCYTES # BLD: 0.6 K/UL (ref 0–1.3)
MONOCYTES NFR BLD: 7.6 %
NEUTROPHILS # BLD: 5.6 K/UL (ref 1.7–7.7)
NEUTROPHILS NFR BLD: 69.3 %
PLATELET # BLD AUTO: 262 K/UL (ref 135–450)
PMV BLD AUTO: 8.9 FL (ref 5–10.5)
POTASSIUM SERPL-SCNC: 5.1 MMOL/L (ref 3.5–5.1)
PROT SERPL-MCNC: 5.9 G/DL (ref 6.4–8.2)
RBC # BLD AUTO: 2.92 M/UL (ref 4.2–5.9)
SODIUM SERPL-SCNC: 142 MMOL/L (ref 136–145)
WBC # BLD AUTO: 8 K/UL (ref 4–11)

## 2024-11-14 ENCOUNTER — COMMUNITY CARE MANAGEMENT (OUTPATIENT)
Facility: CLINIC | Age: 84
End: 2024-11-14

## 2024-11-14 ENCOUNTER — TELEPHONE (OUTPATIENT)
Dept: FAMILY MEDICINE CLINIC | Age: 84
End: 2024-11-14

## 2024-11-14 NOTE — TELEPHONE ENCOUNTER
Claudia Norwood called wanting a verbal order for [facility] homecare for 8 weeks. I was unable to get more information as her phone connect kept cutting in and out.     Please give her a call back with any questions.

## 2024-11-14 NOTE — PROGRESS NOTES
Initial assessment complete patient identified by named at a birth and mailing address, disclaimer provided. This patient is a 84 year old male who states he is in poor health at this time     HTN pt is not aware of dx date.  pt checks his pressures at home every once in awhile, but not regularly. pt \"guesses\" he is eating a low sodium diet.  pt is on medications for bp issues and states his bp is under control at this time.     CAD pt had a CVA pt has speech deficits and weakness, pt states he is not in any therapy despite being very hard to understand.  requesting from [facility] home [facility] PT/OT/ST     A FIB pt is unsure of dx pt is on coumadin pt had a pace maker placed 2-3 weeks ago.  pt states he feels \"pretty good\" since that was done.  pt is \"sleepy all the time\".  pt is on coumadin, this condition is stable at this time.      CHF pt not sure how long ago he was dx. BNP from 8/30/24 1767, pt states he has swelling in his feet and his legs.  pt states that his swelling is \"pretty bad\" and does not ever go down.  pt states he does not take daily weights, states he weighs 2-3 times a week.  teaching for how to take a daily weight, pt states he is aware but is not compliant with it.  pt states he has SOB due to his CHF, states he is \"real short\" pt is not on oxygen.  this condition needs monitoring. pt is on a fluid rxn of 64 oz a day.     DMII dx about 30 years ago.  pt last A1c at 8/12/24 was 7.5.  pt states he checks his blood sugars at home but states his glucometer broke and he needs a new one.  messaged doctor to request a new script be sent to pharmacy.  pt states he does not really stick to a diet much anymore.       ANNMARIE C CPAP dx about 15-20 years ago. pt states he has to have his CPAP to be able to sleep, pt states he is in good health. pt states he feels this condition is under control     COPD pt is unsure of dx date.  pt states that this \"flares up all of the time\"  pt is not on oxygen at home and does not

## 2024-11-19 ENCOUNTER — TELEPHONE (OUTPATIENT)
Dept: FAMILY MEDICINE CLINIC | Age: 84
End: 2024-11-19

## 2024-11-19 NOTE — TELEPHONE ENCOUNTER
Rcv'd fax regarding denial of pt nebulizer solution. Can someone confirm that this PA was submitted correctly? Asking for additional information, what information is needed? I do not see on fax where it says what is needed. Please advise.  Fax will be scanned in.

## 2024-11-21 RX ORDER — ALBUTEROL SULFATE 5 MG/ML
2.5 SOLUTION RESPIRATORY (INHALATION) EVERY 4 HOURS PRN
Qty: 120 EACH | Refills: 1 | Status: SHIPPED | OUTPATIENT
Start: 2024-11-21 | End: 2024-11-22

## 2024-11-21 NOTE — TELEPHONE ENCOUNTER
Patient has medicare coverage. Nebulizer and nebulizer medications will need to be billed under the medical portion (part B) at the pharmacy or through DME company.     If this requires a response please respond to the pool ( P MHCX PSC MEDICATION PRE-AUTH).      Thank you please advise patient.

## 2024-11-22 ENCOUNTER — TELEPHONE (OUTPATIENT)
Dept: FAMILY MEDICINE CLINIC | Age: 84
End: 2024-11-22

## 2024-11-22 DIAGNOSIS — J44.1 CHRONIC OBSTRUCTIVE PULMONARY DISEASE WITH ACUTE EXACERBATION (HCC): Primary | ICD-10-CM

## 2024-11-22 RX ORDER — ALBUTEROL SULFATE 0.83 MG/ML
2.5 SOLUTION RESPIRATORY (INHALATION) EVERY 4 HOURS PRN
COMMUNITY
End: 2024-11-22 | Stop reason: SDUPTHER

## 2024-11-22 RX ORDER — ALBUTEROL SULFATE 0.83 MG/ML
2.5 SOLUTION RESPIRATORY (INHALATION) EVERY 4 HOURS PRN
Qty: 120 EACH | Refills: 2 | Status: SHIPPED | OUTPATIENT
Start: 2024-11-22

## 2024-11-22 NOTE — TELEPHONE ENCOUNTER
PHARMACY REQUESTING NEW MEDICATION   ALBUTEROL HHN 5 MG NOT AVAILABLE AND NOT COVERED BY INSURANCE

## 2024-11-25 ENCOUNTER — TELEPHONE (OUTPATIENT)
Dept: ADMINISTRATIVE | Age: 84
End: 2024-11-25

## 2024-11-25 NOTE — TELEPHONE ENCOUNTER
Submitted PA for Albuterol Sulfate (2.5 MG/3ML)0.083% nebulizer solution   Via [id number]  STATUS:    Patient has medicare coverage. Nebulizer and nebulizer medications will need to be billed under the medical portion (part B) at the pharmacy or through DME company.     If this requires a response please respond to the pool ( P MHCX PSC MEDICATION PRE-AUTH).      Thank you please advise patient.

## 2024-11-25 NOTE — TELEPHONE ENCOUNTER
Spoke with patient, he states he is using the nebulizer solution. He states the breathing has not gotten any worse. It seems to be staying the same.

## 2024-11-26 NOTE — TELEPHONE ENCOUNTER
Just ELEAZAR -- Urged patient to go to hospital and have chest x-ray done. I explained to him the order is in his chart and he can go to the hospital with no appointment and have it done. He said he will have it done the next time he is over that way.

## 2024-12-11 ENCOUNTER — COMMUNITY CARE MANAGEMENT (OUTPATIENT)
Facility: CLINIC | Age: 84
End: 2024-12-11

## 2024-12-11 NOTE — PROGRESS NOTES
f/u assessment complete, pt identified by name  and address, disclaimer provided     pt states his COPD is acting up, he said it isn't \"good\" he is SOB and states he is taking inhalers.  states they are not helping, pt does not have a pulmonologist.      blood sugar this morning was 160.  states that is a little high for him. pt states that he had some extra carbs with dinner. states it is usually in the 140s        pt states there is too much food in his house that is not diabetic friendly  states his sugars were higher than normal this morning.      pt states he has a scab that opens and closes but he has a \"cream\" he uses on it and it \"takes care of it\"     pt has an appointment later this month.      pt denies questions about his medications at this time     pt is aware that the [employer] contract is expiring at the end of this month, is aware the after hours hotline is available to him until the , and he is to call his doctors after that     [email or web address] [email or web address]  [phone]  pt has the stive after hours hotline

## 2025-01-02 RX ORDER — ATORVASTATIN CALCIUM 40 MG/1
40 TABLET, FILM COATED ORAL DAILY
Qty: 90 TABLET | Refills: 0 | Status: SHIPPED | OUTPATIENT
Start: 2025-01-02 | End: 2025-01-03 | Stop reason: SDUPTHER

## 2025-01-03 ENCOUNTER — TELEPHONE (OUTPATIENT)
Dept: FAMILY MEDICINE CLINIC | Age: 85
End: 2025-01-03

## 2025-01-03 RX ORDER — ATORVASTATIN CALCIUM 40 MG/1
40 TABLET, FILM COATED ORAL DAILY
Qty: 90 TABLET | Refills: 0 | Status: SHIPPED | OUTPATIENT
Start: 2025-01-03

## 2025-01-03 NOTE — TELEPHONE ENCOUNTER
Patients son was stopping in to get the medication sent to the new pharmacy     Atorvastatin 40MG, 1x daily, 90 day supply     [facility]     Phone number

## 2025-01-09 ENCOUNTER — TELEPHONE (OUTPATIENT)
Dept: FAMILY MEDICINE CLINIC | Age: 85
End: 2025-01-09

## 2025-01-09 NOTE — TELEPHONE ENCOUNTER
Claudia called to let radha know she would like a verbal order for skilled nursing certification. He has heart failure and also wanted to notify he may be going to the hospital.    Please give her a call back.

## 2025-01-13 ENCOUNTER — TELEPHONE (OUTPATIENT)
Dept: FAMILY MEDICINE CLINIC | Age: 85
End: 2025-01-13

## 2025-01-13 NOTE — TELEPHONE ENCOUNTER
Patient is having a bad cough and Eve wants to know if he can take Mucinex? Please give her a call back.

## 2025-01-31 ENCOUNTER — OFFICE VISIT (OUTPATIENT)
Dept: FAMILY MEDICINE CLINIC | Age: 85
End: 2025-01-31
Payer: MEDICARE

## 2025-01-31 VITALS
WEIGHT: 201 LBS | SYSTOLIC BLOOD PRESSURE: 120 MMHG | OXYGEN SATURATION: 97 % | DIASTOLIC BLOOD PRESSURE: 72 MMHG | BODY MASS INDEX: 32.3 KG/M2 | HEART RATE: 83 BPM | HEIGHT: 66 IN

## 2025-01-31 DIAGNOSIS — J96.01 ACUTE RESPIRATORY FAILURE WITH HYPOXIA AND HYPERCAPNIA: ICD-10-CM

## 2025-01-31 DIAGNOSIS — J96.02 ACUTE RESPIRATORY FAILURE WITH HYPOXIA AND HYPERCAPNIA: ICD-10-CM

## 2025-01-31 DIAGNOSIS — Z71.89 ACP (ADVANCE CARE PLANNING): ICD-10-CM

## 2025-01-31 DIAGNOSIS — F11.20 NARCOTIC DEPENDENCY, CONTINUOUS (HCC): ICD-10-CM

## 2025-01-31 DIAGNOSIS — I48.11 LONGSTANDING PERSISTENT ATRIAL FIBRILLATION (HCC): ICD-10-CM

## 2025-01-31 DIAGNOSIS — J40 BRONCHITIS: Primary | ICD-10-CM

## 2025-01-31 DIAGNOSIS — I50.33 ACUTE ON CHRONIC DIASTOLIC CONGESTIVE HEART FAILURE (HCC): ICD-10-CM

## 2025-01-31 PROCEDURE — G8427 DOCREV CUR MEDS BY ELIG CLIN: HCPCS | Performed by: REGISTERED NURSE

## 2025-01-31 PROCEDURE — G8417 CALC BMI ABV UP PARAM F/U: HCPCS | Performed by: REGISTERED NURSE

## 2025-01-31 PROCEDURE — 1159F MED LIST DOCD IN RCRD: CPT | Performed by: REGISTERED NURSE

## 2025-01-31 PROCEDURE — 1123F ACP DISCUSS/DSCN MKR DOCD: CPT | Performed by: REGISTERED NURSE

## 2025-01-31 PROCEDURE — 1160F RVW MEDS BY RX/DR IN RCRD: CPT | Performed by: REGISTERED NURSE

## 2025-01-31 PROCEDURE — 3074F SYST BP LT 130 MM HG: CPT | Performed by: REGISTERED NURSE

## 2025-01-31 PROCEDURE — 99214 OFFICE O/P EST MOD 30 MIN: CPT | Performed by: REGISTERED NURSE

## 2025-01-31 PROCEDURE — 3078F DIAST BP <80 MM HG: CPT | Performed by: REGISTERED NURSE

## 2025-01-31 PROCEDURE — 4004F PT TOBACCO SCREEN RCVD TLK: CPT | Performed by: REGISTERED NURSE

## 2025-01-31 RX ORDER — PREDNISONE 20 MG/1
60 TABLET ORAL DAILY
Qty: 15 TABLET | Refills: 0 | Status: SHIPPED | OUTPATIENT
Start: 2025-01-31 | End: 2025-02-05

## 2025-01-31 RX ORDER — AZITHROMYCIN 250 MG/1
TABLET, FILM COATED ORAL
Qty: 6 TABLET | Refills: 0 | Status: SHIPPED | OUTPATIENT
Start: 2025-01-31 | End: 2025-02-10

## 2025-01-31 ASSESSMENT — PATIENT HEALTH QUESTIONNAIRE - PHQ9
SUM OF ALL RESPONSES TO PHQ QUESTIONS 1-9: 0
SUM OF ALL RESPONSES TO PHQ QUESTIONS 1-9: 0
DEPRESSION UNABLE TO ASSESS: URGENT/EMERGENT SITUATION
SUM OF ALL RESPONSES TO PHQ9 QUESTIONS 1 & 2: 0
2. FEELING DOWN, DEPRESSED OR HOPELESS: NOT AT ALL
SUM OF ALL RESPONSES TO PHQ QUESTIONS 1-9: 0
1. LITTLE INTEREST OR PLEASURE IN DOING THINGS: NOT AT ALL
SUM OF ALL RESPONSES TO PHQ QUESTIONS 1-9: 0

## 2025-01-31 NOTE — PROGRESS NOTES
Ernesto Ny (:  1940) is a 84 y.o. male,Established patient, here for evaluation of the following chief complaint(s):  Congestion (Congestion x years /Pt states he can't hear out of either of his ears )         Assessment & Plan  Bronchitis   Acute condition, new, Supportive care with appropriate antipyretics and fluids.  Script sent for Z-Martin and Prednisone, r/b/se of medications discussed with pt and wife who verbalized understanding.     Orders:    predniSONE (DELTASONE) 20 MG tablet; Take 3 tablets by mouth daily for 5 days    azithromycin (ZITHROMAX) 250 MG tablet; 500mg on day 1 followed by 250mg on days 2 - 5    Acute respiratory failure with hypoxia and hypercapnia   Chronic, at goal (stable), O2 levels stable on RA. No wheezing or other adventitious lung sounds auscultated, will treat for bronchitis as above.          Acute on chronic diastolic congestive heart failure (HCC)  Chronic, stable, continue diuresis.  - managed by cardiology, is due for f/u in 6 months (~2025).        Narcotic dependency, continuous (HCC)  Stable, chronic, continue Norco,  q6 PRN for chronic pain.          Longstanding persistent atrial fibrillation (HCC)   Chronic, at goal (stable), continue current treatment plan  - HR controlled in office today with HR of 83, regular, murmur (known) auscultated.   - Afib managed per cardiology is due for f/u in 6 months (~2025).          ACP (advance care planning)   Chronic, at goal (stable), HPOA and living will UTD per pt report, records on file.            No follow-ups on file.       Subjective   HPI    Pt is here for nasal congestion, productive cough with white/green phlegm. Pt also feels a fullness sensation in her ears, bilaterally. Endorses PND.  Pt reports his phlegm is thicker and is producing more phlegm than usual. Pt is also having more SOB especially on exertion and occasionally wheezing. Pt has been using his nebulizer solution last treatment was

## 2025-01-31 NOTE — PATIENT INSTRUCTIONS

## 2025-01-31 NOTE — ASSESSMENT & PLAN NOTE
Chronic, stable, continue diuresis.  - managed by cardiology, is due for f/u in 6 months (~7/2025).

## 2025-02-04 ENCOUNTER — TELEPHONE (OUTPATIENT)
Dept: FAMILY MEDICINE CLINIC | Age: 85
End: 2025-02-04

## 2025-02-04 DIAGNOSIS — J32.9 CHRONIC SINUSITIS, UNSPECIFIED LOCATION: ICD-10-CM

## 2025-02-04 DIAGNOSIS — R35.0 URINARY FREQUENCY: Primary | ICD-10-CM

## 2025-02-04 RX ORDER — TAMSULOSIN HYDROCHLORIDE 0.4 MG/1
0.4 CAPSULE ORAL DAILY
Qty: 90 CAPSULE | Refills: 1 | Status: SHIPPED | OUTPATIENT
Start: 2025-02-04

## 2025-02-04 NOTE — TELEPHONE ENCOUNTER
Vi from [facility] is calling because Ernesto needs:    Something for him to empty his bladder    His heart doctor told him to call his PCP and have him put on a 30 day medication for chronic sinus infection     [facility]- [address] - phone no. [phone]    Please give her a call back.

## 2025-02-14 ENCOUNTER — OFFICE VISIT (OUTPATIENT)
Dept: FAMILY MEDICINE CLINIC | Age: 85
End: 2025-02-14
Payer: MEDICARE

## 2025-02-14 VITALS
BODY MASS INDEX: 31.66 KG/M2 | HEART RATE: 68 BPM | WEIGHT: 197 LBS | DIASTOLIC BLOOD PRESSURE: 62 MMHG | OXYGEN SATURATION: 98 % | HEIGHT: 66 IN | SYSTOLIC BLOOD PRESSURE: 94 MMHG

## 2025-02-14 DIAGNOSIS — E11.29 TYPE 2 DIABETES MELLITUS WITH MICROALBUMINURIA, WITHOUT LONG-TERM CURRENT USE OF INSULIN (HCC): Primary | ICD-10-CM

## 2025-02-14 DIAGNOSIS — Z12.5 SCREENING FOR MALIGNANT NEOPLASM OF PROSTATE: ICD-10-CM

## 2025-02-14 DIAGNOSIS — R80.9 TYPE 2 DIABETES MELLITUS WITH MICROALBUMINURIA, WITHOUT LONG-TERM CURRENT USE OF INSULIN (HCC): Primary | ICD-10-CM

## 2025-02-14 DIAGNOSIS — J44.1 CHRONIC OBSTRUCTIVE PULMONARY DISEASE WITH ACUTE EXACERBATION (HCC): ICD-10-CM

## 2025-02-14 DIAGNOSIS — L97.819 NON-PRESSURE CHRONIC ULCER OF OTHER PART OF RIGHT LOWER LEG WITH UNSPECIFIED SEVERITY (HCC): ICD-10-CM

## 2025-02-14 DIAGNOSIS — I50.42 CHRONIC COMBINED SYSTOLIC AND DIASTOLIC CONGESTIVE HEART FAILURE (HCC): ICD-10-CM

## 2025-02-14 DIAGNOSIS — R31.9 HEMATURIA, UNSPECIFIED TYPE: ICD-10-CM

## 2025-02-14 LAB
BILIRUBIN, POC: NORMAL
BLOOD URINE, POC: NORMAL
CLARITY, POC: NORMAL
COLOR, POC: NORMAL
GLUCOSE URINE, POC: NORMAL MG/DL
HBA1C MFR BLD: 8.2 %
KETONES, POC: NORMAL MG/DL
LEUKOCYTE EST, POC: NORMAL
NITRITE, POC: NORMAL
PH, POC: 7
PROTEIN, POC: NORMAL MG/DL
SPECIFIC GRAVITY, POC: 1.02
UROBILINOGEN, POC: 0.2 MG/DL

## 2025-02-14 PROCEDURE — 1160F RVW MEDS BY RX/DR IN RCRD: CPT | Performed by: REGISTERED NURSE

## 2025-02-14 PROCEDURE — 3023F SPIROM DOC REV: CPT | Performed by: REGISTERED NURSE

## 2025-02-14 PROCEDURE — 81002 URINALYSIS NONAUTO W/O SCOPE: CPT | Performed by: REGISTERED NURSE

## 2025-02-14 PROCEDURE — G8417 CALC BMI ABV UP PARAM F/U: HCPCS | Performed by: REGISTERED NURSE

## 2025-02-14 PROCEDURE — 1123F ACP DISCUSS/DSCN MKR DOCD: CPT | Performed by: REGISTERED NURSE

## 2025-02-14 PROCEDURE — 1159F MED LIST DOCD IN RCRD: CPT | Performed by: REGISTERED NURSE

## 2025-02-14 PROCEDURE — 3078F DIAST BP <80 MM HG: CPT | Performed by: REGISTERED NURSE

## 2025-02-14 PROCEDURE — 3074F SYST BP LT 130 MM HG: CPT | Performed by: REGISTERED NURSE

## 2025-02-14 PROCEDURE — 83036 HEMOGLOBIN GLYCOSYLATED A1C: CPT | Performed by: REGISTERED NURSE

## 2025-02-14 PROCEDURE — G8427 DOCREV CUR MEDS BY ELIG CLIN: HCPCS | Performed by: REGISTERED NURSE

## 2025-02-14 PROCEDURE — 99215 OFFICE O/P EST HI 40 MIN: CPT | Performed by: REGISTERED NURSE

## 2025-02-14 PROCEDURE — 3052F HG A1C>EQUAL 8.0%<EQUAL 9.0%: CPT | Performed by: REGISTERED NURSE

## 2025-02-14 PROCEDURE — 4004F PT TOBACCO SCREEN RCVD TLK: CPT | Performed by: REGISTERED NURSE

## 2025-02-14 RX ORDER — FUROSEMIDE 80 MG/1
80 TABLET ORAL 2 TIMES DAILY
COMMUNITY

## 2025-02-14 RX ORDER — GLIPIZIDE AND METFORMIN HCL 5; 500 MG/1; MG/1
1 TABLET, FILM COATED ORAL
Qty: 180 TABLET | Refills: 1 | Status: SHIPPED | OUTPATIENT
Start: 2025-02-14 | End: 2025-08-13

## 2025-02-14 RX ORDER — PIOGLITAZONE 15 MG/1
15 TABLET ORAL DAILY
Qty: 90 TABLET | Refills: 0 | Status: SHIPPED | OUTPATIENT
Start: 2025-02-14 | End: 2025-05-15

## 2025-02-14 RX ORDER — POTASSIUM CHLORIDE 1.5 G/1.58G
20 POWDER, FOR SOLUTION ORAL DAILY
COMMUNITY

## 2025-02-14 NOTE — PROGRESS NOTES
Ernesto Ny (:  1940) is a 84 y.o. male,Established patient, here for evaluation of the following chief complaint(s):  Diabetes Mellitus and Other (UPDATE CHRONIC CONDITIONS. [facility])         Assessment & Plan  Type 2 diabetes mellitus with microalbuminuria, without long-term current use of insulin (HCC)   Chronic, not at goal (unstable),  A1c up to 8.2% today, will combine Metformin and Metaglip. Will also add Actos. I discussed with pt possibly adding a GLP-1 injection; however, pt would prefer to add an oral medication instead of injections. Therefore, Actos was added.   - pt was educated on proper diet and exercise.   - f/u in 3 months for A1c check.  Orders:    POCT glycosylated hemoglobin (Hb A1C)    glipiZIDE-metFORMIN (METAGLIP) 5-500 MG per tablet; Take 1 tablet by mouth 2 times daily (before meals)    pioglitazone (ACTOS) 15 MG tablet; Take 1 tablet by mouth daily    Albumin/Creatinine Ratio, Urine; Future    Albumin/Creatinine Ratio, Urine    Hematuria, unspecified type   New, not at goal (unstable), UA negative for hematuria or other concerning signs of a UTI. Will send urine for culture.     Orders:    POCT Urinalysis no Micro    Culture, Urine; Future    Culture, Urine    Chronic combined systolic and diastolic congestive heart failure (HCC)   Chronic, at goal (stable), BP on the lower side likely r/t Furosemide dose.   - will contact Dr. Baca's office to make them aware of pt's BP readings.   - education provided regarding proper diet and exercise - DASH and Mediterranean diets.        Non-pressure chronic ulcer of other part of right lower leg with unspecified severity (HCC)   Chronic, at goal (stable), continue current treatment plan         Chronic obstructive pulmonary disease with acute exacerbation (HCC)   Chronic, at goal (stable), continue current treatment plan         Screening for malignant neoplasm of prostate       Orders:    PSA, Total and Free; Future      No follow-ups on

## 2025-02-14 NOTE — ASSESSMENT & PLAN NOTE
Chronic, not at goal (unstable), A1c up to 8.2% today, will combine Metformin and Metaglip. Will also add Actos. I discussed with pt possibly adding a GLP-1 injection; however, pt would prefer to add an oral medication instead of injections. Therefore, Actos was added.   - pt was educated on proper diet and exercise.   - f/u in 3 months for A1c check.  Orders:    POCT glycosylated hemoglobin (Hb A1C)    glipiZIDE-metFORMIN (METAGLIP) 5-500 MG per tablet; Take 1 tablet by mouth 2 times daily (before meals)    pioglitazone (ACTOS) 15 MG tablet; Take 1 tablet by mouth daily    Albumin/Creatinine Ratio, Urine; Future    Albumin/Creatinine Ratio, Urine

## 2025-02-15 LAB
CREAT UR-MCNC: 85.9 MG/DL (ref 39–259)
MICROALBUMIN UR DL<=1MG/L-MCNC: 4 MG/DL
MICROALBUMIN/CREAT UR: 46.6 MG/G (ref 0–30)

## 2025-02-16 LAB — BACTERIA UR CULT: NORMAL

## 2025-02-20 ENCOUNTER — LAB (OUTPATIENT)
Dept: FAMILY MEDICINE CLINIC | Age: 85
End: 2025-02-20
Payer: MEDICARE

## 2025-02-20 DIAGNOSIS — Z12.5 SCREENING FOR MALIGNANT NEOPLASM OF PROSTATE: ICD-10-CM

## 2025-02-20 PROCEDURE — 36415 COLL VENOUS BLD VENIPUNCTURE: CPT | Performed by: REGISTERED NURSE

## 2025-02-21 LAB
PROSTATE SPECIFIC ANTIGEN: 2.67 NG/ML (ref 0–4)
PSA FREE MFR SERPL: 7.5 %
PSA FREE SERPL-MCNC: 0.2 UG/L

## 2025-03-11 ENCOUNTER — TELEPHONE (OUTPATIENT)
Dept: FAMILY MEDICINE CLINIC | Age: 85
End: 2025-03-11

## 2025-03-11 DIAGNOSIS — R05.1 ACUTE COUGH: ICD-10-CM

## 2025-03-11 DIAGNOSIS — R06.2 WHEEZING: Primary | ICD-10-CM

## 2025-03-11 RX ORDER — METHYLPREDNISOLONE 4 MG/1
TABLET ORAL
Qty: 21 TABLET | Refills: 0 | Status: SHIPPED | OUTPATIENT
Start: 2025-03-11 | End: 2025-03-17

## 2025-03-11 RX ORDER — ALBUTEROL SULFATE 90 UG/1
2 INHALANT RESPIRATORY (INHALATION) 4 TIMES DAILY PRN
Qty: 18 G | Refills: 0 | Status: SHIPPED | OUTPATIENT
Start: 2025-03-11

## 2025-03-11 NOTE — TELEPHONE ENCOUNTER
Claudia from [facility] needs 2 things:    Verbal order for nursing re certification    She would like for us to order a steroid for patient because his oxygen was low this morning and they did a treatment and got it back up into the 90's, but he has wheezing.     Claudia was not sure what pharmacy - I told her the list and she said to send to:    [facility] - [address] - phone no.[phone]    Please give her a call back.

## 2025-04-24 RX ORDER — ATORVASTATIN CALCIUM 40 MG/1
40 TABLET, FILM COATED ORAL DAILY
Qty: 90 TABLET | Refills: 0 | Status: SHIPPED | OUTPATIENT
Start: 2025-04-24

## 2025-05-08 ENCOUNTER — TELEPHONE (OUTPATIENT)
Dept: FAMILY MEDICINE CLINIC | Age: 85
End: 2025-05-08

## 2025-05-08 DIAGNOSIS — E11.29 TYPE 2 DIABETES MELLITUS WITH MICROALBUMINURIA, WITHOUT LONG-TERM CURRENT USE OF INSULIN (HCC): Primary | ICD-10-CM

## 2025-05-08 DIAGNOSIS — R80.9 TYPE 2 DIABETES MELLITUS WITH MICROALBUMINURIA, WITHOUT LONG-TERM CURRENT USE OF INSULIN (HCC): Primary | ICD-10-CM

## 2025-05-08 NOTE — TELEPHONE ENCOUNTER
Claudia from [facility] called wanting a verbal order for recertification for nursing. She believes the pt would prefer to stay at [facility] because it is more local.     She also said he has never picked up his FARXIGA per the pharmacy because it was $500. She believes this would explain his spiked A1C.    Please give her a call back, her phone is a secure line.

## 2025-05-09 NOTE — TELEPHONE ENCOUNTER
Claudia called the pt to follow up and he stated the pharmacy told him Jardiance is also over $500 and they will not be getting that either. Is there anything else that can be sent in?

## 2025-05-09 NOTE — TELEPHONE ENCOUNTER
OK for verbal order.    I sent Jardiance to his pharmacy, he will take this daily.    Thank you.       Called and left detailed message for Claudia at [facility] and advised on Marcelle ray.

## 2025-05-11 DIAGNOSIS — E11.29 TYPE 2 DIABETES MELLITUS WITH MICROALBUMINURIA, WITHOUT LONG-TERM CURRENT USE OF INSULIN (HCC): ICD-10-CM

## 2025-05-11 DIAGNOSIS — R80.9 TYPE 2 DIABETES MELLITUS WITH MICROALBUMINURIA, WITHOUT LONG-TERM CURRENT USE OF INSULIN (HCC): ICD-10-CM

## 2025-05-11 DIAGNOSIS — R35.0 URINARY FREQUENCY: ICD-10-CM

## 2025-05-12 RX ORDER — PIOGLITAZONE 15 MG/1
15 TABLET ORAL DAILY
Qty: 90 TABLET | Refills: 0 | Status: SHIPPED | OUTPATIENT
Start: 2025-05-12

## 2025-05-12 RX ORDER — TAMSULOSIN HYDROCHLORIDE 0.4 MG/1
0.4 CAPSULE ORAL DAILY
Qty: 90 CAPSULE | Refills: 0 | Status: SHIPPED | OUTPATIENT
Start: 2025-05-12

## 2025-05-14 DIAGNOSIS — N06.9 ISOLATED PROTEINURIA WITH MORPHOLOGIC LESION: Primary | ICD-10-CM

## 2025-05-14 RX ORDER — LISINOPRIL 5 MG/1
5 TABLET ORAL DAILY
Qty: 90 TABLET | Refills: 1 | Status: SHIPPED | OUTPATIENT
Start: 2025-05-14

## 2025-05-15 ENCOUNTER — HOSPITAL ENCOUNTER (OUTPATIENT)
Dept: GENERAL RADIOLOGY | Age: 85
Discharge: HOME | End: 2025-05-15
Payer: MEDICARE

## 2025-05-15 ENCOUNTER — HOSPITAL ENCOUNTER (OUTPATIENT)
Age: 85
Discharge: HOME | End: 2025-05-15
Payer: MEDICARE

## 2025-05-15 ENCOUNTER — TELEPHONE (OUTPATIENT)
Dept: FAMILY MEDICINE CLINIC | Age: 85
End: 2025-05-15

## 2025-05-15 DIAGNOSIS — R06.02 SOB (SHORTNESS OF BREATH): ICD-10-CM

## 2025-05-15 DIAGNOSIS — R06.02 SOB (SHORTNESS OF BREATH): Primary | ICD-10-CM

## 2025-05-15 PROCEDURE — 71046 X-RAY EXAM CHEST 2 VIEWS: CPT

## 2025-05-15 NOTE — TELEPHONE ENCOUNTER
Vi from [facility] is calling - Patient is having increase shortness of breath today, he can't get a good breath, she did not hear any fluids in his lungs, but did hear wheezing thru out. She suspect it is his COPD. He is coughing-gray. Please give her a call back.     [facility]- [address] - phone no.[phone]

## 2025-05-15 NOTE — TELEPHONE ENCOUNTER
CALLED AND SPOKE TO RAMIREZ AT [facility] AND ADVISED ON MAURILIO MESSAGE, SHE SAID THE CHEST X RAY SHOULD BE GOOD, SHE IS GOING TO CALL THE PATIENT TO LET HIM KNOW. PLEASE PLACE ORDER THAN CAN CLOSE MESSAGE. SC

## 2025-05-15 NOTE — TELEPHONE ENCOUNTER
Since we do not really have any appointments today, see if they would be able to get him to the hospital for a chest x-ray.  If so, I can order.

## 2025-05-16 ENCOUNTER — RESULTS FOLLOW-UP (OUTPATIENT)
Dept: FAMILY MEDICINE CLINIC | Age: 85
End: 2025-05-16

## 2025-05-29 ENCOUNTER — TELEPHONE (OUTPATIENT)
Dept: FAMILY MEDICINE CLINIC | Age: 85
End: 2025-05-29

## 2025-05-29 NOTE — TELEPHONE ENCOUNTER
Pt has some Lisinopril written by Marcelle on May 14, 25.  He is not taking this because he is not sure why he   Has it.    Please call pt     Should he be taking this?  And Why?

## 2025-05-30 NOTE — TELEPHONE ENCOUNTER
Marcelle Dasilva, [facility] - Caitlyn Chin MA; [facility] Practice Staff1 hour ago (7:12 AM)       Per our previous messages, he left a urine sample that was showing albumin in his urine indicating his diabetes has affected his kidneys.  At first, I prescribed Farxiga and Jardiance for kidney protection but these medications were too expensive for  him.  So I spoke with his cardiologist made him aware of my concerns, we both agree that patient could benefit from being on a very low-dose of lisinopril to help prevent further kidney damage as a result of his diabetes.     CALLED AND SPOKE TO EDDIE AT [facility] AND ADVISED ON MARCELLE MESSAGE. SC

## 2025-07-10 ENCOUNTER — TELEPHONE (OUTPATIENT)
Dept: FAMILY MEDICINE CLINIC | Age: 85
End: 2025-07-10

## 2025-07-14 ENCOUNTER — TELEPHONE (OUTPATIENT)
Dept: NEPHROLOGY | Age: 85
End: 2025-07-14

## 2025-07-15 NOTE — TELEPHONE ENCOUNTER
Called patient, jennifer Wolfe answered phone, informed him that labs done today for Mr. Ernesto Ny showed a critical value of potassium of 6.8 (specimen non hemolyzed). Advised patient to present to the nearest ER to treat hyperkalemia. Verbalized understanding.

## 2025-07-16 ENCOUNTER — TELEPHONE (OUTPATIENT)
Dept: FAMILY MEDICINE CLINIC | Age: 85
End: 2025-07-16

## 2025-07-16 DIAGNOSIS — E87.5 HYPERKALEMIA: Primary | ICD-10-CM

## 2025-07-16 NOTE — TELEPHONE ENCOUNTER
SON CALLED BACK AND STATED THAT THE KIDNEY DOCTOR WANTS A NEW POTASSIUM LEVEL CHECKED, IT WAS REALLY HIGH AT THE LAST DRAW AND SON SAID THEY HAD TO TAKE HIM TO THE ER YESTERDAY FOR THE POTASSIUM. SAW KIDNEY DOCTOR YESTERDAY AND THEY WANTS PANELS REDRAWN. SC

## 2025-07-16 NOTE — TELEPHONE ENCOUNTER
----- Message from Martinez RENNER sent at 7/16/2025  8:42 AM EDT -----  Regarding: ECC Message to Provider  ECC Message to Provider    Relationship to Patient: Other ; Son, Aiden DAY    Additional Information :  Patient would also need bloodwork for the appointment on 07/18 for the kidney doctor      --------------------------------------------------------------------------------------------------------------------------    Call Back Information: OK to leave message on voicemail  Preferred Call Back Number: Phone : [phone]

## 2025-07-16 NOTE — TELEPHONE ENCOUNTER
CALLED AND LEFT DETAILED MESSAGE FOR PATIENT SON TO CALL BACK TO LET US KNOW WHAT BLOOD TEST HE IS ASKING ABOUT. PATIENT JUST HAD A BUNCH OF LABS DONE BY KIDNEY DOCTOR 4 DAYS AGO, NOT SURE WHAT MORE HE WANTS CHECKED OUT. AWAITING CALL BACK FROM SON. SC

## 2025-07-18 ENCOUNTER — OFFICE VISIT (OUTPATIENT)
Dept: FAMILY MEDICINE CLINIC | Age: 85
End: 2025-07-18
Payer: MEDICARE

## 2025-07-18 VITALS
SYSTOLIC BLOOD PRESSURE: 110 MMHG | OXYGEN SATURATION: 91 % | WEIGHT: 218 LBS | HEART RATE: 81 BPM | HEIGHT: 66 IN | BODY MASS INDEX: 35.03 KG/M2 | DIASTOLIC BLOOD PRESSURE: 66 MMHG

## 2025-07-18 DIAGNOSIS — G56.22 CUBITAL TUNNEL SYNDROME ON LEFT: Primary | ICD-10-CM

## 2025-07-18 DIAGNOSIS — J18.9 COMMUNITY ACQUIRED PNEUMONIA OF RIGHT LOWER LOBE OF LUNG: ICD-10-CM

## 2025-07-18 DIAGNOSIS — Z71.89 ACP (ADVANCE CARE PLANNING): ICD-10-CM

## 2025-07-18 DIAGNOSIS — E87.5 HYPERKALEMIA: ICD-10-CM

## 2025-07-18 LAB
ANION GAP SERPL CALCULATED.3IONS-SCNC: 9 MMOL/L (ref 3–16)
BUN SERPL-MCNC: 40 MG/DL (ref 7–20)
CALCIUM SERPL-MCNC: 8.5 MG/DL (ref 8.3–10.6)
CHLORIDE SERPL-SCNC: 107 MMOL/L (ref 99–110)
CO2 SERPL-SCNC: 26 MMOL/L (ref 21–32)
CREAT SERPL-MCNC: 1.6 MG/DL (ref 0.8–1.3)
GFR SERPLBLD CREATININE-BSD FMLA CKD-EPI: 42 ML/MIN/{1.73_M2}
GLUCOSE SERPL-MCNC: 118 MG/DL (ref 70–99)
POTASSIUM SERPL-SCNC: 5.3 MMOL/L (ref 3.5–5.1)
SODIUM SERPL-SCNC: 142 MMOL/L (ref 136–145)

## 2025-07-18 PROCEDURE — 1123F ACP DISCUSS/DSCN MKR DOCD: CPT | Performed by: REGISTERED NURSE

## 2025-07-18 PROCEDURE — 4004F PT TOBACCO SCREEN RCVD TLK: CPT | Performed by: REGISTERED NURSE

## 2025-07-18 PROCEDURE — 3074F SYST BP LT 130 MM HG: CPT | Performed by: REGISTERED NURSE

## 2025-07-18 PROCEDURE — 3078F DIAST BP <80 MM HG: CPT | Performed by: REGISTERED NURSE

## 2025-07-18 PROCEDURE — 99214 OFFICE O/P EST MOD 30 MIN: CPT | Performed by: REGISTERED NURSE

## 2025-07-18 PROCEDURE — G8417 CALC BMI ABV UP PARAM F/U: HCPCS | Performed by: REGISTERED NURSE

## 2025-07-18 PROCEDURE — 36415 COLL VENOUS BLD VENIPUNCTURE: CPT | Performed by: REGISTERED NURSE

## 2025-07-18 PROCEDURE — G8427 DOCREV CUR MEDS BY ELIG CLIN: HCPCS | Performed by: REGISTERED NURSE

## 2025-07-18 PROCEDURE — 1159F MED LIST DOCD IN RCRD: CPT | Performed by: REGISTERED NURSE

## 2025-07-18 RX ORDER — TAMSULOSIN HYDROCHLORIDE 0.4 MG/1
0.4 CAPSULE ORAL DAILY
COMMUNITY

## 2025-07-18 RX ORDER — AZITHROMYCIN 250 MG/1
TABLET, FILM COATED ORAL
Qty: 6 TABLET | Refills: 0 | Status: SHIPPED | OUTPATIENT
Start: 2025-07-18 | End: 2025-07-18

## 2025-07-18 RX ORDER — CEFUROXIME AXETIL 500 MG/1
500 TABLET ORAL 2 TIMES DAILY
Qty: 14 TABLET | Refills: 0 | Status: SHIPPED | OUTPATIENT
Start: 2025-07-18 | End: 2025-07-25

## 2025-07-18 RX ORDER — DOXYCYCLINE HYCLATE 100 MG
100 TABLET ORAL 2 TIMES DAILY
Qty: 14 TABLET | Refills: 0 | Status: SHIPPED | OUTPATIENT
Start: 2025-07-18 | End: 2025-07-25

## 2025-07-18 ASSESSMENT — ENCOUNTER SYMPTOMS
COUGH: 1
SHORTNESS OF BREATH: 0
WHEEZING: 0

## 2025-07-18 NOTE — PROGRESS NOTES
Assessment & Plan  Cubital tunnel syndrome  - Symptoms suggest possible cubital tunnel syndrome, characterized by numbness, tingling, and burning sensations in the left arm. Considered medial and lateral epicondylitis but pt does not experience pain at those areas. Pt's LUE is not weak, does not experience the pain above his elbow, suspicion for stroke is low.   - pt and son educated regarding diagnosis, mentioned getting an EMG study performed but pt and son decline at this time. Stressed importance of remaining active, frequent repositioning when sitting. Pt's son reports he has 3 pound weights, will try to get pt to utilize them.     Hyperkalemia  - Recently hospitalized due to a potassium level of 6.8.  - BMP will be ordered today to recheck potassium levels.  - Advised to follow a low-potassium diet    Community Acquired Pneumonia   - rhonchi auscultated to RLL, accompanied with moist, congested cough. CXR findings from ER r/w. Will go ahead and send in Ceftin and Doxycycline to pharmacy.   Advised to take Mucinex, symptom management, avoid NSAIDS given CKD history.       Subjective   History of Present Illness  The patient is an 85-year-old male here for LUE numbness, pain, and tingling that he's been experiencing for the past month. The discomfort intensifies when he's sitting down in a chair. Despite removing his watch nightly, he finds no relief from the numbness and tingling. He has not taken any medication for these symptoms. He sleeps on his side which he thinks may contribute to his symptoms. Movement, standing relieves the numbness/tingling.     Also, he was recently in the ER for hyperkalemia, he needs his BMP levels rechecked. He will be seeing nephrology within the next couple of months.     Additionally, he has a loose, congested cough. A CXR performed in the ER showed \"R basilar opacity appearing to represent a combination of pleural fluid and underlying infiltrate or atelectasis.\"   Pt denies

## 2025-07-18 NOTE — PATIENT INSTRUCTIONS

## 2025-07-19 ASSESSMENT — ENCOUNTER SYMPTOMS: CHEST TIGHTNESS: 0

## 2025-07-20 ENCOUNTER — RESULTS FOLLOW-UP (OUTPATIENT)
Dept: FAMILY MEDICINE CLINIC | Age: 85
End: 2025-07-20

## 2025-07-20 DIAGNOSIS — E87.5 HYPERKALEMIA: Primary | ICD-10-CM

## 2025-07-22 RX ORDER — ATORVASTATIN CALCIUM 40 MG/1
40 TABLET, FILM COATED ORAL DAILY
Qty: 90 TABLET | Refills: 0 | Status: SHIPPED | OUTPATIENT
Start: 2025-07-22

## 2025-07-22 NOTE — TELEPHONE ENCOUNTER
LV 7/18/25 WITH LB NV NONE  
Quality 431: Preventive Care And Screening: Unhealthy Alcohol Use - Screening: Patient not identified as an unhealthy alcohol user when screened for unhealthy alcohol use using a systematic screening method
Quality 47: Advance Care Plan: Advance Care Planning discussed and documented; advance care plan or surrogate decision maker documented in the medical record.
Detail Level: Simple
Quality 226: Preventive Care And Screening: Tobacco Use: Screening And Cessation Intervention: Patient screened for tobacco use and is an ex/non-smoker
Quality 111:Pneumonia Vaccination Status For Older Adults: Patient received any pneumococcal conjugate or polysaccharide vaccine on or after their 60th birthday and before the end of the measurement period
Quality 130: Documentation Of Current Medications In The Medical Record: Current Medications Documented

## 2025-08-05 ENCOUNTER — LAB (OUTPATIENT)
Dept: FAMILY MEDICINE CLINIC | Age: 85
End: 2025-08-05
Payer: MEDICARE

## 2025-08-05 DIAGNOSIS — E87.5 HYPERKALEMIA: ICD-10-CM

## 2025-08-05 LAB
ANION GAP SERPL CALCULATED.3IONS-SCNC: 9 MMOL/L (ref 3–16)
BUN SERPL-MCNC: 50 MG/DL (ref 7–20)
CALCIUM SERPL-MCNC: 9 MG/DL (ref 8.3–10.6)
CHLORIDE SERPL-SCNC: 102 MMOL/L (ref 99–110)
CO2 SERPL-SCNC: 33 MMOL/L (ref 21–32)
CREAT SERPL-MCNC: 1.6 MG/DL (ref 0.8–1.3)
GFR SERPLBLD CREATININE-BSD FMLA CKD-EPI: 42 ML/MIN/{1.73_M2}
GLUCOSE SERPL-MCNC: 136 MG/DL (ref 70–99)
POTASSIUM SERPL-SCNC: 5.4 MMOL/L (ref 3.5–5.1)
SODIUM SERPL-SCNC: 144 MMOL/L (ref 136–145)

## 2025-08-05 PROCEDURE — 36415 COLL VENOUS BLD VENIPUNCTURE: CPT | Performed by: NURSE PRACTITIONER

## 2025-08-06 PROBLEM — Z86.73 HISTORY OF STROKE: Status: ACTIVE | Noted: 2024-01-09

## 2025-08-07 DIAGNOSIS — N40.1 BPH ASSOCIATED WITH NOCTURIA: ICD-10-CM

## 2025-08-07 DIAGNOSIS — R35.1 BPH ASSOCIATED WITH NOCTURIA: ICD-10-CM

## 2025-08-07 DIAGNOSIS — R35.0 URINARY FREQUENCY: Primary | ICD-10-CM

## 2025-08-07 RX ORDER — TAMSULOSIN HYDROCHLORIDE 0.4 MG/1
0.4 CAPSULE ORAL DAILY
Qty: 30 CAPSULE | OUTPATIENT
Start: 2025-08-07

## 2025-08-07 RX ORDER — TAMSULOSIN HYDROCHLORIDE 0.4 MG/1
0.4 CAPSULE ORAL DAILY
Qty: 90 CAPSULE | Refills: 1 | Status: SHIPPED | OUTPATIENT
Start: 2025-08-07

## 2025-08-09 DIAGNOSIS — R80.9 TYPE 2 DIABETES MELLITUS WITH MICROALBUMINURIA, WITHOUT LONG-TERM CURRENT USE OF INSULIN (HCC): ICD-10-CM

## 2025-08-09 DIAGNOSIS — E11.29 TYPE 2 DIABETES MELLITUS WITH MICROALBUMINURIA, WITHOUT LONG-TERM CURRENT USE OF INSULIN (HCC): ICD-10-CM

## 2025-08-11 RX ORDER — GLIPIZIDE AND METFORMIN HCL 5; 500 MG/1; MG/1
TABLET, FILM COATED ORAL
Qty: 180 TABLET | Refills: 0 | Status: SHIPPED | OUTPATIENT
Start: 2025-08-11

## 2025-08-11 RX ORDER — PIOGLITAZONE 15 MG/1
15 TABLET ORAL DAILY
Qty: 90 TABLET | Refills: 0 | Status: SHIPPED | OUTPATIENT
Start: 2025-08-11

## 2025-08-18 ENCOUNTER — TELEPHONE (OUTPATIENT)
Dept: FAMILY MEDICINE CLINIC | Age: 85
End: 2025-08-18

## 2025-08-20 ENCOUNTER — TELEPHONE (OUTPATIENT)
Dept: FAMILY MEDICINE CLINIC | Age: 85
End: 2025-08-20

## 2025-08-26 ENCOUNTER — TELEPHONE (OUTPATIENT)
Dept: FAMILY MEDICINE CLINIC | Age: 85
End: 2025-08-26

## 2025-08-27 ENCOUNTER — TELEPHONE (OUTPATIENT)
Dept: FAMILY MEDICINE CLINIC | Age: 85
End: 2025-08-27